# Patient Record
Sex: MALE | Race: WHITE | NOT HISPANIC OR LATINO | Employment: OTHER | URBAN - METROPOLITAN AREA
[De-identification: names, ages, dates, MRNs, and addresses within clinical notes are randomized per-mention and may not be internally consistent; named-entity substitution may affect disease eponyms.]

---

## 2017-04-04 DIAGNOSIS — E11.9 TYPE 2 DIABETES MELLITUS WITHOUT COMPLICATION, UNSPECIFIED LONG TERM INSULIN USE STATUS: ICD-10-CM

## 2017-04-04 DIAGNOSIS — E78.5 HYPERLIPIDEMIA, UNSPECIFIED HYPERLIPIDEMIA TYPE: Primary | ICD-10-CM

## 2017-04-05 DIAGNOSIS — Z12.5 SPECIAL SCREENING FOR MALIGNANT NEOPLASM OF PROSTATE: Primary | ICD-10-CM

## 2017-04-05 LAB
ALBUMIN SERPL-MCNC: 4.6 G/DL (ref 3.5–5.2)
ALBUMIN/GLOB SERPL: 1.8 G/DL
ALP SERPL-CCNC: 57 U/L (ref 39–117)
ALT SERPL-CCNC: 42 U/L (ref 1–41)
AST SERPL-CCNC: 43 U/L (ref 1–40)
BILIRUB SERPL-MCNC: 0.5 MG/DL (ref 0.1–1.2)
BUN SERPL-MCNC: 14 MG/DL (ref 8–23)
BUN/CREAT SERPL: 13.1 (ref 7–25)
CALCIUM SERPL-MCNC: 9.8 MG/DL (ref 8.6–10.5)
CHLORIDE SERPL-SCNC: 102 MMOL/L (ref 98–107)
CHOLEST SERPL-MCNC: 174 MG/DL (ref 0–200)
CO2 SERPL-SCNC: 25.6 MMOL/L (ref 22–29)
CREAT SERPL-MCNC: 1.07 MG/DL (ref 0.76–1.27)
GLOBULIN SER CALC-MCNC: 2.6 GM/DL
GLUCOSE SERPL-MCNC: 126 MG/DL (ref 65–99)
HBA1C MFR BLD: 7.87 % (ref 4.8–5.6)
HDLC SERPL-MCNC: 56 MG/DL (ref 40–60)
LDLC SERPL CALC-MCNC: 82 MG/DL (ref 0–100)
LDLC/HDLC SERPL: 1.47 {RATIO}
POTASSIUM SERPL-SCNC: 4.4 MMOL/L (ref 3.5–5.2)
PROT SERPL-MCNC: 7.2 G/DL (ref 6–8.5)
PSA SERPL-MCNC: 0.57 NG/ML (ref 0–4)
SODIUM SERPL-SCNC: 143 MMOL/L (ref 136–145)
TRIGL SERPL-MCNC: 179 MG/DL (ref 0–150)
VLDLC SERPL CALC-MCNC: 35.8 MG/DL (ref 5–40)

## 2017-04-07 ENCOUNTER — OFFICE VISIT (OUTPATIENT)
Dept: FAMILY MEDICINE CLINIC | Facility: CLINIC | Age: 67
End: 2017-04-07

## 2017-04-07 VITALS
OXYGEN SATURATION: 97 % | RESPIRATION RATE: 18 BRPM | DIASTOLIC BLOOD PRESSURE: 80 MMHG | WEIGHT: 302 LBS | SYSTOLIC BLOOD PRESSURE: 162 MMHG | HEIGHT: 74 IN | BODY MASS INDEX: 38.76 KG/M2 | HEART RATE: 90 BPM | TEMPERATURE: 97.7 F

## 2017-04-07 DIAGNOSIS — E11.9 TYPE 2 DIABETES MELLITUS WITHOUT COMPLICATION, WITHOUT LONG-TERM CURRENT USE OF INSULIN (HCC): Primary | ICD-10-CM

## 2017-04-07 DIAGNOSIS — G47.33 OBSTRUCTIVE SLEEP APNEA SYNDROME: ICD-10-CM

## 2017-04-07 DIAGNOSIS — R53.82 CHRONIC FATIGUE: ICD-10-CM

## 2017-04-07 DIAGNOSIS — E78.2 MIXED HYPERLIPIDEMIA: ICD-10-CM

## 2017-04-07 DIAGNOSIS — G47.10 HYPERSOMNIA: ICD-10-CM

## 2017-04-07 PROCEDURE — 99213 OFFICE O/P EST LOW 20 MIN: CPT

## 2017-04-07 RX ORDER — GLIMEPIRIDE 2 MG/1
2 TABLET ORAL
Qty: 30 TABLET | Refills: 2 | Status: SHIPPED | OUTPATIENT
Start: 2017-04-07 | End: 2017-07-15 | Stop reason: SDUPTHER

## 2017-04-07 RX ORDER — METFORMIN HYDROCHLORIDE 1000 MG/1
1000 TABLET, FILM COATED, EXTENDED RELEASE ORAL
COMMUNITY
End: 2017-10-10

## 2017-04-07 RX ORDER — GLIMEPIRIDE 2 MG/1
2 TABLET ORAL
COMMUNITY
End: 2017-04-07 | Stop reason: SDUPTHER

## 2017-04-07 RX ORDER — ALOGLIPTIN BENZOATE AND PIOGLITAZONE HYDROCHLORIDE 25; 30 MG/1; MG/1
TABLET, FILM COATED ORAL
COMMUNITY
End: 2017-05-18

## 2017-04-07 NOTE — PROGRESS NOTES
Subjective   Barry Cam is a 66 y.o. male. Patient is here today for   Chief Complaint   Patient presents with   • Diabetes   • Hyperlipidemia          Vitals:    04/07/17 0755   BP: 162/80   Pulse: 90   Resp: 18   Temp: 97.7 °F (36.5 °C)   SpO2: 97%     The following portions of the patient's history were reviewed and updated as appropriate: allergies, current medications, past family history, past medical history, past social history, past surgical history and problem list.    Past Medical History:   Diagnosis Date   • Allergic    • Depression    • Diabetes mellitus    • GERD (gastroesophageal reflux disease)    • History of hiatal hernia    • History of kidney stones    • Hyperglycemia    • Hyperlipidemia    • Sleep apnea       No Known Allergies   Social History     Social History   • Marital status:      Spouse name: N/A   • Number of children: N/A   • Years of education: N/A     Occupational History   • Not on file.     Social History Main Topics   • Smoking status: Never Smoker   • Smokeless tobacco: Not on file   • Alcohol use No   • Drug use: Not on file   • Sexual activity: Not on file     Other Topics Concern   • Not on file     Social History Narrative   • No narrative on file        Current Outpatient Prescriptions:   •  albuterol (PROVENTIL HFA;VENTOLIN HFA) 108 (90 BASE) MCG/ACT inhaler, Inhale 2 puffs Every 4 (Four) Hours As Needed for wheezing., Disp: 3 inhaler, Rfl: 3  •  ALLERGY/CONGESTION RELIEF 5-120 MG per 12 hr tablet, Take 1 tablet by mouth 2 (Two) Times a Day., Disp: 180 tablet, Rfl: 3  •  Alogliptin-Pioglitazone 25-30 MG tablet, Take  by mouth., Disp: , Rfl:   •  betamethasone valerate (VALISONE) 0.1 % cream, APPLY CREAM TOPICALLY TO AFFECTED AREA(S) TO SKIN TWICE DAILY, Disp: 15 g, Rfl: 1  •  glimepiride (AMARYL) 2 MG tablet, Take 1 tablet by mouth Every Morning Before Breakfast., Disp: 30 tablet, Rfl: 2  •  Glucosamine-Chondroit-Vit C-Mn (GLUCOSAMINE CHONDR 500 COMPLEX)  capsule, Take 2 capsules by mouth daily., Disp: , Rfl:   •  glucose blood (ONE TOUCH ULTRA TEST) test strip, USE TO TEST BLOOD SUGAR 3 TIMES DAILY AS DIRECTED, Disp: 100 each, Rfl: 3  •  glucose blood test strip, OneTouch Ultra Blue In Vitro Strip; Patient Sig: OneTouch Ultra Blue In Vitro Strip USE ONE STRIP TO CHECK GLUCOSE THREE TIMES DAILY; 100; 0; 09-Nov-2015; Active, Disp: , Rfl:   •  Lancets (ONETOUCH ULTRASOFT) lancets, USE TO TEST BLOOD SUGAR 3 TIMES DAILY AS DIRECTED, Disp: 100 each, Rfl: 3  •  metFORMIN (GLUMETZA) 1000 MG (MOD) 24 hr tablet, Take 1,000 mg by mouth Daily With Breakfast., Disp: , Rfl:   •  modafinil (PROVIGIL) 200 MG tablet, Take 1 tablet by mouth 2 (Two) Times a Day., Disp: 180 tablet, Rfl: 1  •  MULTIPLE VITAMINS ESSENTIAL PO, Take  by mouth daily., Disp: , Rfl:   •  naproxen sodium (ALEVE) 220 MG tablet, Take 2 tablets by mouth., Disp: , Rfl:   •  omeprazole (PriLOSEC) 20 MG capsule, Take 1 capsule by mouth Daily., Disp: 90 capsule, Rfl: 3  •  ONE TOUCH ULTRA TEST test strip, USE ONE STRIP TO CHECK GLUCOSE THREE TIMES DAILY, Disp: 100 each, Rfl: 5  •  pravastatin (PRAVACHOL) 40 MG tablet, Take 1 tablet by mouth Daily., Disp: 90 tablet, Rfl: 3     Objective     History of Present Illness   The patient is here today for follow-up on type 2 diabetes mellitus, hyperlipidemia, fatigue and hypersomnia.    Review of Systems   Constitutional:        The patient's main complaint is excessive daytime sleepiness which is not responding very well to his present medication.   HENT: Negative.    Respiratory: Negative for cough, shortness of breath and wheezing.    Cardiovascular: Negative for chest pain, palpitations and leg swelling.   Gastrointestinal: Negative for abdominal distention, abdominal pain and blood in stool.   Genitourinary: Negative.    Musculoskeletal:        Mild aches and pains only   Neurological: Negative for dizziness, light-headedness, numbness and headaches.    Psychiatric/Behavioral: Negative.        Physical Exam   Constitutional: He is oriented to person, place, and time. He appears well-developed and well-nourished.   Very overweight   Eyes: Pupils are equal, round, and reactive to light.   Neck:   Carotid pulses normal   Cardiovascular: Normal rate, regular rhythm and normal heart sounds.    Pulmonary/Chest: Effort normal and breath sounds normal. No respiratory distress. He has no wheezes. He has no rales.   Abdominal: Soft. Bowel sounds are normal.   Musculoskeletal: Normal range of motion.   Neurological: He is alert and oriented to person, place, and time.   Skin: Skin is warm and dry.   Psychiatric: He has a normal mood and affect.   The patient does appear fatigued   Nursing note and vitals reviewed.      ASSESSMENT  #1 type 2 diabetes mellitus         #2 hyperlipidemia         #3 excessive daytime sleepiness         #4 obstructive sleep apnea      #5 fatigue     DISCUSSION/SUMMARY   The patient's blood pressure was initially elevated at 162/80 but upon recheck it was down to 148/80.  Patient states that he had really not gotten much sleep last night and he has been up since about 130 AM.  The patient was told to monitor his blood pressure at home and if it exceeds 140 systolic on average she will let us know.  PSA remains normal at 0.573.  CMP is normal except for elevated fasting blood sugar of 126 and the patient's hemoglobin A1c is 7.87%.  The patient has gained approximately 20 pounds since his last visit and admits that his diet has been poor.  We discussed, again, a low sugar low starch and low saturated fat diet as well as him increasing his aerobic exercise.  Total cholesterol is 174, triglycerides 179, HDL cholesterol 56, and LDL cholesterol is 82.  The patient's biggest complaint today is his excessive daytime fatigue and sleepiness which is not really being controlled with his current dose of modafinil 200 mg twice a day.  The patient was diagnosed  as having obstructive sleep apnea in 2014 but was unsuccessful at using his CPAP machine because he just could not stand this being on his face.  He is currently not using any type of apparatus for his obstructive sleep apnea and I explained to him that treatment of his excessive daytime fatigue will most likely be intertwined with treatment of his obstructive sleep apnea .  The patient is willing to see another sleep specialist to discuss treatment for both problems.  I will get his old records concerning his obstructive sleep apnea and then make a referral to another physician for evaluation of this problem.    PLAN  Recheck in 6 months with fasting CMP, lipid panel and HbA1c.  I will also add a free T4 and TSH to his labs were drawn several days ago.  No Follow-up on file.

## 2017-04-27 RX ORDER — MODAFINIL 200 MG/1
TABLET ORAL
Qty: 180 TABLET | Refills: 0 | Status: SHIPPED | OUTPATIENT
Start: 2017-04-27 | End: 2018-01-18 | Stop reason: SDUPTHER

## 2017-05-15 RX ORDER — LANCETS
EACH MISCELLANEOUS
Qty: 100 EACH | Refills: 1 | Status: SHIPPED | OUTPATIENT
Start: 2017-05-15 | End: 2018-01-22 | Stop reason: SDUPTHER

## 2017-05-18 ENCOUNTER — TELEPHONE (OUTPATIENT)
Dept: FAMILY MEDICINE CLINIC | Facility: CLINIC | Age: 67
End: 2017-05-18

## 2017-05-18 RX ORDER — PIOGLITAZONEHYDROCHLORIDE 30 MG/1
30 TABLET ORAL DAILY
Qty: 30 TABLET | Refills: 5 | Status: SHIPPED | OUTPATIENT
Start: 2017-05-18 | End: 2017-12-15 | Stop reason: SDUPTHER

## 2017-07-17 RX ORDER — GLIMEPIRIDE 2 MG/1
TABLET ORAL
Qty: 30 TABLET | Refills: 5 | Status: SHIPPED | OUTPATIENT
Start: 2017-07-17 | End: 2018-03-17 | Stop reason: SDUPTHER

## 2017-07-17 RX ORDER — LORATADINE, PSEUDOEPHEDRINE SULFATE 5; 120 MG/1; MG/1
1 TABLET, FILM COATED, EXTENDED RELEASE ORAL 2 TIMES DAILY
Qty: 180 TABLET | Refills: 3 | Status: SHIPPED | OUTPATIENT
Start: 2017-07-17 | End: 2017-11-17 | Stop reason: SDUPTHER

## 2017-07-19 ENCOUNTER — HOSPITAL ENCOUNTER (OUTPATIENT)
Dept: SLEEP MEDICINE | Facility: HOSPITAL | Age: 67
Discharge: HOME | End: 2017-07-19

## 2017-07-19 DIAGNOSIS — M79.602 LEFT ARM PAIN: Primary | ICD-10-CM

## 2017-07-19 PROCEDURE — 99204 OFFICE O/P NEW MOD 45 MIN: CPT | Performed by: INTERNAL MEDICINE

## 2017-07-23 NOTE — H&P
[facility] New Patient/Consultation       Reason for Consultation: GRICEL    Patient Care Team:  Austin Shaikh MD as PCP - General  Barry Gillespie MD as Consulting Physician (Sleep Medicine)    Chief complaint:  GRICEL    History of present illness:  The patient is a 67 y.o. male who has a history of severe GRICEL diagnosed by overnight polysomnogram in August 20 14 at [facility].  The patient only used his CPAP for short time because he could not stand it.  Today he has complaints of excessive daytime sleepiness with related occasional exhaustion.  He goes to bed between 10 PM and 2 AM and awakens between 5 and 7 AM.  Takes him a long time to go to sleep and is variable on how he feels upon awakening.  She will take 1 or 2 naps a day.  His [place] Sleepiness Scale is abnormal at 15.  He has gained weight.  He has nocturnal pain.  He grinds his teeth at night.  He has complaints of COLE.  He has difficulty staying asleep.  He will go to the bathroom once or twice during the nighttime.  He is sleepy even if he increases his times.    Review of Systems:  Recorded on the Sleep Questionnaire.  Unremarkable except for nasal congestion and postnasal drip, dizziness, and anxiety and depression.    History:  Past Medical History:   Diagnosis Date   • Allergic    • Depression    • Diabetes mellitus    • GERD (gastroesophageal reflux disease)    • History of hiatal hernia    • History of kidney stones    • Hyperglycemia    • Hyperlipidemia    • Sleep apnea    ,   Past Surgical History:   Procedure Laterality Date   • HERNIA REPAIR     ,   Family History   Problem Relation Age of Onset   • Depression Mother    • Coronary artery disease Father    • Diabetes Father    • Stroke Father     and   Social History   Substance Use Topics   • Smoking status: Never Smoker   • Smokeless tobacco: Not on file   • Alcohol use No       Social History:  He is a retired .  He will have 1 or 2 quarts of the  caffeinated beverage a day.    Allergies:  No known medical allergies.     Medication Review: Reviewed.    Vital Signs:  Height 73 inches and his weight is 304 pounds.  In 2014, his weight was 286 pounds.  He is morbidly obese with a body mass index of 40.  /79 and heart rate 94 and neck size 18 inches.     Physical Exam:  Recorded on the [facility] Physical Exam Form and is unremarkable except for:  A large tongue and uvula and mild narrowing of the posterior pharyngeal opening and class II-III MP airway.     Results Review:  I reviewed the results of his overnight polysomnogram as well as as titration study performed in 2014.       Impression:   History of severe GRICEL with sleep-related hypoxemia with the emergence of complex sleep apnea with positive airway pressure therapy, borderline, and 2014.  The patient has gained 20 pounds since that time.  He has complaints of hypersomnolence.    Plan:  Good sleep hygiene measures should be maintained weight loss would be beneficial.  Pathophysiology of obstructive sleep apnea described to the patient.  Cardiovascular complications of severe untreated GRICEL also reviewed.    I obtained due to reports of his overnight polysomnogram after the visit.  I was asked the patient to return for follow-up and to bring all of his sleep equipment.  All will be reviewed and further recommendations to be made.  Previously the patient asked about an oral appliance.  Due to the severity, that will not cure his present condition.    Thank you for allowing me to assist in this patient's care.    Barry Gillespie MD  07/23/17  9:17 AM

## 2017-08-21 ENCOUNTER — OFFICE VISIT (OUTPATIENT)
Dept: ORTHOPEDIC SURGERY | Facility: CLINIC | Age: 67
End: 2017-08-21

## 2017-08-21 VITALS — TEMPERATURE: 98.8 F | WEIGHT: 305 LBS | BODY MASS INDEX: 40.42 KG/M2 | HEIGHT: 73 IN

## 2017-08-21 DIAGNOSIS — M75.102 LEFT ROTATOR CUFF TEAR ARTHROPATHY: ICD-10-CM

## 2017-08-21 DIAGNOSIS — S49.92XA SHOULDER INJURY, LEFT, INITIAL ENCOUNTER: Primary | ICD-10-CM

## 2017-08-21 DIAGNOSIS — S69.92XA LEFT WRIST INJURY, INITIAL ENCOUNTER: ICD-10-CM

## 2017-08-21 DIAGNOSIS — M12.812 LEFT ROTATOR CUFF TEAR ARTHROPATHY: ICD-10-CM

## 2017-08-21 PROCEDURE — 20610 DRAIN/INJ JOINT/BURSA W/O US: CPT | Performed by: ORTHOPAEDIC SURGERY

## 2017-08-21 PROCEDURE — 73110 X-RAY EXAM OF WRIST: CPT | Performed by: ORTHOPAEDIC SURGERY

## 2017-08-21 PROCEDURE — 73030 X-RAY EXAM OF SHOULDER: CPT | Performed by: ORTHOPAEDIC SURGERY

## 2017-08-21 PROCEDURE — 99204 OFFICE O/P NEW MOD 45 MIN: CPT | Performed by: ORTHOPAEDIC SURGERY

## 2017-08-21 RX ADMIN — BUPIVACAINE HYDROCHLORIDE 2 ML: 5 INJECTION, SOLUTION PERINEURAL at 09:10

## 2017-08-21 RX ADMIN — METHYLPREDNISOLONE ACETATE 160 MG: 80 INJECTION, SUSPENSION INTRA-ARTICULAR; INTRALESIONAL; INTRAMUSCULAR; SOFT TISSUE at 09:10

## 2017-08-21 RX ADMIN — LIDOCAINE HYDROCHLORIDE 2 ML: 10 INJECTION, SOLUTION INFILTRATION; PERINEURAL at 09:10

## 2017-08-21 NOTE — PROGRESS NOTES
"Patient: Barry Cma    YOB: 1950    Medical Record Number: [id number]    Chief Complaints:  Left shoulder pain and weakness; left wrist pain    History of Present Illness:     67 y.o. male patient who presents with a long history of progressively worsening pain and dysfunction affecting the left shoulder.  This shoulder has bothered him for years but he fell in his yard at home approximately 5 weeks ago and his pain is a little worse.  He describes his current pain as mild to moderate, intermittent, and both crushing and grinding in character.  His biggest complaint is weakness.  This is new since his fall.   He struggles to raise the left arm on his own.   He tells me that he has to use his right hand to raise the left.  He tells me that he is occasionally able to raise the left arm on it sound lyons has to \"manipulated into position\" before he can raise it.  He has not noticed any alleviating factors for his pain or weakness.    He also tells me that he injured his left wrist when he fell.  He was having some discomfort into the dorsum of the wrist.  The wrist pain is now completely resolved.  He tells me the wrist feels like it is \"back to normal\" as he states it.    Allergies: No Known Allergies    Home Medications:    Current Outpatient Prescriptions:   •  albuterol (PROVENTIL HFA;VENTOLIN HFA) 108 (90 BASE) MCG/ACT inhaler, Inhale 2 puffs Every 4 (Four) Hours As Needed for wheezing., Disp: 3 inhaler, Rfl: 3  •  ALLERGY/CONGESTION RELIEF 5-120 MG per 12 hr tablet, Take 1 tablet by mouth 2 (Two) Times a Day., Disp: 180 tablet, Rfl: 3  •  glimepiride (AMARYL) 2 MG tablet, TAKE ONE TABLET BY MOUTH IN THE MORNING BEFORE  BREAKFAST, Disp: 30 tablet, Rfl: 5  •  Glucosamine-Chondroit-Vit C-Mn (GLUCOSAMINE CHONDR 500 COMPLEX) capsule, Take 2 capsules by mouth daily., Disp: , Rfl:   •  Lancets (ONETOUCH ULTRASOFT) lancets, USE ONE LANCET TWO TO THREE TIMES DAILY TO TEST BLOOD GLUCOSE, Disp: 100 each, " Rfl: 1  •  metFORMIN (GLUCOPHAGE) 1000 MG tablet, , Disp: , Rfl:   •  metFORMIN (GLUMETZA) 1000 MG (MOD) 24 hr tablet, Take 1,000 mg by mouth Daily With Breakfast., Disp: , Rfl:   •  modafinil (PROVIGIL) 200 MG tablet, TAKE ONE TABLET BY MOUTH TWICE DAILY, Disp: 180 tablet, Rfl: 0  •  MULTIPLE VITAMINS ESSENTIAL PO, Take  by mouth daily., Disp: , Rfl:   •  naproxen sodium (ALEVE) 220 MG tablet, Take 2 tablets by mouth., Disp: , Rfl:   •  omeprazole (PriLOSEC) 20 MG capsule, Take 1 capsule by mouth Daily., Disp: 90 capsule, Rfl: 3  •  ONE TOUCH ULTRA TEST test strip, USE ONE STRIP TO CHECK GLUCOSE THREE TIMES DAILY, Disp: 100 each, Rfl: 1  •  pioglitazone (ACTOS) 30 MG tablet, Take 1 tablet by mouth Daily., Disp: 30 tablet, Rfl: 5  •  pravastatin (PRAVACHOL) 40 MG tablet, Take 1 tablet by mouth Daily., Disp: 90 tablet, Rfl: 3  •  SITagliptin (JANUVIA) 100 MG tablet, Take 1 tablet by mouth Daily., Disp: 30 tablet, Rfl: 5  •  betamethasone valerate (VALISONE) 0.1 % cream, APPLY CREAM TOPICALLY TO AFFECTED AREA(S) TO SKIN TWICE DAILY, Disp: 15 g, Rfl: 1    Past Medical History:   Diagnosis Date   • Allergic    • Depression    • Diabetes mellitus    • GERD (gastroesophageal reflux disease)    • History of hiatal hernia    • History of kidney stones    • Hyperglycemia    • Hyperlipidemia    • Sleep apnea        Past Surgical History:   Procedure Laterality Date   • HERNIA REPAIR         Social History     Occupational History   • Not on file.     Social History Main Topics   • Smoking status: Never Smoker   • Smokeless tobacco: Not on file   • Alcohol use No   • Drug use: Not on file   • Sexual activity: Not on file      Social History     Social History Narrative       Family History   Problem Relation Age of Onset   • Depression Mother    • Coronary artery disease Father    • Diabetes Father    • Stroke Father        Review of Systems:      Constitutional: Denies fever, shaking or chills   Eyes: Denies change in visual  "acuity   HEENT: Denies nasal congestion or sore throat   Respiratory: Denies cough or shortness of breath   Cardiovascular: Denies chest pain or edema  Endocrine: Denies tremors, palpitations, intolerance of heat or cold, polyuria, polydipsia.  GI: Denies abdominal pain, nausea, vomiting, bloody stools or diarrhea  : Denies frequency, urgency, incontinence, retention, or nocturia.  Musculoskeletal: Denies numbness tingling or loss of motor function except as above  Integument: Denies rash, lesion or ulceration   Neurologic: Denies headache or focal weakness, deficits  Heme: Denies epistaxis, spontaneous or excessive bleeding, epistaxis, hematuria, melena, fatigue, enlarged or tender lymph nodes.      All other pertinent positives and negatives as noted above in HPI.    Physical Exam: 67 y.o. male  Vitals:    08/21/17 0929   Temp: 98.8 °F (37.1 °C)   TempSrc: Temporal Artery    Weight: (!) 305 lb (138 kg)   Height: 73\" (185.4 cm)     General:  Patient is awake and alert.  Appears in no acute distress or discomfort.  Oriented to person, place, and time.    Psych:  Affect and demeanor are appropriate.    Eyes:  Conjunctiva and sclera appear grossly normal.  Eyes track well and EOM seem to be intact.    Ears:  No gross abnormalities.  Hearing adequate for the exam.    Cardiovascular:  Regular rate and rhythm.    Lungs:  Good chest expansion.  Breathing unlabored.    Spine:  Neck appears grossly normal.  No palpable masses or adenopathy.  Good motion.  Spurling's maneuver is negative for any shoulder or arm symptoms.    Extremities:  Skin is benign.  No obvious gross abnormalities about left shoulder.  No palpable masses or adenopathy.  Trace bursal effusion.  Moderate tenderness noted over anterior glenohumeral joint and rotator interval.  Motion is limited and uncomfortable.  He has to use his right arm to assist with raising the left.  No instability.  4 out of 5 strength with resistive testing of elevation in the " scapular plane and external rotation.  15° external rotation lag sign.  Negative Hornblower's.  The deltoid fires well.  Axillary nerve sensation is intact.  Good motor and sensory function in lower arm and hand.  No tenderness at the wrist.  He has full wrist motion and there is no evident instability.  Palpable radial pulse.  Brisk cap refill.  Good skin turgor.         Radiology:  AP, scapular Y, and axillary views of the left shoulder are ordered by myself and reviewed to evaluate the patient's complaint.  No comparison films are immediately available.  The x-rays show significant rotator cuff tear arthropathy with a diminished acromiohumeral interval, joint space narrowing, osteophyte formation, and subchondral sclerosis.  AP, oblique, and lateral views of the left wrist are also ordered and reviewed.  Again, no comparison films are available.  There is questionable widening of the scapholunate interval.  I do not see any other concerning findings on the x-rays.    Assessment/Plan:  1.  Left shoulder rotator cuff tear arthropathy with suspected acute on chronic rotator cuff tear  2.  Left wrist sprain, possible scapholunate ligament injury  3.  Diabetes mellitus    For the shoulder, we discussed treatment options in detail including the risks, benefits, and alternatives of conservative treatment versus surgical options.  Regarding conservative treatment, we discussed appropriate activity modifications, anti-inflammatories, injections, and physical therapy.  We also discussed the option of an arthroplasty and all that would entail.  I have recommended that we start with a conservative approach and he agrees.  The risks, benefits, and alternatives to an injection were carefully discussed, particularly with respect to his diabetes.  He acknowledged understanding of this information and consented to proceed.    His wrist pain is completely resolved at this point.  We will manage that expectantly.  If the shoulder  pain persists or recurs or if the wrist pain recurs, I told him to let me know and I will be happy to see him back.  Otherwise, he can follow-up as needed.    Large Joint Arthrocentesis  Date/Time: 8/21/2017 9:10 AM  Consent given by: patient  Site marked: site marked  Timeout: Immediately prior to procedure a time out was called to verify the correct patient, procedure, equipment, support staff and site/side marked as required   Supporting Documentation  Indications: pain   Procedure Details  Location: shoulder - L subacromial bursa  Preparation: Patient was prepped and draped in the usual sterile fashion  Needle size: 25 G  Approach: posterior  Medications administered: 2 mL lidocaine 1 %; 160 mg methylPREDNISolone acetate 80 MG/ML; 2 mL bupivacaine  Patient tolerance: patient tolerated the procedure well with no immediate complications            Noah Salcedo MD    08/21/2017    CC to Austin Shaikh MD

## 2017-08-22 RX ORDER — METHYLPREDNISOLONE ACETATE 80 MG/ML
160 INJECTION, SUSPENSION INTRA-ARTICULAR; INTRALESIONAL; INTRAMUSCULAR; SOFT TISSUE
Status: COMPLETED | OUTPATIENT
Start: 2017-08-21 | End: 2017-08-21

## 2017-08-22 RX ORDER — LIDOCAINE HYDROCHLORIDE 10 MG/ML
2 INJECTION, SOLUTION INFILTRATION; PERINEURAL
Status: COMPLETED | OUTPATIENT
Start: 2017-08-21 | End: 2017-08-21

## 2017-08-22 RX ORDER — BUPIVACAINE HYDROCHLORIDE 5 MG/ML
2 INJECTION, SOLUTION PERINEURAL
Status: COMPLETED | OUTPATIENT
Start: 2017-08-21 | End: 2017-08-21

## 2017-08-23 ENCOUNTER — HOSPITAL ENCOUNTER (OUTPATIENT)
Dept: SLEEP MEDICINE | Facility: HOSPITAL | Age: 67
Discharge: HOME | End: 2017-08-23
Admitting: INTERNAL MEDICINE

## 2017-08-23 ENCOUNTER — TELEPHONE (OUTPATIENT)
Dept: SLEEP MEDICINE | Facility: HOSPITAL | Age: 67
End: 2017-08-23

## 2017-08-23 PROCEDURE — 99213 OFFICE O/P EST LOW 20 MIN: CPT | Performed by: INTERNAL MEDICINE

## 2017-08-23 PROCEDURE — G0463 HOSPITAL OUTPT CLINIC VISIT: HCPCS

## 2017-08-26 NOTE — PROGRESS NOTES
Follow Up [facility] Note       Patient Care Team:  Austin Shaikh MD as PCP - General  Barry Gillespie MD as Consulting Physician (Sleep Medicine)    Chief Complaint:  GRICEL     Interval History:   The patient was last seen by me in July 2017.  He is here today with all of his sleep supplies.  He is unchanged.  His bedtime and wake time varies.  He has no new symptoms since I last saw him.  [place] Sleepiness Scale is abnormal at 10.    Review of Systems:  Recorded on the Sleep Questionnaire.  Unremarkable except for nasal congestion and COLE.    Social History:  He does not smoke cigarettes.  No alcohol.  2 bottles of tea a day.    Allergies:  No known medical allergies.     Medication Review:  Reviewed.      Vital Signs:  Height 73 inches and weight 300 pounds and he is obese with a body mass index of 39-40.    Physical Exam:    Constitutional:  Well developed white male and appears in no apparent distress.  Awake & oriented times 3.  Normal mood with normal recent and remote memory and normal judgement.  Eyes:  Conjunctivae normal.  Oropharynx:  moist mucous membranes without exudate and a large tongue and uvula and mild narrowing of the posterior pharyngeal opening and class II-III MP airway.      Results Review:  I reviewed downloads from 2014.  The patient has an auto CPAP set on 11 cm water pressure.  His AHI was normal at that time.  Over 4 days of using CPAP +11, average usage is 4 hours and 16 minutes with a maximum usage of 6 hours and 6 minutes.      Impression:    history of severe GRICEL with sleep-related hypoxemia with the emergence of complex sleep apnea with positive airway pressure therapy.      Plan:  Good sleep hygiene measurewhite female weight down to continueuld be maintained.  Weight loss would be beneficial in this patient who is obese by BMI.  The patient is benefiting from the treatment being provideI reviewed all with the patient.  His auto CPAP will be changed to 9-16.   A ResMed Air Fit small mask recommended with chinstrap.  The patient will work with his CPAP for 4-6 weeks to obtain compliance.      The patient will call for any problems and will follow up in 4-6 weeks.      Barry Gillespie MD  08/26/17  3:25 PM

## 2017-09-06 ENCOUNTER — TREATMENT (OUTPATIENT)
Dept: PHYSICAL THERAPY | Facility: CLINIC | Age: 67
End: 2017-09-06

## 2017-09-06 DIAGNOSIS — G89.29 CHRONIC LEFT SHOULDER PAIN: Primary | ICD-10-CM

## 2017-09-06 DIAGNOSIS — M75.102 TEAR OF LEFT ROTATOR CUFF, UNSPECIFIED TEAR EXTENT: ICD-10-CM

## 2017-09-06 DIAGNOSIS — M25.512 CHRONIC LEFT SHOULDER PAIN: Primary | ICD-10-CM

## 2017-09-06 PROCEDURE — G8984 CARRY CURRENT STATUS: HCPCS | Performed by: PHYSICAL THERAPIST

## 2017-09-06 PROCEDURE — 97110 THERAPEUTIC EXERCISES: CPT | Performed by: PHYSICAL THERAPIST

## 2017-09-06 PROCEDURE — 97161 PT EVAL LOW COMPLEX 20 MIN: CPT | Performed by: PHYSICAL THERAPIST

## 2017-09-06 PROCEDURE — G8985 CARRY GOAL STATUS: HCPCS | Performed by: PHYSICAL THERAPIST

## 2017-09-06 NOTE — PATIENT INSTRUCTIONS
Please view [facility] Barry Cam for a complete list of HEP instructions.  A&P of symptoms.   PT course of care, treatment outcomes.

## 2017-09-06 NOTE — PROGRESS NOTES
Physical Therapy Initial Evaluation    Patient Name: Barry Cam       Patient MRN: [id number]  : 1950  Physician:Noah Salcedo MD  Date: 2017    Encounter Diagnoses   Name Primary?   • Chronic left shoulder pain Yes   • Tear of left rotator cuff, unspecified tear extent        Subjective Evaluation    History of Present Illness  Date of surgery: 2017  Mechanism of injury: Pt reports he fell on the left side using the left arm to catch himself when he tripped in his garden.  Reports he waited 3 weeks, but it did not get better so he went to the MD because he was concerned something was broken.   Pt reports he hurt his wrist and shoulder when he fell, the wrist is feeling better, has some pain with pushing down or turning.   Reports MD stated he had hurt his shoulder previously and the fall probably tore the tendons further.   PMH: right leg fx, DM II      Patient Occupation: Retired  Quality of life: excellent    Pain  At worst pain rating: 3  Location: Front and top of the shoulder  Quality: sharp (Snapping. Audible popping, usually painfree)  Alleviating factors: Cortizone shot helped for 3 days. 2 Aleve 2x/day.  Aggravating factors: movement and lifting (Lifting out to the side)    Social Support  Lives in: multiple-level home  Lives with: spouse    Hand dominance: right    Diagnostic Tests  X-ray: abnormal (Arthritis)    Treatments  Previous treatment: injection treatment  Patient Goals  Patient goals for therapy: decreased pain           Objective     Postural Observations  Seated posture: fair (elevated and rounded shoulders)        Tenderness     Left Shoulder   Tenderness in the AC joint (Anterior GH joint), infraspinatus tendon and supraspinatus tendon.     Cervical/Thoracic Screen   Cervical range of motion within normal limits  Thoracic range of motion within normal limits    Neurological Testing   Sensation     Shoulder   Left Shoulder   Intact: light  touch    Right Shoulder   Intact: light touch    Reflexes   Left   Biceps (C5/C6): normal (2+)    Right   Biceps (C5/C6): normal (2+)    Active Range of Motion   Left Shoulder   Flexion: 160 degrees   Abduction: 155 degrees   External rotation 45°: 2 degrees with pain  Internal rotation BTB: T10     Right Shoulder   Flexion: 164 degrees   Abduction: 160 degrees   External rotation 45°: 84 degrees   Internal rotation BTB: T10     Passive Range of Motion   Left Shoulder   External rotation 45°: 85 degrees     Joint Play   Left Shoulder  Hypomobile in the posterior capsule and inferior capsule.    Strength/Myotome Testing     Left Shoulder     Planes of Motion   Flexion: 4   Abduction: 3   External rotation at 0°: 2-   Internal rotation at 0°: 4+     Isolated Muscles   Biceps: 4+   Supraspinatus: 3-   Triceps: 5     Right Shoulder     Planes of Motion   Flexion: 5   Abduction: 5   External rotation at 0°: 5   Internal rotation at 0°: 5     Isolated Muscles   Biceps: 5   Supraspinatus: 4   Triceps: 5     Tests     Left Shoulder   Negative Neer's.        Assessment & Plan     Assessment  Impairments: abnormal or restricted ROM, impaired physical strength, lacks appropriate home exercise program and pain with function  Assessment details: Barry Cam is a pleasant 67 y.o. male that presents with signs and symptoms consistent with the above diagnosis.   Pt will benefit from skilled PT services in order to address listed impairments and increase tolerance to normal daily activities including ADL's, work and recreational activities.    Prognosis: good  Functional Limitations: carrying objects, lifting, pulling, pushing, uncomfortable because of pain, reaching behind back and reaching overhead  Goals  Plan Goals: 2 weeks. Pt will:  1. Be independent with initial HEP  4 weeks. Pt will:  1. Report pain of </= 1/10 with all ADLs  2. Demonstrate improved left UE ER MMT of >/= 3+/5  3. Demonstrate improved left GH AROM ER of  >/= 20  4. Demonstrate independence with long term strengthening HEP  5. QuickDASH </= 3%, corresponding with        Plan  Therapy options: will be seen for skilled physical therapy services  Planned modality interventions: cryotherapy, electrical stimulation/German stimulation, ultrasound and thermotherapy (hydrocollator packs)  Planned therapy interventions: manual therapy, neuromuscular re-education, postural training, soft tissue mobilization, spinal/joint mobilization, stretching, strengthening, joint mobilization and home exercise program  Frequency: 1x week  Duration in weeks: 4  Treatment plan discussed with: patient        Therapy Exercise 65712 10 minutes    Timed Code Treatment: 10   Minutes     Total Treatment Time: 45      Minutes    Mary Ann Starkey, PT, DPT  Physical Therapist    Medicare Initial Certification  Certification Period: 12/5/2017  I certify that the therapy services are furnished while this patient is under my care.  The services outlined above are required by this patient, and will be reviewed every 90 days.     PHYSICIAN: Noah Salcedo MD      DATE:     Please sign and return via fax to [phone].. Thank you, [facility].

## 2017-09-18 ENCOUNTER — TREATMENT (OUTPATIENT)
Dept: PHYSICAL THERAPY | Facility: CLINIC | Age: 67
End: 2017-09-18

## 2017-09-18 DIAGNOSIS — M75.102 TEAR OF LEFT ROTATOR CUFF, UNSPECIFIED TEAR EXTENT: ICD-10-CM

## 2017-09-18 DIAGNOSIS — M25.512 CHRONIC LEFT SHOULDER PAIN: Primary | ICD-10-CM

## 2017-09-18 DIAGNOSIS — G89.29 CHRONIC LEFT SHOULDER PAIN: Primary | ICD-10-CM

## 2017-09-18 PROCEDURE — 97035 APP MDLTY 1+ULTRASOUND EA 15: CPT | Performed by: PHYSICAL THERAPIST

## 2017-09-18 PROCEDURE — 97110 THERAPEUTIC EXERCISES: CPT | Performed by: PHYSICAL THERAPIST

## 2017-09-18 NOTE — PROGRESS NOTES
"   Physical Therapy Daily Progress Note      Barry Cam reports: his shoulder \"popped\" about 5 days ago, cannot remember what he was doing at the time and it has been bothering him ever since.  Reports he has not been doing the exercises since then.     Objective   See Exercise, Manual, and Modality Logs for complete treatment.     Assessment/Plan  Instructed pt in pain free progressed strengthening HEP, importance of avoiding painful activities, and continued use if cryotherapy.  Pt would benefit from additional skilled PT to continue to progress left GH ROM and strength to tolerance.    Progress strengthening /stabilization /functional activity    Therapy Exercise 58277 15 minutes    Timed Code Treatment: 23   Minutes     Total Treatment Time: 50      Minutes    Mary Ann Starkey, PT, DPT  Physical Therapist #[id number]        "

## 2017-09-27 ENCOUNTER — HOSPITAL ENCOUNTER (OUTPATIENT)
Dept: SLEEP MEDICINE | Facility: HOSPITAL | Age: 67
Discharge: HOME | End: 2017-09-27
Admitting: INTERNAL MEDICINE

## 2017-09-27 PROCEDURE — G0463 HOSPITAL OUTPT CLINIC VISIT: HCPCS

## 2017-09-27 PROCEDURE — 99213 OFFICE O/P EST LOW 20 MIN: CPT | Performed by: INTERNAL MEDICINE

## 2017-09-29 ENCOUNTER — TREATMENT (OUTPATIENT)
Dept: PHYSICAL THERAPY | Facility: CLINIC | Age: 67
End: 2017-09-29

## 2017-09-29 DIAGNOSIS — M25.512 CHRONIC LEFT SHOULDER PAIN: Primary | ICD-10-CM

## 2017-09-29 DIAGNOSIS — G89.29 CHRONIC LEFT SHOULDER PAIN: Primary | ICD-10-CM

## 2017-09-29 DIAGNOSIS — M75.102 TEAR OF LEFT ROTATOR CUFF, UNSPECIFIED TEAR EXTENT: ICD-10-CM

## 2017-09-29 PROCEDURE — 97530 THERAPEUTIC ACTIVITIES: CPT | Performed by: PHYSICAL THERAPIST

## 2017-09-29 PROCEDURE — 97110 THERAPEUTIC EXERCISES: CPT | Performed by: PHYSICAL THERAPIST

## 2017-09-29 NOTE — PROGRESS NOTES
Physical Therapy Daily Progress Note      Barry Cam reports: the hardest thing he has to do is get up off the floor. Reports difficulty because he cannot use the left arm to push up from the ground.   Pain scale: 0     Objective   See Exercise, Manual, and Modality Logs for complete treatment.     Assessment/Plan  Pt performed all strengthening with no increased pain. Reviewed supine to standing transfers from ground, advised pt to roll to right instead of left due to weakness in the left shoulder to stabilize to push up from the ground.  Discussed with patient, long term outcomes, anatomy of why he continues to have the weakness and popping.  Instructed pt to continue HEP, follow up with MD as needed.   Other    Therapy Exercise 31526 15 minutes and Ther Act 81201 10 minutes    Timed Code Treatment: 25   Minutes     Total Treatment Time: 32      Minutes    Mary Ann Starkey, PT, DPT  Physical Therapist #[id number]

## 2017-10-02 DIAGNOSIS — E78.5 HYPERLIPIDEMIA, UNSPECIFIED HYPERLIPIDEMIA TYPE: ICD-10-CM

## 2017-10-02 DIAGNOSIS — E11.9 TYPE 2 DIABETES MELLITUS WITHOUT COMPLICATION, UNSPECIFIED LONG TERM INSULIN USE STATUS: Primary | ICD-10-CM

## 2017-10-05 LAB
ALBUMIN SERPL-MCNC: 4.4 G/DL (ref 3.5–5.2)
ALBUMIN/GLOB SERPL: 1.7 G/DL
ALP SERPL-CCNC: 48 U/L (ref 39–117)
ALT SERPL-CCNC: 32 U/L (ref 1–41)
AST SERPL-CCNC: 27 U/L (ref 1–40)
BILIRUB SERPL-MCNC: 0.5 MG/DL (ref 0.1–1.2)
BUN SERPL-MCNC: 14 MG/DL (ref 8–23)
BUN/CREAT SERPL: 14.3 (ref 7–25)
CALCIUM SERPL-MCNC: 9.9 MG/DL (ref 8.6–10.5)
CHLORIDE SERPL-SCNC: 103 MMOL/L (ref 98–107)
CHOLEST SERPL-MCNC: 154 MG/DL (ref 0–200)
CO2 SERPL-SCNC: 26.5 MMOL/L (ref 22–29)
CREAT SERPL-MCNC: 0.98 MG/DL (ref 0.76–1.27)
GLOBULIN SER CALC-MCNC: 2.6 GM/DL
GLUCOSE SERPL-MCNC: 125 MG/DL (ref 65–99)
HBA1C MFR BLD: 6.45 % (ref 4.8–5.6)
HDLC SERPL-MCNC: 55 MG/DL (ref 40–60)
LDLC SERPL CALC-MCNC: 62 MG/DL (ref 0–100)
LDLC/HDLC SERPL: 1.12 {RATIO}
POTASSIUM SERPL-SCNC: 4.2 MMOL/L (ref 3.5–5.2)
PROT SERPL-MCNC: 7 G/DL (ref 6–8.5)
SODIUM SERPL-SCNC: 145 MMOL/L (ref 136–145)
TRIGL SERPL-MCNC: 187 MG/DL (ref 0–150)
VLDLC SERPL CALC-MCNC: 37.4 MG/DL (ref 5–40)

## 2017-10-08 DIAGNOSIS — IMO0002 SLEEP-RELATED HYPOVENTILATION: ICD-10-CM

## 2017-10-08 DIAGNOSIS — G47.31 COMPLEX SLEEP APNEA SYNDROME: ICD-10-CM

## 2017-10-08 DIAGNOSIS — G47.33 OSA (OBSTRUCTIVE SLEEP APNEA): Primary | ICD-10-CM

## 2017-10-09 ENCOUNTER — DOCUMENTATION (OUTPATIENT)
Dept: SLEEP MEDICINE | Facility: HOSPITAL | Age: 67
End: 2017-10-09

## 2017-10-10 ENCOUNTER — OFFICE VISIT (OUTPATIENT)
Dept: FAMILY MEDICINE CLINIC | Facility: CLINIC | Age: 67
End: 2017-10-10

## 2017-10-10 VITALS
HEIGHT: 73 IN | OXYGEN SATURATION: 96 % | SYSTOLIC BLOOD PRESSURE: 140 MMHG | DIASTOLIC BLOOD PRESSURE: 78 MMHG | TEMPERATURE: 98 F | HEART RATE: 86 BPM | BODY MASS INDEX: 40.95 KG/M2 | WEIGHT: 309 LBS | RESPIRATION RATE: 18 BRPM

## 2017-10-10 DIAGNOSIS — G47.10 HYPERSOMNIA: ICD-10-CM

## 2017-10-10 DIAGNOSIS — E11.9 TYPE 2 DIABETES MELLITUS WITHOUT COMPLICATION, WITHOUT LONG-TERM CURRENT USE OF INSULIN (HCC): Primary | ICD-10-CM

## 2017-10-10 DIAGNOSIS — G47.33 OBSTRUCTIVE SLEEP APNEA SYNDROME: ICD-10-CM

## 2017-10-10 DIAGNOSIS — E78.2 MIXED HYPERLIPIDEMIA: ICD-10-CM

## 2017-10-10 PROCEDURE — 99213 OFFICE O/P EST LOW 20 MIN: CPT

## 2017-10-10 NOTE — PROGRESS NOTES
Subjective   Barry Cam is a 67 y.o. male. Patient is here today for   Chief Complaint   Patient presents with   • Diabetes   • Hyperlipidemia          Vitals:    10/10/17 0751   BP: 140/78   Pulse: 86   Resp: 18   Temp: 98 °F (36.7 °C)   SpO2: 96%     The following portions of the patient's history were reviewed and updated as appropriate: allergies, current medications, past family history, past medical history, past social history, past surgical history and problem list.    Past Medical History:   Diagnosis Date   • Allergic    • Depression    • Diabetes mellitus    • GERD (gastroesophageal reflux disease)    • History of hiatal hernia    • History of kidney stones    • Hyperglycemia    • Hyperlipidemia    • Sleep apnea       No Known Allergies   Social History     Social History   • Marital status:      Spouse name: N/A   • Number of children: N/A   • Years of education: N/A     Occupational History   • Not on file.     Social History Main Topics   • Smoking status: Never Smoker   • Smokeless tobacco: Not on file   • Alcohol use No   • Drug use: Not on file   • Sexual activity: Not on file     Other Topics Concern   • Not on file     Social History Narrative        Current Outpatient Prescriptions:   •  albuterol (PROVENTIL HFA;VENTOLIN HFA) 108 (90 BASE) MCG/ACT inhaler, Inhale 2 puffs Every 4 (Four) Hours As Needed for wheezing., Disp: 3 inhaler, Rfl: 3  •  ALLERGY/CONGESTION RELIEF 5-120 MG per 12 hr tablet, Take 1 tablet by mouth 2 (Two) Times a Day., Disp: 180 tablet, Rfl: 3  •  betamethasone valerate (VALISONE) 0.1 % cream, APPLY CREAM TOPICALLY TO AFFECTED AREA(S) TO SKIN TWICE DAILY, Disp: 15 g, Rfl: 1  •  glimepiride (AMARYL) 2 MG tablet, TAKE ONE TABLET BY MOUTH IN THE MORNING BEFORE  BREAKFAST, Disp: 30 tablet, Rfl: 5  •  Glucosamine-Chondroit-Vit C-Mn (GLUCOSAMINE CHONDR 500 COMPLEX) capsule, Take 2 capsules by mouth daily., Disp: , Rfl:   •  Lancets (ONETOUCH ULTRASOFT) lancets, USE ONE  LANCET TWO TO THREE TIMES DAILY TO TEST BLOOD GLUCOSE, Disp: 100 each, Rfl: 1  •  modafinil (PROVIGIL) 200 MG tablet, TAKE ONE TABLET BY MOUTH TWICE DAILY, Disp: 180 tablet, Rfl: 0  •  MULTIPLE VITAMINS ESSENTIAL PO, Take  by mouth daily., Disp: , Rfl:   •  naproxen sodium (ALEVE) 220 MG tablet, Take 2 tablets by mouth., Disp: , Rfl:   •  omeprazole (PriLOSEC) 20 MG capsule, Take 1 capsule by mouth Daily., Disp: 90 capsule, Rfl: 3  •  ONE TOUCH ULTRA TEST test strip, USE ONE STRIP TO CHECK GLUCOSE THREE TIMES DAILY, Disp: 100 each, Rfl: 1  •  pioglitazone (ACTOS) 30 MG tablet, Take 1 tablet by mouth Daily., Disp: 30 tablet, Rfl: 5  •  pravastatin (PRAVACHOL) 40 MG tablet, Take 1 tablet by mouth Daily., Disp: 90 tablet, Rfl: 3  •  SITagliptin (JANUVIA) 100 MG tablet, Take 1 tablet by mouth Daily., Disp: 30 tablet, Rfl: 5     Objective     History of Present Illness   The patient is here today for follow-up on type 2 diabetes mellitus and hyperlipidemia    Review of Systems   Constitutional:        Mild, chronic fatigue and daytime hyperinsomnia   HENT: Negative.    Respiratory: Negative for cough, shortness of breath and wheezing.    Cardiovascular: Negative for chest pain, palpitations and leg swelling.   Gastrointestinal: Negative for abdominal pain, blood in stool, constipation and diarrhea.   Genitourinary: Negative.    Musculoskeletal:        The patient fell and injured his left shoulder earlier this past summer and does have chronic pain.  He is being followed by orthopedic surgery for this and is going through physical therapy.  Patient may very well need surgery later.  He also has osteoarthritic aches and pains in various joints   Neurological: Negative.    Hematological: Negative.    Psychiatric/Behavioral: Negative.        Physical Exam   Constitutional: He is oriented to person, place, and time. He appears well-developed and well-nourished.   Overweight   Neck:   Carotid pulses normal   Cardiovascular:  Normal rate, regular rhythm and normal heart sounds.    Pulmonary/Chest: Effort normal and breath sounds normal. No respiratory distress. He has no wheezes. He has no rales.   Abdominal: Soft. Bowel sounds are normal.   Musculoskeletal:   Decreased range of motion in the left shoulder due to pain.  Mild osteoarthritic changes in various joints.   Neurological: He is alert and oriented to person, place, and time.   Skin: Skin is warm and dry.   Psychiatric: He has a normal mood and affect.   Nursing note and vitals reviewed.      ASSESSMENT  #1 type 2 diabetes mellitus             #2 hyperlipidemia, mixed    DISCUSSION/SUMMARY   The patient's blood pressure by me is 136/78.  CMP was normal except for elevated fasting blood sugar of 125 and the patient's hemoglobin A1c was in the proper range at 6.45%.  Total cholesterol is 154, triglycerides 187, HDL cholesterol 55, and LDL cholesterol 62.  Overall the patient is doing well although he does have some chronic left shoulder pain after a fall.  The patient is seeing an orthopedic surgeon for this.  The patient will try to work harder on his diet and keep up with some aerobic exercise.  He will stay on the same medications.    PLAN  Recheck in 6 months with fasting CMP, lipid panel, HbA1c, PSA  No Follow-up on file.

## 2017-11-17 RX ORDER — LORATADINE AND PSEUDOEPHEDRINE SULFATE 5; 120 MG/1; MG/1
TABLET, EXTENDED RELEASE ORAL
Qty: 60 TABLET | Refills: 11 | Status: SHIPPED | OUTPATIENT
Start: 2017-11-17 | End: 2018-08-22 | Stop reason: ALTCHOICE

## 2017-11-17 RX ORDER — PRAVASTATIN SODIUM 40 MG
TABLET ORAL
Qty: 30 TABLET | Refills: 11 | Status: SHIPPED | OUTPATIENT
Start: 2017-11-17 | End: 2018-09-27 | Stop reason: SDUPTHER

## 2017-11-17 RX ORDER — OMEPRAZOLE 20 MG/1
CAPSULE, DELAYED RELEASE ORAL
Qty: 30 CAPSULE | Refills: 11 | Status: SHIPPED | OUTPATIENT
Start: 2017-11-17 | End: 2018-11-17 | Stop reason: SDUPTHER

## 2017-12-15 RX ORDER — PIOGLITAZONEHYDROCHLORIDE 30 MG/1
30 TABLET ORAL DAILY
Qty: 30 TABLET | Refills: 5 | Status: SHIPPED | OUTPATIENT
Start: 2017-12-15 | End: 2018-01-18 | Stop reason: SDUPTHER

## 2018-01-18 RX ORDER — MODAFINIL 200 MG/1
200 TABLET ORAL DAILY
Qty: 180 TABLET | Refills: 0 | Status: SHIPPED | OUTPATIENT
Start: 2018-01-18 | End: 2018-01-18 | Stop reason: SDUPTHER

## 2018-01-18 RX ORDER — MODAFINIL 200 MG/1
200 TABLET ORAL 2 TIMES DAILY
Qty: 180 TABLET | Refills: 0 | Status: SHIPPED | OUTPATIENT
Start: 2018-01-18 | End: 2018-10-11 | Stop reason: SDUPTHER

## 2018-01-18 RX ORDER — PIOGLITAZONEHYDROCHLORIDE 30 MG/1
30 TABLET ORAL DAILY
Qty: 90 TABLET | Refills: 1 | Status: SHIPPED | OUTPATIENT
Start: 2018-01-18 | End: 2018-07-10 | Stop reason: SDUPTHER

## 2018-01-18 RX ORDER — ALBUTEROL SULFATE 90 UG/1
2 AEROSOL, METERED RESPIRATORY (INHALATION) EVERY 4 HOURS PRN
Qty: 3 INHALER | Refills: 3 | Status: SHIPPED | OUTPATIENT
Start: 2018-01-18 | End: 2019-04-25 | Stop reason: SDUPTHER

## 2018-01-22 RX ORDER — LANCETS
EACH MISCELLANEOUS
Qty: 100 EACH | Refills: 5 | Status: SHIPPED | OUTPATIENT
Start: 2018-01-22 | End: 2019-11-05 | Stop reason: SDUPTHER

## 2018-03-19 RX ORDER — GLIMEPIRIDE 2 MG/1
TABLET ORAL
Qty: 30 TABLET | Refills: 5 | Status: SHIPPED | OUTPATIENT
Start: 2018-03-19 | End: 2019-03-24 | Stop reason: SDUPTHER

## 2018-04-05 DIAGNOSIS — E78.5 HYPERLIPIDEMIA, UNSPECIFIED HYPERLIPIDEMIA TYPE: Primary | ICD-10-CM

## 2018-04-05 DIAGNOSIS — E11.9 TYPE 2 DIABETES MELLITUS WITHOUT COMPLICATION, UNSPECIFIED LONG TERM INSULIN USE STATUS: ICD-10-CM

## 2018-04-05 DIAGNOSIS — Z12.5 SPECIAL SCREENING FOR MALIGNANT NEOPLASM OF PROSTATE: ICD-10-CM

## 2018-04-10 LAB
ALBUMIN SERPL-MCNC: 4.3 G/DL (ref 3.5–5.2)
ALBUMIN/GLOB SERPL: 1.6 G/DL
ALP SERPL-CCNC: 52 U/L (ref 39–117)
ALT SERPL-CCNC: 49 U/L (ref 1–41)
AST SERPL-CCNC: 43 U/L (ref 1–40)
BILIRUB SERPL-MCNC: 0.4 MG/DL (ref 0.1–1.2)
BUN SERPL-MCNC: 12 MG/DL (ref 8–23)
BUN/CREAT SERPL: 12.2 (ref 7–25)
CALCIUM SERPL-MCNC: 9.5 MG/DL (ref 8.6–10.5)
CHLORIDE SERPL-SCNC: 104 MMOL/L (ref 98–107)
CHOLEST SERPL-MCNC: 159 MG/DL (ref 0–200)
CO2 SERPL-SCNC: 26.1 MMOL/L (ref 22–29)
CREAT SERPL-MCNC: 0.98 MG/DL (ref 0.76–1.27)
GFR SERPLBLD CREATININE-BSD FMLA CKD-EPI: 77 ML/MIN/1.73
GFR SERPLBLD CREATININE-BSD FMLA CKD-EPI: 93 ML/MIN/1.73
GLOBULIN SER CALC-MCNC: 2.7 GM/DL
GLUCOSE SERPL-MCNC: 126 MG/DL (ref 65–99)
HBA1C MFR BLD: 7.35 % (ref 4.8–5.6)
HDLC SERPL-MCNC: 50 MG/DL (ref 40–60)
LDLC SERPL CALC-MCNC: 75 MG/DL (ref 0–100)
LDLC/HDLC SERPL: 1.49 {RATIO}
POTASSIUM SERPL-SCNC: 4 MMOL/L (ref 3.5–5.2)
PROT SERPL-MCNC: 7 G/DL (ref 6–8.5)
PSA SERPL-MCNC: 0.57 NG/ML (ref 0–4)
SODIUM SERPL-SCNC: 144 MMOL/L (ref 136–145)
TRIGL SERPL-MCNC: 172 MG/DL (ref 0–150)
VLDLC SERPL CALC-MCNC: 34.4 MG/DL (ref 5–40)

## 2018-04-17 ENCOUNTER — OFFICE VISIT (OUTPATIENT)
Dept: FAMILY MEDICINE CLINIC | Facility: CLINIC | Age: 68
End: 2018-04-17

## 2018-04-17 VITALS
DIASTOLIC BLOOD PRESSURE: 70 MMHG | HEART RATE: 83 BPM | WEIGHT: 312 LBS | TEMPERATURE: 97.6 F | RESPIRATION RATE: 18 BRPM | SYSTOLIC BLOOD PRESSURE: 124 MMHG | BODY MASS INDEX: 41.35 KG/M2 | HEIGHT: 73 IN

## 2018-04-17 DIAGNOSIS — E11.40 DIABETIC NEUROPATHY, PAINFUL (HCC): ICD-10-CM

## 2018-04-17 DIAGNOSIS — E78.2 MIXED HYPERLIPIDEMIA: ICD-10-CM

## 2018-04-17 DIAGNOSIS — E11.9 TYPE 2 DIABETES MELLITUS WITHOUT COMPLICATION, WITHOUT LONG-TERM CURRENT USE OF INSULIN (HCC): Primary | ICD-10-CM

## 2018-04-17 DIAGNOSIS — G47.33 OBSTRUCTIVE SLEEP APNEA SYNDROME: ICD-10-CM

## 2018-04-17 DIAGNOSIS — G47.10 HYPERSOMNIA: ICD-10-CM

## 2018-04-17 PROCEDURE — 99213 OFFICE O/P EST LOW 20 MIN: CPT

## 2018-04-17 NOTE — PROGRESS NOTES
Subjective   Barry Cam is a 67 y.o. male. Patient is here today for   Chief Complaint   Patient presents with   • Diabetes   • Hyperlipidemia          Vitals:    04/17/18 0757   BP: 124/70   Pulse: 83   Resp: 18   Temp: 97.6 °F (36.4 °C)     The following portions of the patient's history were reviewed and updated as appropriate: allergies, current medications, past family history, past medical history, past social history, past surgical history and problem list.    Past Medical History:   Diagnosis Date   • Allergic    • Depression    • Diabetes mellitus    • GERD (gastroesophageal reflux disease)    • History of hiatal hernia    • History of kidney stones    • Hyperglycemia    • Hyperlipidemia    • Sleep apnea       No Known Allergies   Social History     Social History   • Marital status:      Spouse name: N/A   • Number of children: N/A   • Years of education: N/A     Occupational History   • Not on file.     Social History Main Topics   • Smoking status: Never Smoker   • Smokeless tobacco: Not on file   • Alcohol use No   • Drug use: Unknown   • Sexual activity: Not on file     Other Topics Concern   • Not on file     Social History Narrative   • No narrative on file        Current Outpatient Prescriptions:   •  albuterol (PROVENTIL HFA;VENTOLIN HFA) 108 (90 Base) MCG/ACT inhaler, Inhale 2 puffs Every 4 (Four) Hours As Needed for Wheezing., Disp: 3 inhaler, Rfl: 3  •  betamethasone valerate (VALISONE) 0.1 % cream, APPLY CREAM TOPICALLY TO AFFECTED AREA(S) TO SKIN TWICE DAILY, Disp: 15 g, Rfl: 1  •  CLARITIN-D 12 HOUR 5-120 MG per 12 hr tablet, TAKE ONE TABLET BY MOUTH TWICE DAILY, Disp: 60 tablet, Rfl: 11  •  glimepiride (AMARYL) 2 MG tablet, TAKE 1 TABLET BY MOUTH EVERY MORNING BEFORE BREAKFAST, Disp: 30 tablet, Rfl: 5  •  Glucosamine-Chondroit-Vit C-Mn (GLUCOSAMINE CHONDR 500 COMPLEX) capsule, Take 2 capsules by mouth daily., Disp: , Rfl:   •  glucose blood (ONE TOUCH ULTRA TEST) test strip, USE  TO TEST BLOOD SUGAR 3 TIMES DAILY, Disp: 100 each, Rfl: 5  •  Lancets (ONETOUCH ULTRASOFT) lancets, USE TO TEST BLOOD SUGAR 3 TIMES DAILY, Disp: 100 each, Rfl: 5  •  metFORMIN (GLUCOPHAGE) 1000 MG tablet, TAKE ONE TABLET BY MOUTH TWICE DAILY, Disp: 60 tablet, Rfl: 11  •  modafinil (PROVIGIL) 200 MG tablet, Take 1 tablet by mouth 2 (Two) Times a Day., Disp: 180 tablet, Rfl: 0  •  MULTIPLE VITAMINS ESSENTIAL PO, Take  by mouth daily., Disp: , Rfl:   •  naproxen sodium (ALEVE) 220 MG tablet, Take 2 tablets by mouth., Disp: , Rfl:   •  omeprazole (priLOSEC) 20 MG capsule, TAKE ONE CAPSULE BY MOUTH ONCE DAILY, Disp: 30 capsule, Rfl: 11  •  pioglitazone (ACTOS) 30 MG tablet, Take 1 tablet by mouth Daily., Disp: 90 tablet, Rfl: 1  •  pravastatin (PRAVACHOL) 40 MG tablet, TAKE ONE TABLET BY MOUTH ONCE DAILY, Disp: 30 tablet, Rfl: 11  •  SITagliptin (JANUVIA) 100 MG tablet, Take 1 tablet by mouth Daily., Disp: 90 tablet, Rfl: 1     Objective     History of Present Illness   The patient is here today for follow-up on type 2 diabetes mellitus, mixed hyperlipidemia, obstructive sleep apnea, and hypersomnia    Review of Systems   Constitutional: Negative for chills and fever.        The patient does have some fatigue during the day because of hypersomnia secondary to obstructive sleep apnea syndrome.  Patient takes Nuvigil for this   HENT: Negative.    Respiratory: Negative for cough, shortness of breath and wheezing.    Cardiovascular: Negative for chest pain, palpitations and leg swelling.   Gastrointestinal: Negative for blood in stool, constipation and diarrhea.   Musculoskeletal:        Mild to moderate osteoarthritic aches and pains   Neurological:        The patient states that he has been having increasing discomfort, a burning pain, and the bottoms of his feet bilaterally.  This has gradually come on over the last couple of years.   Psychiatric/Behavioral: Negative.        Physical Exam   Constitutional: He is oriented  to person, place, and time. He appears well-developed and well-nourished.   Overweight   Neck:   Carotid pulses normal   Cardiovascular: Normal rate, regular rhythm and normal heart sounds.    Pulmonary/Chest: Effort normal and breath sounds normal. No respiratory distress. He has no wheezes. He has no rales.   Abdominal: Soft. Bowel sounds are normal.   Musculoskeletal: Normal range of motion.   The patient seems to have normal sensation on the bottoms of feet but does complain of burning in this area.   Neurological: He is alert and oriented to person, place, and time.   Skin: Skin is warm and dry.   Psychiatric: He has a normal mood and affect.   Nursing note and vitals reviewed.      ASSESSMENT  #1 type 2 diabetes mellitus         #2 mixed hyperlipidemia              #3 diabetic peripheral painful neuropathy             #4 obstructive sleep apnea with daytime hypersomnolence      DISCUSSION/SUMMARY   The patient's vital signs are normal.  CMP showed an elevated fasting blood sugar of 126 and the patient's hemoglobin A1c is slightly higher on this visit at 7.35%.  The patient states that his diet has not been ideal over the winter and he has not been getting very much exercise.  He will try to improve his diet and exercise level while staying on the same doses of his medications.  PSA remains normal.  Total cholesterol is 159, triglycerides 172, HDL cholesterol 50 and LDL cholesterol is 75.  The patient does state that he has burning discomfort in the bottoms of his feet and this has been gradually worsening over the last couple of years.  Exam is normal.  I believe the patient does have an element of painful diabetic peripheral neuropathy.  He will start gabapentin at bedtime for this and he will call us in a couple of weeks to let us know if he is tolerating this medication.    PLAN   Recheck 6 months with fasting CMP, lipid panel, HbA1c  No Follow-up on file.

## 2018-06-28 ENCOUNTER — OFFICE VISIT (OUTPATIENT)
Dept: SLEEP MEDICINE | Facility: HOSPITAL | Age: 68
End: 2018-06-28
Attending: INTERNAL MEDICINE

## 2018-06-28 DIAGNOSIS — G47.33 OBSTRUCTIVE SLEEP APNEA SYNDROME: Primary | ICD-10-CM

## 2018-06-28 PROCEDURE — 99213 OFFICE O/P EST LOW 20 MIN: CPT | Performed by: INTERNAL MEDICINE

## 2018-06-28 PROCEDURE — G0463 HOSPITAL OUTPT CLINIC VISIT: HCPCS

## 2018-06-28 NOTE — PROGRESS NOTES
Follow Up [facility] Note     Chief Complaint:  GRICEL     Primary Care Physician: Austin Shaikh MD    Interval History:   The patient was last seen by me in September.  From the GRICEL standpoint he appears to be stable.  He goes to bed between 9:30 PM and 11:30 PM.  He awakens between 5:30 and 9 AM.  [place] Sleepiness Scale is borderline normal at 7.  He continues to take Provigil 200 mg every morning.    The patient reports shortness of breath due to blood clots.    Review of Systems:  Recorded on the Sleep Questionnaire.  Unremarkable except for nasal congestion and postnasal drip, DUGGAN with some wheezing, cough, COLE and abdominal bloating, and ear pain    Social History:  12-24 ounces of soda a day.  Social History     Social History   • Marital status:      Social History Main Topics   • Smoking status: Never Smoker   • Alcohol use No   • Drug use: Unknown     Other Topics Concern   • Not on file       Allergies:  Patient has no known allergies.     Medication Review:  Reviewed.      Vital Signs:  Height 73 inches, weight 305 pounds and BMI morbidly obese at 40-41.    Physical Exam:    Constitutional:  Well developed white male and appears in no apparent distress.  Awake & oriented times 3.  Normal mood with normal recent and remote memory and normal judgement.  Eyes:  Conjunctivae normal.  Oropharynx:  moist mucous membranes without exudate and a large tongue and uvula and mild narrowing of the posterior pharyngeal opening and class II-III MP airway      Results Review:  The patient uses a medium Quatro full face mask.  Downloads between December 30 and June 27, 2018 compliances 90%.  Average usage is 6 hours and 47 minutes.  Average AHI is mildly abnormal 7.1 without leak.  Average AutoCPAP pressure is 13.4 and his auto CPAP is 9-16.       Impression:   Obstructive sleep apnea nearly adequately treated with auto titrating CPAP with good compliance and usage and no significant complaints of  hypersomnolence      Plan:  Good sleep hygiene measures should be maintained.  Weight loss would be beneficial in this patient who is obese by BMI.  The patient is benefiting from the treatment being provided.     The patient will continue auto CPAP.  Pressures will be changed to 10-15.  A new prescription will be sent to his DME.    The patient will call for any problems and will follow up in one year.      Barry Gillespie MD  Sleep Medicine  06/28/18  11:46 AM

## 2018-07-03 ENCOUNTER — OFFICE VISIT (OUTPATIENT)
Dept: FAMILY MEDICINE CLINIC | Facility: CLINIC | Age: 68
End: 2018-07-03

## 2018-07-03 VITALS
DIASTOLIC BLOOD PRESSURE: 68 MMHG | RESPIRATION RATE: 16 BRPM | SYSTOLIC BLOOD PRESSURE: 128 MMHG | HEART RATE: 92 BPM | HEIGHT: 73 IN | WEIGHT: 315 LBS | OXYGEN SATURATION: 95 % | BODY MASS INDEX: 41.75 KG/M2

## 2018-07-03 DIAGNOSIS — Z23 NEED FOR VACCINATION: Primary | ICD-10-CM

## 2018-07-03 DIAGNOSIS — I26.99 MULTIPLE PULMONARY EMBOLI (HCC): ICD-10-CM

## 2018-07-03 DIAGNOSIS — I82.4Z1 ACUTE DEEP VEIN THROMBOSIS (DVT) OF DISTAL VEIN OF RIGHT LOWER EXTREMITY (HCC): ICD-10-CM

## 2018-07-03 PROCEDURE — 99213 OFFICE O/P EST LOW 20 MIN: CPT

## 2018-07-03 PROCEDURE — 90670 PCV13 VACCINE IM: CPT

## 2018-07-03 PROCEDURE — G0009 ADMIN PNEUMOCOCCAL VACCINE: HCPCS

## 2018-07-03 RX ORDER — GABAPENTIN 300 MG/1
300 CAPSULE ORAL 2 TIMES DAILY
COMMUNITY
End: 2019-12-04 | Stop reason: SDUPTHER

## 2018-07-10 RX ORDER — PIOGLITAZONEHYDROCHLORIDE 30 MG/1
30 TABLET ORAL DAILY
Qty: 90 TABLET | Refills: 1 | Status: SHIPPED | OUTPATIENT
Start: 2018-07-10 | End: 2019-01-23 | Stop reason: SDUPTHER

## 2018-09-27 RX ORDER — PRAVASTATIN SODIUM 40 MG
40 TABLET ORAL DAILY
Qty: 90 TABLET | Refills: 3 | Status: SHIPPED | OUTPATIENT
Start: 2018-09-27 | End: 2019-11-26 | Stop reason: SDUPTHER

## 2018-10-10 RX ORDER — MODAFINIL 200 MG/1
TABLET ORAL
Qty: 180 TABLET | Status: CANCELLED | OUTPATIENT
Start: 2018-10-10

## 2018-10-11 RX ORDER — MODAFINIL 200 MG/1
200 TABLET ORAL 2 TIMES DAILY
Qty: 180 TABLET | Refills: 0 | Status: SHIPPED | OUTPATIENT
Start: 2018-10-11 | End: 2019-08-30 | Stop reason: SDUPTHER

## 2018-10-12 RX ORDER — MODAFINIL 200 MG/1
TABLET ORAL
Qty: 180 TABLET | OUTPATIENT
Start: 2018-10-12

## 2018-10-16 DIAGNOSIS — E78.2 MIXED HYPERLIPIDEMIA: ICD-10-CM

## 2018-10-16 DIAGNOSIS — E11.9 TYPE 2 DIABETES MELLITUS WITHOUT COMPLICATION, WITHOUT LONG-TERM CURRENT USE OF INSULIN (HCC): Primary | ICD-10-CM

## 2018-10-17 LAB
ALBUMIN SERPL-MCNC: 4.3 G/DL (ref 3.5–5.2)
ALBUMIN/GLOB SERPL: 1.4 G/DL
ALP SERPL-CCNC: 56 U/L (ref 39–117)
ALT SERPL-CCNC: 23 U/L (ref 1–41)
AST SERPL-CCNC: 25 U/L (ref 1–40)
BILIRUB SERPL-MCNC: 0.3 MG/DL (ref 0.1–1.2)
BUN SERPL-MCNC: 13 MG/DL (ref 8–23)
BUN/CREAT SERPL: 12 (ref 7–25)
CALCIUM SERPL-MCNC: 9.7 MG/DL (ref 8.6–10.5)
CHLORIDE SERPL-SCNC: 102 MMOL/L (ref 98–107)
CHOLEST SERPL-MCNC: 179 MG/DL (ref 0–200)
CO2 SERPL-SCNC: 25.4 MMOL/L (ref 22–29)
CREAT SERPL-MCNC: 1.08 MG/DL (ref 0.76–1.27)
GLOBULIN SER CALC-MCNC: 3 GM/DL
GLUCOSE SERPL-MCNC: 150 MG/DL (ref 65–99)
HBA1C MFR BLD: 7.09 % (ref 4.8–5.6)
HDLC SERPL-MCNC: 55 MG/DL (ref 40–60)
LDLC SERPL CALC-MCNC: 65 MG/DL (ref 0–100)
LDLC/HDLC SERPL: 1.19 {RATIO}
POTASSIUM SERPL-SCNC: 4.4 MMOL/L (ref 3.5–5.2)
PROT SERPL-MCNC: 7.3 G/DL (ref 6–8.5)
SODIUM SERPL-SCNC: 143 MMOL/L (ref 136–145)
TRIGL SERPL-MCNC: 294 MG/DL (ref 0–150)
UNABLE TO VOID: NORMAL
VLDLC SERPL CALC-MCNC: 58.8 MG/DL (ref 5–40)

## 2018-10-24 ENCOUNTER — OFFICE VISIT (OUTPATIENT)
Dept: FAMILY MEDICINE CLINIC | Facility: CLINIC | Age: 68
End: 2018-10-24

## 2018-10-24 VITALS
TEMPERATURE: 98 F | DIASTOLIC BLOOD PRESSURE: 80 MMHG | HEIGHT: 73 IN | WEIGHT: 315 LBS | RESPIRATION RATE: 16 BRPM | SYSTOLIC BLOOD PRESSURE: 142 MMHG | HEART RATE: 90 BPM | BODY MASS INDEX: 41.75 KG/M2 | OXYGEN SATURATION: 97 %

## 2018-10-24 DIAGNOSIS — Z79.4 TYPE 2 DIABETES MELLITUS WITHOUT COMPLICATION, WITH LONG-TERM CURRENT USE OF INSULIN (HCC): Primary | ICD-10-CM

## 2018-10-24 DIAGNOSIS — E78.2 MIXED HYPERLIPIDEMIA: ICD-10-CM

## 2018-10-24 DIAGNOSIS — E11.9 TYPE 2 DIABETES MELLITUS WITHOUT COMPLICATION, WITH LONG-TERM CURRENT USE OF INSULIN (HCC): Primary | ICD-10-CM

## 2018-10-24 DIAGNOSIS — Z23 NEED FOR VACCINATION: ICD-10-CM

## 2018-10-24 DIAGNOSIS — I26.99 MULTIPLE PULMONARY EMBOLI (HCC): ICD-10-CM

## 2018-10-24 PROCEDURE — G0008 ADMIN INFLUENZA VIRUS VAC: HCPCS

## 2018-10-24 PROCEDURE — 99213 OFFICE O/P EST LOW 20 MIN: CPT

## 2018-10-24 PROCEDURE — 90662 IIV NO PRSV INCREASED AG IM: CPT

## 2018-10-24 NOTE — PROGRESS NOTES
Subjective   Barry Cam is a 68 y.o. male. Patient is here today for   Chief Complaint   Patient presents with   • Diabetes     lab f/u   • Hyperlipidemia          Vitals:    10/24/18 0814   BP: 142/80   Pulse: 90   Resp: 16   Temp: 98 °F (36.7 °C)   SpO2: 97%     The following portions of the patient's history were reviewed and updated as appropriate: allergies, current medications, past family history, past medical history, past social history, past surgical history and problem list.    Past Medical History:   Diagnosis Date   • Allergic    • Depression    • Diabetes mellitus (CMS/HCC)    • GERD (gastroesophageal reflux disease)    • History of hiatal hernia    • History of kidney stones    • Hyperglycemia    • Hyperlipidemia    • Sleep apnea       Allergies   Allergen Reactions   • Other Cough     Hay fever      Social History     Social History   • Marital status:      Spouse name: N/A   • Number of children: N/A   • Years of education: N/A     Occupational History   • Not on file.     Social History Main Topics   • Smoking status: Never Smoker   • Smokeless tobacco: Not on file   • Alcohol use No   • Drug use: Unknown   • Sexual activity: Not on file     Other Topics Concern   • Not on file     Social History Narrative   • No narrative on file        Current Outpatient Prescriptions:   •  albuterol (PROVENTIL HFA;VENTOLIN HFA) 108 (90 Base) MCG/ACT inhaler, Inhale 2 puffs Every 4 (Four) Hours As Needed for Wheezing., Disp: 3 inhaler, Rfl: 3  •  apixaban (ELIQUIS) 5 MG tablet tablet, Take 2 tablets by mouth Every 12 (Twelve) Hours., Disp: 60 tablet, Rfl: 4  •  betamethasone valerate (VALISONE) 0.1 % cream, APPLY CREAM TOPICALLY TO AFFECTED AREA(S) TO SKIN TWICE DAILY, Disp: 15 g, Rfl: 1  •  gabapentin (NEURONTIN) 300 MG capsule, Take 300 mg by mouth 2 (Two) Times a Day., Disp: , Rfl:   •  glimepiride (AMARYL) 2 MG tablet, TAKE 1 TABLET BY MOUTH EVERY MORNING BEFORE BREAKFAST, Disp: 30 tablet, Rfl:  5  •  Glucosamine-Chondroit-Vit C-Mn (GLUCOSAMINE CHONDR 500 COMPLEX) capsule, Take 2 capsules by mouth daily., Disp: , Rfl:   •  glucose blood (ONE TOUCH ULTRA TEST) test strip, USE TO TEST BLOOD SUGAR 3 TIMES DAILY, Disp: 100 each, Rfl: 5  •  Lancets (ONETOUCH ULTRASOFT) lancets, USE TO TEST BLOOD SUGAR 3 TIMES DAILY, Disp: 100 each, Rfl: 5  •  loratadine-pseudoephedrine (LORATADINE-D 12HR) 5-120 MG per 12 hr tablet, Take 1 tablet by mouth 2 (Two) Times a Day., Disp: 60 tablet, Rfl: 2  •  metFORMIN (GLUCOPHAGE) 1000 MG tablet, TAKE ONE TABLET BY MOUTH TWICE DAILY, Disp: 60 tablet, Rfl: 11  •  modafinil (PROVIGIL) 200 MG tablet, Take 1 tablet by mouth 2 (Two) Times a Day., Disp: 180 tablet, Rfl: 0  •  MULTIPLE VITAMINS ESSENTIAL PO, Take  by mouth daily., Disp: , Rfl:   •  naproxen sodium (ALEVE) 220 MG tablet, Take 2 tablets by mouth., Disp: , Rfl:   •  omeprazole (priLOSEC) 20 MG capsule, TAKE ONE CAPSULE BY MOUTH ONCE DAILY, Disp: 30 capsule, Rfl: 11  •  pioglitazone (ACTOS) 30 MG tablet, TAKE 1 TABLET BY MOUTH DAILY., Disp: 90 tablet, Rfl: 1  •  pravastatin (PRAVACHOL) 40 MG tablet, Take 1 tablet by mouth Daily., Disp: 90 tablet, Rfl: 3  •  SITagliptin (JANUVIA) 100 MG tablet, Take 1 tablet by mouth Daily., Disp: 90 tablet, Rfl: 1     Objective     History of Present Illness   The patient is here today for follow-up on type 2 diabetes mellitus, hyperlipidemia and multiple pulmonary emboli diagnosed in July 2018    Review of Systems   Constitutional: Negative for chills and fever.   HENT: Negative.    Respiratory: Negative for cough.         The patient does report some exertional dyspnea and slight wheezing with exertion.   Cardiovascular: Negative for chest pain, palpitations and leg swelling.   Gastrointestinal: Negative for abdominal pain, blood in stool, constipation and diarrhea.   Genitourinary: Negative.    Musculoskeletal:        Mild to moderate osteoarthritic aches and pains   Neurological: Negative  for light-headedness, numbness and headaches.   Hematological: Does not bruise/bleed easily.   Psychiatric/Behavioral: Negative.        Physical Exam   Constitutional: He is oriented to person, place, and time. He appears well-developed and well-nourished.   Overweight   Neck:   Carotid pulses normal   Cardiovascular: Normal rate, regular rhythm and normal heart sounds.    Pulmonary/Chest: Effort normal and breath sounds normal. No respiratory distress. He has no wheezes. He has no rales.   Abdominal: Soft. Bowel sounds are normal. He exhibits no distension. There is no tenderness. There is no guarding.   Musculoskeletal:   Mild osteoarthritic changes in multiple joints   Neurological: He is alert and oriented to person, place, and time.   Skin: Skin is warm and dry.   Psychiatric: He has a normal mood and affect.   Nursing note and vitals reviewed.      ASSESSMENT  #1 type 2 diabetes mellitus                       #2 hyperlipidemia               3 multiple pulmonary emboli in July 2018       DISCUSSION/SUMMARY    the patient's vital signs are normal.  CMP was normal except for elevated fasting blood sugar of 150 and hemoglobin A1c was 7.09%.  The patient's hemoglobin A1c has slightly improved from 6 months ago.  He will stay on his current diabetic medications.  The total cholesterol is 179, triglycerides 294, HDL cholesterol 55 and LDL cholesterol 65.  A urine was submitted today for microalbumin.  The patient had a DVT in his leg in June of this year.  He subsequently developed multiple bilateral pulmonary emboli and was anticoagulated.  The patient remains on Eliquis 5 mg twice a day.  The patient theoretically will be due to go off his Eliquis in January 2019 and I will see him at that time before we make that decision.  If he is still having any shortness of air I am going to have him evaluated by pulmonology before going off his Eliquis.      PLAN   recheck in January 2019 with no labs.  Recheck in 6 months  with fasting CMP, lipid panel, HbA1c and PSA   No Follow-up on file.

## 2018-10-25 LAB
ALBUMIN/CREAT UR: 2.8 MG/G CREAT (ref 0–30)
CREAT UR-MCNC: 147.4 MG/DL
MICROALBUMIN UR-MCNC: 4.2 UG/ML

## 2018-11-02 ENCOUNTER — TELEPHONE (OUTPATIENT)
Dept: FAMILY MEDICINE CLINIC | Facility: CLINIC | Age: 68
End: 2018-11-02

## 2018-11-02 NOTE — TELEPHONE ENCOUNTER
Left voicemail letting patient know.    ----- Message from Austin Shaikh MD sent at 10/25/2018  1:04 PM EDT -----  Please tell patient that his urine test did not show any evidence of significant amount of protein in his urine which is good.

## 2018-11-19 RX ORDER — OMEPRAZOLE 20 MG/1
CAPSULE, DELAYED RELEASE ORAL
Qty: 30 CAPSULE | Refills: 5 | Status: SHIPPED | OUTPATIENT
Start: 2018-11-19 | End: 2019-04-25 | Stop reason: SDUPTHER

## 2019-01-23 RX ORDER — PIOGLITAZONEHYDROCHLORIDE 30 MG/1
30 TABLET ORAL DAILY
Qty: 90 TABLET | Refills: 1 | Status: SHIPPED | OUTPATIENT
Start: 2019-01-23 | End: 2019-07-26 | Stop reason: SDUPTHER

## 2019-01-24 ENCOUNTER — OFFICE VISIT (OUTPATIENT)
Dept: FAMILY MEDICINE CLINIC | Facility: CLINIC | Age: 69
End: 2019-01-24

## 2019-01-24 VITALS
BODY MASS INDEX: 41.75 KG/M2 | WEIGHT: 315 LBS | OXYGEN SATURATION: 98 % | SYSTOLIC BLOOD PRESSURE: 154 MMHG | HEIGHT: 73 IN | HEART RATE: 86 BPM | RESPIRATION RATE: 20 BRPM | DIASTOLIC BLOOD PRESSURE: 78 MMHG | TEMPERATURE: 97.6 F

## 2019-01-24 DIAGNOSIS — G47.33 OBSTRUCTIVE SLEEP APNEA SYNDROME: ICD-10-CM

## 2019-01-24 DIAGNOSIS — E11.9 TYPE 2 DIABETES MELLITUS WITHOUT COMPLICATION, WITHOUT LONG-TERM CURRENT USE OF INSULIN (HCC): ICD-10-CM

## 2019-01-24 DIAGNOSIS — I82.4Y9 DEEP VEIN THROMBOSIS (DVT) OF PROXIMAL LOWER EXTREMITY, UNSPECIFIED CHRONICITY, UNSPECIFIED LATERALITY (HCC): Primary | ICD-10-CM

## 2019-01-24 DIAGNOSIS — E11.40 DIABETIC NEUROPATHY, PAINFUL (HCC): ICD-10-CM

## 2019-01-24 DIAGNOSIS — I82.4Z1 ACUTE DEEP VEIN THROMBOSIS (DVT) OF DISTAL VEIN OF RIGHT LOWER EXTREMITY (HCC): ICD-10-CM

## 2019-01-24 DIAGNOSIS — I26.99 MULTIPLE PULMONARY EMBOLI (HCC): ICD-10-CM

## 2019-01-24 DIAGNOSIS — R53.82 CHRONIC FATIGUE: ICD-10-CM

## 2019-01-24 DIAGNOSIS — G47.10 HYPERSOMNIA: ICD-10-CM

## 2019-01-24 DIAGNOSIS — E78.2 MIXED HYPERLIPIDEMIA: ICD-10-CM

## 2019-01-24 PROCEDURE — 99213 OFFICE O/P EST LOW 20 MIN: CPT

## 2019-01-24 NOTE — PROGRESS NOTES
Subjective   Barry Cam is a 68 y.o. male. Patient is here today for   Chief Complaint   Patient presents with   • Follow-up     Med check for Eliquis          Vitals:    01/24/19 0753   BP: 154/78   Pulse: 86   Resp: 20   Temp: 97.6 °F (36.4 °C)   SpO2: 98%     The following portions of the patient's history were reviewed and updated as appropriate: allergies, current medications, past family history, past medical history, past social history, past surgical history and problem list.    Past Medical History:   Diagnosis Date   • Allergic    • Depression    • Diabetes mellitus (CMS/[facility])    • GERD (gastroesophageal reflux disease)    • History of hiatal hernia    • History of kidney stones    • Hyperglycemia    • Hyperlipidemia    • Sleep apnea       Allergies   Allergen Reactions   • Other Cough     Hay fever      Social History     Socioeconomic History   • Marital status:      Spouse name: Not on file   • Number of children: Not on file   • Years of education: Not on file   • Highest education level: Not on file   Social Needs   • Financial resource strain: Not on file   • Food insecurity - worry: Not on file   • Food insecurity - inability: Not on file   • Transportation needs - medical: Not on file   • Transportation needs - non-medical: Not on file   Occupational History   • Not on file   Tobacco Use   • Smoking status: Never Smoker   Substance and Sexual Activity   • Alcohol use: No   • Drug use: Not on file   • Sexual activity: Not on file   Other Topics Concern   • Not on file   Social History Narrative   • Not on file        Current Outpatient Medications:   •  albuterol (PROVENTIL HFA;VENTOLIN HFA) 108 (90 Base) MCG/ACT inhaler, Inhale 2 puffs Every 4 (Four) Hours As Needed for Wheezing., Disp: 3 inhaler, Rfl: 3  •  apixaban (ELIQUIS) 5 MG tablet tablet, Take 2 tablets by mouth Every 12 (Twelve) Hours., Disp: 60 tablet, Rfl: 4  •  betamethasone valerate (VALISONE) 0.1 % cream, APPLY CREAM  TOPICALLY TO AFFECTED AREA(S) TO SKIN TWICE DAILY, Disp: 15 g, Rfl: 1  •  gabapentin (NEURONTIN) 300 MG capsule, Take 300 mg by mouth 2 (Two) Times a Day., Disp: , Rfl:   •  glimepiride (AMARYL) 2 MG tablet, TAKE 1 TABLET BY MOUTH EVERY MORNING BEFORE BREAKFAST, Disp: 30 tablet, Rfl: 5  •  Glucosamine-Chondroit-Vit C-Mn (GLUCOSAMINE CHONDR 500 COMPLEX) capsule, Take 2 capsules by mouth daily., Disp: , Rfl:   •  glucose blood (ONE TOUCH ULTRA TEST) test strip, USE TO TEST BLOOD SUGAR 3 TIMES DAILY, Disp: 100 each, Rfl: 5  •  Lancets (ONETOUCH ULTRASOFT) lancets, USE TO TEST BLOOD SUGAR 3 TIMES DAILY, Disp: 100 each, Rfl: 5  •  loratadine-pseudoephedrine (LORATADINE-D 12HR) 5-120 MG per 12 hr tablet, Take 1 tablet by mouth 2 (Two) Times a Day., Disp: 60 tablet, Rfl: 2  •  metFORMIN (GLUCOPHAGE) 1000 MG tablet, TAKE 1 TABLET BY MOUTH TWICE A DAY, Disp: 60 tablet, Rfl: 5  •  modafinil (PROVIGIL) 200 MG tablet, Take 1 tablet by mouth 2 (Two) Times a Day., Disp: 180 tablet, Rfl: 0  •  MULTIPLE VITAMINS ESSENTIAL PO, Take  by mouth daily., Disp: , Rfl:   •  naproxen sodium (ALEVE) 220 MG tablet, Take 2 tablets by mouth., Disp: , Rfl:   •  omeprazole (priLOSEC) 20 MG capsule, TAKE ONE CAPSULE BY MOUTH EVERY DAY, Disp: 30 capsule, Rfl: 5  •  pioglitazone (ACTOS) 30 MG tablet, TAKE 1 TABLET BY MOUTH DAILY., Disp: 90 tablet, Rfl: 1  •  pravastatin (PRAVACHOL) 40 MG tablet, Take 1 tablet by mouth Daily., Disp: 90 tablet, Rfl: 3  •  SITagliptin (JANUVIA) 100 MG tablet, Take 1 tablet by mouth Daily., Disp: 90 tablet, Rfl: 1     Objective     History of Present Illness   The patient is here today for follow-up on history of DVT and pulmonary embolus in June 2018.  The patient has been on long-term anticoagulation since that time.    Review of Systems   Constitutional:        The patient states that he is quite fatigued all the time.  This is despite taking generic Nuvigil.  The patient also is using his CPAP machine for  obstructive sleep apnea.   HENT: Negative.    Respiratory: Negative for cough and wheezing.         The patient does have shortness of air with moderate exertion   Cardiovascular: Negative for chest pain and palpitations.        The patient states that his right leg still has mild swelling and chronically.  This is the leg that he had a DVT in last summer.   Gastrointestinal: Negative for abdominal pain and blood in stool.   Genitourinary: Negative.    Musculoskeletal:        Mild to moderate osteoarthritic aches and pains.  The patient denies pain in his right leg.   Neurological: Negative for headaches.        The patient does have some numbness and discomfort in the lower legs from diabetic peripheral neuropathy but his symptoms have improved somewhat with the use of gabapentin   Hematological:        The patient bruises more easily since being on Eliquis but nothing of any significance.   Psychiatric/Behavioral: Negative.        Physical Exam   Constitutional: He is oriented to person, place, and time. He appears well-developed and well-nourished.   Overweight   Cardiovascular: Normal rate, regular rhythm and normal heart sounds.   Pulmonary/Chest: Effort normal and breath sounds normal. No respiratory distress. He has no wheezes. He has no rales.   Abdominal: Soft. Bowel sounds are normal.   Musculoskeletal:   Mild swelling of the right lower extremity below-the-knee.  No calf muscle tenderness.   Neurological: He is alert and oriented to person, place, and time.   Skin: Skin is warm and dry.   Psychiatric: He has a normal mood and affect.   Nursing note and vitals reviewed.      ASSESSMENT  #1 history of DVT and pulmonary embolus in June 2018, patient currently on Eliquis 5 mg twice a day               #2 moderate fatigue                  #3 type 2 diabetes mellitus                  #4 elevated blood pressure without diagnosis of hypertension                  #5 for hypersomnia                 #6 obstructive  sleep apnea              #7 hyperlipidemia    DISCUSSION/SUMMARY   Blood pressure was a bit elevated today at 150/78.  I have asked the patient to closely monitor his blood pressure over the next month until I see him again.  The patient was evaluated at [facility] last June for right leg swelling and discomfort.  The patient was found to have an extensive DVT of the right leg and also found to have pulmonary embolus.  He was initially started on heparin and then switched to Eliquis 5 mg twice a day which she has been on since that time.  A hypercoagulable workup was not done at the time he had his blood clot.  Before taking him off his medications I am going to order a repeat venous Doppler study of his right lower extremity and a hypercoagulable workup.  I am also going to move up his appointment that was scheduled several months down to just one month now with labs because of his extreme fatigue.    PLAN  #1 set up venous stop her study right lower extremity           #2 recheck in about one month with fasting CMP, lipid panel, HbA1c, CBC, free T4 and TSH  No Follow-up on file.

## 2019-02-01 LAB
F2 GENE MUT ANL BLD/T: NORMAL
F5 GENE MUT ANL BLD/T: NORMAL
MTHFR GENE MUT ANL BLD/T: NORMAL

## 2019-02-05 ENCOUNTER — HOSPITAL ENCOUNTER (OUTPATIENT)
Dept: CARDIOLOGY | Facility: HOSPITAL | Age: 69
Discharge: HOME | End: 2019-02-05

## 2019-02-05 LAB
BH CV LOW VAS RIGHT DISTAL FEMORAL SPONT: 1
BH CV LOW VAS RIGHT GASTRONEMIUS VESSEL: 1
BH CV LOW VAS RIGHT POPLITEAL SPONT: 1
BH CV LOW VAS RIGHT POSTERIOR TIBIAL VESSEL: 1
BH CV LOW VAS RIGHT SAPHENOFEMORAL JUNCTION SPONT: 1
BH CV LOWER VASCULAR LEFT COMMON FEMORAL AUGMENT: NORMAL
BH CV LOWER VASCULAR LEFT COMMON FEMORAL COMPETENT: NORMAL
BH CV LOWER VASCULAR LEFT COMMON FEMORAL COMPRESS: NORMAL
BH CV LOWER VASCULAR LEFT COMMON FEMORAL PHASIC: NORMAL
BH CV LOWER VASCULAR LEFT COMMON FEMORAL SPONT: NORMAL
BH CV LOWER VASCULAR RIGHT COMMON FEMORAL AUGMENT: NORMAL
BH CV LOWER VASCULAR RIGHT COMMON FEMORAL COMPETENT: NORMAL
BH CV LOWER VASCULAR RIGHT COMMON FEMORAL COMPRESS: NORMAL
BH CV LOWER VASCULAR RIGHT COMMON FEMORAL PHASIC: NORMAL
BH CV LOWER VASCULAR RIGHT COMMON FEMORAL SPONT: NORMAL
BH CV LOWER VASCULAR RIGHT DISTAL FEMORAL COMPRESS: NORMAL
BH CV LOWER VASCULAR RIGHT DISTAL FEMORAL THROMBUS: NORMAL
BH CV LOWER VASCULAR RIGHT GASTRONEMIUS COMPRESS: NORMAL
BH CV LOWER VASCULAR RIGHT GASTRONEMIUS THROMBUS: NORMAL
BH CV LOWER VASCULAR RIGHT GREATER SAPH AK COMPRESS: NORMAL
BH CV LOWER VASCULAR RIGHT GREATER SAPH BK COMPRESS: NORMAL
BH CV LOWER VASCULAR RIGHT LESSER SAPH COMPRESS: NORMAL
BH CV LOWER VASCULAR RIGHT MID FEMORAL AUGMENT: NORMAL
BH CV LOWER VASCULAR RIGHT MID FEMORAL COMPETENT: NORMAL
BH CV LOWER VASCULAR RIGHT MID FEMORAL COMPRESS: NORMAL
BH CV LOWER VASCULAR RIGHT MID FEMORAL PHASIC: NORMAL
BH CV LOWER VASCULAR RIGHT MID FEMORAL SPONT: NORMAL
BH CV LOWER VASCULAR RIGHT PERONEAL COMPRESS: NORMAL
BH CV LOWER VASCULAR RIGHT POPLITEAL AUGMENT: NORMAL
BH CV LOWER VASCULAR RIGHT POPLITEAL COMPETENT: NORMAL
BH CV LOWER VASCULAR RIGHT POPLITEAL COMPRESS: NORMAL
BH CV LOWER VASCULAR RIGHT POPLITEAL PHASIC: NORMAL
BH CV LOWER VASCULAR RIGHT POPLITEAL SPONT: NORMAL
BH CV LOWER VASCULAR RIGHT POPLITEAL THROMBUS: NORMAL
BH CV LOWER VASCULAR RIGHT POSTERIOR TIBIAL COMPRESS: NORMAL
BH CV LOWER VASCULAR RIGHT POSTERIOR TIBIAL THROMBUS: NORMAL
BH CV LOWER VASCULAR RIGHT PROXIMAL FEMORAL COMPRESS: NORMAL
BH CV LOWER VASCULAR RIGHT SAPHENOFEMORAL JUNCTION AUGMENT: NORMAL
BH CV LOWER VASCULAR RIGHT SAPHENOFEMORAL JUNCTION COMPETENT: NORMAL
BH CV LOWER VASCULAR RIGHT SAPHENOFEMORAL JUNCTION COMPRESS: NORMAL
BH CV LOWER VASCULAR RIGHT SAPHENOFEMORAL JUNCTION PHASIC: NORMAL
BH CV LOWER VASCULAR RIGHT SAPHENOFEMORAL JUNCTION SPONT: NORMAL

## 2019-02-05 PROCEDURE — 93971 EXTREMITY STUDY: CPT

## 2019-02-05 RX ORDER — LORATADINE/PSEUDOEPHEDRINE 5 MG-120MG
TABLET, EXTENDED RELEASE 12 HR ORAL
Qty: 60 TABLET | Refills: 2 | Status: SHIPPED | OUTPATIENT
Start: 2019-02-05 | End: 2019-03-28

## 2019-02-14 DIAGNOSIS — Z12.5 SPECIAL SCREENING FOR MALIGNANT NEOPLASM OF PROSTATE: ICD-10-CM

## 2019-02-14 DIAGNOSIS — E78.5 HYPERLIPIDEMIA, UNSPECIFIED HYPERLIPIDEMIA TYPE: Primary | ICD-10-CM

## 2019-02-14 DIAGNOSIS — E11.9 TYPE 2 DIABETES MELLITUS WITHOUT COMPLICATION, UNSPECIFIED WHETHER LONG TERM INSULIN USE (HCC): ICD-10-CM

## 2019-02-16 LAB
ALBUMIN SERPL-MCNC: 4.5 G/DL (ref 3.6–4.8)
ALBUMIN/GLOB SERPL: 1.7 {RATIO} (ref 1.2–2.2)
ALP SERPL-CCNC: 50 IU/L (ref 39–117)
ALT SERPL-CCNC: 28 IU/L (ref 0–44)
AST SERPL-CCNC: 32 IU/L (ref 0–40)
BILIRUB SERPL-MCNC: 0.4 MG/DL (ref 0–1.2)
BUN SERPL-MCNC: 12 MG/DL (ref 8–27)
BUN/CREAT SERPL: 13 (ref 10–24)
CALCIUM SERPL-MCNC: 9.8 MG/DL (ref 8.6–10.2)
CHLORIDE SERPL-SCNC: 103 MMOL/L (ref 96–106)
CHOLEST SERPL-MCNC: 185 MG/DL (ref 100–199)
CO2 SERPL-SCNC: 25 MMOL/L (ref 20–29)
CREAT SERPL-MCNC: 0.95 MG/DL (ref 0.76–1.27)
GLOBULIN SER CALC-MCNC: 2.7 G/DL (ref 1.5–4.5)
GLUCOSE SERPL-MCNC: 129 MG/DL (ref 65–99)
HBA1C MFR BLD: 7.1 % (ref 4.8–5.6)
HDLC SERPL-MCNC: 52 MG/DL
LDLC SERPL CALC-MCNC: 95 MG/DL (ref 0–99)
LDLC/HDLC SERPL: 1.8 RATIO (ref 0–3.6)
POTASSIUM SERPL-SCNC: 4.4 MMOL/L (ref 3.5–5.2)
PROT SERPL-MCNC: 7.2 G/DL (ref 6–8.5)
PSA SERPL-MCNC: 0.5 NG/ML (ref 0–4)
SODIUM SERPL-SCNC: 143 MMOL/L (ref 134–144)
TRIGL SERPL-MCNC: 189 MG/DL (ref 0–149)
VLDLC SERPL CALC-MCNC: 38 MG/DL (ref 5–40)

## 2019-02-22 ENCOUNTER — OFFICE VISIT (OUTPATIENT)
Dept: FAMILY MEDICINE CLINIC | Facility: CLINIC | Age: 69
End: 2019-02-22

## 2019-02-22 VITALS
OXYGEN SATURATION: 98 % | HEART RATE: 83 BPM | HEIGHT: 73 IN | RESPIRATION RATE: 20 BRPM | SYSTOLIC BLOOD PRESSURE: 158 MMHG | WEIGHT: 315 LBS | TEMPERATURE: 97.4 F | BODY MASS INDEX: 41.75 KG/M2 | DIASTOLIC BLOOD PRESSURE: 80 MMHG

## 2019-02-22 DIAGNOSIS — R06.09 EXERTIONAL DYSPNEA: ICD-10-CM

## 2019-02-22 DIAGNOSIS — E78.2 MIXED HYPERLIPIDEMIA: ICD-10-CM

## 2019-02-22 DIAGNOSIS — Z86.711 HISTORY OF PULMONARY EMBOLISM: ICD-10-CM

## 2019-02-22 DIAGNOSIS — R53.83 OTHER FATIGUE: Primary | ICD-10-CM

## 2019-02-22 DIAGNOSIS — E11.9 TYPE 2 DIABETES MELLITUS WITHOUT COMPLICATION, WITHOUT LONG-TERM CURRENT USE OF INSULIN (HCC): Primary | ICD-10-CM

## 2019-02-22 DIAGNOSIS — I82.5Z1 CHRONIC DEEP VEIN THROMBOSIS (DVT) OF DISTAL VEIN OF RIGHT LOWER EXTREMITY (HCC): ICD-10-CM

## 2019-02-22 DIAGNOSIS — R53.82 CHRONIC FATIGUE: ICD-10-CM

## 2019-02-22 PROBLEM — I26.99 MULTIPLE PULMONARY EMBOLI: Status: RESOLVED | Noted: 2018-07-03 | Resolved: 2019-02-22

## 2019-02-22 PROCEDURE — 99214 OFFICE O/P EST MOD 30 MIN: CPT

## 2019-02-22 RX ORDER — LOSARTAN POTASSIUM 100 MG/1
100 TABLET ORAL DAILY
Qty: 90 TABLET | Refills: 3 | Status: SHIPPED | OUTPATIENT
Start: 2019-02-22 | End: 2019-11-11 | Stop reason: SDUPTHER

## 2019-02-22 NOTE — PROGRESS NOTES
Subjective   Barry Cam is a 68 y.o. male. Patient is here today for   Chief Complaint   Patient presents with   • Diabetes   • Hyperlipidemia          Vitals:    02/22/19 0752   BP: 158/80   Pulse: 83   Resp: 20   Temp: 97.4 °F (36.3 °C)   SpO2: 98%     The following portions of the patient's history were reviewed and updated as appropriate: allergies, current medications, past family history, past medical history, past social history, past surgical history and problem list.    Past Medical History:   Diagnosis Date   • Allergic    • Depression    • Diabetes mellitus (CMS/[facility])    • GERD (gastroesophageal reflux disease)    • History of hiatal hernia    • History of kidney stones    • Hyperglycemia    • Hyperlipidemia    • Sleep apnea       Allergies   Allergen Reactions   • Other Cough     Hay fever      Social History     Socioeconomic History   • Marital status:      Spouse name: Not on file   • Number of children: Not on file   • Years of education: Not on file   • Highest education level: Not on file   Social Needs   • Financial resource strain: Not on file   • Food insecurity - worry: Not on file   • Food insecurity - inability: Not on file   • Transportation needs - medical: Not on file   • Transportation needs - non-medical: Not on file   Occupational History   • Not on file   Tobacco Use   • Smoking status: Never Smoker   Substance and Sexual Activity   • Alcohol use: No   • Drug use: Not on file   • Sexual activity: Not on file   Other Topics Concern   • Not on file   Social History Narrative   • Not on file        Current Outpatient Medications:   •  albuterol (PROVENTIL HFA;VENTOLIN HFA) 108 (90 Base) MCG/ACT inhaler, Inhale 2 puffs Every 4 (Four) Hours As Needed for Wheezing., Disp: 3 inhaler, Rfl: 3  •  apixaban (ELIQUIS) 5 MG tablet tablet, Take 2 tablets by mouth Every 12 (Twelve) Hours., Disp: 60 tablet, Rfl: 4  •  betamethasone valerate (VALISONE) 0.1 % cream, APPLY CREAM TOPICALLY TO  AFFECTED AREA(S) TO SKIN TWICE DAILY, Disp: 15 g, Rfl: 1  •  [employer] ALLERGY RELIEF-D12 5-120 MG per 12 hr tablet, TAKE 1 TABLET BY MOUTH TWICE A DAY, Disp: 60 tablet, Rfl: 2  •  gabapentin (NEURONTIN) 300 MG capsule, Take 300 mg by mouth 2 (Two) Times a Day., Disp: , Rfl:   •  glimepiride (AMARYL) 2 MG tablet, TAKE 1 TABLET BY MOUTH EVERY MORNING BEFORE BREAKFAST, Disp: 30 tablet, Rfl: 5  •  Glucosamine-Chondroit-Vit C-Mn (GLUCOSAMINE CHONDR 500 COMPLEX) capsule, Take 2 capsules by mouth daily., Disp: , Rfl:   •  glucose blood (ONE TOUCH ULTRA TEST) test strip, USE TO TEST BLOOD SUGAR 3 TIMES DAILY, Disp: 100 each, Rfl: 5  •  Lancets (ONETOUCH ULTRASOFT) lancets, USE TO TEST BLOOD SUGAR 3 TIMES DAILY, Disp: 100 each, Rfl: 5  •  metFORMIN (GLUCOPHAGE) 1000 MG tablet, TAKE 1 TABLET BY MOUTH TWICE A DAY, Disp: 60 tablet, Rfl: 5  •  modafinil (PROVIGIL) 200 MG tablet, Take 1 tablet by mouth 2 (Two) Times a Day., Disp: 180 tablet, Rfl: 0  •  MULTIPLE VITAMINS ESSENTIAL PO, Take  by mouth daily., Disp: , Rfl:   •  naproxen sodium (ALEVE) 220 MG tablet, Take 2 tablets by mouth., Disp: , Rfl:   •  omeprazole (priLOSEC) 20 MG capsule, TAKE ONE CAPSULE BY MOUTH EVERY DAY, Disp: 30 capsule, Rfl: 5  •  pioglitazone (ACTOS) 30 MG tablet, TAKE 1 TABLET BY MOUTH DAILY., Disp: 90 tablet, Rfl: 1  •  pravastatin (PRAVACHOL) 40 MG tablet, Take 1 tablet by mouth Daily., Disp: 90 tablet, Rfl: 3  •  SITagliptin (JANUVIA) 100 MG tablet, Take 1 tablet by mouth Daily., Disp: 90 tablet, Rfl: 1     Objective     History of Present Illness   The patient is here today for follow-up on type 2 diabetes mellitus, hyperlipidemia, history of pulmonary emboli and DVT, fatigue, hypersomnia and exertional dyspnea    Review of Systems   Constitutional: Positive for fatigue. Negative for appetite change, chills and fever.        The patient has not been exercising much at all because he has exertional dyspnea.   HENT: Negative.    Respiratory: Negative for  cough and wheezing.         The patient states that he is fine at rest but does have exertional dyspnea even with mild to moderate exercise   Cardiovascular: Negative for chest pain, palpitations and leg swelling.   Gastrointestinal: Negative.    Genitourinary: Negative.    Musculoskeletal:        Mild to moderate osteoarthritic aches and pains.  Patient denies any pain or swelling in his legs   Neurological: Negative for weakness, numbness and headaches.   Hematological: Does not bruise/bleed easily.   Psychiatric/Behavioral: Negative.        Physical Exam   Constitutional: He is oriented to person, place, and time. He appears well-developed and well-nourished.   Overweight.  The patient is in no acute distress at rest   Neck:   Carotid pulses normal   Cardiovascular: Normal rate, regular rhythm and normal heart sounds.   Pulmonary/Chest: Effort normal and breath sounds normal. No respiratory distress. He has no wheezes. He has no rales.   Abdominal: Soft. Bowel sounds are normal.   Musculoskeletal:   Mild osteoarthritic changes in multiple sites.  No significant leg edema   Neurological: He is alert and oriented to person, place, and time.   Skin: Skin is warm and dry.   Psychiatric: He has a normal mood and affect.   Nursing note and vitals reviewed.      ASSESSMENT  #1 type 2 diabetes mellitus                  #2 elevated blood pressure with new diagnosis of essential hypertension               #3 hyperlipidemia                #4 history of multiple pulmonary emboli and DVT of right lower extremity                  #5 fatigue                 #6 persistent exertional dyspnea    DISCUSSION/SUMMARY   Blood pressure is 158/80 and home readings have been similar.  Because of this I am going to start the patient on losartan 100 mg tablets 1 daily.  Previous tests to check for genetic predisposition for hypercoagulability state  were essentially negative .  CMP was normal except for elevated fasting blood sugar of 129  and the patient's hemoglobin A1c is still  elevated at 7.1%.  We discussed a low sugar, low starch and low/fat  diet again the patient will try to increase his walking.  Total cholesterol is 185, triglycerides 189, HDL cholesterol 52 and LDL cholesterol is 95.  The patient's PSA remains normal at 0.5.    The patient was found to have multiple pulmonary emboli and a DVT in his right lower extremity back in July 2018.  He has been on anticoagulant therapy with Eliquis since that time.  A repeat venous Doppler of his legs show subacute right lower extremity deep vein thrombosis noted in the popliteal area.  He also has chronic right lower extremity DVT in the distal femoral, posterior tibial and  gastrocnemius muscle area.  He is going to remain on Eliquis 5 mg twice a day and we will repeat his venous Doppler study again in 3 months.    The patient's main complaint is fatigue and exertional dyspnea.  He is not having any chest pain but I consider him high risk patient due to his type 2 diabetes mellitus, hyperlipidemia and family history.  I feel that he needs to see a cardiologist for evaluation and the patient wants to see Dr. Silvino Donahue who took care of his father and the patient has also seen him in the past.  I am also going to have the patient come back for a testosterone level, free T4 and TSH due to his fatigue.    PLAN  Lab only, nonfasting labs for total testosterone, free T4 and TSH levels.  I am going to refer the patient to cardiology for evaluation.  Recheck in 6 months with fasting CMP, lipid panel, hemoglobin A1c.  Repeat venous Doppler study in about 3 months.  No Follow-up on file.

## 2019-02-25 RX ORDER — APIXABAN 5 MG/1
TABLET, FILM COATED ORAL
Qty: 60 TABLET | Refills: 5 | Status: SHIPPED | OUTPATIENT
Start: 2019-02-25 | End: 2019-08-23 | Stop reason: SDUPTHER

## 2019-02-27 ENCOUNTER — TELEPHONE (OUTPATIENT)
Dept: FAMILY MEDICINE CLINIC | Facility: CLINIC | Age: 69
End: 2019-02-27

## 2019-02-27 LAB
T4 FREE SERPL-MCNC: 1.01 NG/DL (ref 0.93–1.7)
TESTOST FREE SERPL-MCNC: 8.9 PG/ML (ref 6.6–18.1)
TESTOST SERPL-MCNC: 414 NG/DL (ref 264–916)
TSH SERPL DL<=0.005 MIU/L-ACNC: 5.3 MIU/ML (ref 0.27–4.2)

## 2019-02-27 RX ORDER — LEVOTHYROXINE SODIUM 0.03 MG/1
25 TABLET ORAL DAILY
Qty: 90 TABLET | Refills: 0 | Status: SHIPPED | OUTPATIENT
Start: 2019-02-27 | End: 2019-05-20 | Stop reason: SDUPTHER

## 2019-03-25 RX ORDER — GLIMEPIRIDE 2 MG/1
TABLET ORAL
Qty: 30 TABLET | Refills: 5 | Status: SHIPPED | OUTPATIENT
Start: 2019-03-25 | End: 2019-07-16 | Stop reason: SDUPTHER

## 2019-03-28 ENCOUNTER — OFFICE VISIT (OUTPATIENT)
Dept: CARDIOLOGY | Facility: CLINIC | Age: 69
End: 2019-03-28

## 2019-03-28 VITALS
SYSTOLIC BLOOD PRESSURE: 138 MMHG | WEIGHT: 315 LBS | HEIGHT: 73 IN | BODY MASS INDEX: 41.75 KG/M2 | DIASTOLIC BLOOD PRESSURE: 76 MMHG | OXYGEN SATURATION: 97 % | HEART RATE: 83 BPM

## 2019-03-28 DIAGNOSIS — E78.2 MIXED HYPERLIPIDEMIA: ICD-10-CM

## 2019-03-28 DIAGNOSIS — E66.01 MORBID OBESITY WITH BMI OF 40.0-44.9, ADULT (HCC): ICD-10-CM

## 2019-03-28 DIAGNOSIS — I15.0 RENOVASCULAR HYPERTENSION: ICD-10-CM

## 2019-03-28 DIAGNOSIS — G47.33 OBSTRUCTIVE SLEEP APNEA SYNDROME: ICD-10-CM

## 2019-03-28 DIAGNOSIS — R06.09 DYSPNEA ON EXERTION: Primary | ICD-10-CM

## 2019-03-28 PROCEDURE — 93000 ELECTROCARDIOGRAM COMPLETE: CPT | Performed by: INTERNAL MEDICINE

## 2019-03-28 PROCEDURE — 99204 OFFICE O/P NEW MOD 45 MIN: CPT | Performed by: INTERNAL MEDICINE

## 2019-03-28 RX ORDER — CAFFEINE 200 MG
200 TABLET ORAL EVERY 4 HOURS PRN
COMMUNITY

## 2019-03-28 RX ORDER — ACETAMINOPHEN 500 MG
500 TABLET ORAL EVERY 6 HOURS PRN
COMMUNITY

## 2019-03-28 RX ORDER — TRIAMCINOLONE ACETONIDE 55 UG/1
2 SPRAY, METERED NASAL DAILY PRN
COMMUNITY

## 2019-03-28 RX ORDER — ZEAXANTHIN 90 %
POWDER (GRAM) MISCELLANEOUS DAILY
COMMUNITY

## 2019-03-28 RX ORDER — DIPHENHYDRAMINE HCL 50 MG
50 CAPSULE ORAL EVERY 6 HOURS PRN
COMMUNITY

## 2019-03-28 RX ORDER — UREA 10 %
LOTION (ML) TOPICAL
COMMUNITY
End: 2021-06-29

## 2019-03-28 RX ORDER — MULTIVIT WITH MINERALS/LUTEIN
250 TABLET ORAL DAILY
COMMUNITY

## 2019-03-28 NOTE — PROGRESS NOTES
Date of Office Visit: 19  Encounter Provider: Silvino Donahue MD  Place of Service: [facility] CARDIOLOGY  Patient Name: Barry Cam  :1950  Referral Provider:Austin Shaikh MD      No chief complaint on file.    History of Present Illness  Mr. Cam is a pleasant 68-year-old gentleman with history of hypertension, diabetes mellitus, hyperlipidemia, hypothyroidism, DVT, and sleep apnea.  We have seen him in the distant past and evaluated him with stress testing we then saw him again in 2014 for some atypical symptoms and then in 2018 he had a another echocardiogram performed at [facility] where he had normal LV systolic function grade 1 diastolic dysfunction and mild concentric hypertrophy.  But he now comes in because he is just getting more more short of breath and fatigued.  It has gotten progressively worse.  No real orthopnea or PND but he just does not feel like he has any energy and is very sleepy and tired during the day.  He had a lower extremity DVT in 2018 was started on Eliquis and follow-up venous study recently that showed some chronic disease not completely resolved and he supposed to have a follow-up again.  He denies palpitations near syncope or syncope.  Denies any recent fevers chills or sweats.  He has gained however a significant amount of weight he says.  He does not really do any structured exercising.          Past Medical History:   Diagnosis Date   • Allergic    • Allergic rhinitis    • Anxiety    • Depression    • Depression    • Diabetes mellitus (CMS/HCC)    • DUGGAN (dyspnea on exertion)    • Edema    • GERD (gastroesophageal reflux disease)    • History of hiatal hernia    • History of kidney stones    • Hx of blood clots    • Hyperglycemia    • Hyperlipidemia    • Hypertension    • Pulmonary embolism (CMS/HCC)    • Sciatica    • Sleep apnea    • SOB (shortness of breath)    • Thyroid disease          Past  Surgical History:   Procedure Laterality Date   • HERNIA REPAIR     • HERNIA REPAIR     • TONSILLECTOMY           Current Outpatient Medications on File Prior to Visit   Medication Sig Dispense Refill   • acetaminophen (TYLENOL) 500 MG tablet Take 500 mg by mouth Every 6 (Six) Hours As Needed for Mild Pain .     • albuterol (PROVENTIL HFA;VENTOLIN HFA) 108 (90 Base) MCG/ACT inhaler Inhale 2 puffs Every 4 (Four) Hours As Needed for Wheezing. 3 inhaler 3   • betamethasone valerate (VALISONE) 0.1 % cream APPLY CREAM TOPICALLY TO AFFECTED AREA(S) TO SKIN TWICE DAILY 15 g 1   • caffeine 200 MG tablet Take 200 mg by mouth Every 4 (Four) Hours As Needed for Headache.     • Copper Gluconate (COPPER CAPS PO) Take  by mouth.     • diphenhydrAMINE (BENADRYL) 50 MG capsule Take 50 mg by mouth Every 6 (Six) Hours As Needed for Itching.     • ELIQUIS 5 MG tablet tablet TAKE 1 TABLET BY MOUTH EVERY 12 HOURS 60 tablet 5   • gabapentin (NEURONTIN) 300 MG capsule Take 300 mg by mouth 2 (Two) Times a Day.     • glimepiride (AMARYL) 2 MG tablet TAKE 1 TABLET BY MOUTH EVERY MORNING BEFORE BREAKFAST 30 tablet 5   • Glucosamine-Chondroit-Vit C-Mn (GLUCOSAMINE CHONDR 500 COMPLEX) capsule Take 2 capsules by mouth daily.     • glucose blood (ONE TOUCH ULTRA TEST) test strip USE TO TEST BLOOD SUGAR 3 TIMES DAILY 100 each 5   • Lancets (ONETOUCH ULTRASOFT) lancets USE TO TEST BLOOD SUGAR 3 TIMES DAILY 100 each 5   • levothyroxine (SYNTHROID, LEVOTHROID) 25 MCG tablet Take 1 tablet by mouth Daily. 90 tablet 0   • losartan (COZAAR) 100 MG tablet Take 1 tablet by mouth Daily. 90 tablet 3   • LUTEIN PO Take 5 mg by mouth Daily.     • melatonin 1 MG tablet Take  by mouth.     • metFORMIN (GLUCOPHAGE) 1000 MG tablet TAKE 1 TABLET BY MOUTH TWICE A DAY 60 tablet 5   • modafinil (PROVIGIL) 200 MG tablet Take 1 tablet by mouth 2 (Two) Times a Day. 180 tablet 0   • MULTIPLE VITAMINS ESSENTIAL PO Take  by mouth daily.     • omeprazole (priLOSEC) 20 MG  capsule TAKE ONE CAPSULE BY MOUTH EVERY DAY 30 capsule 5   • pioglitazone (ACTOS) 30 MG tablet TAKE 1 TABLET BY MOUTH DAILY. 90 tablet 1   • pravastatin (PRAVACHOL) 40 MG tablet Take 1 tablet by mouth Daily. 90 tablet 3   • SITagliptin (JANUVIA) 100 MG tablet Take 1 tablet by mouth Daily. 90 tablet 1   • Triamcinolone Acetonide (NASACORT) 55 MCG/ACT nasal inhaler 2 sprays into the nostril(s) as directed by provider Daily.     • vitamin C (ASCORBIC ACID) 250 MG tablet Take 250 mg by mouth Daily.     • vitamin E 200 UNIT capsule Take 200 Units by mouth Daily.     • Zeaxanthin powder      • Zinc Sulfate (ZINC 15 PO) Take  by mouth.     • [DISCONTINUED] [employer] ALLERGY RELIEF-D12 5-120 MG per 12 hr tablet TAKE 1 TABLET BY MOUTH TWICE A DAY 60 tablet 2   • [DISCONTINUED] naproxen sodium (ALEVE) 220 MG tablet Take 2 tablets by mouth.       No current facility-administered medications on file prior to visit.          Social History     Socioeconomic History   • Marital status:      Spouse name: Not on file   • Number of children: Not on file   • Years of education: Not on file   • Highest education level: Not on file   Tobacco Use   • Smoking status: Never Smoker   • Smokeless tobacco: Never Used   • Tobacco comment: caffeine use   Substance and Sexual Activity   • Alcohol use: No       Family History   Problem Relation Age of Onset   • Depression Mother    • Coronary artery disease Father    • Diabetes Father    • Stroke Father    • Hypertension Father    • Diabetes Brother    • Diabetes Maternal Grandmother          Review of Systems   Constitution: Negative for decreased appetite, diaphoresis, fever, weakness, malaise/fatigue, weight gain and weight loss.   HENT: Negative for congestion, hearing loss, nosebleeds and tinnitus.    Eyes: Negative for blurred vision, double vision, vision loss in left eye, vision loss in right eye and visual disturbance.   Cardiovascular:        As noted in HPI   Respiratory:        As  "noted HPI   Endocrine: Negative for cold intolerance and heat intolerance.   Hematologic/Lymphatic: Negative for bleeding problem. Does not bruise/bleed easily.   Skin: Negative for color change, flushing, itching and rash.   Musculoskeletal: Negative for arthritis, back pain, joint pain, joint swelling, muscle weakness and myalgias.   Gastrointestinal: Negative for bloating, abdominal pain, constipation, diarrhea, dysphagia, heartburn, hematemesis, hematochezia, melena, nausea and vomiting.   Genitourinary: Negative for bladder incontinence, dysuria, frequency, nocturia and urgency.   Neurological: Negative for dizziness, focal weakness, headaches, light-headedness, loss of balance, numbness, paresthesias and vertigo.   Psychiatric/Behavioral: Negative for depression, memory loss and substance abuse.       Procedures      ECG 12 Lead  Date/Time: 3/28/2019 4:02 PM  Performed by: Silvino Donahue MD  Authorized by: Silvino Donahue MD   Comparison: compared with previous ECG   Similar to previous ECG  Rhythm: sinus rhythm  Rate: normal  QRS axis: normal                  Objective:    /76 (BP Location: Right arm, Patient Position: Sitting, Cuff Size: Large Adult)   Pulse 83   Ht 185.4 cm (73\")   Wt (!) 149 kg (327 lb 6.4 oz)   SpO2 97%   BMI 43.20 kg/m²        Physical Exam  Physical Exam   Constitutional: He is oriented to person, place, and time. He appears well-developed and well-nourished. No distress.   HENT:   Head: Normocephalic.   Eyes: Conjunctivae are normal. Pupils are equal, round, and reactive to light. No scleral icterus.   Neck: Normal carotid pulses, no hepatojugular reflux and no JVD present. Carotid bruit is not present. No tracheal deviation, no edema and no erythema present. No thyromegaly present.   Cardiovascular: Normal rate, regular rhythm, S1 normal, S2 normal, normal heart sounds and intact distal pulses.  No extrasystoles are present. PMI is not displaced. Exam reveals no " gallop, no distant heart sounds and no friction rub.   No murmur heard.  Pulses:       Carotid pulses are 2+ on the right side, and 2+ on the left side.       Radial pulses are 2+ on the right side, and 2+ on the left side.        Femoral pulses are 2+ on the right side, and 2+ on the left side.       Dorsalis pedis pulses are 2+ on the right side, and 2+ on the left side.        Posterior tibial pulses are 2+ on the right side, and 2+ on the left side.   Pulmonary/Chest: Effort normal and breath sounds normal. No respiratory distress. He has no decreased breath sounds. He has no wheezes. He has no rhonchi. He has no rales. He exhibits no tenderness.   Abdominal: Soft. Bowel sounds are normal. He exhibits no distension and no mass. There is no hepatosplenomegaly. There is no tenderness. There is no rebound and no guarding.   Musculoskeletal: He exhibits edema (1+ bilateral tibial). He exhibits no tenderness or deformity.   Neurological: He is alert and oriented to person, place, and time.   Skin: Skin is warm and dry. No rash noted. He is not diaphoretic. No cyanosis or erythema. No pallor. Nails show no clubbing.   Psychiatric: He has a normal mood and affect. His speech is normal and behavior is normal. Judgment and thought content normal.           Assessment:   1.  68-year-old gentleman was worrisome shortness of breath with activity.  This certainly could be an anginal equivalent is at least intermediate if not high risk for ischemic heart disease to been a little progressive however I do really believe this is probably due to his obesity his body mass index is now over 40 however again need to rule out underlying ischemic disease and structural heart disease therefore he is to return for a perfusion stress test will do this is a PET protocol since his body mass index is 43.  He is also to have an echocardiogram.  If these are normal I believe the symptoms are likely due to his obesity and he needs to diet  exercise and lose weight to have symptom improvement.  2.  Morbid obesity body mass index of 43 as outlined above.  3.  Diabetes mellitus followed by PCP.  4.  History of hypertension blood pressure goal 130/80 he seems to be close to that.  5.  Hyperlipidemia on target dose pravastatin.  6.  Obstructive sleep apnea my fear is that this may not be adequately treated and could be the cause of his symptoms he is going to follow back up with sleep medicine.  Obviously if he is gained significant amount of weight this may be making this harder to treat.  7.  Hypothyroidism on replacement therapy.         Plan:

## 2019-04-11 ENCOUNTER — HOSPITAL ENCOUNTER (OUTPATIENT)
Dept: CARDIOLOGY | Facility: HOSPITAL | Age: 69
Discharge: HOME | End: 2019-04-11

## 2019-04-11 ENCOUNTER — TELEPHONE (OUTPATIENT)
Dept: CARDIOLOGY | Facility: CLINIC | Age: 69
End: 2019-04-11

## 2019-04-11 ENCOUNTER — HOSPITAL ENCOUNTER (OUTPATIENT)
Dept: CARDIOLOGY | Facility: HOSPITAL | Age: 69
Discharge: HOME | End: 2019-04-11
Admitting: INTERNAL MEDICINE

## 2019-04-11 VITALS
OXYGEN SATURATION: 95 % | BODY MASS INDEX: 41.75 KG/M2 | DIASTOLIC BLOOD PRESSURE: 72 MMHG | HEART RATE: 76 BPM | HEIGHT: 73 IN | WEIGHT: 315 LBS | SYSTOLIC BLOOD PRESSURE: 130 MMHG

## 2019-04-11 DIAGNOSIS — R06.09 DYSPNEA ON EXERTION: ICD-10-CM

## 2019-04-11 LAB
ASCENDING AORTA: 3.7 CM
BH CV ECHO MEAS - ACS: 2.5 CM
BH CV ECHO MEAS - AO MAX PG (FULL): 1.8 MMHG
BH CV ECHO MEAS - AO MAX PG: 6.4 MMHG
BH CV ECHO MEAS - AO MEAN PG (FULL): 1.4 MMHG
BH CV ECHO MEAS - AO MEAN PG: 4 MMHG
BH CV ECHO MEAS - AO ROOT AREA (BSA CORRECTED): 1.4
BH CV ECHO MEAS - AO ROOT AREA: 10.5 CM^2
BH CV ECHO MEAS - AO ROOT DIAM: 3.7 CM
BH CV ECHO MEAS - AO V2 MAX: 126 CM/SEC
BH CV ECHO MEAS - AO V2 MEAN: 94.8 CM/SEC
BH CV ECHO MEAS - AO V2 VTI: 30.4 CM
BH CV ECHO MEAS - ASC AORTA: 3.7 CM
BH CV ECHO MEAS - AVA(I,A): 2.1 CM^2
BH CV ECHO MEAS - AVA(I,D): 2.1 CM^2
BH CV ECHO MEAS - AVA(V,A): 2.4 CM^2
BH CV ECHO MEAS - AVA(V,D): 2.4 CM^2
BH CV ECHO MEAS - BSA(HAYCOCK): 2.8 M^2
BH CV ECHO MEAS - BSA: 2.6 M^2
BH CV ECHO MEAS - BZI_BMI: 41.7 KILOGRAMS/M^2
BH CV ECHO MEAS - BZI_METRIC_HEIGHT: 185.4 CM
BH CV ECHO MEAS - BZI_METRIC_WEIGHT: 143.3 KG
BH CV ECHO MEAS - EDV(TEICH): 131.7 ML
BH CV ECHO MEAS - EF(CUBED): 67.2 %
BH CV ECHO MEAS - EF(MOD-BP): 66 %
BH CV ECHO MEAS - EF(TEICH): 58.4 %
BH CV ECHO MEAS - ESV(TEICH): 54.8 ML
BH CV ECHO MEAS - IVS/LVPW: 0.96
BH CV ECHO MEAS - IVSD: 1.1 CM
BH CV ECHO MEAS - LAT PEAK E' VEL: 9 CM/SEC
BH CV ECHO MEAS - LV MASS(C)D: 235.8 GRAMS
BH CV ECHO MEAS - LV MASS(C)DI: 90.2 GRAMS/M^2
BH CV ECHO MEAS - LV MAX PG: 4.6 MMHG
BH CV ECHO MEAS - LV MEAN PG: 2.6 MMHG
BH CV ECHO MEAS - LV V1 MAX: 107.2 CM/SEC
BH CV ECHO MEAS - LV V1 MEAN: 75.9 CM/SEC
BH CV ECHO MEAS - LV V1 VTI: 23.3 CM
BH CV ECHO MEAS - LVIDD: 5.2 CM
BH CV ECHO MEAS - LVIDS: 3.6 CM
BH CV ECHO MEAS - LVOT AREA (M): 2.8 CM^2
BH CV ECHO MEAS - LVOT AREA: 2.8 CM^2
BH CV ECHO MEAS - LVOT DIAM: 1.9 CM
BH CV ECHO MEAS - LVPWD: 1.2 CM
BH CV ECHO MEAS - MED PEAK E' VEL: 8 CM/SEC
BH CV ECHO MEAS - MV A DUR: 0.11 SEC
BH CV ECHO MEAS - MV A MAX VEL: 88.6 CM/SEC
BH CV ECHO MEAS - MV DEC SLOPE: 381.7 CM/SEC^2
BH CV ECHO MEAS - MV DEC TIME: 0.17 SEC
BH CV ECHO MEAS - MV E MAX VEL: 71.7 CM/SEC
BH CV ECHO MEAS - MV E/A: 0.81
BH CV ECHO MEAS - MV MAX PG: 3.7 MMHG
BH CV ECHO MEAS - MV MEAN PG: 1.5 MMHG
BH CV ECHO MEAS - MV P1/2T MAX VEL: 79.5 CM/SEC
BH CV ECHO MEAS - MV P1/2T: 61 MSEC
BH CV ECHO MEAS - MV V2 MAX: 96 CM/SEC
BH CV ECHO MEAS - MV V2 MEAN: 57.6 CM/SEC
BH CV ECHO MEAS - MV V2 VTI: 24.7 CM
BH CV ECHO MEAS - MVA P1/2T LCG: 2.8 CM^2
BH CV ECHO MEAS - MVA(P1/2T): 3.6 CM^2
BH CV ECHO MEAS - MVA(VTI): 2.6 CM^2
BH CV ECHO MEAS - PA ACC TIME: 0.07 SEC
BH CV ECHO MEAS - PA MAX PG (FULL): 1.3 MMHG
BH CV ECHO MEAS - PA MAX PG: 3.5 MMHG
BH CV ECHO MEAS - PA PR(ACCEL): 47.3 MMHG
BH CV ECHO MEAS - PA V2 MAX: 93.7 CM/SEC
BH CV ECHO MEAS - PULM A REVS DUR: 0.1 SEC
BH CV ECHO MEAS - PULM A REVS VEL: 31.4 CM/SEC
BH CV ECHO MEAS - PULM DIAS VEL: 33.6 CM/SEC
BH CV ECHO MEAS - PULM S/D: 1.6
BH CV ECHO MEAS - PULM SYS VEL: 54.9 CM/SEC
BH CV ECHO MEAS - PVA(V,A): 3.2 CM^2
BH CV ECHO MEAS - PVA(V,D): 3.2 CM^2
BH CV ECHO MEAS - QP/QS: 1.2
BH CV ECHO MEAS - RV MAX PG: 2.2 MMHG
BH CV ECHO MEAS - RV MEAN PG: 1.4 MMHG
BH CV ECHO MEAS - RV V1 MAX: 74.9 CM/SEC
BH CV ECHO MEAS - RV V1 MEAN: 56.3 CM/SEC
BH CV ECHO MEAS - RV V1 VTI: 19.2 CM
BH CV ECHO MEAS - RVOT AREA: 4 CM^2
BH CV ECHO MEAS - RVOT DIAM: 2.3 CM
BH CV ECHO MEAS - SI(AO): 122.4 ML/M^2
BH CV ECHO MEAS - SI(CUBED): 37 ML/M^2
BH CV ECHO MEAS - SI(LVOT): 24.9 ML/M^2
BH CV ECHO MEAS - SI(TEICH): 29.4 ML/M^2
BH CV ECHO MEAS - SUP REN AO DIAM: 2 CM
BH CV ECHO MEAS - SV(AO): 319.8 ML
BH CV ECHO MEAS - SV(CUBED): 96.6 ML
BH CV ECHO MEAS - SV(LVOT): 65.1 ML
BH CV ECHO MEAS - SV(RVOT): 76.8 ML
BH CV ECHO MEAS - SV(TEICH): 76.9 ML
BH CV ECHO MEAS - TAPSE (>1.6): 2.6 CM2
BH CV ECHO MEASUREMENTS AVERAGE E/E' RATIO: 8.44
BH CV NUCLEAR PRIOR STUDY: 3
BH CV STRESS BP STAGE 1: NORMAL
BH CV STRESS COMMENTS STAGE 1: NORMAL
BH CV STRESS DOSE REGADENOSON STAGE 1: 0.4
BH CV STRESS DURATION MIN STAGE 1: 0
BH CV STRESS DURATION SEC STAGE 1: 10
BH CV STRESS HR STAGE 1: 93
BH CV STRESS PROTOCOL 1: NORMAL
BH CV STRESS RECOVERY BP: NORMAL MMHG
BH CV STRESS RECOVERY HR: 81 BPM
BH CV STRESS STAGE 1: 1
BH CV XLRA - RV BASE: 3.2 CM
BH CV XLRA - TDI S': 16 CM/SEC
LEFT ATRIUM VOLUME INDEX: 26 ML/M2
LV EF NUC BP: 77 %
MAXIMAL PREDICTED HEART RATE: 154 BPM
MAXIMAL PREDICTED HEART RATE: 154 BPM
PERCENT MAX PREDICTED HR: 60.39 %
SINUS: 3.2 CM
STJ: 3.3 CM
STRESS BASELINE BP: NORMAL MMHG
STRESS BASELINE HR: 72 BPM
STRESS PERCENT HR: 71 %
STRESS POST EXERCISE DUR SEC: 10 SEC
STRESS POST PEAK BP: NORMAL MMHG
STRESS POST PEAK HR: 93 BPM
STRESS TARGET HR: 131 BPM
STRESS TARGET HR: 131 BPM

## 2019-04-11 PROCEDURE — 25010000002 REGADENOSON 0.4 MG/5ML SOLUTION: Performed by: INTERNAL MEDICINE

## 2019-04-11 PROCEDURE — 93016 CV STRESS TEST SUPVJ ONLY: CPT | Performed by: INTERNAL MEDICINE

## 2019-04-11 PROCEDURE — 25010000002 PERFLUTREN (DEFINITY) 8.476 MG IN SODIUM CHLORIDE 0.9 % 10 ML INJECTION: Performed by: INTERNAL MEDICINE

## 2019-04-11 PROCEDURE — A9555 RB82 RUBIDIUM: HCPCS | Performed by: INTERNAL MEDICINE

## 2019-04-11 PROCEDURE — 0 RUBIDIUM CHLORIDE: Performed by: INTERNAL MEDICINE

## 2019-04-11 PROCEDURE — 93017 CV STRESS TEST TRACING ONLY: CPT

## 2019-04-11 PROCEDURE — 78492 MYOCRD IMG PET MLT RST&STRS: CPT

## 2019-04-11 PROCEDURE — 78492 MYOCRD IMG PET MLT RST&STRS: CPT | Performed by: INTERNAL MEDICINE

## 2019-04-11 PROCEDURE — 93306 TTE W/DOPPLER COMPLETE: CPT | Performed by: INTERNAL MEDICINE

## 2019-04-11 PROCEDURE — 93306 TTE W/DOPPLER COMPLETE: CPT

## 2019-04-11 PROCEDURE — 93018 CV STRESS TEST I&R ONLY: CPT | Performed by: INTERNAL MEDICINE

## 2019-04-11 RX ADMIN — REGADENOSON 0.4 MG: 0.08 INJECTION, SOLUTION INTRAVENOUS at 11:45

## 2019-04-11 RX ADMIN — RUBIDIUM CHLORIDE RB-82 1 DOSE: 150 INJECTION, SOLUTION INTRAVENOUS at 11:45

## 2019-04-11 RX ADMIN — PERFLUTREN 2 ML: 6.52 INJECTION, SUSPENSION INTRAVENOUS at 10:55

## 2019-04-11 RX ADMIN — RUBIDIUM CHLORIDE RB-82 1 DOSE: 150 INJECTION, SOLUTION INTRAVENOUS at 11:35

## 2019-04-11 NOTE — TELEPHONE ENCOUNTER
Called echocardiac M normal, stress test normal.  Needs to just work on diet exercise and risk factor modification and will see us as needed.  And to call back if problems or questions.ADITI

## 2019-04-15 ENCOUNTER — TELEPHONE (OUTPATIENT)
Dept: CARDIOLOGY | Facility: CLINIC | Age: 69
End: 2019-04-15

## 2019-04-15 NOTE — TELEPHONE ENCOUNTER
Regarding: Compliment  Contact: [phone]  ----- Message from [employer] CIO Generic sent at 4/13/2019  8:58 PM EDT -----    Thank you and your staff for taking such good care of me.  :-)

## 2019-04-25 RX ORDER — ALBUTEROL SULFATE 90 UG/1
AEROSOL, METERED RESPIRATORY (INHALATION)
Qty: 54 INHALER | Refills: 2 | Status: SHIPPED | OUTPATIENT
Start: 2019-04-25 | End: 2019-10-10 | Stop reason: SDUPTHER

## 2019-04-25 RX ORDER — OMEPRAZOLE 20 MG/1
CAPSULE, DELAYED RELEASE ORAL
Qty: 30 CAPSULE | Refills: 4 | Status: SHIPPED | OUTPATIENT
Start: 2019-04-25 | End: 2019-09-11 | Stop reason: SDUPTHER

## 2019-05-20 RX ORDER — LEVOTHYROXINE SODIUM 0.03 MG/1
TABLET ORAL
Qty: 90 TABLET | Refills: 0 | Status: SHIPPED | OUTPATIENT
Start: 2019-05-20 | End: 2019-08-23 | Stop reason: SDUPTHER

## 2019-06-18 RX ORDER — SITAGLIPTIN 100 MG/1
TABLET, FILM COATED ORAL
Qty: 90 TABLET | Refills: 1 | Status: SHIPPED | OUTPATIENT
Start: 2019-06-18 | End: 2019-10-15 | Stop reason: SDUPTHER

## 2019-06-27 ENCOUNTER — OFFICE VISIT (OUTPATIENT)
Dept: SLEEP MEDICINE | Facility: HOSPITAL | Age: 69
End: 2019-06-27
Attending: INTERNAL MEDICINE

## 2019-06-27 VITALS — WEIGHT: 315 LBS | HEIGHT: 73 IN | BODY MASS INDEX: 41.75 KG/M2

## 2019-06-27 DIAGNOSIS — G47.10 HYPERSOMNIA: ICD-10-CM

## 2019-06-27 DIAGNOSIS — E66.01 CLASS 3 SEVERE OBESITY DUE TO EXCESS CALORIES WITH SERIOUS COMORBIDITY AND BODY MASS INDEX (BMI) OF 40.0 TO 44.9 IN ADULT (HCC): ICD-10-CM

## 2019-06-27 DIAGNOSIS — G47.33 OBSTRUCTIVE SLEEP APNEA SYNDROME: Primary | ICD-10-CM

## 2019-06-27 DIAGNOSIS — R06.09 DOE (DYSPNEA ON EXERTION): ICD-10-CM

## 2019-06-27 PROCEDURE — G0463 HOSPITAL OUTPT CLINIC VISIT: HCPCS

## 2019-06-27 PROCEDURE — 99214 OFFICE O/P EST MOD 30 MIN: CPT | Performed by: INTERNAL MEDICINE

## 2019-06-27 NOTE — PROGRESS NOTES
"Follow Up [facility] Note     Chief Complaint:  GRICEL     Primary Care Physician: Austin Shaikh MD    Interval History:   I last saw the patient one year ago.  The patient states he is unchanged from the GRICEL standpoint.  He goes to bed between 10 PM and 2 AM and awakens between 6 AM and 10 AM.  He will use the bathroom during the nighttime.  [place] Sleepiness Scale borderline normal at 8    The patient reports a DVT/PE 6/12/2018.  Because of that he had weight gain.  Because of both of the above, he describes more shortness of breath.  He did have a cardiology evaluation in April of this year and his stress test was okay.    Review of Systems:    A complete review of systems was done and all were negative with the exception of nasal congestion and postnasal drip, DUGGAN, COLE and ear pain    Medical history reviewed in the integrated medical records.  Patient reports no change in family history    Social History:    Social History     Socioeconomic History   • Marital status:      Spouse name: Not on file   • Number of children: Not on file   • Years of education: Not on file   • Highest education level: Not on file   Tobacco Use   • Smoking status: Never Smoker   • Smokeless tobacco: Never Used   • Tobacco comment: caffeine use   Substance and Sexual Activity   • Alcohol use: No       Allergies:  Other     Medication Review: His list was reviewed.      Vital Signs:    Vitals:    06/27/19 0858   Weight: (!) 152 kg (334 lb)   Height: 185.4 cm (73\")     Body mass index is 44.07 kg/m².    Physical Exam:    Constitutional:  Well developed 69 y.o. male that appears in no apparent distress.  Awake & oriented times 3.  Normal mood with normal recent and remote memory and normal judgement.  Eyes:  Conjunctivae normal.  Oropharynx:  moist mucous membranes without exudate and large tongue and uvula and mild narrowing of the posterior pharyngeal opening and class II-3 MP airway  Neck: Trachea " midline  Respiratory: Not labored at rest  Musculoskeletal: Gait appears grossly normal and there is no evidence of clubbing     Results Review:  DME is [facility] and he uses a fullface mask.  Downloads between 3/29 and 6/26/2019 compliance 100%.  Average usage is 9 hours.  Average AHI is normal without leak.  Average AutoCPAP pressure is 14.1 and his auto CPAP is 10-15.       Impression:   Obstructive sleep apnea adequately treated with CPAP with good compliance and usage and some persistent complaints of hypersomnolence.  Dyspnea on exertion, probably multifactorial; history DVT and PE    Plan:  Good sleep hygiene measures should be maintained.  Weight loss would be beneficial in this patient who is morbidly obese by BMI.  The patient is benefiting from the treatment being provided.     The patient will continue auto CPAP.  A new prescription will be sent to his DME.    Pulmonary evaluation will be requested for DUGGAN.  Worsening shortness of breath probably multifactorial related to previous DVT and PE, weight gain, and subsequent and activity.    The patient will call for any problems and will follow up in 1 year.      Barry Gillespie MD  Sleep Medicine  06/27/19  9:13 AM

## 2019-06-30 PROBLEM — E66.813 CLASS 3 SEVERE OBESITY DUE TO EXCESS CALORIES WITH SERIOUS COMORBIDITY AND BODY MASS INDEX (BMI) OF 40.0 TO 44.9 IN ADULT: Status: ACTIVE | Noted: 2019-06-30

## 2019-06-30 PROBLEM — E66.01 CLASS 3 SEVERE OBESITY DUE TO EXCESS CALORIES WITH SERIOUS COMORBIDITY AND BODY MASS INDEX (BMI) OF 40.0 TO 44.9 IN ADULT (HCC): Status: ACTIVE | Noted: 2019-06-30

## 2019-07-17 RX ORDER — GLIMEPIRIDE 2 MG/1
TABLET ORAL
Qty: 90 TABLET | Refills: 1 | Status: SHIPPED | OUTPATIENT
Start: 2019-07-17 | End: 2019-12-30

## 2019-07-26 RX ORDER — PIOGLITAZONEHYDROCHLORIDE 30 MG/1
30 TABLET ORAL DAILY
Qty: 90 TABLET | Refills: 1 | Status: SHIPPED | OUTPATIENT
Start: 2019-07-26 | End: 2019-11-26 | Stop reason: SDUPTHER

## 2019-08-01 ENCOUNTER — TRANSCRIBE ORDERS (OUTPATIENT)
Dept: ADMINISTRATIVE | Facility: HOSPITAL | Age: 69
End: 2019-08-01

## 2019-08-01 ENCOUNTER — HOSPITAL ENCOUNTER (OUTPATIENT)
Dept: CT IMAGING | Facility: HOSPITAL | Age: 69
Discharge: HOME | End: 2019-08-01
Admitting: INTERNAL MEDICINE

## 2019-08-01 DIAGNOSIS — I26.99 OTHER PULMONARY EMBOLISM WITHOUT ACUTE COR PULMONALE, UNSPECIFIED CHRONICITY (HCC): Primary | ICD-10-CM

## 2019-08-01 DIAGNOSIS — I26.99 OTHER PULMONARY EMBOLISM WITHOUT ACUTE COR PULMONALE, UNSPECIFIED CHRONICITY (HCC): ICD-10-CM

## 2019-08-01 LAB — CREAT BLDA-MCNC: 1 MG/DL (ref 0.6–1.3)

## 2019-08-01 PROCEDURE — 82565 ASSAY OF CREATININE: CPT

## 2019-08-01 PROCEDURE — 0 IOPAMIDOL PER 1 ML: Performed by: INTERNAL MEDICINE

## 2019-08-01 PROCEDURE — 71275 CT ANGIOGRAPHY CHEST: CPT

## 2019-08-01 RX ADMIN — IOPAMIDOL 95 ML: 755 INJECTION, SOLUTION INTRAVENOUS at 15:41

## 2019-08-01 NOTE — NURSING NOTE
Dr. Guerra read CT PE Chest as no PE. This was called to Dr. Reese Velasco as ordered and he stated patient may go home. Patient ambulated to car with spouse.

## 2019-08-19 DIAGNOSIS — E11.9 TYPE 2 DIABETES MELLITUS WITHOUT COMPLICATION, WITHOUT LONG-TERM CURRENT USE OF INSULIN (HCC): ICD-10-CM

## 2019-08-19 DIAGNOSIS — E78.2 MIXED HYPERLIPIDEMIA: ICD-10-CM

## 2019-08-23 RX ORDER — LEVOTHYROXINE SODIUM 0.03 MG/1
TABLET ORAL
Qty: 90 TABLET | Refills: 0 | Status: SHIPPED | OUTPATIENT
Start: 2019-08-23 | End: 2019-11-07 | Stop reason: SDUPTHER

## 2019-08-23 RX ORDER — APIXABAN 5 MG/1
TABLET, FILM COATED ORAL
Qty: 60 TABLET | Refills: 5 | Status: SHIPPED | OUTPATIENT
Start: 2019-08-23 | End: 2020-03-12

## 2019-08-24 LAB
ALBUMIN SERPL-MCNC: 4.4 G/DL (ref 3.5–5.2)
ALBUMIN/GLOB SERPL: 1.7 G/DL
ALP SERPL-CCNC: 52 U/L (ref 39–117)
ALT SERPL-CCNC: 21 U/L (ref 1–41)
AST SERPL-CCNC: 23 U/L (ref 1–40)
BILIRUB SERPL-MCNC: 0.3 MG/DL (ref 0.2–1.2)
BUN SERPL-MCNC: 13 MG/DL (ref 8–23)
BUN/CREAT SERPL: 13.8 (ref 7–25)
CALCIUM SERPL-MCNC: 9.2 MG/DL (ref 8.6–10.5)
CHLORIDE SERPL-SCNC: 103 MMOL/L (ref 98–107)
CHOLEST SERPL-MCNC: 204 MG/DL (ref 0–200)
CO2 SERPL-SCNC: 25.2 MMOL/L (ref 22–29)
CREAT SERPL-MCNC: 0.94 MG/DL (ref 0.76–1.27)
GLOBULIN SER CALC-MCNC: 2.6 GM/DL
GLUCOSE SERPL-MCNC: 122 MG/DL (ref 65–99)
HBA1C MFR BLD: 6.3 % (ref 4.8–5.6)
HDLC SERPL-MCNC: 44 MG/DL (ref 40–60)
LDLC SERPL CALC-MCNC: 87 MG/DL (ref 0–100)
LDLC/HDLC SERPL: 1.97 {RATIO}
POTASSIUM SERPL-SCNC: 4 MMOL/L (ref 3.5–5.2)
PROT SERPL-MCNC: 7 G/DL (ref 6–8.5)
SODIUM SERPL-SCNC: 144 MMOL/L (ref 136–145)
TRIGL SERPL-MCNC: 366 MG/DL (ref 0–150)
VLDLC SERPL CALC-MCNC: 73.2 MG/DL

## 2019-08-30 ENCOUNTER — OFFICE VISIT (OUTPATIENT)
Dept: FAMILY MEDICINE CLINIC | Facility: CLINIC | Age: 69
End: 2019-08-30

## 2019-08-30 VITALS
DIASTOLIC BLOOD PRESSURE: 78 MMHG | BODY MASS INDEX: 41.75 KG/M2 | HEIGHT: 73 IN | TEMPERATURE: 98.2 F | RESPIRATION RATE: 18 BRPM | OXYGEN SATURATION: 96 % | HEART RATE: 87 BPM | WEIGHT: 315 LBS | SYSTOLIC BLOOD PRESSURE: 132 MMHG

## 2019-08-30 DIAGNOSIS — G47.19 EXCESSIVE DAYTIME SLEEPINESS: ICD-10-CM

## 2019-08-30 DIAGNOSIS — Z86.711 HISTORY OF PULMONARY EMBOLISM: ICD-10-CM

## 2019-08-30 DIAGNOSIS — G47.10 HYPERSOMNIA: ICD-10-CM

## 2019-08-30 DIAGNOSIS — K21.9 GASTROESOPHAGEAL REFLUX DISEASE, ESOPHAGITIS PRESENCE NOT SPECIFIED: ICD-10-CM

## 2019-08-30 DIAGNOSIS — I82.5Z1 CHRONIC DEEP VEIN THROMBOSIS (DVT) OF DISTAL VEIN OF RIGHT LOWER EXTREMITY (HCC): ICD-10-CM

## 2019-08-30 DIAGNOSIS — E66.01 CLASS 3 SEVERE OBESITY DUE TO EXCESS CALORIES WITH SERIOUS COMORBIDITY AND BODY MASS INDEX (BMI) OF 40.0 TO 44.9 IN ADULT (HCC): ICD-10-CM

## 2019-08-30 DIAGNOSIS — E11.9 TYPE 2 DIABETES MELLITUS WITHOUT COMPLICATION, WITHOUT LONG-TERM CURRENT USE OF INSULIN (HCC): ICD-10-CM

## 2019-08-30 DIAGNOSIS — E78.2 MIXED HYPERLIPIDEMIA: Primary | ICD-10-CM

## 2019-08-30 PROCEDURE — 90732 PPSV23 VACC 2 YRS+ SUBQ/IM: CPT | Performed by: INTERNAL MEDICINE

## 2019-08-30 PROCEDURE — G0009 ADMIN PNEUMOCOCCAL VACCINE: HCPCS | Performed by: INTERNAL MEDICINE

## 2019-08-30 PROCEDURE — 99214 OFFICE O/P EST MOD 30 MIN: CPT | Performed by: INTERNAL MEDICINE

## 2019-08-30 RX ORDER — MODAFINIL 200 MG/1
200 TABLET ORAL 2 TIMES DAILY
Qty: 180 TABLET | Refills: 1 | Status: SHIPPED | OUTPATIENT
Start: 2019-08-30 | End: 2019-12-04 | Stop reason: SDUPTHER

## 2019-08-30 NOTE — PROGRESS NOTES
Subjective   Barry Cam is a 69 y.o. male. Patient is here today for establishing care with me as a new patient.  He was seeing Dr. Shaikh.  He has known problems of obesity, history of DVT and pulmonary emboli, hypertension, hyperlipidemia dyspnea on exertion.  He also has had some GERD and diabetes mellitus type 2 and excessive daytime sleepiness  Chief Complaint   Patient presents with   • Diabetes     HLD- FOLLOW UP LABS          Vitals:    08/30/19 1324   BP: 132/78   Pulse: 87   Resp: 18   Temp: 98.2 °F (36.8 °C)   SpO2: 96%     The following portions of the patient's history were reviewed and updated as appropriate: allergies, current medications, past family history, past medical history, past social history, past surgical history and problem list.    Past Medical History:   Diagnosis Date   • Allergic    • Allergic rhinitis    • Anxiety    • Depression    • Depression    • Diabetes mellitus (CMS/HCC)    • DUGGAN (dyspnea on exertion)    • Edema    • GERD (gastroesophageal reflux disease)    • History of hiatal hernia    • History of kidney stones    • Hx of blood clots    • Hyperglycemia    • Hyperlipidemia    • Hypertension    • Pulmonary embolism (CMS/HCC)    • Sciatica    • Sleep apnea    • SOB (shortness of breath)    • Thyroid disease       Allergies   Allergen Reactions   • Other Cough     Hay fever      Social History     Socioeconomic History   • Marital status:      Spouse name: Not on file   • Number of children: Not on file   • Years of education: Not on file   • Highest education level: Not on file   Tobacco Use   • Smoking status: Never Smoker   • Smokeless tobacco: Never Used   • Tobacco comment: caffeine use   Substance and Sexual Activity   • Alcohol use: No        Current Outpatient Medications:   •  acetaminophen (TYLENOL) 500 MG tablet, Take 500 mg by mouth Every 6 (Six) Hours As Needed for Mild Pain ., Disp: , Rfl:   •  betamethasone valerate (VALISONE) 0.1 % cream, APPLY  CREAM TOPICALLY TO AFFECTED AREA(S) TO SKIN TWICE DAILY, Disp: 15 g, Rfl: 1  •  caffeine 200 MG tablet, Take 200 mg by mouth Every 4 (Four) Hours As Needed for Headache., Disp: , Rfl:   •  Copper Gluconate (COPPER CAPS PO), Take  by mouth., Disp: , Rfl:   •  diphenhydrAMINE (BENADRYL) 50 MG capsule, Take 50 mg by mouth Every 6 (Six) Hours As Needed for Itching., Disp: , Rfl:   •  ELIQUIS 5 MG tablet tablet, TAKE 1 TABLET BY MOUTH EVERY 12 HOURS, Disp: 60 tablet, Rfl: 5  •  gabapentin (NEURONTIN) 300 MG capsule, Take 300 mg by mouth 2 (Two) Times a Day., Disp: , Rfl:   •  glimepiride (AMARYL) 2 MG tablet, TAKE 1 TABLET BY MOUTH EVERY MORNING BEFORE BREAKFAST, Disp: 90 tablet, Rfl: 1  •  Glucosamine-Chondroit-Vit C-Mn (GLUCOSAMINE CHONDR 500 COMPLEX) capsule, Take 2 capsules by mouth daily., Disp: , Rfl:   •  glucose blood (ONE TOUCH ULTRA TEST) test strip, USE TO TEST BLOOD SUGAR 3 TIMES DAILY, Disp: 100 each, Rfl: 5  •  JANUVIA 100 MG tablet, TAKE 1 TABLET BY MOUTH DAILY., Disp: 90 tablet, Rfl: 1  •  Lancets (ONETOUCH ULTRASOFT) lancets, USE TO TEST BLOOD SUGAR 3 TIMES DAILY, Disp: 100 each, Rfl: 5  •  levothyroxine (SYNTHROID, LEVOTHROID) 25 MCG tablet, TAKE 1 TABLET BY MOUTH EVERY DAY, Disp: 90 tablet, Rfl: 0  •  losartan (COZAAR) 100 MG tablet, Take 1 tablet by mouth Daily., Disp: 90 tablet, Rfl: 3  •  LUTEIN PO, Take 5 mg by mouth Daily., Disp: , Rfl:   •  melatonin 1 MG tablet, Take  by mouth., Disp: , Rfl:   •  metFORMIN (GLUCOPHAGE) 1000 MG tablet, TAKE 1 TABLET BY MOUTH TWICE A DAY, Disp: 180 tablet, Rfl: 1  •  modafinil (PROVIGIL) 200 MG tablet, Take 1 tablet by mouth 2 (Two) Times a Day., Disp: 180 tablet, Rfl: 1  •  MULTIPLE VITAMINS ESSENTIAL PO, Take  by mouth daily., Disp: , Rfl:   •  omeprazole (priLOSEC) 20 MG capsule, TAKE ONE CAPSULE BY MOUTH EVERY DAY, Disp: 30 capsule, Rfl: 4  •  pioglitazone (ACTOS) 30 MG tablet, TAKE 1 TABLET BY MOUTH DAILY., Disp: 90 tablet, Rfl: 1  •  pravastatin (PRAVACHOL)  40 MG tablet, Take 1 tablet by mouth Daily., Disp: 90 tablet, Rfl: 3  •  Triamcinolone Acetonide (NASACORT) 55 MCG/ACT nasal inhaler, 2 sprays into the nostril(s) as directed by provider Daily., Disp: , Rfl:   •  VENTOLIN  (90 Base) MCG/ACT inhaler, INHALE 2 PUFFS EVERY 4 HOURS AS NEEDED FOR WHEEZING, Disp: 54 inhaler, Rfl: 2  •  vitamin C (ASCORBIC ACID) 250 MG tablet, Take 250 mg by mouth Daily., Disp: , Rfl:   •  vitamin E 200 UNIT capsule, Take 200 Units by mouth Daily., Disp: , Rfl:   •  Zeaxanthin powder, , Disp: , Rfl:   •  Zinc Sulfate (ZINC 15 PO), Take  by mouth., Disp: , Rfl:      Objective     History of Present Illness     Review of Systems   Constitutional: Negative.    HENT: Negative.    Eyes: Negative.    Respiratory: Negative.    Cardiovascular: Negative.    Gastrointestinal: Negative.    Genitourinary: Negative.    Musculoskeletal: Negative.    Skin: Negative.    Neurological: Negative.    Psychiatric/Behavioral: Negative.        Physical Exam   Constitutional: He is oriented to person, place, and time. He appears well-developed and well-nourished.   Pleasant, cooperative no acute distress, blood pressure 130/80   HENT:   Head: Normocephalic and atraumatic.   Eyes: Conjunctivae are normal. Pupils are equal, round, and reactive to light. No scleral icterus.   Neck: Normal range of motion. Neck supple.   Cardiovascular: Normal rate, regular rhythm and normal heart sounds.   Pulmonary/Chest: Effort normal and breath sounds normal. No respiratory distress. He has no wheezes. He has no rales.   Musculoskeletal: Normal range of motion.   Neurological: He is alert and oriented to person, place, and time.   Skin: Skin is warm and dry.   Psychiatric: He has a normal mood and affect. His behavior is normal.   Nursing note and vitals reviewed.      ASSESSMENT lipid panel is total cholesterol of 204, HDL 44 and LDL 87.  CMP has a sugar of 122 and is otherwise normal and hemoglobin A1c is improved  dramatically to 6.3.  #1-hyperlipidemia, reasonable control  #2-history of DVT and pulmonary emboli, asymptomatic  #3-diabetes mellitus type 2, controlled on medication  #4-hypersomnia and excessive daytime sleepiness, on medication  #5-obesity, stable     Problem List Items Addressed This Visit        Cardiovascular and Mediastinum    Hyperlipidemia - Primary    Chronic deep vein thrombosis (DVT) of distal vein of right lower extremity (CMS/[facility])       Digestive    Gastroesophageal reflux disease    Class 3 severe obesity due to excess calories with serious comorbidity and body mass index (BMI) of 40.0 to 44.9 in adult (CMS/[facility])       Endocrine    Diabetes mellitus (CMS/[facility])       Other    Hypersomnia          PLAN the patient received a Pneumovax 23 immunization today.  I refilled his Provigil and he will continue on the low-dose thyroid medication.  He will continue his other medicines as now.  I recommended the hepatitis A, shingles immunizations and a flu shot in October.  I would like to recheck the patient in about 3 months with a CBC, CMP, lipid panel, hemoglobin A1c, TSH and free T4    There are no Patient Instructions on file for this visit.  Return in about 3 months (around 11/30/2019) for with labs.

## 2019-09-11 RX ORDER — OMEPRAZOLE 20 MG/1
CAPSULE, DELAYED RELEASE ORAL
Qty: 90 CAPSULE | Refills: 1 | Status: SHIPPED | OUTPATIENT
Start: 2019-09-11 | End: 2020-02-18 | Stop reason: SDUPTHER

## 2019-10-10 RX ORDER — ALBUTEROL SULFATE 90 UG/1
2 AEROSOL, METERED RESPIRATORY (INHALATION) EVERY 4 HOURS PRN
Qty: 54 INHALER | Refills: 1 | Status: SHIPPED | OUTPATIENT
Start: 2019-10-10 | End: 2022-01-11 | Stop reason: SDUPTHER

## 2019-11-05 RX ORDER — LANCETS
EACH MISCELLANEOUS
Qty: 100 EACH | Refills: 5 | Status: SHIPPED | OUTPATIENT
Start: 2019-11-05 | End: 2021-04-16

## 2019-11-07 RX ORDER — LEVOTHYROXINE SODIUM 0.03 MG/1
TABLET ORAL
Qty: 90 TABLET | Refills: 0 | Status: SHIPPED | OUTPATIENT
Start: 2019-11-07 | End: 2020-01-30

## 2019-11-11 RX ORDER — LOSARTAN POTASSIUM 100 MG/1
100 TABLET ORAL DAILY
Qty: 90 TABLET | Refills: 3 | Status: SHIPPED | OUTPATIENT
Start: 2019-11-11 | End: 2020-10-09

## 2019-11-26 DIAGNOSIS — E78.2 MIXED HYPERLIPIDEMIA: Primary | ICD-10-CM

## 2019-11-26 DIAGNOSIS — E11.9 TYPE 2 DIABETES MELLITUS WITHOUT COMPLICATION, WITHOUT LONG-TERM CURRENT USE OF INSULIN (HCC): ICD-10-CM

## 2019-11-26 RX ORDER — PRAVASTATIN SODIUM 40 MG
40 TABLET ORAL DAILY
Qty: 90 TABLET | Refills: 1 | Status: SHIPPED | OUTPATIENT
Start: 2019-11-26 | End: 2020-03-12

## 2019-11-26 RX ORDER — PIOGLITAZONEHYDROCHLORIDE 30 MG/1
30 TABLET ORAL DAILY
Qty: 90 TABLET | Refills: 1 | Status: SHIPPED | OUTPATIENT
Start: 2019-11-26 | End: 2020-07-15

## 2019-11-27 LAB
ALBUMIN SERPL-MCNC: 4.6 G/DL (ref 3.5–5.2)
ALBUMIN/GLOB SERPL: 1.7 G/DL
ALP SERPL-CCNC: 55 U/L (ref 39–117)
ALT SERPL-CCNC: 18 U/L (ref 1–41)
AST SERPL-CCNC: 21 U/L (ref 1–40)
BASOPHILS # BLD AUTO: 0.04 10*3/MM3 (ref 0–0.2)
BASOPHILS NFR BLD AUTO: 0.5 % (ref 0–1.5)
BILIRUB SERPL-MCNC: 0.4 MG/DL (ref 0.2–1.2)
BUN SERPL-MCNC: 15 MG/DL (ref 8–23)
BUN/CREAT SERPL: 13.5 (ref 7–25)
CALCIUM SERPL-MCNC: 9.9 MG/DL (ref 8.6–10.5)
CHLORIDE SERPL-SCNC: 102 MMOL/L (ref 98–107)
CHOLEST SERPL-MCNC: 212 MG/DL (ref 0–200)
CO2 SERPL-SCNC: 24.1 MMOL/L (ref 22–29)
CREAT SERPL-MCNC: 1.11 MG/DL (ref 0.76–1.27)
EOSINOPHIL # BLD AUTO: 0.23 10*3/MM3 (ref 0–0.4)
EOSINOPHIL NFR BLD AUTO: 3.1 % (ref 0.3–6.2)
ERYTHROCYTE [DISTWIDTH] IN BLOOD BY AUTOMATED COUNT: 13.4 % (ref 12.3–15.4)
GLOBULIN SER CALC-MCNC: 2.7 GM/DL
GLUCOSE SERPL-MCNC: 138 MG/DL (ref 65–99)
HBA1C MFR BLD: 6.4 % (ref 4.8–5.6)
HCT VFR BLD AUTO: 39.3 % (ref 37.5–51)
HDLC SERPL-MCNC: 48 MG/DL (ref 40–60)
HGB BLD-MCNC: 13.4 G/DL (ref 13–17.7)
IMM GRANULOCYTES # BLD AUTO: 0.03 10*3/MM3 (ref 0–0.05)
IMM GRANULOCYTES NFR BLD AUTO: 0.4 % (ref 0–0.5)
LDLC SERPL CALC-MCNC: 92 MG/DL (ref 0–100)
LDLC/HDLC SERPL: 1.93 {RATIO}
LYMPHOCYTES # BLD AUTO: 1.88 10*3/MM3 (ref 0.7–3.1)
LYMPHOCYTES NFR BLD AUTO: 25.3 % (ref 19.6–45.3)
MCH RBC QN AUTO: 30.5 PG (ref 26.6–33)
MCHC RBC AUTO-ENTMCNC: 34.1 G/DL (ref 31.5–35.7)
MCV RBC AUTO: 89.5 FL (ref 79–97)
MONOCYTES # BLD AUTO: 0.58 10*3/MM3 (ref 0.1–0.9)
MONOCYTES NFR BLD AUTO: 7.8 % (ref 5–12)
NEUTROPHILS # BLD AUTO: 4.68 10*3/MM3 (ref 1.7–7)
NEUTROPHILS NFR BLD AUTO: 62.9 % (ref 42.7–76)
NRBC BLD AUTO-RTO: 0 /100 WBC (ref 0–0.2)
PLATELET # BLD AUTO: 187 10*3/MM3 (ref 140–450)
POTASSIUM SERPL-SCNC: 4.1 MMOL/L (ref 3.5–5.2)
PROT SERPL-MCNC: 7.3 G/DL (ref 6–8.5)
RBC # BLD AUTO: 4.39 10*6/MM3 (ref 4.14–5.8)
SODIUM SERPL-SCNC: 141 MMOL/L (ref 136–145)
T4 FREE SERPL-MCNC: 1.09 NG/DL (ref 0.93–1.7)
TRIGL SERPL-MCNC: 358 MG/DL (ref 0–150)
TSH SERPL DL<=0.005 MIU/L-ACNC: 3.95 UIU/ML (ref 0.27–4.2)
VLDLC SERPL CALC-MCNC: 71.6 MG/DL
WBC # BLD AUTO: 7.44 10*3/MM3 (ref 3.4–10.8)

## 2019-12-04 ENCOUNTER — OFFICE VISIT (OUTPATIENT)
Dept: FAMILY MEDICINE CLINIC | Facility: CLINIC | Age: 69
End: 2019-12-04

## 2019-12-04 VITALS
HEIGHT: 73 IN | OXYGEN SATURATION: 96 % | WEIGHT: 315 LBS | BODY MASS INDEX: 41.75 KG/M2 | TEMPERATURE: 97.8 F | HEART RATE: 92 BPM

## 2019-12-04 DIAGNOSIS — E07.9 THYROID DISEASE: ICD-10-CM

## 2019-12-04 DIAGNOSIS — H61.21 IMPACTED CERUMEN OF RIGHT EAR: ICD-10-CM

## 2019-12-04 DIAGNOSIS — E78.2 MIXED HYPERLIPIDEMIA: ICD-10-CM

## 2019-12-04 DIAGNOSIS — E66.01 CLASS 3 SEVERE OBESITY DUE TO EXCESS CALORIES WITH SERIOUS COMORBIDITY AND BODY MASS INDEX (BMI) OF 40.0 TO 44.9 IN ADULT (HCC): ICD-10-CM

## 2019-12-04 DIAGNOSIS — I82.5Z1 CHRONIC DEEP VEIN THROMBOSIS (DVT) OF DISTAL VEIN OF RIGHT LOWER EXTREMITY (HCC): Primary | ICD-10-CM

## 2019-12-04 DIAGNOSIS — G47.10 HYPERSOMNIA: ICD-10-CM

## 2019-12-04 DIAGNOSIS — E11.40 DIABETIC NEUROPATHY, PAINFUL (HCC): ICD-10-CM

## 2019-12-04 DIAGNOSIS — E11.9 TYPE 2 DIABETES MELLITUS WITHOUT COMPLICATION, WITHOUT LONG-TERM CURRENT USE OF INSULIN (HCC): ICD-10-CM

## 2019-12-04 DIAGNOSIS — G47.19 EXCESSIVE DAYTIME SLEEPINESS: ICD-10-CM

## 2019-12-04 PROCEDURE — 99214 OFFICE O/P EST MOD 30 MIN: CPT | Performed by: INTERNAL MEDICINE

## 2019-12-04 PROCEDURE — 69209 REMOVE IMPACTED EAR WAX UNI: CPT | Performed by: INTERNAL MEDICINE

## 2019-12-04 RX ORDER — MODAFINIL 200 MG/1
200 TABLET ORAL 2 TIMES DAILY
Qty: 180 TABLET | Refills: 1 | Status: SHIPPED | OUTPATIENT
Start: 2019-12-04 | End: 2020-04-13 | Stop reason: SDUPTHER

## 2019-12-04 RX ORDER — GABAPENTIN 300 MG/1
600 CAPSULE ORAL 2 TIMES DAILY
Qty: 360 CAPSULE | Refills: 1 | Status: SHIPPED | OUTPATIENT
Start: 2019-12-04 | End: 2020-07-07

## 2019-12-04 NOTE — PROGRESS NOTES
Subjective   Barry Cam is a 69 y.o. male. Patient is here today for follow-up on his history of DVT, hypertension, hyperlipidemia, obstructive sleep apnea with excessive daytime sleepiness, obesity, diabetes mellitus type 2 with peripheral neuropathy and hypothyroidism.  Patient's generally been stable and has had no chest pain, shortness of breath, edema or myalgias.  He is been tolerating medicines without problems  Chief Complaint   Patient presents with   • Hyperlipidemia   • Diabetes          Vitals:    12/04/19 0848   Pulse: 92   Temp: 97.8 °F (36.6 °C)   SpO2: 96%     Body mass index is 45.36 kg/m².  The following portions of the patient's history were reviewed and updated as appropriate: allergies, current medications, past family history, past medical history, past social history, past surgical history and problem list.    Past Medical History:   Diagnosis Date   • Allergic    • Allergic rhinitis    • Anxiety    • Depression    • Depression    • Diabetes mellitus (CMS/HCC)    • DUGGAN (dyspnea on exertion)    • Edema    • GERD (gastroesophageal reflux disease)    • History of hiatal hernia    • History of kidney stones    • Hx of blood clots    • Hyperglycemia    • Hyperlipidemia    • Hypertension    • Pulmonary embolism (CMS/HCC)    • Sciatica    • Sleep apnea    • SOB (shortness of breath)    • Thyroid disease       Allergies   Allergen Reactions   • Other Cough     Hay fever      Social History     Socioeconomic History   • Marital status:      Spouse name: Not on file   • Number of children: Not on file   • Years of education: Not on file   • Highest education level: Not on file   Tobacco Use   • Smoking status: Never Smoker   • Smokeless tobacco: Never Used   • Tobacco comment: caffeine use   Substance and Sexual Activity   • Alcohol use: No        Current Outpatient Medications:   •  acetaminophen (TYLENOL) 500 MG tablet, Take 500 mg by mouth Every 6 (Six) Hours As Needed for Mild Pain .,  Disp: , Rfl:   •  albuterol sulfate HFA (VENTOLIN HFA) 108 (90 Base) MCG/ACT inhaler, Inhale 2 puffs Every 4 (Four) Hours As Needed for Wheezing or Shortness of Air. for wheezing, Disp: 54 inhaler, Rfl: 1  •  betamethasone valerate (VALISONE) 0.1 % cream, APPLY CREAM TOPICALLY TO AFFECTED AREA(S) TO SKIN TWICE DAILY, Disp: 15 g, Rfl: 1  •  caffeine 200 MG tablet, Take 200 mg by mouth Every 4 (Four) Hours As Needed for Headache., Disp: , Rfl:   •  Copper Gluconate (COPPER CAPS PO), Take  by mouth., Disp: , Rfl:   •  diphenhydrAMINE (BENADRYL) 50 MG capsule, Take 50 mg by mouth Every 6 (Six) Hours As Needed for Itching., Disp: , Rfl:   •  ELIQUIS 5 MG tablet tablet, TAKE 1 TABLET BY MOUTH EVERY 12 HOURS, Disp: 60 tablet, Rfl: 5  •  gabapentin (NEURONTIN) 300 MG capsule, Take 2 capsules by mouth 2 (Two) Times a Day., Disp: 360 capsule, Rfl: 1  •  glimepiride (AMARYL) 2 MG tablet, TAKE 1 TABLET BY MOUTH EVERY MORNING BEFORE BREAKFAST, Disp: 90 tablet, Rfl: 1  •  Glucosamine-Chondroit-Vit C-Mn (GLUCOSAMINE CHONDR 500 COMPLEX) capsule, Take 2 capsules by mouth daily., Disp: , Rfl:   •  glucose blood (ONE TOUCH ULTRA TEST) test strip, USE TO TEST BLOOD SUGAR 3 TIMES DAILY, Disp: 100 each, Rfl: 5  •  Lancets (ONETOUCH ULTRASOFT) lancets, USE TO TEST BLOOD SUGAR 3 TIMES DAILY, Disp: 100 each, Rfl: 5  •  levothyroxine (SYNTHROID, LEVOTHROID) 25 MCG tablet, TAKE 1 TABLET BY MOUTH EVERY DAY, Disp: 90 tablet, Rfl: 0  •  losartan (COZAAR) 100 MG tablet, Take 1 tablet by mouth Daily., Disp: 90 tablet, Rfl: 3  •  LUTEIN PO, Take 5 mg by mouth Daily., Disp: , Rfl:   •  melatonin 1 MG tablet, Take  by mouth., Disp: , Rfl:   •  metFORMIN (GLUCOPHAGE) 1000 MG tablet, Take 1 tablet by mouth 2 (Two) Times a Day., Disp: 180 tablet, Rfl: 1  •  modafinil (PROVIGIL) 200 MG tablet, Take 1 tablet by mouth 2 (Two) Times a Day., Disp: 180 tablet, Rfl: 1  •  MULTIPLE VITAMINS ESSENTIAL PO, Take  by mouth daily., Disp: , Rfl:   •  omeprazole  (priLOSEC) 20 MG capsule, TAKE ONE CAPSULE BY MOUTH EVERY DAY, Disp: 90 capsule, Rfl: 1  •  pioglitazone (ACTOS) 30 MG tablet, Take 1 tablet by mouth Daily., Disp: 90 tablet, Rfl: 1  •  pravastatin (PRAVACHOL) 40 MG tablet, Take 1 tablet by mouth Daily., Disp: 90 tablet, Rfl: 1  •  SITagliptin (JANUVIA) 100 MG tablet, Take 1 tablet by mouth Daily., Disp: 90 tablet, Rfl: 0  •  Triamcinolone Acetonide (NASACORT) 55 MCG/ACT nasal inhaler, 2 sprays into the nostril(s) as directed by provider Daily., Disp: , Rfl:   •  vitamin C (ASCORBIC ACID) 250 MG tablet, Take 250 mg by mouth Daily., Disp: , Rfl:   •  vitamin E 200 UNIT capsule, Take 200 Units by mouth Daily., Disp: , Rfl:   •  Zeaxanthin powder, , Disp: , Rfl:   •  Zinc Sulfate (ZINC 15 PO), Take  by mouth., Disp: , Rfl:      Objective     History of Present Illness     Review of Systems   Constitutional: Negative.    HENT: Negative.    Eyes: Negative.    Respiratory: Negative.    Cardiovascular: Negative.    Gastrointestinal: Negative.    Genitourinary: Negative.    Musculoskeletal: Negative.    Skin: Negative.    Neurological: Negative.    Psychiatric/Behavioral: Negative.        Physical Exam   Constitutional: He is oriented to person, place, and time. He appears well-developed and well-nourished.   Pleasant, cooperative no acute distress, blood pressure 130/80   HENT:   Head: Normocephalic and atraumatic.   Right Ear: Tympanic membrane, external ear and ear canal normal.   Initially the right auditory canal was plugged with wax.  This was successfully cleared.   Eyes: Conjunctivae are normal. Pupils are equal, round, and reactive to light. No scleral icterus.   Neck: Normal range of motion. Neck supple.   Cardiovascular: Normal rate, regular rhythm and normal heart sounds.   Pulmonary/Chest: Effort normal and breath sounds normal. No respiratory distress. He has no wheezes. He has no rales.   Musculoskeletal: Normal range of motion. He exhibits no edema.    Neurological: He is alert and oriented to person, place, and time.   Skin: Skin is warm and dry.   Psychiatric: He has a normal mood and affect. His behavior is normal.   Nursing note and vitals reviewed.  Ear Cerumen Removal  Date/Time: 12/4/2019 9:19 AM  Performed by: Cristi Gerber MD  Authorized by: Cristi Gerber MD   Consent: Verbal consent obtained.  Consent given by: patient    Anesthesia:  Local Anesthetic: none  Location details: right ear  Comments: The right ear was successfully cleared with irrigation and the large amount of wax was were removed.  The tympanic membrane and auditory canals appear normal and there was no signs of trauma.  Patient tolerated the procedure well and his hearing improved.  Procedure type: irrigation         ASSESSMENT CBC was normal.  CMP has an elevated but stable sugar of 138.  Hemoglobin A1c is stable and acceptable at 6.4.  TSH and free T4 were normal.  Lipid panel is stable with total cholesterol 212, HDL 48 LDL 92  #1-hyperlipidemia, reasonable control  #2-diabetes mellitus type 2, adequate control with peripheral neuropathy  #3-hypersomnia and obstructive sleep apnea  #4-history of DVT, on anticoagulation  #5-obesity  #6-right cerumen impaction, successfully cleared     Problem List Items Addressed This Visit        Cardiovascular and Mediastinum    Hyperlipidemia    Chronic deep vein thrombosis (DVT) of distal vein of right lower extremity (CMS/HCC) - Primary       Digestive    Class 3 severe obesity due to excess calories with serious comorbidity and body mass index (BMI) of 40.0 to 44.9 in adult (CMS/[facility])       Endocrine    Diabetes mellitus (CMS/[facility])    Diabetic neuropathy, painful (CMS/[facility])       Other    Hypersomnia    Excessive daytime sleepiness      Other Visit Diagnoses     Impacted cerumen of right ear              PLAN the patient will continue current medicines as now and I will recheck him in 6 months with a CMP, lipid panel, hemoglobin A1c,  urine microalbumin, TSH and free T4 and PSA    There are no Patient Instructions on file for this visit.  Return in about 6 months (around 6/4/2020) for with labs.

## 2019-12-30 RX ORDER — GLIMEPIRIDE 2 MG/1
TABLET ORAL
Qty: 90 TABLET | Refills: 1 | Status: SHIPPED | OUTPATIENT
Start: 2019-12-30 | End: 2020-06-02

## 2020-01-30 RX ORDER — LEVOTHYROXINE SODIUM 0.03 MG/1
TABLET ORAL
Qty: 90 TABLET | Refills: 1 | Status: SHIPPED | OUTPATIENT
Start: 2020-01-30 | End: 2020-07-15

## 2020-02-14 ENCOUNTER — TRANSCRIBE ORDERS (OUTPATIENT)
Dept: ADMINISTRATIVE | Facility: HOSPITAL | Age: 70
End: 2020-02-14

## 2020-02-14 DIAGNOSIS — J98.4 RESTRICTIVE LUNG DISEASE: Primary | ICD-10-CM

## 2020-02-18 RX ORDER — OMEPRAZOLE 20 MG/1
20 CAPSULE, DELAYED RELEASE ORAL DAILY
Qty: 90 CAPSULE | Refills: 0 | Status: SHIPPED | OUTPATIENT
Start: 2020-02-18 | End: 2020-03-12

## 2020-02-21 ENCOUNTER — HOSPITAL ENCOUNTER (OUTPATIENT)
Dept: CT IMAGING | Facility: HOSPITAL | Age: 70
Discharge: HOME | End: 2020-02-21
Admitting: INTERNAL MEDICINE

## 2020-02-21 DIAGNOSIS — J98.4 RESTRICTIVE LUNG DISEASE: ICD-10-CM

## 2020-02-21 PROCEDURE — 71250 CT THORAX DX C-: CPT

## 2020-03-12 RX ORDER — PRAVASTATIN SODIUM 40 MG
TABLET ORAL
Qty: 90 TABLET | Refills: 1 | Status: SHIPPED | OUTPATIENT
Start: 2020-03-12 | End: 2020-10-09

## 2020-03-12 RX ORDER — APIXABAN 5 MG/1
TABLET, FILM COATED ORAL
Qty: 60 TABLET | Refills: 5 | Status: SHIPPED | OUTPATIENT
Start: 2020-03-12 | End: 2020-09-08

## 2020-03-12 RX ORDER — OMEPRAZOLE 20 MG/1
CAPSULE, DELAYED RELEASE ORAL
Qty: 90 CAPSULE | Refills: 1 | Status: SHIPPED | OUTPATIENT
Start: 2020-03-12 | End: 2020-10-30

## 2020-04-14 RX ORDER — MODAFINIL 200 MG/1
200 TABLET ORAL 2 TIMES DAILY
Qty: 180 TABLET | Refills: 1 | Status: SHIPPED | OUTPATIENT
Start: 2020-04-14 | End: 2020-12-07

## 2020-06-02 ENCOUNTER — RESULTS ENCOUNTER (OUTPATIENT)
Dept: FAMILY MEDICINE CLINIC | Facility: CLINIC | Age: 70
End: 2020-06-02

## 2020-06-02 DIAGNOSIS — E78.2 MIXED HYPERLIPIDEMIA: Primary | ICD-10-CM

## 2020-06-02 DIAGNOSIS — E11.9 TYPE 2 DIABETES MELLITUS WITHOUT COMPLICATION, WITHOUT LONG-TERM CURRENT USE OF INSULIN (HCC): ICD-10-CM

## 2020-06-02 DIAGNOSIS — E07.9 THYROID DISEASE: ICD-10-CM

## 2020-06-02 DIAGNOSIS — Z12.5 SPECIAL SCREENING FOR MALIGNANT NEOPLASM OF PROSTATE: ICD-10-CM

## 2020-06-02 RX ORDER — GLIMEPIRIDE 2 MG/1
TABLET ORAL
Qty: 90 TABLET | Refills: 1 | Status: SHIPPED | OUTPATIENT
Start: 2020-06-02 | End: 2020-11-17

## 2020-06-04 ENCOUNTER — RESULTS ENCOUNTER (OUTPATIENT)
Dept: FAMILY MEDICINE CLINIC | Facility: CLINIC | Age: 70
End: 2020-06-04

## 2020-06-04 DIAGNOSIS — E11.9 TYPE 2 DIABETES MELLITUS WITHOUT COMPLICATION, WITHOUT LONG-TERM CURRENT USE OF INSULIN (HCC): ICD-10-CM

## 2020-06-04 DIAGNOSIS — E07.9 THYROID DISEASE: ICD-10-CM

## 2020-06-04 DIAGNOSIS — E78.2 MIXED HYPERLIPIDEMIA: ICD-10-CM

## 2020-06-04 DIAGNOSIS — Z12.5 SPECIAL SCREENING FOR MALIGNANT NEOPLASM OF PROSTATE: ICD-10-CM

## 2020-06-05 LAB
ALBUMIN SERPL-MCNC: 4.5 G/DL (ref 3.5–5.2)
ALBUMIN/CREAT UR: 3 MG/G CREAT (ref 0–29)
ALBUMIN/GLOB SERPL: 1.7 G/DL
ALP SERPL-CCNC: 47 U/L (ref 39–117)
ALT SERPL-CCNC: 17 U/L (ref 1–41)
AST SERPL-CCNC: 16 U/L (ref 1–40)
BILIRUB SERPL-MCNC: 0.3 MG/DL (ref 0.2–1.2)
BUN SERPL-MCNC: 10 MG/DL (ref 8–23)
BUN/CREAT SERPL: 9.9 (ref 7–25)
CALCIUM SERPL-MCNC: 9.8 MG/DL (ref 8.6–10.5)
CHLORIDE SERPL-SCNC: 104 MMOL/L (ref 98–107)
CHOLEST SERPL-MCNC: 183 MG/DL (ref 0–200)
CO2 SERPL-SCNC: 24.8 MMOL/L (ref 22–29)
CREAT SERPL-MCNC: 1.01 MG/DL (ref 0.76–1.27)
CREAT UR-MCNC: 160.1 MG/DL
GLOBULIN SER CALC-MCNC: 2.7 GM/DL
GLUCOSE SERPL-MCNC: 109 MG/DL (ref 65–99)
HBA1C MFR BLD: 6.3 % (ref 4.8–5.6)
HDLC SERPL-MCNC: 45 MG/DL (ref 40–60)
LDLC SERPL CALC-MCNC: 64 MG/DL (ref 0–100)
LDLC/HDLC SERPL: 1.41 {RATIO}
MICROALBUMIN UR-MCNC: 4.4 UG/ML
POTASSIUM SERPL-SCNC: 3.8 MMOL/L (ref 3.5–5.2)
PROT SERPL-MCNC: 7.2 G/DL (ref 6–8.5)
PSA SERPL-MCNC: 0.55 NG/ML (ref 0–4)
SODIUM SERPL-SCNC: 140 MMOL/L (ref 136–145)
T4 FREE SERPL-MCNC: 1.09 NG/DL (ref 0.93–1.7)
TRIGL SERPL-MCNC: 372 MG/DL (ref 0–150)
TSH SERPL DL<=0.005 MIU/L-ACNC: 3.13 UIU/ML (ref 0.27–4.2)
VLDLC SERPL CALC-MCNC: 74.4 MG/DL

## 2020-06-10 ENCOUNTER — OFFICE VISIT (OUTPATIENT)
Dept: FAMILY MEDICINE CLINIC | Facility: CLINIC | Age: 70
End: 2020-06-10

## 2020-06-10 VITALS
RESPIRATION RATE: 18 BRPM | HEIGHT: 73 IN | DIASTOLIC BLOOD PRESSURE: 70 MMHG | OXYGEN SATURATION: 98 % | TEMPERATURE: 98 F | BODY MASS INDEX: 41.75 KG/M2 | HEART RATE: 78 BPM | WEIGHT: 315 LBS | SYSTOLIC BLOOD PRESSURE: 142 MMHG

## 2020-06-10 DIAGNOSIS — I10 HYPERTENSION, UNSPECIFIED TYPE: ICD-10-CM

## 2020-06-10 DIAGNOSIS — E11.9 TYPE 2 DIABETES MELLITUS WITHOUT COMPLICATION, WITHOUT LONG-TERM CURRENT USE OF INSULIN (HCC): ICD-10-CM

## 2020-06-10 DIAGNOSIS — K21.9 GASTROESOPHAGEAL REFLUX DISEASE, ESOPHAGITIS PRESENCE NOT SPECIFIED: ICD-10-CM

## 2020-06-10 DIAGNOSIS — E66.01 CLASS 3 SEVERE OBESITY DUE TO EXCESS CALORIES WITH SERIOUS COMORBIDITY AND BODY MASS INDEX (BMI) OF 40.0 TO 44.9 IN ADULT (HCC): ICD-10-CM

## 2020-06-10 DIAGNOSIS — E78.2 MIXED HYPERLIPIDEMIA: Primary | ICD-10-CM

## 2020-06-10 DIAGNOSIS — E11.40 DIABETIC NEUROPATHY, PAINFUL (HCC): ICD-10-CM

## 2020-06-10 DIAGNOSIS — E07.9 THYROID DISEASE: ICD-10-CM

## 2020-06-10 PROCEDURE — 99214 OFFICE O/P EST MOD 30 MIN: CPT | Performed by: INTERNAL MEDICINE

## 2020-06-10 NOTE — PROGRESS NOTES
Subjective   Barry Cam is a 70 y.o. male. Patient is here today for follow-up on his hypertension, hyperlipidemia, obesity, diabetes mellitus type 2 and hypothyroidism.  He is generally been stable and has no acute complaints and has had no chest pain, shortness of breath, edema or myalgias  Chief Complaint   Patient presents with   • Hypertension   • Hypothyroidism   • Diabetes   • Hyperlipidemia          Vitals:    06/10/20 0811   BP: 142/70   Pulse: 78   Resp: 18   Temp: 98 °F (36.7 °C)   SpO2: 98%     Body mass index is 44.08 kg/m².  The following portions of the patient's history were reviewed and updated as appropriate: allergies, current medications, past family history, past medical history, past social history, past surgical history and problem list.    Past Medical History:   Diagnosis Date   • Allergic    • Allergic rhinitis    • Anxiety    • Depression    • Depression    • Diabetes mellitus (CMS/HCC)    • DUGGAN (dyspnea on exertion)    • Edema    • GERD (gastroesophageal reflux disease)    • History of hiatal hernia    • History of kidney stones    • Hx of blood clots    • Hyperglycemia    • Hyperlipidemia    • Hypertension    • Pulmonary embolism (CMS/HCC)    • Sciatica    • Sleep apnea    • SOB (shortness of breath)    • Thyroid disease       Allergies   Allergen Reactions   • Other Cough     Hay fever      Social History     Socioeconomic History   • Marital status:      Spouse name: Not on file   • Number of children: Not on file   • Years of education: Not on file   • Highest education level: Not on file   Tobacco Use   • Smoking status: Never Smoker   • Smokeless tobacco: Never Used   • Tobacco comment: caffeine use   Substance and Sexual Activity   • Alcohol use: No   • Drug use: Defer   • Sexual activity: Defer        Current Outpatient Medications:   •  acetaminophen (TYLENOL) 500 MG tablet, Take 500 mg by mouth Every 6 (Six) Hours As Needed for Mild Pain ., Disp: , Rfl:   •   albuterol sulfate HFA (VENTOLIN HFA) 108 (90 Base) MCG/ACT inhaler, Inhale 2 puffs Every 4 (Four) Hours As Needed for Wheezing or Shortness of Air. for wheezing, Disp: 54 inhaler, Rfl: 1  •  betamethasone valerate (VALISONE) 0.1 % cream, APPLY CREAM TOPICALLY TO AFFECTED AREA(S) TO SKIN TWICE DAILY, Disp: 15 g, Rfl: 1  •  caffeine 200 MG tablet, Take 200 mg by mouth Every 4 (Four) Hours As Needed for Headache., Disp: , Rfl:   •  Copper Gluconate (COPPER CAPS PO), Take  by mouth., Disp: , Rfl:   •  diphenhydrAMINE (BENADRYL) 50 MG capsule, Take 50 mg by mouth Every 6 (Six) Hours As Needed for Itching., Disp: , Rfl:   •  ELIQUIS 5 MG tablet tablet, TAKE 1 TABLET BY MOUTH EVERY 12 HOURS, Disp: 60 tablet, Rfl: 5  •  gabapentin (NEURONTIN) 300 MG capsule, Take 2 capsules by mouth 2 (Two) Times a Day., Disp: 360 capsule, Rfl: 1  •  glimepiride (AMARYL) 2 MG tablet, TAKE 1 TABLET BY MOUTH EVERY DAY BEFORE BREAKFAST, Disp: 90 tablet, Rfl: 1  •  Glucosamine-Chondroit-Vit C-Mn (GLUCOSAMINE CHONDR 500 COMPLEX) capsule, Take 2 capsules by mouth daily., Disp: , Rfl:   •  glucose blood (ONE TOUCH ULTRA TEST) test strip, USE TO TEST BLOOD SUGAR 3 TIMES DAILY, Disp: 100 each, Rfl: 5  •  JANUVIA 100 MG tablet, TAKE 1 TABLET BY MOUTH DAILY., Disp: 90 tablet, Rfl: 1  •  Lancets (ONETOUCH ULTRASOFT) lancets, USE TO TEST BLOOD SUGAR 3 TIMES DAILY, Disp: 100 each, Rfl: 5  •  levothyroxine (SYNTHROID, LEVOTHROID) 25 MCG tablet, TAKE 1 TABLET BY MOUTH EVERY DAY, Disp: 90 tablet, Rfl: 1  •  losartan (COZAAR) 100 MG tablet, Take 1 tablet by mouth Daily., Disp: 90 tablet, Rfl: 3  •  LUTEIN PO, Take 5 mg by mouth Daily., Disp: , Rfl:   •  melatonin 1 MG tablet, Take  by mouth., Disp: , Rfl:   •  metFORMIN (GLUCOPHAGE) 1000 MG tablet, Take 1 tablet by mouth 2 (Two) Times a Day., Disp: 180 tablet, Rfl: 1  •  modafinil (PROVIGIL) 200 MG tablet, Take 1 tablet by mouth 2 (Two) Times a Day., Disp: 180 tablet, Rfl: 1  •  MULTIPLE VITAMINS ESSENTIAL  PO, Take  by mouth daily., Disp: , Rfl:   •  omeprazole (priLOSEC) 20 MG capsule, TAKE 1 CAPSULE BY MOUTH EVERY DAY, Disp: 90 capsule, Rfl: 1  •  pioglitazone (ACTOS) 30 MG tablet, Take 1 tablet by mouth Daily., Disp: 90 tablet, Rfl: 1  •  pravastatin (PRAVACHOL) 40 MG tablet, TAKE 1 TABLET BY MOUTH EVERY DAY, Disp: 90 tablet, Rfl: 1  •  Triamcinolone Acetonide (NASACORT) 55 MCG/ACT nasal inhaler, 2 sprays into the nostril(s) as directed by provider Daily., Disp: , Rfl:   •  vitamin C (ASCORBIC ACID) 250 MG tablet, Take 250 mg by mouth Daily., Disp: , Rfl:   •  vitamin E 200 UNIT capsule, Take 200 Units by mouth Daily., Disp: , Rfl:   •  Zeaxanthin powder, , Disp: , Rfl:   •  Zinc Sulfate (ZINC 15 PO), Take  by mouth., Disp: , Rfl:      Objective     History of Present Illness     Review of Systems   Constitutional: Negative.    HENT: Negative.    Respiratory: Negative.    Cardiovascular: Negative.    Gastrointestinal: Negative.    Genitourinary: Negative.    Musculoskeletal: Negative.    Skin: Negative.    Neurological: Negative.    Psychiatric/Behavioral: Negative.        Physical Exam   Constitutional: He is oriented to person, place, and time. He appears well-developed and well-nourished.   Pleasant, cooperative no acute distress, blood pressure 130/80   HENT:   Head: Normocephalic and atraumatic.   Eyes: Conjunctivae are normal. No scleral icterus.   Neck: Normal range of motion. Neck supple.   Cardiovascular: Normal rate, regular rhythm and normal heart sounds.   Pulmonary/Chest: Effort normal and breath sounds normal. No respiratory distress. He has no wheezes. He has no rales.   Musculoskeletal: Normal range of motion. He exhibits no edema.   Neurological: He is alert and oriented to person, place, and time.   Skin: Skin is warm and dry.   Psychiatric: He has a normal mood and affect. His behavior is normal.   Nursing note and vitals reviewed.      ASSESSMENT CMP had a sugar of 109 and hemoglobin A1c is  stable and acceptable at 6.4 and urine microalbumin was low.  PSA was stable and remains quite normal and low.  TSH and free T4 were both normal on supplement.  Lipid panel was acceptable with total cholesterol 183, HDL 45 and LDL 64  #1-hypertension, controlled on medication  #2-hyperlipidemia, controlled on medication  #3-diabetes mellitus type 2, adequate control on medication  #4-obesity with some weight loss  #5-hypothyroidism, euthyroid on replacement     Problem List Items Addressed This Visit        Cardiovascular and Mediastinum    Hyperlipidemia - Primary    Hypertension       Digestive    Gastroesophageal reflux disease       Endocrine    Diabetes mellitus (CMS/HCC)    Diabetic neuropathy, painful (CMS/[facility])    Thyroid disease       Other    Class 3 severe obesity due to excess calories with serious comorbidity and body mass index (BMI) of 40.0 to 44.9 in adult (CMS/[facility])          PLAN the patient will continue current medicines as now and I will plan on rechecking him in 6 months with a CBC, CMP, lipid panel, hemoglobin A1c, TSH    There are no Patient Instructions on file for this visit.  Return in about 6 months (around 12/10/2020) for with labs.

## 2020-06-24 ENCOUNTER — OFFICE VISIT (OUTPATIENT)
Dept: SLEEP MEDICINE | Facility: HOSPITAL | Age: 70
End: 2020-06-24

## 2020-06-24 VITALS — BODY MASS INDEX: 41.75 KG/M2 | WEIGHT: 315 LBS | HEIGHT: 73 IN

## 2020-06-24 DIAGNOSIS — G47.33 OBSTRUCTIVE SLEEP APNEA SYNDROME: Primary | ICD-10-CM

## 2020-06-24 DIAGNOSIS — E66.01 CLASS 3 SEVERE OBESITY DUE TO EXCESS CALORIES WITH SERIOUS COMORBIDITY AND BODY MASS INDEX (BMI) OF 40.0 TO 44.9 IN ADULT (HCC): ICD-10-CM

## 2020-06-24 DIAGNOSIS — G47.14 HYPERSOMNIA DUE TO MEDICAL CONDITION: ICD-10-CM

## 2020-06-24 PROCEDURE — G0463 HOSPITAL OUTPT CLINIC VISIT: HCPCS

## 2020-06-24 PROCEDURE — 99213 OFFICE O/P EST LOW 20 MIN: CPT | Performed by: INTERNAL MEDICINE

## 2020-06-24 NOTE — PROGRESS NOTES
"[facility] Note     Chief Complaint:  GRICEL     Primary Care Physician: Cristi Gerber MD    Interval History:   I last saw the patient 1 year ago.  He states he is stable without new complaints.  The patient goes to bed between 10 PM and midnight and awakens between 5 and 9 AM.  [place] Sleepiness Scale is abnormal at 11    Review of Systems:    A complete review of systems was done and all were negative with the exception of DUGGAN and he was evaluated by Dr. Reese Villar    Social History:    Social History     Socioeconomic History   • Marital status:      Spouse name: Not on file   • Number of children: Not on file   • Years of education: Not on file   • Highest education level: Not on file   Tobacco Use   • Smoking status: Never Smoker   • Smokeless tobacco: Never Used   • Tobacco comment: caffeine use   Substance and Sexual Activity   • Alcohol use: No   • Drug use: Defer   • Sexual activity: Defer       Allergies:  Other     Medication Review:  Reviewed.      Vital Signs:    Vitals:    06/24/20 0855   Weight: (!) 152 kg (335 lb)   Height: 185.4 cm (73\")     Body mass index is 44.2 kg/m².    Physical Exam:    Constitutional:  Well developed 70 y.o. male that appears in no apparent distress.  Awake & oriented times 3.  Normal mood with normal recent and remote memory and normal judgement.  Eyes:  Conjunctivae normal.  Oropharynx: Previously, moist mucous membranes without exudate and a large tongue and uvula and mild narrowing of the posterior pharyngeal opening and class II-3 Mallampati airway, patient is wearing a facemask.     Results Review:  DME is [facility] and he uses a fullface mask.  Downloads between 3/26 and 6/23/2020 compliance 100%.  Average usage is 10 hours and 13 minutes.  Average AHI is normal without leak.  Average AutoCPAP pressure is 14.4 and his auto CPAP is 10-15.       Impression:   Obstructive sleep apnea adequately treated with auto CPAP with good compliance " and usage and some persistent complaints of hypersomnolence.    Plan:  Good sleep hygiene measures should be maintained.  Weight loss would be beneficial in this patient who is morbidly obese by BMI.  The patient is benefiting from the treatment being provided.     Patient will continue auto CPAP and a new prescription will be sent to his DME    The patient will call for any problems and will follow up in 1 year.      Barry Gillespie MD  Sleep Medicine  06/24/20  08:58

## 2020-07-07 RX ORDER — GABAPENTIN 300 MG/1
600 CAPSULE ORAL 2 TIMES DAILY
Qty: 360 CAPSULE | Refills: 0 | Status: SHIPPED | OUTPATIENT
Start: 2020-07-07 | End: 2020-09-30

## 2020-07-15 RX ORDER — LEVOTHYROXINE SODIUM 0.03 MG/1
TABLET ORAL
Qty: 90 TABLET | Refills: 1 | Status: SHIPPED | OUTPATIENT
Start: 2020-07-15 | End: 2020-12-15 | Stop reason: SDUPTHER

## 2020-07-15 RX ORDER — PIOGLITAZONEHYDROCHLORIDE 30 MG/1
TABLET ORAL
Qty: 90 TABLET | Refills: 1 | Status: SHIPPED | OUTPATIENT
Start: 2020-07-15 | End: 2020-12-15 | Stop reason: SDUPTHER

## 2020-09-08 RX ORDER — APIXABAN 5 MG/1
TABLET, FILM COATED ORAL
Qty: 60 TABLET | Refills: 5 | Status: SHIPPED | OUTPATIENT
Start: 2020-09-08 | End: 2020-12-15 | Stop reason: SDUPTHER

## 2020-09-30 RX ORDER — GABAPENTIN 300 MG/1
CAPSULE ORAL
Qty: 360 CAPSULE | Refills: 1 | Status: SHIPPED | OUTPATIENT
Start: 2020-09-30 | End: 2021-04-12

## 2020-10-09 RX ORDER — LOSARTAN POTASSIUM 100 MG/1
TABLET ORAL
Qty: 90 TABLET | Refills: 3 | Status: SHIPPED | OUTPATIENT
Start: 2020-10-09 | End: 2021-09-15

## 2020-10-09 RX ORDER — PRAVASTATIN SODIUM 40 MG
TABLET ORAL
Qty: 90 TABLET | Refills: 1 | Status: SHIPPED | OUTPATIENT
Start: 2020-10-09 | End: 2021-03-12

## 2020-10-30 RX ORDER — OMEPRAZOLE 20 MG/1
CAPSULE, DELAYED RELEASE ORAL
Qty: 90 CAPSULE | Refills: 1 | Status: SHIPPED | OUTPATIENT
Start: 2020-10-30 | End: 2021-04-20

## 2020-11-17 RX ORDER — GLIMEPIRIDE 2 MG/1
TABLET ORAL
Qty: 90 TABLET | Refills: 1 | Status: SHIPPED | OUTPATIENT
Start: 2020-11-17 | End: 2021-04-28

## 2020-11-19 RX ORDER — SITAGLIPTIN 100 MG/1
TABLET, FILM COATED ORAL
Qty: 90 TABLET | Refills: 1 | Status: SHIPPED | OUTPATIENT
Start: 2020-11-19 | End: 2021-04-28

## 2020-12-03 DIAGNOSIS — E07.9 THYROID DISEASE: ICD-10-CM

## 2020-12-03 DIAGNOSIS — E78.2 MIXED HYPERLIPIDEMIA: ICD-10-CM

## 2020-12-03 DIAGNOSIS — E11.9 TYPE 2 DIABETES MELLITUS WITHOUT COMPLICATION, WITHOUT LONG-TERM CURRENT USE OF INSULIN (HCC): ICD-10-CM

## 2020-12-08 ENCOUNTER — RESULTS ENCOUNTER (OUTPATIENT)
Dept: FAMILY MEDICINE CLINIC | Facility: CLINIC | Age: 70
End: 2020-12-08

## 2020-12-08 DIAGNOSIS — E11.9 TYPE 2 DIABETES MELLITUS WITHOUT COMPLICATION, WITHOUT LONG-TERM CURRENT USE OF INSULIN (HCC): ICD-10-CM

## 2020-12-08 DIAGNOSIS — E07.9 THYROID DISEASE: ICD-10-CM

## 2020-12-08 DIAGNOSIS — E78.2 MIXED HYPERLIPIDEMIA: ICD-10-CM

## 2020-12-08 LAB
ALBUMIN SERPL-MCNC: 4.3 G/DL (ref 3.5–5.2)
ALBUMIN/GLOB SERPL: 1.8 G/DL
ALP SERPL-CCNC: 53 U/L (ref 39–117)
ALT SERPL-CCNC: 17 U/L (ref 1–41)
AST SERPL-CCNC: 20 U/L (ref 1–40)
BASOPHILS # BLD AUTO: 0.04 10*3/MM3 (ref 0–0.2)
BASOPHILS NFR BLD AUTO: 0.6 % (ref 0–1.5)
BILIRUB SERPL-MCNC: 0.3 MG/DL (ref 0–1.2)
BUN SERPL-MCNC: 11 MG/DL (ref 8–23)
BUN/CREAT SERPL: 12.4 (ref 7–25)
CALCIUM SERPL-MCNC: 9.4 MG/DL (ref 8.6–10.5)
CHLORIDE SERPL-SCNC: 104 MMOL/L (ref 98–107)
CHOLEST SERPL-MCNC: 196 MG/DL (ref 0–200)
CO2 SERPL-SCNC: 26.3 MMOL/L (ref 22–29)
CREAT SERPL-MCNC: 0.89 MG/DL (ref 0.76–1.27)
EOSINOPHIL # BLD AUTO: 0.18 10*3/MM3 (ref 0–0.4)
EOSINOPHIL NFR BLD AUTO: 2.7 % (ref 0.3–6.2)
ERYTHROCYTE [DISTWIDTH] IN BLOOD BY AUTOMATED COUNT: 13 % (ref 12.3–15.4)
GLOBULIN SER CALC-MCNC: 2.4 GM/DL
GLUCOSE SERPL-MCNC: 109 MG/DL (ref 65–99)
HBA1C MFR BLD: 6 % (ref 4.8–5.6)
HCT VFR BLD AUTO: 37.9 % (ref 37.5–51)
HDLC SERPL-MCNC: 47 MG/DL (ref 40–60)
HGB BLD-MCNC: 12.8 G/DL (ref 13–17.7)
IMM GRANULOCYTES # BLD AUTO: 0.02 10*3/MM3 (ref 0–0.05)
IMM GRANULOCYTES NFR BLD AUTO: 0.3 % (ref 0–0.5)
LDLC SERPL CALC-MCNC: 90 MG/DL (ref 0–100)
LDLC/HDLC SERPL: 1.63 {RATIO}
LYMPHOCYTES # BLD AUTO: 1.48 10*3/MM3 (ref 0.7–3.1)
LYMPHOCYTES NFR BLD AUTO: 22.6 % (ref 19.6–45.3)
MCH RBC QN AUTO: 30.5 PG (ref 26.6–33)
MCHC RBC AUTO-ENTMCNC: 33.8 G/DL (ref 31.5–35.7)
MCV RBC AUTO: 90.5 FL (ref 79–97)
MONOCYTES # BLD AUTO: 0.49 10*3/MM3 (ref 0.1–0.9)
MONOCYTES NFR BLD AUTO: 7.5 % (ref 5–12)
NEUTROPHILS # BLD AUTO: 4.34 10*3/MM3 (ref 1.7–7)
NEUTROPHILS NFR BLD AUTO: 66.3 % (ref 42.7–76)
NRBC BLD AUTO-RTO: 0 /100 WBC (ref 0–0.2)
PLATELET # BLD AUTO: 176 10*3/MM3 (ref 140–450)
POTASSIUM SERPL-SCNC: 4.1 MMOL/L (ref 3.5–5.2)
PROT SERPL-MCNC: 6.7 G/DL (ref 6–8.5)
RBC # BLD AUTO: 4.19 10*6/MM3 (ref 4.14–5.8)
SODIUM SERPL-SCNC: 141 MMOL/L (ref 136–145)
T4 FREE SERPL-MCNC: 1.01 NG/DL (ref 0.93–1.7)
TRIGL SERPL-MCNC: 361 MG/DL (ref 0–150)
TSH SERPL DL<=0.005 MIU/L-ACNC: 3.01 UIU/ML (ref 0.27–4.2)
VLDLC SERPL CALC-MCNC: 59 MG/DL (ref 5–40)
WBC # BLD AUTO: 6.55 10*3/MM3 (ref 3.4–10.8)

## 2020-12-08 RX ORDER — MODAFINIL 200 MG/1
TABLET ORAL
Qty: 180 TABLET | Refills: 0 | Status: SHIPPED | OUTPATIENT
Start: 2020-12-08 | End: 2021-12-01 | Stop reason: SDUPTHER

## 2020-12-15 ENCOUNTER — OFFICE VISIT (OUTPATIENT)
Dept: FAMILY MEDICINE CLINIC | Facility: CLINIC | Age: 70
End: 2020-12-15

## 2020-12-15 VITALS
OXYGEN SATURATION: 99 % | WEIGHT: 315 LBS | HEIGHT: 73 IN | SYSTOLIC BLOOD PRESSURE: 144 MMHG | TEMPERATURE: 99.1 F | RESPIRATION RATE: 18 BRPM | DIASTOLIC BLOOD PRESSURE: 80 MMHG | HEART RATE: 73 BPM | BODY MASS INDEX: 41.75 KG/M2

## 2020-12-15 DIAGNOSIS — E11.9 TYPE 2 DIABETES MELLITUS WITHOUT COMPLICATION, WITHOUT LONG-TERM CURRENT USE OF INSULIN (HCC): ICD-10-CM

## 2020-12-15 DIAGNOSIS — E66.01 CLASS 3 SEVERE OBESITY DUE TO EXCESS CALORIES WITH SERIOUS COMORBIDITY AND BODY MASS INDEX (BMI) OF 40.0 TO 44.9 IN ADULT (HCC): ICD-10-CM

## 2020-12-15 DIAGNOSIS — I10 HYPERTENSION, UNSPECIFIED TYPE: Primary | ICD-10-CM

## 2020-12-15 DIAGNOSIS — E07.9 THYROID DISEASE: ICD-10-CM

## 2020-12-15 DIAGNOSIS — K21.9 GASTROESOPHAGEAL REFLUX DISEASE, UNSPECIFIED WHETHER ESOPHAGITIS PRESENT: ICD-10-CM

## 2020-12-15 DIAGNOSIS — E78.2 MIXED HYPERLIPIDEMIA: ICD-10-CM

## 2020-12-15 PROCEDURE — 99214 OFFICE O/P EST MOD 30 MIN: CPT | Performed by: INTERNAL MEDICINE

## 2020-12-15 RX ORDER — LEVOTHYROXINE SODIUM 0.03 MG/1
25 TABLET ORAL DAILY
Qty: 90 TABLET | Refills: 3 | Status: SHIPPED | OUTPATIENT
Start: 2020-12-15 | End: 2021-12-13

## 2020-12-15 RX ORDER — PIOGLITAZONEHYDROCHLORIDE 30 MG/1
30 TABLET ORAL DAILY
Qty: 90 TABLET | Refills: 3 | Status: SHIPPED | OUTPATIENT
Start: 2020-12-15 | End: 2021-12-13

## 2020-12-15 NOTE — PROGRESS NOTES
Subjective   Barry Cam is a 70 y.o. male. Patient is here today for follow-up on his hypertension, hyperlipidemia, GERD, obesity, hypothyroidism, diabetes mellitus type 2.  Patient is generally feeling well and has no acute complaints.  He has not lost any significant weight.  He has been more active than usual.  He has had no chest pain, shortness of breath, edema or myalgias  Chief Complaint   Patient presents with   • Diabetes     HLD, HTN, THYROID- FOLLOW UP LABS   • Dizziness          Vitals:    12/15/20 0800   BP: 144/80   Pulse: 73   Resp: 18   Temp: 99.1 °F (37.3 °C)   SpO2: 99%     Body mass index is 43.92 kg/m².  The following portions of the patient's history were reviewed and updated as appropriate: allergies, current medications, past family history, past medical history, past social history, past surgical history and problem list.    Past Medical History:   Diagnosis Date   • Allergic    • Allergic rhinitis    • Anxiety    • Depression    • Depression    • Diabetes mellitus (CMS/HCC)    • DUGGAN (dyspnea on exertion)    • Edema    • GERD (gastroesophageal reflux disease)    • History of hiatal hernia    • History of kidney stones    • Hx of blood clots    • Hyperglycemia    • Hyperlipidemia    • Hypertension    • Pulmonary embolism (CMS/HCC)    • Sciatica    • Sleep apnea    • SOB (shortness of breath)    • Thyroid disease       Allergies   Allergen Reactions   • Other Cough     Hay fever      Social History     Socioeconomic History   • Marital status:      Spouse name: Not on file   • Number of children: Not on file   • Years of education: Not on file   • Highest education level: Not on file   Tobacco Use   • Smoking status: Never Smoker   • Smokeless tobacco: Never Used   • Tobacco comment: caffeine use   Substance and Sexual Activity   • Alcohol use: No   • Drug use: Defer   • Sexual activity: Defer        Current Outpatient Medications:   •  acetaminophen (TYLENOL) 500 MG tablet, Take  500 mg by mouth Every 6 (Six) Hours As Needed for Mild Pain ., Disp: , Rfl:   •  albuterol sulfate HFA (VENTOLIN HFA) 108 (90 Base) MCG/ACT inhaler, Inhale 2 puffs Every 4 (Four) Hours As Needed for Wheezing or Shortness of Air. for wheezing, Disp: 54 inhaler, Rfl: 1  •  apixaban (Eliquis) 5 MG tablet tablet, Take 1 tablet by mouth Every 12 (Twelve) Hours., Disp: 60 tablet, Rfl: 5  •  betamethasone valerate (VALISONE) 0.1 % cream, APPLY CREAM TOPICALLY TO AFFECTED AREA(S) TO SKIN TWICE DAILY, Disp: 15 g, Rfl: 1  •  caffeine 200 MG tablet, Take 200 mg by mouth Every 4 (Four) Hours As Needed for Headache., Disp: , Rfl:   •  Copper Gluconate (COPPER CAPS PO), Take  by mouth., Disp: , Rfl:   •  diphenhydrAMINE (BENADRYL) 50 MG capsule, Take 50 mg by mouth Every 6 (Six) Hours As Needed for Itching., Disp: , Rfl:   •  gabapentin (NEURONTIN) 300 MG capsule, TAKE 2 CAPSULES BY MOUTH TWICE A DAY, Disp: 360 capsule, Rfl: 1  •  glimepiride (AMARYL) 2 MG tablet, TAKE 1 TABLET BY MOUTH EVERY DAY BEFORE BREAKFAST, Disp: 90 tablet, Rfl: 1  •  Glucosamine-Chondroit-Vit C-Mn (GLUCOSAMINE CHONDR 500 COMPLEX) capsule, Take 2 capsules by mouth daily., Disp: , Rfl:   •  glucose blood (ONE TOUCH ULTRA TEST) test strip, USE TO TEST BLOOD SUGAR 3 TIMES DAILY, Disp: 100 each, Rfl: 5  •  Januvia 100 MG tablet, TAKE 1 TABLET BY MOUTH DAILY., Disp: 90 tablet, Rfl: 1  •  Lancets (ONETOUCH ULTRASOFT) lancets, USE TO TEST BLOOD SUGAR 3 TIMES DAILY, Disp: 100 each, Rfl: 5  •  levothyroxine (SYNTHROID, LEVOTHROID) 25 MCG tablet, Take 1 tablet by mouth Daily., Disp: 90 tablet, Rfl: 3  •  losartan (COZAAR) 100 MG tablet, TAKE 1 TABLET BY MOUTH EVERY DAY, Disp: 90 tablet, Rfl: 3  •  LUTEIN PO, Take 5 mg by mouth Daily., Disp: , Rfl:   •  melatonin 1 MG tablet, Take  by mouth., Disp: , Rfl:   •  metFORMIN (GLUCOPHAGE) 1000 MG tablet, Take 1 tablet by mouth 2 (Two) Times a Day., Disp: 180 tablet, Rfl: 3  •  modafinil (PROVIGIL) 200 MG tablet, TAKE 1  TABLET BY MOUTH TWICE A DAY, Disp: 180 tablet, Rfl: 0  •  MULTIPLE VITAMINS ESSENTIAL PO, Take  by mouth daily., Disp: , Rfl:   •  omeprazole (priLOSEC) 20 MG capsule, TAKE 1 CAPSULE BY MOUTH EVERY DAY, Disp: 90 capsule, Rfl: 1  •  pioglitazone (ACTOS) 30 MG tablet, Take 1 tablet by mouth Daily., Disp: 90 tablet, Rfl: 3  •  pravastatin (PRAVACHOL) 40 MG tablet, TAKE 1 TABLET BY MOUTH EVERY DAY, Disp: 90 tablet, Rfl: 1  •  Triamcinolone Acetonide (NASACORT) 55 MCG/ACT nasal inhaler, 2 sprays into the nostril(s) as directed by provider Daily., Disp: , Rfl:   •  vitamin C (ASCORBIC ACID) 250 MG tablet, Take 250 mg by mouth Daily., Disp: , Rfl:   •  vitamin E 200 UNIT capsule, Take 200 Units by mouth Daily., Disp: , Rfl:   •  Zeaxanthin powder, , Disp: , Rfl:   •  Zinc Sulfate (ZINC 15 PO), Take  by mouth., Disp: , Rfl:      Objective     History of Present Illness     Review of Systems   Constitutional: Negative.    HENT: Negative.    Respiratory: Negative.    Cardiovascular: Negative.    Gastrointestinal: Negative.    Genitourinary: Negative.    Musculoskeletal: Negative.    Skin: Negative.    Neurological: Negative.    Psychiatric/Behavioral: Negative.        Physical Exam  Vitals signs and nursing note reviewed.   Constitutional:       General: He is not in acute distress.     Appearance: Normal appearance. He is obese. He is not ill-appearing.   HENT:      Head: Normocephalic and atraumatic.      Right Ear: Tympanic membrane, ear canal and external ear normal. There is no impacted cerumen.      Left Ear: Tympanic membrane, ear canal and external ear normal. There is no impacted cerumen.   Eyes:      General: No scleral icterus.     Conjunctiva/sclera: Conjunctivae normal.   Neck:      Musculoskeletal: Normal range of motion and neck supple.   Cardiovascular:      Rate and Rhythm: Normal rate and regular rhythm.      Heart sounds: Normal heart sounds.   Pulmonary:      Effort: Pulmonary effort is normal. No  respiratory distress.      Breath sounds: Normal breath sounds. No wheezing, rhonchi or rales.   Musculoskeletal: Normal range of motion.   Skin:     General: Skin is warm and dry.   Neurological:      General: No focal deficit present.      Mental Status: He is alert and oriented to person, place, and time.   Psychiatric:         Mood and Affect: Mood normal.         Behavior: Behavior normal.         ASSESSMENT CBC was essentially normal aside from a minimally low hemoglobin of 12.8.  CMP had a sugar of 109 and was otherwise normal and hemoglobin A1c was improved to 6.0.  Lipid panel is stable with total cholesterol 196, HDL 47 LDL 90.  TSH and free T4 both normal on supplementation  #1-hypertension, stable on medication  #2-hyperlipidemia, controlled on medication  #3-obesity with no significant weight loss  #4-GERD, stable on medication  #5-diabetes mellitus type 2, adequate control on medications  #6-hypothyroidism, euthyroid on replacement     Problems Addressed this Visit        Cardiovascular and Mediastinum    Hyperlipidemia    Hypertension - Primary       Digestive    Gastroesophageal reflux disease       Endocrine    Diabetes mellitus (CMS/[facility])    Relevant Medications    pioglitazone (ACTOS) 30 MG tablet    metFORMIN (GLUCOPHAGE) 1000 MG tablet    Thyroid disease    Relevant Medications    levothyroxine (SYNTHROID, LEVOTHROID) 25 MCG tablet      Diagnoses       Codes Comments    Hypertension, unspecified type    -  Primary ICD-10-CM: I10  ICD-9-CM: 401.9     Mixed hyperlipidemia     ICD-10-CM: E78.2  ICD-9-CM: 272.2     Gastroesophageal reflux disease, unspecified whether esophagitis present     ICD-10-CM: K21.9  ICD-9-CM: 530.81     Type 2 diabetes mellitus without complication, without long-term current use of insulin (CMS/[facility])     ICD-10-CM: E11.9  ICD-9-CM: 250.00     Thyroid disease     ICD-10-CM: E07.9  ICD-9-CM: 246.9           PLAN I recommended the shingles immunization for the patient and he  will continue current medicines as now.  I plan on rechecking him in 6 months with a CMP, lipid panel, hemoglobin A1c, TSH and free T4, PSA and urine microalbumin    There are no Patient Instructions on file for this visit.  Return in about 6 months (around 6/15/2021) for with labs.

## 2021-03-12 RX ORDER — PRAVASTATIN SODIUM 40 MG
TABLET ORAL
Qty: 90 TABLET | Refills: 1 | Status: SHIPPED | OUTPATIENT
Start: 2021-03-12 | End: 2021-08-31

## 2021-03-19 ENCOUNTER — BULK ORDERING (OUTPATIENT)
Dept: CASE MANAGEMENT | Facility: OTHER | Age: 71
End: 2021-03-19

## 2021-03-19 DIAGNOSIS — Z23 IMMUNIZATION DUE: ICD-10-CM

## 2021-04-13 RX ORDER — GABAPENTIN 300 MG/1
CAPSULE ORAL
Qty: 360 CAPSULE | Refills: 1 | Status: SHIPPED | OUTPATIENT
Start: 2021-04-13 | End: 2021-10-08

## 2021-04-16 DIAGNOSIS — E11.9 TYPE 2 DIABETES MELLITUS WITHOUT COMPLICATION, WITHOUT LONG-TERM CURRENT USE OF INSULIN (HCC): Primary | ICD-10-CM

## 2021-04-20 DIAGNOSIS — E11.9 TYPE 2 DIABETES MELLITUS WITHOUT COMPLICATION, WITHOUT LONG-TERM CURRENT USE OF INSULIN (HCC): ICD-10-CM

## 2021-04-20 RX ORDER — BLOOD SUGAR DIAGNOSTIC
STRIP MISCELLANEOUS
Qty: 100 EACH | Refills: 5 | Status: SHIPPED | OUTPATIENT
Start: 2021-04-20 | End: 2022-05-09

## 2021-04-20 RX ORDER — LANCETS 33 GAUGE
EACH MISCELLANEOUS
Qty: 100 EACH | Refills: 5 | Status: SHIPPED | OUTPATIENT
Start: 2021-04-20 | End: 2021-04-20 | Stop reason: SDUPTHER

## 2021-04-20 RX ORDER — OMEPRAZOLE 20 MG/1
CAPSULE, DELAYED RELEASE ORAL
Qty: 90 CAPSULE | Refills: 2 | Status: SHIPPED | OUTPATIENT
Start: 2021-04-20 | End: 2021-12-30

## 2021-04-20 RX ORDER — LANCETS 33 GAUGE
EACH MISCELLANEOUS
Qty: 100 EACH | Refills: 5 | Status: SHIPPED | OUTPATIENT
Start: 2021-04-20

## 2021-04-28 RX ORDER — GLIMEPIRIDE 2 MG/1
TABLET ORAL
Qty: 90 TABLET | Refills: 1 | Status: SHIPPED | OUTPATIENT
Start: 2021-04-28 | End: 2021-10-08

## 2021-04-28 RX ORDER — SITAGLIPTIN 100 MG/1
TABLET, FILM COATED ORAL
Qty: 90 TABLET | Refills: 1 | Status: SHIPPED | OUTPATIENT
Start: 2021-04-28 | End: 2021-10-08

## 2021-06-16 DIAGNOSIS — Z12.5 SPECIAL SCREENING FOR MALIGNANT NEOPLASM OF PROSTATE: ICD-10-CM

## 2021-06-16 DIAGNOSIS — E11.9 TYPE 2 DIABETES MELLITUS WITHOUT COMPLICATION, WITHOUT LONG-TERM CURRENT USE OF INSULIN (HCC): ICD-10-CM

## 2021-06-16 DIAGNOSIS — E07.9 THYROID DISEASE: ICD-10-CM

## 2021-06-16 DIAGNOSIS — E78.2 MIXED HYPERLIPIDEMIA: ICD-10-CM

## 2021-06-29 ENCOUNTER — OFFICE VISIT (OUTPATIENT)
Dept: FAMILY MEDICINE CLINIC | Facility: CLINIC | Age: 71
End: 2021-06-29

## 2021-06-29 VITALS
OXYGEN SATURATION: 95 % | WEIGHT: 315 LBS | DIASTOLIC BLOOD PRESSURE: 80 MMHG | HEIGHT: 73 IN | SYSTOLIC BLOOD PRESSURE: 160 MMHG | RESPIRATION RATE: 18 BRPM | TEMPERATURE: 98 F | HEART RATE: 73 BPM | BODY MASS INDEX: 41.75 KG/M2

## 2021-06-29 DIAGNOSIS — E11.40 DIABETIC NEUROPATHY, PAINFUL (HCC): ICD-10-CM

## 2021-06-29 DIAGNOSIS — E07.9 THYROID DISEASE: ICD-10-CM

## 2021-06-29 DIAGNOSIS — I10 HYPERTENSION, UNSPECIFIED TYPE: ICD-10-CM

## 2021-06-29 DIAGNOSIS — I82.5Z1 CHRONIC DEEP VEIN THROMBOSIS (DVT) OF DISTAL VEIN OF RIGHT LOWER EXTREMITY (HCC): ICD-10-CM

## 2021-06-29 DIAGNOSIS — G47.33 OBSTRUCTIVE SLEEP APNEA SYNDROME: ICD-10-CM

## 2021-06-29 DIAGNOSIS — Z86.711 HISTORY OF PULMONARY EMBOLISM: ICD-10-CM

## 2021-06-29 DIAGNOSIS — E66.01 CLASS 3 SEVERE OBESITY DUE TO EXCESS CALORIES WITH SERIOUS COMORBIDITY AND BODY MASS INDEX (BMI) OF 40.0 TO 44.9 IN ADULT (HCC): ICD-10-CM

## 2021-06-29 DIAGNOSIS — E78.2 MIXED HYPERLIPIDEMIA: ICD-10-CM

## 2021-06-29 DIAGNOSIS — J30.2 SEASONAL ALLERGIC RHINITIS, UNSPECIFIED TRIGGER: Primary | ICD-10-CM

## 2021-06-29 DIAGNOSIS — E11.9 TYPE 2 DIABETES MELLITUS WITHOUT COMPLICATION, WITHOUT LONG-TERM CURRENT USE OF INSULIN (HCC): ICD-10-CM

## 2021-06-29 PROCEDURE — 99214 OFFICE O/P EST MOD 30 MIN: CPT | Performed by: INTERNAL MEDICINE

## 2021-06-29 RX ORDER — AMLODIPINE BESYLATE 5 MG/1
5 TABLET ORAL DAILY
Qty: 90 TABLET | Refills: 2 | Status: SHIPPED | OUTPATIENT
Start: 2021-06-29 | End: 2021-07-29 | Stop reason: SDUPTHER

## 2021-06-29 RX ORDER — PHENOL 1.4 %
AEROSOL, SPRAY (ML) MUCOUS MEMBRANE DAILY PRN
COMMUNITY
Start: 2020-01-01

## 2021-06-29 NOTE — PROGRESS NOTES
Subjective   Barry Cam is a 71 y.o. male. Patient is here today for follow-up on his hypertension, hyperlipidemia, hypothyroidism, obesity, diabetes mellitus type 2 and history of DVTs and PE.  He remains on Eliquis.  He has had no chest pain, shortness of breath, edema or myalgias and has no acute complaints.  Chief Complaint   Patient presents with   • Hypertension     HYPERLIPIDEMIA, DIABETES- F/U LABS AND HAVING SOME VERTIGO           Vitals:    06/29/21 0800   BP: 160/80   Pulse: 73   Resp: 18   Temp: 98 °F (36.7 °C)   SpO2: 95%     Body mass index is 44.66 kg/m².  The following portions of the patient's history were reviewed and updated as appropriate: allergies, current medications, past family history, past medical history, past social history, past surgical history and problem list.    Past Medical History:   Diagnosis Date   • Allergic    • Allergic rhinitis    • Anxiety    • Depression    • Depression    • Diabetes mellitus (CMS/HCC)    • DUGGAN (dyspnea on exertion)    • Edema    • GERD (gastroesophageal reflux disease)    • History of hiatal hernia    • History of kidney stones    • Hx of blood clots    • Hyperglycemia    • Hyperlipidemia    • Hypertension    • Pulmonary embolism (CMS/HCC)    • Sciatica    • Sleep apnea    • SOB (shortness of breath)    • Thyroid disease       Allergies   Allergen Reactions   • Other Cough     Hay fever      Social History     Socioeconomic History   • Marital status:      Spouse name: Not on file   • Number of children: Not on file   • Years of education: Not on file   • Highest education level: Not on file   Tobacco Use   • Smoking status: Never Smoker   • Smokeless tobacco: Never Used   • Tobacco comment: caffeine use   Substance and Sexual Activity   • Alcohol use: No   • Drug use: Defer   • Sexual activity: Defer        Current Outpatient Medications:   •  acetaminophen (TYLENOL) 500 MG tablet, Take 500 mg by mouth Every 6 (Six) Hours As Needed for Mild  Pain ., Disp: , Rfl:   •  albuterol sulfate HFA (VENTOLIN HFA) 108 (90 Base) MCG/ACT inhaler, Inhale 2 puffs Every 4 (Four) Hours As Needed for Wheezing or Shortness of Air. for wheezing, Disp: 54 inhaler, Rfl: 1  •  apixaban (Eliquis) 5 MG tablet tablet, Take 1 tablet by mouth Every 12 (Twelve) Hours., Disp: 60 tablet, Rfl: 5  •  betamethasone valerate (VALISONE) 0.1 % cream, APPLY CREAM TOPICALLY TO AFFECTED AREA(S) TO SKIN TWICE DAILY, Disp: 15 g, Rfl: 1  •  caffeine 200 MG tablet, Take 200 mg by mouth Every 4 (Four) Hours As Needed for Headache., Disp: , Rfl:   •  Copper Gluconate (COPPER CAPS PO), Take  by mouth., Disp: , Rfl:   •  diphenhydrAMINE (BENADRYL) 50 MG capsule, Take 50 mg by mouth Every 6 (Six) Hours As Needed for Itching., Disp: , Rfl:   •  gabapentin (NEURONTIN) 300 MG capsule, TAKE 2 CAPSULES BY MOUTH TWICE A DAY, Disp: 360 capsule, Rfl: 1  •  glimepiride (AMARYL) 2 MG tablet, TAKE 1 TABLET BY MOUTH EVERY DAY BEFORE BREAKFAST, Disp: 90 tablet, Rfl: 1  •  Glucosamine-Chondroit-Vit C-Mn (GLUCOSAMINE CHONDR 500 COMPLEX) capsule, Take 2 capsules by mouth daily., Disp: , Rfl:   •  Januvia 100 MG tablet, TAKE 1 TABLET BY MOUTH DAILY., Disp: 90 tablet, Rfl: 1  •  levothyroxine (SYNTHROID, LEVOTHROID) 25 MCG tablet, Take 1 tablet by mouth Daily., Disp: 90 tablet, Rfl: 3  •  losartan (COZAAR) 100 MG tablet, TAKE 1 TABLET BY MOUTH EVERY DAY, Disp: 90 tablet, Rfl: 3  •  LUTEIN PO, Take 5 mg by mouth Daily., Disp: , Rfl:   •  Melatonin 10 MG tablet, , Disp: , Rfl:   •  metFORMIN (GLUCOPHAGE) 1000 MG tablet, Take 1 tablet by mouth 2 (Two) Times a Day., Disp: 180 tablet, Rfl: 3  •  modafinil (PROVIGIL) 200 MG tablet, TAKE 1 TABLET BY MOUTH TWICE A DAY, Disp: 180 tablet, Rfl: 0  •  MULTIPLE VITAMINS ESSENTIAL PO, Take  by mouth daily., Disp: , Rfl:   •  omeprazole (priLOSEC) 20 MG capsule, TAKE 1 CAPSULE BY MOUTH EVERY DAY, Disp: 90 capsule, Rfl: 2  •  OneTouch Delica Lancets 33G misc, USE TO TEST BLOOD SUGAR 3  TIMES DAILY DIABETES TYPE 2 E11.9, Disp: 100 each, Rfl: 5  •  OneTouch Ultra test strip, USE TO TEST BLOOD SUGAR 3 TIMES DAILY, Disp: 100 each, Rfl: 5  •  pioglitazone (ACTOS) 30 MG tablet, Take 1 tablet by mouth Daily., Disp: 90 tablet, Rfl: 3  •  pravastatin (PRAVACHOL) 40 MG tablet, TAKE 1 TABLET BY MOUTH EVERY DAY, Disp: 90 tablet, Rfl: 1  •  Triamcinolone Acetonide (NASACORT) 55 MCG/ACT nasal inhaler, 2 sprays into the nostril(s) as directed by provider Daily., Disp: , Rfl:   •  vitamin C (ASCORBIC ACID) 250 MG tablet, Take 250 mg by mouth Daily., Disp: , Rfl:   •  vitamin E 200 UNIT capsule, Take 200 Units by mouth Daily., Disp: , Rfl:   •  Zeaxanthin powder, , Disp: , Rfl:   •  Zinc Sulfate (ZINC 15 PO), Take  by mouth., Disp: , Rfl:   •  amLODIPine (NORVASC) 5 MG tablet, Take 1 tablet by mouth Daily., Disp: 90 tablet, Rfl: 2     Objective     History of Present Illness     Review of Systems   Constitutional: Negative.    HENT: Negative.    Respiratory: Negative.    Cardiovascular: Negative.    Gastrointestinal: Negative.    Genitourinary: Negative.    Musculoskeletal: Negative.    Skin: Negative.    Neurological: Negative.    Hematological: Negative.    Psychiatric/Behavioral: Negative.        Physical Exam  Vitals (150/80) and nursing note reviewed.   Constitutional:       General: He is not in acute distress.     Appearance: Normal appearance. He is obese. He is not ill-appearing.   HENT:      Head: Normocephalic and atraumatic.   Eyes:      General: No scleral icterus.     Conjunctiva/sclera: Conjunctivae normal.   Cardiovascular:      Rate and Rhythm: Normal rate and regular rhythm.      Heart sounds: Normal heart sounds.   Pulmonary:      Effort: Pulmonary effort is normal. No respiratory distress.      Breath sounds: Normal breath sounds. No wheezing or rales.   Musculoskeletal:         General: Normal range of motion.      Cervical back: Normal range of motion and neck supple.   Skin:     General:  Skin is warm and dry.   Neurological:      General: No focal deficit present.      Mental Status: He is alert and oriented to person, place, and time.   Psychiatric:         Mood and Affect: Mood normal.         Behavior: Behavior normal.         ASSESSMENT CMP has an elevated but stable sugar of 113 and is otherwise normal.  Hemoglobin A1c was acceptable at 6.4.  Lipid panel is stable with total cholesterol 197, HDL 47, LDL 90.  Urine microalbumin remains low.  TSH and free T4 are normal on supplement.  PSA is stable and low normal.  #1-hypertension, not optimally controlled  #2-hyperlipidemia, reasonable control on medication  #3-hypothyroidism, euthyroid on replacement  #4-diabetes mellitus type 2 was acceptable hemoglobin A1c  #5-obesity with no significant weight loss  #6-history of PE and DVT, asymptomatic on Eliquis     Problems Addressed this Visit        Allergies and Adverse Reactions    Seasonal allergic rhinitis - Primary       Cardiac and Vasculature    Hyperlipidemia    Hypertension    Relevant Medications    amLODIPine (NORVASC) 5 MG tablet       Coag and Thromboembolic    History of pulmonary embolism    Chronic deep vein thrombosis (DVT) of distal vein of right lower extremity (CMS/HCC)       Endocrine and Metabolic    Diabetes mellitus (CMS/[facility])    Class 3 severe obesity due to excess calories with serious comorbidity and body mass index (BMI) of 40.0 to 44.9 in adult (CMS/[facility])    Thyroid disease       Neuro    Diabetic neuropathy, painful (CMS/[facility])       Sleep    Obstructive sleep apnea syndrome treated with auto CPAP      Diagnoses       Codes Comments    Seasonal allergic rhinitis, unspecified trigger    -  Primary ICD-10-CM: J30.2  ICD-9-CM: 477.9     Hypertension, unspecified type     ICD-10-CM: I10  ICD-9-CM: 401.9     Mixed hyperlipidemia     ICD-10-CM: E78.2  ICD-9-CM: 272.2     Chronic deep vein thrombosis (DVT) of distal vein of right lower extremity (CMS/HCC)     ICD-10-CM:  I82.5Z1  ICD-9-CM: 453.52     History of pulmonary embolism     ICD-10-CM: Z86.711  ICD-9-CM: V12.55     Thyroid disease     ICD-10-CM: E07.9  ICD-9-CM: 246.9     Class 3 severe obesity due to excess calories with serious comorbidity and body mass index (BMI) of 40.0 to 44.9 in adult (CMS/[facility])     ICD-10-CM: E66.01, Z68.41  ICD-9-CM: 278.01, V85.41     Type 2 diabetes mellitus without complication, without long-term current use of insulin (CMS/[facility])     ICD-10-CM: E11.9  ICD-9-CM: 250.00     Diabetic neuropathy, painful (CMS/[facility])     ICD-10-CM: E11.40  ICD-9-CM: 250.60, 357.2     Obstructive sleep apnea syndrome treated with auto CPAP     ICD-10-CM: G47.33  ICD-9-CM: 327.23           PLAN I am going to add in amlodipine 5 mg daily for the patient's blood pressure and would like to recheck him in 1 month.  He will continue other medicines as now.    There are no Patient Instructions on file for this visit.  Return in about 1 month (around 7/29/2021) for Recheck.

## 2021-06-30 ENCOUNTER — OFFICE VISIT (OUTPATIENT)
Dept: SLEEP MEDICINE | Facility: HOSPITAL | Age: 71
End: 2021-06-30

## 2021-06-30 ENCOUNTER — APPOINTMENT (OUTPATIENT)
Dept: SLEEP MEDICINE | Facility: HOSPITAL | Age: 71
End: 2021-06-30

## 2021-06-30 ENCOUNTER — TELEPHONE (OUTPATIENT)
Dept: SLEEP MEDICINE | Facility: HOSPITAL | Age: 71
End: 2021-06-30

## 2021-06-30 VITALS — BODY MASS INDEX: 41.75 KG/M2 | WEIGHT: 315 LBS | HEIGHT: 73 IN

## 2021-06-30 DIAGNOSIS — G47.33 OBSTRUCTIVE SLEEP APNEA SYNDROME: Primary | ICD-10-CM

## 2021-06-30 DIAGNOSIS — E66.01 CLASS 3 SEVERE OBESITY DUE TO EXCESS CALORIES WITH SERIOUS COMORBIDITY AND BODY MASS INDEX (BMI) OF 40.0 TO 44.9 IN ADULT (HCC): ICD-10-CM

## 2021-06-30 DIAGNOSIS — G47.14 HYPERSOMNIA DUE TO MEDICAL CONDITION: ICD-10-CM

## 2021-06-30 PROCEDURE — 99213 OFFICE O/P EST LOW 20 MIN: CPT | Performed by: INTERNAL MEDICINE

## 2021-06-30 PROCEDURE — G0463 HOSPITAL OUTPT CLINIC VISIT: HCPCS

## 2021-06-30 NOTE — PROGRESS NOTES
"Follow Up [facility] Note     Chief Complaint:  GRICEL     Primary Care Physician: Cristi Gerber MD    Interval History:   The patient is a 71 y.o. male  who I last saw 6/24/2020 and that note was reviewed.  The patient states he is stable without new complaints.  He goes to bed 11 PM and awakens at 7 AM.  He will use the restroom occasionally during the nighttime.    Self-administered [place] Sleepiness Scale test results: 11  0-5 Lower normal daytime sleepiness  6-10 Higher normal daytime sleepiness  11-12 Mild, 13-15 Moderate, & 16-24 Severe excessive daytime sleepiness    Review of Systems:    A complete review of systems was done and all were negative with the exception of nasal congestion and postnasal drip, some shortness of breath especially with exertion, and some anxiety and depression that is variable    Social History:    Social History     Socioeconomic History   • Marital status:      Spouse name: Not on file   • Number of children: Not on file   • Years of education: Not on file   • Highest education level: Not on file   Tobacco Use   • Smoking status: Never Smoker   • Smokeless tobacco: Never Used   • Tobacco comment: caffeine use   Substance and Sexual Activity   • Alcohol use: No   • Drug use: Defer   • Sexual activity: Defer       Allergies:  Other     Medication Review:  Reviewed.      Vital Signs:    Vitals:    06/30/21 0815   Weight: (!) 153 kg (338 lb)   Height: 185.4 cm (73\")     Body mass index is 44.59 kg/m².    Physical Exam:    Constitutional:  Well developed 71 y.o. male that appears in no apparent distress.  Awake & oriented times 3.  Normal mood with normal recent and remote memory and normal judgement.  Eyes:  Conjunctivae normal.  Oropharynx: Previously, moist mucous membranes without exudate and a large tongue and uvula and mild narrowing of the posterior pharyngeal opening and class II-3 Mallampati airway, patient is wearing a facemask.     Downloaded PAP Data " Reviewed For Compliance:  DME is [facility] and he uses a fullface mask.  Downloads between 4/1 and 6/29/2021 compliance 100%.  Average usage is 10 hours and 15 minutes.  Average AHI is normal without leak.  Average auto CPAP pressure is 13.8 and his auto CPAP is 10-15.    I have reviewed the above results and compared them with the patient's last downloads and reviewed with the patient.    Impression:   Obstructive sleep apnea adequately treated with auto CPAP. The patient appears to be at goal with good compliance and usage. The patient has some complaints of hypersomnolence.    Plan:  Good sleep hygiene measures should be maintained.  Weight loss would be beneficial in this patient who is obese by BMI.      After evaluating the patient and assessing results available, the patient is benefiting from the treatment being provided.     The patient will continue auto CPAP.  After clinical evaluation and review of downloads, I recommend no changes to the patient's pressures.  A new prescription will be sent to the patient's DME.    I answered all of the patient's questions.  The patient will call for any problems and will follow up in 1 year.      Barry Gillespie MD  Sleep Medicine  06/30/21  08:20 EDT

## 2021-07-29 ENCOUNTER — OFFICE VISIT (OUTPATIENT)
Dept: FAMILY MEDICINE CLINIC | Facility: CLINIC | Age: 71
End: 2021-07-29

## 2021-07-29 VITALS
OXYGEN SATURATION: 95 % | SYSTOLIC BLOOD PRESSURE: 150 MMHG | HEIGHT: 73 IN | BODY MASS INDEX: 41.75 KG/M2 | RESPIRATION RATE: 18 BRPM | HEART RATE: 78 BPM | WEIGHT: 315 LBS | TEMPERATURE: 97.5 F | DIASTOLIC BLOOD PRESSURE: 72 MMHG

## 2021-07-29 DIAGNOSIS — R42 VERTIGO: ICD-10-CM

## 2021-07-29 DIAGNOSIS — E66.01 CLASS 3 SEVERE OBESITY DUE TO EXCESS CALORIES WITH SERIOUS COMORBIDITY AND BODY MASS INDEX (BMI) OF 40.0 TO 44.9 IN ADULT (HCC): ICD-10-CM

## 2021-07-29 DIAGNOSIS — I10 HYPERTENSION, UNSPECIFIED TYPE: Primary | ICD-10-CM

## 2021-07-29 PROCEDURE — 99213 OFFICE O/P EST LOW 20 MIN: CPT | Performed by: INTERNAL MEDICINE

## 2021-07-29 RX ORDER — AMLODIPINE BESYLATE 10 MG/1
10 TABLET ORAL DAILY
Qty: 90 TABLET | Refills: 3 | Status: SHIPPED | OUTPATIENT
Start: 2021-07-29 | End: 2022-08-17

## 2021-07-29 NOTE — PROGRESS NOTES
Subjective   Barry Cam is a 71 y.o. male. Patient is here today for follow-up on his hypertension.  At the last visit I increased his amlodipine.  Home blood pressure readings seem improved with systolics from 130 up to 150 generally.  Patient's only other new complaint is of balance problems, some dizziness.  Seems to be aggravated when he changes position or turns and it seems to be gradually worsening.  He has had no major episodes.  Chief Complaint   Patient presents with   • Hypertension     FOLLOW UP BP          Vitals:    07/29/21 0802   BP: 150/72   Pulse: 78   Resp: 18   Temp: 97.5 °F (36.4 °C)   SpO2: 95%     Body mass index is 44.68 kg/m².  The following portions of the patient's history were reviewed and updated as appropriate: allergies, current medications, past family history, past medical history, past social history, past surgical history and problem list.    Past Medical History:   Diagnosis Date   • Allergic    • Allergic rhinitis    • Anxiety    • Depression    • Depression    • Diabetes mellitus (CMS/HCC)    • DUGGAN (dyspnea on exertion)    • Edema    • GERD (gastroesophageal reflux disease)    • History of hiatal hernia    • History of kidney stones    • Hx of blood clots    • Hyperglycemia    • Hyperlipidemia    • Hypertension    • Pulmonary embolism (CMS/HCC)    • Sciatica    • Sleep apnea    • SOB (shortness of breath)    • Thyroid disease       Allergies   Allergen Reactions   • Other Cough     Hay fever      Social History     Socioeconomic History   • Marital status:      Spouse name: Not on file   • Number of children: Not on file   • Years of education: Not on file   • Highest education level: Not on file   Tobacco Use   • Smoking status: Never Smoker   • Smokeless tobacco: Never Used   • Tobacco comment: caffeine use   Substance and Sexual Activity   • Alcohol use: No   • Drug use: Defer   • Sexual activity: Defer        Current Outpatient Medications:   •  acetaminophen  (TYLENOL) 500 MG tablet, Take 500 mg by mouth Every 6 (Six) Hours As Needed for Mild Pain ., Disp: , Rfl:   •  albuterol sulfate HFA (VENTOLIN HFA) 108 (90 Base) MCG/ACT inhaler, Inhale 2 puffs Every 4 (Four) Hours As Needed for Wheezing or Shortness of Air. for wheezing, Disp: 54 inhaler, Rfl: 1  •  amLODIPine (NORVASC) 10 MG tablet, Take 1 tablet by mouth Daily., Disp: 90 tablet, Rfl: 3  •  apixaban (Eliquis) 5 MG tablet tablet, Take 1 tablet by mouth Every 12 (Twelve) Hours., Disp: 60 tablet, Rfl: 5  •  betamethasone valerate (VALISONE) 0.1 % cream, APPLY CREAM TOPICALLY TO AFFECTED AREA(S) TO SKIN TWICE DAILY, Disp: 15 g, Rfl: 1  •  caffeine 200 MG tablet, Take 200 mg by mouth Every 4 (Four) Hours As Needed for Headache., Disp: , Rfl:   •  Copper Gluconate (COPPER CAPS PO), Take  by mouth., Disp: , Rfl:   •  diphenhydrAMINE (BENADRYL) 50 MG capsule, Take 50 mg by mouth Every 6 (Six) Hours As Needed for Itching., Disp: , Rfl:   •  gabapentin (NEURONTIN) 300 MG capsule, TAKE 2 CAPSULES BY MOUTH TWICE A DAY, Disp: 360 capsule, Rfl: 1  •  glimepiride (AMARYL) 2 MG tablet, TAKE 1 TABLET BY MOUTH EVERY DAY BEFORE BREAKFAST, Disp: 90 tablet, Rfl: 1  •  Glucosamine-Chondroit-Vit C-Mn (GLUCOSAMINE CHONDR 500 COMPLEX) capsule, Take 2 capsules by mouth daily., Disp: , Rfl:   •  Januvia 100 MG tablet, TAKE 1 TABLET BY MOUTH DAILY., Disp: 90 tablet, Rfl: 1  •  levothyroxine (SYNTHROID, LEVOTHROID) 25 MCG tablet, Take 1 tablet by mouth Daily., Disp: 90 tablet, Rfl: 3  •  losartan (COZAAR) 100 MG tablet, TAKE 1 TABLET BY MOUTH EVERY DAY, Disp: 90 tablet, Rfl: 3  •  LUTEIN PO, Take 5 mg by mouth Daily., Disp: , Rfl:   •  Melatonin 10 MG tablet, , Disp: , Rfl:   •  metFORMIN (GLUCOPHAGE) 1000 MG tablet, Take 1 tablet by mouth 2 (Two) Times a Day., Disp: 180 tablet, Rfl: 3  •  modafinil (PROVIGIL) 200 MG tablet, TAKE 1 TABLET BY MOUTH TWICE A DAY, Disp: 180 tablet, Rfl: 0  •  MULTIPLE VITAMINS ESSENTIAL PO, Take  by mouth daily.,  Disp: , Rfl:   •  omeprazole (priLOSEC) 20 MG capsule, TAKE 1 CAPSULE BY MOUTH EVERY DAY, Disp: 90 capsule, Rfl: 2  •  OneTouch Delica Lancets 33G misc, USE TO TEST BLOOD SUGAR 3 TIMES DAILY DIABETES TYPE 2 E11.9, Disp: 100 each, Rfl: 5  •  OneTouch Ultra test strip, USE TO TEST BLOOD SUGAR 3 TIMES DAILY, Disp: 100 each, Rfl: 5  •  pioglitazone (ACTOS) 30 MG tablet, Take 1 tablet by mouth Daily., Disp: 90 tablet, Rfl: 3  •  pravastatin (PRAVACHOL) 40 MG tablet, TAKE 1 TABLET BY MOUTH EVERY DAY, Disp: 90 tablet, Rfl: 1  •  Triamcinolone Acetonide (NASACORT) 55 MCG/ACT nasal inhaler, 2 sprays into the nostril(s) as directed by provider Daily., Disp: , Rfl:   •  vitamin C (ASCORBIC ACID) 250 MG tablet, Take 250 mg by mouth Daily., Disp: , Rfl:   •  vitamin E 200 UNIT capsule, Take 200 Units by mouth Daily., Disp: , Rfl:   •  Zeaxanthin powder, , Disp: , Rfl:   •  Zinc Sulfate (ZINC 15 PO), Take  by mouth., Disp: , Rfl:      Objective     History of Present Illness     Review of Systems   Constitutional: Negative.    HENT: Negative.    Respiratory: Negative.    Cardiovascular: Negative.    Gastrointestinal: Negative.    Genitourinary: Negative.    Musculoskeletal: Negative.    Skin: Negative.    Neurological: Negative.    Hematological: Negative.    Psychiatric/Behavioral: Negative.        Physical Exam  Vitals (140/70, 145/70) and nursing note reviewed.   Constitutional:       Appearance: Normal appearance. He is obese.   HENT:      Head: Normocephalic and atraumatic.   Eyes:      General: No scleral icterus.     Conjunctiva/sclera: Conjunctivae normal.      Comments: No nystagmus   Cardiovascular:      Rate and Rhythm: Normal rate and regular rhythm.      Heart sounds: Normal heart sounds.   Pulmonary:      Effort: Pulmonary effort is normal. No respiratory distress.      Breath sounds: Normal breath sounds. No wheezing or rales.   Musculoskeletal:         General: Normal range of motion.      Cervical back: Normal  range of motion and neck supple.      Right lower leg: No edema.      Left lower leg: No edema.   Skin:     General: Skin is warm and dry.   Neurological:      General: No focal deficit present.      Mental Status: He is alert and oriented to person, place, and time.   Psychiatric:         Mood and Affect: Mood normal.         Behavior: Behavior normal.         ASSESSMENT #1-hypertension overall I believe improved and tolerating amlodipine 5 mg  #2-symptoms consistent with vertigo     Problems Addressed this Visit        Cardiac and Vasculature    Hypertension - Primary    Relevant Medications    amLODIPine (NORVASC) 10 MG tablet       ENT    Vertigo    Relevant Orders    Ambulatory Referral to Physical Therapy Vestibular       Endocrine and Metabolic    Class 3 severe obesity due to excess calories with serious comorbidity and body mass index (BMI) of 40.0 to 44.9 in adult (CMS/[facility])      Diagnoses       Codes Comments    Hypertension, unspecified type    -  Primary ICD-10-CM: I10  ICD-9-CM: 401.9     Class 3 severe obesity due to excess calories with serious comorbidity and body mass index (BMI) of 40.0 to 44.9 in adult (CMS/[facility])     ICD-10-CM: E66.01, Z68.41  ICD-9-CM: 278.01, V85.41     Vertigo     ICD-10-CM: R42  ICD-9-CM: 780.4           PLAN I am going to increase the patient's amlodipine to 10 mg daily.  I also am referring him to physical therapy for treatments for vertigo see if they would help.  I plan on rechecking the patient in October to see how his blood pressure is doing on the amlodipine 10 mg    There are no Patient Instructions on file for this visit.  Return in about 2 months (around 9/29/2021).

## 2021-08-16 RX ORDER — APIXABAN 5 MG/1
TABLET, FILM COATED ORAL
Qty: 60 TABLET | Refills: 5 | Status: SHIPPED | OUTPATIENT
Start: 2021-08-16 | End: 2022-01-11 | Stop reason: SDUPTHER

## 2021-08-31 RX ORDER — PRAVASTATIN SODIUM 40 MG
TABLET ORAL
Qty: 90 TABLET | Refills: 1 | Status: SHIPPED | OUTPATIENT
Start: 2021-08-31 | End: 2022-01-11 | Stop reason: SDUPTHER

## 2021-09-15 RX ORDER — LOSARTAN POTASSIUM 100 MG/1
TABLET ORAL
Qty: 90 TABLET | Refills: 3 | Status: SHIPPED | OUTPATIENT
Start: 2021-09-15 | End: 2022-09-07

## 2021-10-08 RX ORDER — GLIMEPIRIDE 2 MG/1
TABLET ORAL
Qty: 90 TABLET | Refills: 1 | Status: SHIPPED | OUTPATIENT
Start: 2021-10-08 | End: 2022-01-11 | Stop reason: SDUPTHER

## 2021-10-08 RX ORDER — GABAPENTIN 300 MG/1
CAPSULE ORAL
Qty: 360 CAPSULE | Refills: 1 | Status: SHIPPED | OUTPATIENT
Start: 2021-10-08 | End: 2022-01-11 | Stop reason: SDUPTHER

## 2021-10-08 RX ORDER — SITAGLIPTIN 100 MG/1
TABLET, FILM COATED ORAL
Qty: 90 TABLET | Refills: 1 | Status: SHIPPED | OUTPATIENT
Start: 2021-10-08 | End: 2022-01-11 | Stop reason: SDUPTHER

## 2021-10-08 NOTE — TELEPHONE ENCOUNTER
Rx Refill Note  Requested Prescriptions     Pending Prescriptions Disp Refills   • glimepiride (AMARYL) 2 MG tablet [Pharmacy Med Name: GLIMEPIRIDE 2 MG TABLET] 90 tablet 1     Sig: TAKE 1 TABLET BY MOUTH EVERY DAY BEFORE BREAKFAST   • gabapentin (NEURONTIN) 300 MG capsule [Pharmacy Med Name: GABAPENTIN 300 MG CAPSULE] 360 capsule 1     Sig: TAKE 2 CAPSULES BY MOUTH TWICE A DAY   • Januvia 100 MG tablet [Pharmacy Med Name: JANUVIA 100 MG TABLET] 90 tablet 1     Sig: TAKE 1 TABLET BY MOUTH DAILY.      Last office visit with prescribing clinician: 7/29/2021      Next office visit with prescribing clinician: 10/12/2021            Beth Anderson MA  10/08/21, 12:47 EDT

## 2021-10-12 ENCOUNTER — OFFICE VISIT (OUTPATIENT)
Dept: FAMILY MEDICINE CLINIC | Facility: CLINIC | Age: 71
End: 2021-10-12

## 2021-10-12 VITALS
TEMPERATURE: 97.5 F | HEART RATE: 72 BPM | WEIGHT: 315 LBS | HEIGHT: 73 IN | OXYGEN SATURATION: 97 % | SYSTOLIC BLOOD PRESSURE: 140 MMHG | BODY MASS INDEX: 41.75 KG/M2 | RESPIRATION RATE: 20 BRPM | DIASTOLIC BLOOD PRESSURE: 72 MMHG

## 2021-10-12 DIAGNOSIS — G89.29 CHRONIC MIDLINE LOW BACK PAIN WITH LEFT-SIDED SCIATICA: ICD-10-CM

## 2021-10-12 DIAGNOSIS — E11.9 TYPE 2 DIABETES MELLITUS WITHOUT COMPLICATION, WITHOUT LONG-TERM CURRENT USE OF INSULIN (HCC): ICD-10-CM

## 2021-10-12 DIAGNOSIS — R73.9 HYPERGLYCEMIA: ICD-10-CM

## 2021-10-12 DIAGNOSIS — R42 VERTIGO: ICD-10-CM

## 2021-10-12 DIAGNOSIS — E11.40 DIABETIC NEUROPATHY, PAINFUL (HCC): ICD-10-CM

## 2021-10-12 DIAGNOSIS — E78.2 MIXED HYPERLIPIDEMIA: ICD-10-CM

## 2021-10-12 DIAGNOSIS — E66.01 CLASS 3 SEVERE OBESITY DUE TO EXCESS CALORIES WITH SERIOUS COMORBIDITY AND BODY MASS INDEX (BMI) OF 40.0 TO 44.9 IN ADULT (HCC): ICD-10-CM

## 2021-10-12 DIAGNOSIS — Z86.711 HISTORY OF PULMONARY EMBOLISM: ICD-10-CM

## 2021-10-12 DIAGNOSIS — Z23 NEED FOR IMMUNIZATION AGAINST INFLUENZA: Primary | ICD-10-CM

## 2021-10-12 DIAGNOSIS — E07.9 THYROID DISEASE: ICD-10-CM

## 2021-10-12 DIAGNOSIS — I10 HYPERTENSION, UNSPECIFIED TYPE: ICD-10-CM

## 2021-10-12 DIAGNOSIS — M54.42 CHRONIC MIDLINE LOW BACK PAIN WITH LEFT-SIDED SCIATICA: ICD-10-CM

## 2021-10-12 PROCEDURE — 90662 IIV NO PRSV INCREASED AG IM: CPT | Performed by: INTERNAL MEDICINE

## 2021-10-12 PROCEDURE — 99214 OFFICE O/P EST MOD 30 MIN: CPT | Performed by: INTERNAL MEDICINE

## 2021-10-12 PROCEDURE — G0008 ADMIN INFLUENZA VIRUS VAC: HCPCS | Performed by: INTERNAL MEDICINE

## 2021-10-12 RX ORDER — METHYLPREDNISOLONE 4 MG/1
TABLET ORAL
Qty: 21 TABLET | Refills: 0 | Status: SHIPPED | OUTPATIENT
Start: 2021-10-12 | End: 2022-01-11

## 2021-10-12 RX ORDER — HYDROCHLOROTHIAZIDE 12.5 MG/1
12.5 TABLET ORAL DAILY
Qty: 30 TABLET | Refills: 2 | Status: SHIPPED | OUTPATIENT
Start: 2021-10-12 | End: 2021-12-30

## 2021-10-12 NOTE — PROGRESS NOTES
Subjective   Barry Cam is a 71 y.o. male. Patient is here today for follow-up on hypertension.  He also has hyperlipidemia, hyperglycemia, chronic anticoagulation, hypothyroidism and obesity.  Patient has a couple of new problems.  He had a fall at home about 10 days ago injuring his left side of his chest and under the axilla.  He has an abrasion on the left shoulder that is healing and his pain is improved.  He has had no real breathing problems.  Secondly he continues with vertigo and never did coordinate with physical therapy and third he has had off and on back pain has been having a lot of problems with left-sided sciatica.  Chief Complaint   Patient presents with   • Hypertension     FOLLOW UP ON BP          Vitals:    10/12/21 0812   BP: 140/72   Pulse: 72   Resp: 20   Temp: 97.5 °F (36.4 °C)   SpO2: 97%     Body mass index is 45.61 kg/m².  The following portions of the patient's history were reviewed and updated as appropriate: allergies, current medications, past family history, past medical history, past social history, past surgical history and problem list.    Past Medical History:   Diagnosis Date   • Allergic    • Allergic rhinitis    • Anxiety    • Depression    • Depression    • Diabetes mellitus (HCC)    • DUGGAN (dyspnea on exertion)    • Edema    • GERD (gastroesophageal reflux disease)    • History of hiatal hernia    • History of kidney stones    • Hx of blood clots    • Hyperglycemia    • Hyperlipidemia    • Hypertension    • Pulmonary embolism (HCC)    • Sciatica    • Sleep apnea    • SOB (shortness of breath)    • Thyroid disease       Allergies   Allergen Reactions   • Other Cough     Hay fever      Social History     Socioeconomic History   • Marital status:    Tobacco Use   • Smoking status: Never Smoker   • Smokeless tobacco: Never Used   • Tobacco comment: caffeine use   Substance and Sexual Activity   • Alcohol use: No   • Drug use: Defer   • Sexual activity: Defer         Current Outpatient Medications:   •  acetaminophen (TYLENOL) 500 MG tablet, Take 500 mg by mouth Every 6 (Six) Hours As Needed for Mild Pain ., Disp: , Rfl:   •  albuterol sulfate HFA (VENTOLIN HFA) 108 (90 Base) MCG/ACT inhaler, Inhale 2 puffs Every 4 (Four) Hours As Needed for Wheezing or Shortness of Air. for wheezing, Disp: 54 inhaler, Rfl: 1  •  amLODIPine (NORVASC) 10 MG tablet, Take 1 tablet by mouth Daily., Disp: 90 tablet, Rfl: 3  •  betamethasone valerate (VALISONE) 0.1 % cream, APPLY CREAM TOPICALLY TO AFFECTED AREA(S) TO SKIN TWICE DAILY, Disp: 15 g, Rfl: 1  •  caffeine 200 MG tablet, Take 200 mg by mouth Every 4 (Four) Hours As Needed for Headache., Disp: , Rfl:   •  Copper Gluconate (COPPER CAPS PO), Take  by mouth., Disp: , Rfl:   •  diphenhydrAMINE (BENADRYL) 50 MG capsule, Take 50 mg by mouth Every 6 (Six) Hours As Needed for Itching., Disp: , Rfl:   •  Eliquis 5 MG tablet tablet, TAKE 1 TABLET BY MOUTH EVERY 12 HOURS, Disp: 60 tablet, Rfl: 5  •  gabapentin (NEURONTIN) 300 MG capsule, TAKE 2 CAPSULES BY MOUTH TWICE A DAY, Disp: 360 capsule, Rfl: 1  •  glimepiride (AMARYL) 2 MG tablet, TAKE 1 TABLET BY MOUTH EVERY DAY BEFORE BREAKFAST, Disp: 90 tablet, Rfl: 1  •  Glucosamine-Chondroit-Vit C-Mn (GLUCOSAMINE CHONDR 500 COMPLEX) capsule, Take 2 capsules by mouth daily., Disp: , Rfl:   •  Januvia 100 MG tablet, TAKE 1 TABLET BY MOUTH DAILY., Disp: 90 tablet, Rfl: 1  •  levothyroxine (SYNTHROID, LEVOTHROID) 25 MCG tablet, Take 1 tablet by mouth Daily., Disp: 90 tablet, Rfl: 3  •  losartan (COZAAR) 100 MG tablet, TAKE 1 TABLET BY MOUTH EVERY DAY, Disp: 90 tablet, Rfl: 3  •  LUTEIN PO, Take 5 mg by mouth Daily., Disp: , Rfl:   •  Melatonin 10 MG tablet, , Disp: , Rfl:   •  metFORMIN (GLUCOPHAGE) 1000 MG tablet, Take 1 tablet by mouth 2 (Two) Times a Day., Disp: 180 tablet, Rfl: 3  •  modafinil (PROVIGIL) 200 MG tablet, TAKE 1 TABLET BY MOUTH TWICE A DAY, Disp: 180 tablet, Rfl: 0  •  MULTIPLE VITAMINS  ESSENTIAL PO, Take  by mouth daily., Disp: , Rfl:   •  omeprazole (priLOSEC) 20 MG capsule, TAKE 1 CAPSULE BY MOUTH EVERY DAY, Disp: 90 capsule, Rfl: 2  •  OneTouch Delica Lancets 33G misc, USE TO TEST BLOOD SUGAR 3 TIMES DAILY DIABETES TYPE 2 E11.9, Disp: 100 each, Rfl: 5  •  OneTouch Ultra test strip, USE TO TEST BLOOD SUGAR 3 TIMES DAILY, Disp: 100 each, Rfl: 5  •  pioglitazone (ACTOS) 30 MG tablet, Take 1 tablet by mouth Daily., Disp: 90 tablet, Rfl: 3  •  pravastatin (PRAVACHOL) 40 MG tablet, TAKE 1 TABLET BY MOUTH EVERY DAY, Disp: 90 tablet, Rfl: 1  •  Triamcinolone Acetonide (NASACORT) 55 MCG/ACT nasal inhaler, 2 sprays into the nostril(s) as directed by provider Daily., Disp: , Rfl:   •  vitamin C (ASCORBIC ACID) 250 MG tablet, Take 250 mg by mouth Daily., Disp: , Rfl:   •  vitamin E 200 UNIT capsule, Take 200 Units by mouth Daily., Disp: , Rfl:   •  Zeaxanthin powder, , Disp: , Rfl:   •  Zinc Sulfate (ZINC 15 PO), Take  by mouth., Disp: , Rfl:   •  hydroCHLOROthiazide (HYDRODIURIL) 12.5 MG tablet, Take 1 tablet by mouth Daily., Disp: 30 tablet, Rfl: 2  •  methylPREDNISolone (MEDROL) 4 MG dose pack, Take as directed on package instructions., Disp: 21 tablet, Rfl: 0     Objective     History of Present Illness     Review of Systems   Constitutional: Negative.    HENT: Negative.    Respiratory: Negative.    Cardiovascular: Negative.    Gastrointestinal: Negative.    Genitourinary: Negative.    Musculoskeletal: Negative.    Skin: Negative.    Neurological: Negative.    Hematological: Negative.    Psychiatric/Behavioral: Negative.        Physical Exam  Vitals (140/80) and nursing note reviewed.   Constitutional:       General: He is not in acute distress.     Appearance: Normal appearance. He is obese. He is not ill-appearing.   HENT:      Head: Normocephalic and atraumatic.   Eyes:      General: No scleral icterus.     Conjunctiva/sclera: Conjunctivae normal.   Cardiovascular:      Rate and Rhythm: Normal rate  and regular rhythm.      Heart sounds: Normal heart sounds.   Pulmonary:      Effort: Pulmonary effort is normal.      Breath sounds: Normal breath sounds.   Chest:      Chest wall: No tenderness.   Musculoskeletal:         General: No tenderness. Normal range of motion.      Cervical back: Normal range of motion and neck supple.      Comments: No significant chest wall tenderness   Skin:     General: Skin is warm and dry.      Comments: Healing abrasion to the left shoulder   Neurological:      General: No focal deficit present.      Mental Status: He is alert and oriented to person, place, and time.   Psychiatric:         Mood and Affect: Mood normal.         Behavior: Behavior normal.         ASSESSMENT #1-episodes of vertigo  #2-fall at home with left chest soft tissue injury but no sign of rib fracture  #3-hypertension, not optimally controlled  #4-low back pain with left-sided sciatica, intermittent  #5-hypothyroidism, asymptomatic  #6-chronic anticoagulation     Problems Addressed this Visit        Cardiac and Vasculature    Hyperlipidemia    Hypertension    Relevant Medications    hydroCHLOROthiazide (HYDRODIURIL) 12.5 MG tablet       Coag and Thromboembolic    History of pulmonary embolism       ENT    Vertigo    Relevant Orders    Ambulatory Referral to Physical Therapy Evaluate and treat       Endocrine and Metabolic    Diabetes mellitus (HCC)    Class 3 severe obesity due to excess calories with serious comorbidity and body mass index (BMI) of 40.0 to 44.9 in adult (HCC)    Thyroid disease    Relevant Medications    methylPREDNISolone (MEDROL) 4 MG dose pack    Hyperglycemia       Neuro    Diabetic neuropathy, painful (HCC)      Other Visit Diagnoses     Need for immunization against influenza    -  Primary    Relevant Orders    Fluzone High-Dose 65+yrs    Chronic midline low back pain with left-sided sciatica        Relevant Orders    Ambulatory Referral to Physical Therapy Evaluate and treat       Diagnoses       Codes Comments    Need for immunization against influenza    -  Primary ICD-10-CM: Z23  ICD-9-CM: V04.81     Hypertension, unspecified type     ICD-10-CM: I10  ICD-9-CM: 401.9     Mixed hyperlipidemia     ICD-10-CM: E78.2  ICD-9-CM: 272.2     History of pulmonary embolism     ICD-10-CM: Z86.711  ICD-9-CM: V12.55     Class 3 severe obesity due to excess calories with serious comorbidity and body mass index (BMI) of 40.0 to 44.9 in adult ([facility])     ICD-10-CM: E66.01, Z68.41  ICD-9-CM: 278.01, V85.41     Type 2 diabetes mellitus without complication, without long-term current use of insulin ([facility])     ICD-10-CM: E11.9  ICD-9-CM: 250.00     Diabetic neuropathy, painful ([facility])     ICD-10-CM: E11.40  ICD-9-CM: 250.60, 357.2     Chronic midline low back pain with left-sided sciatica     ICD-10-CM: M54.42, G89.29  ICD-9-CM: 724.2, 724.3, 338.29     Vertigo     ICD-10-CM: R42  ICD-9-CM: 780.4     Thyroid disease     ICD-10-CM: E07.9  ICD-9-CM: 246.9     Hyperglycemia     ICD-10-CM: R73.9  ICD-9-CM: 790.29           PLAN the patient received a flu shot today.  I am starting him on a Medrol Dosepak for his back and I am referring him to physical therapy for his back and vertigo.  I am starting him on hydrochlorothiazide 12.5 mg daily for his blood pressure and would like to recheck him in 1 month to see how the blood pressure is doing    There are no Patient Instructions on file for this visit.  Return in about 1 month (around 11/12/2021).

## 2021-10-28 ENCOUNTER — TREATMENT (OUTPATIENT)
Dept: PHYSICAL THERAPY | Facility: CLINIC | Age: 71
End: 2021-10-28

## 2021-10-28 DIAGNOSIS — H81.12 BPPV (BENIGN PAROXYSMAL POSITIONAL VERTIGO), LEFT: Primary | ICD-10-CM

## 2021-10-28 DIAGNOSIS — R26.89 IMBALANCE: ICD-10-CM

## 2021-10-28 PROCEDURE — 97161 PT EVAL LOW COMPLEX 20 MIN: CPT | Performed by: PHYSICAL THERAPIST

## 2021-10-28 PROCEDURE — 95992 CANALITH REPOSITIONING PROC: CPT | Performed by: PHYSICAL THERAPIST

## 2021-10-28 NOTE — PROGRESS NOTES
Physical Therapy Initial Evaluation-  Vestibular Therapy    Patient Name: Barry Cam       Patient MRN: [id number]  : 1950  Physician:Cristi Gerber MD  Date: 10/28/2021  Encounter Diagnoses   Name Primary?   • BPPV (benign paroxysmal positional vertigo), left Yes   • Imbalance        Objective Testing:  Positional Testing  Positional Testing: With infrared goggles  Vertebrobasilar Artery Screen - Right: Negative  Vertebrobasilar Artery Screen - Left: Negative  [place]-Hallpike Right: No nystagmus  [place]-Hallpike Left: No nystagmus  Horizontal Roll Test Right: Left-beating (stronger)  Horizontal Roll Test Right Onset Time : immediate  Horizontal Roll Test Left: Right-beating (weaker)  Horizontal Roll Test Left Onset Time : immediate     Occulomotor Exam Fixation Present  Spontaneous Nystagmus: Absent  VOR with Occulomotor Exam Fixation Absent   Spontaneous Nystagmus: Absent   DHI: 12/100, low perception of handicap    THERAPY ASSESSMENT: Patient is a 71 y.o. male. Patient presents to physical therapy due to complaints of episodes of imbalance that started several years ago. He reports several falls but denies dizziness as the cause of the falls. He feels unsteady with quick head turns, bending forward and standing with EC. He reports also having LBP with radiculopathy. This is his primary concern. He also endorses bilateral peripheral neuropathy which may contributing to imbalance. Signs and symptoms are consistent with L LC cupulolithiasis BPPV. Balance was not tested due to L leg pain and LBP. Will set up eval for LBP. Patient is a good candidate for physical therapy to address the following:    Functional Limitations: Walking, Complaints of Pain, Difficulty moving   Length of Therapy:  (6 visits)       PT Interventions: Home Exercise Program, Balance Training, CRP   Patient Agrees with Plan of Care: Yes    REHAB POTENTIAL: fair            SHORT TERM GOALS: To be met in 2 weeks:  1. Patient is  independent with HEP.  2. Patient will report at least 30% improvement in overall condition.  LONG TERM GOALS:To be met in 4 weeks:  1. Patient will report decreased disequilibrium/dizziness by at least 90%.  2. Patient will report no loss of balance with ADLs to demonstrate improved functional balance.  3. Patient denies dizziness with daily activity.  4. DHI score is less than 10.     Other Procedure CPT 81972 minutes 0    Timed Code Treatment: 0   Minutes     Total Treatment Time: 45      Minutes      PT SIGNATURE: Elda Cain PT, DPT, CHT, AMANDA   [place] license #[id number]  DATE TREATMENT INITIATED: 10/28/2021     Medicare Initial Certification  Certification Period: 1/26/2022  I certify that the therapy services are furnished while this patient is under my care.  The services outlined above are required by this patient, and will be reviewed every 90 days.     PHYSICIAN: Cristi Gerber MD      DATE:     Please sign and return via fax to [phone].. Thank you, [facility].

## 2021-11-04 ENCOUNTER — TREATMENT (OUTPATIENT)
Dept: PHYSICAL THERAPY | Facility: CLINIC | Age: 71
End: 2021-11-04

## 2021-11-04 DIAGNOSIS — R26.89 IMBALANCE: Primary | ICD-10-CM

## 2021-11-04 DIAGNOSIS — H81.12 BPPV (BENIGN PAROXYSMAL POSITIONAL VERTIGO), LEFT: ICD-10-CM

## 2021-11-04 PROCEDURE — 95992 CANALITH REPOSITIONING PROC: CPT | Performed by: PHYSICAL THERAPIST

## 2021-11-04 NOTE — PROGRESS NOTES
Vestibular Daily Progress Note    Visit#:2  Subjective   No change in symptoms. I am not able to stay up after doing the home exercise because of the pain in my back and leg. I did the exercise a few times. I also have narcolepsy and have no control when I need to sleep.  Objective            Positional Testing  Positional Testing: With infrared goggles  Horizontal Roll Test Right: Left-beating  Horizontal Roll Test Right Onset Time : 10 sec  Horizontal Roll Test Right Onset Time : < 1 min  Horizontal Roll Test Left: Right-beating  Horizontal Roll Test Left Onset Time : 13 sec > 1 min  Treatment:  (PRIMO Rodrigues)             PROCEDURES AND MODALITIES:  Paraffin:    Moist Heat:    Ice:    E-Stim:    Ultrasound:    Ionto:   Traction:    See Exercise, Manual, and Modality Logs for complete treatment.   Other Procedure CPT 43787 minutes 0       Timed Code Treatment: 0 Minutes  Total Treatment Time: 30 Minutes    Assessment/Plan   Patient continues to present with L LC cupulolithiasis BPPV. He reports he was not compliant with HEP. CRP was performed again today and encouraged to do what he could in terms of after care for CRP. Stay upright for as long as he could and avoid lying on L side. His physical pain and narcolepsy is affecting the effectiveness of the CRP treatment and may prolong improvement.     Progress per Plan of Care    Elda Cain, PT, DPT, CHT, CIDN  Physical Therapist

## 2021-11-09 ENCOUNTER — TREATMENT (OUTPATIENT)
Dept: PHYSICAL THERAPY | Facility: CLINIC | Age: 71
End: 2021-11-09

## 2021-11-09 DIAGNOSIS — H81.12 BPPV (BENIGN PAROXYSMAL POSITIONAL VERTIGO), LEFT: Primary | ICD-10-CM

## 2021-11-09 DIAGNOSIS — R26.89 IMBALANCE: ICD-10-CM

## 2021-11-09 PROCEDURE — 95992 CANALITH REPOSITIONING PROC: CPT | Performed by: PHYSICAL THERAPIST

## 2021-11-09 NOTE — PROGRESS NOTES
Vestibular Daily Progress Note    Visit#:3  Subjective   No change in balance. Patient reports having increased dizziness/imbalance waking up Saturday morning. Was able to still do the exercises daily. Feels back to baseline today.  Objective            Positional Testing  [place]-Hallpike Left: Left-beating Nystagmus  [place]-Hallpike Left Onset Time : immediate  [place]ora-Hallpike Left Duration Time : 12 sec  Horizontal Roll Test Right: Right-beating  Horizontal Roll Test Right Onset Time : 5 sec  Horizontal Roll Test Right Onset Time : 5 sec  Horizontal Roll Test Left: Left-beating  Horizontal Roll Test Left Onset Time : 5 sec  Horizontal Roll Test Left Onset Time : 12 sec  Treatment: L BBQ Roll             PROCEDURES AND MODALITIES:  Paraffin:    Moist Heat:    Ice:    E-Stim:    Ultrasound:    Ionto:   Traction:    See Exercise, Manual, and Modality Logs for complete treatment.   Other Procedure CPT 72031 minutes 0       Timed Code Treatment: 0 Minutes  Total Treatment Time: 30 Minutes    Assessment/Plan   L LC cupulolithiasis BPPV has transitioned to canalithiasis and that likely explains why he felt increased symptoms on Saturday. He reports he was more compliant with HEP. CRP was performed today and HEP was changed. This is good progress in resolving BPPV. Hopeful he will notice an improvement in balance once resolved.      Progress per Plan of Care    Elda Cain, PT, DPT, CHT, CIDN  Physical Therapist

## 2021-11-17 ENCOUNTER — TREATMENT (OUTPATIENT)
Dept: PHYSICAL THERAPY | Facility: CLINIC | Age: 71
End: 2021-11-17

## 2021-11-17 DIAGNOSIS — G89.29 CHRONIC BILATERAL LOW BACK PAIN WITH LEFT-SIDED SCIATICA: Primary | ICD-10-CM

## 2021-11-17 DIAGNOSIS — Z74.09 IMPAIRED FUNCTIONAL MOBILITY AND ACTIVITY TOLERANCE: ICD-10-CM

## 2021-11-17 DIAGNOSIS — M54.42 CHRONIC BILATERAL LOW BACK PAIN WITH LEFT-SIDED SCIATICA: Primary | ICD-10-CM

## 2021-11-17 PROCEDURE — 97110 THERAPEUTIC EXERCISES: CPT | Performed by: PHYSICAL THERAPIST

## 2021-11-17 PROCEDURE — 97530 THERAPEUTIC ACTIVITIES: CPT | Performed by: PHYSICAL THERAPIST

## 2021-11-17 PROCEDURE — 97161 PT EVAL LOW COMPLEX 20 MIN: CPT | Performed by: PHYSICAL THERAPIST

## 2021-11-17 NOTE — PATIENT INSTRUCTIONS
Access Code: [email or web address]  URL: [email or web address]  Date: 11/17/2021  Prepared by: Patria Wynn    Exercises  Hooklying Single Knee to Chest Stretch - 1 x daily - 7 x weekly - 1 sets - 6 reps - 10s hold  Seated Hamstring Stretch - 1 x daily - 7 x weekly - 1 sets - 3 reps - 20s hold  Supine Posterior Pelvic Tilt - 1 x daily - 7 x weekly - 1 sets - 10 reps - 5s hold     80

## 2021-11-17 NOTE — PROGRESS NOTES
Physical Therapy Initial Evaluation and Plan of Care      Patient: Barry Cam   : 1950  Diagnosis/ICD-10 Code:  Chronic bilateral low back pain with left-sided sciatica [M54.42, G89.29]  Referring practitioner: Cristi Gerber MD  Date of Initial Visit: 2021  Today's Date: 2021  Patient seen for 1 sessions           Subjective Questionnaire: Oswestry: 54/100      Subjective Evaluation    History of Present Illness  Mechanism of injury: Low back pain for a few years. A few weeks ago I began having stabbing pain in left hip that shoots to L foot. Better in the last 2-3 weeks. Pain triggered by movement. Low back tightens with standing. There are days I can't walk 75ft to mailbox. There are days I can make it around grocery store as long as I can lean on cart.    Taking tylenol and gabapentin daily    CT 2019 notes DDD in spine    Hx Type 2 diabetes, diabetic neuropathy affecting B feet/lower legs       Patient Occupation: Retired  Pain  Current pain ratin  At worst pain rating: 10  Location: Low back, L hip  Quality: sharp, dull ache and tight  Relieving factors: rest and medications  Aggravating factors: movement and standing  Progression: improved    Social Support  Lives in: multiple-level home  Lives with: spouse    Diagnostic Tests  Abnormal x-ray: None.    Treatments  Current treatment: physical therapy  Patient Goals  Patient goals for therapy: decreased pain, improved balance, increased motion, increased strength and independence with ADLs/IADLs             Objective        Special Questions      Additional Special Questions  Denies change in bladder/bowel      Palpation   Left   Tenderness of the erector spinae.     Tenderness     Lumbar Spine  Tenderness in the spinous process.     Additional Tenderness Details  TTP L2/3    Active Range of Motion     Lumbar   Normal active range of motion    Strength/Myotome Testing     Left Hip   Planes of Motion   Flexion: 4 (Pain L low  back)    Right Hip   Planes of Motion   Flexion: 5    Left Knee   Flexion: 5  Extension: 5    Right Knee   Flexion: 5  Extension: 5    Left Ankle/Foot   Dorsiflexion: 5    Right Ankle/Foot   Dorsiflexion: 5    Tests       Thoracic   Negative slump.     Lumbar     Left   Negative passive SLR.     Right   Negative passive SLR.     Additional Tests Details  TRACY: Mild L hip pain, circumferential    Lumbar Flexibility Comments:   Mod herring hamstrings B          Assessment & Plan     Assessment  Impairments: abnormal gait, activity intolerance, impaired physical strength, lacks appropriate home exercise program and pain with function  Assessment details: Pt c/o low back (chronic) and L LE pain x4 weeks. Objective findings include lumbar tenderness, low back pain w/ resisted L hip flexion, positive TRACY L, and moderate limitation in hamstring flexibility B. Pt will benefit from skilled PT services in order to address listed impairments and increase tolerance to normal daily activities including ADL's, and recreational activities.    Prognosis: good  Functional Limitations: lifting, walking, uncomfortable because of pain, moving in bed and standing  Goals  Plan Goals: Short Term Goals: 4-6 weeks. Patient will:  1. Be independent with initial HEP  2. Be instructed in posture and body mechanics  3. Report pain of </= 5/10 with daily activities    Long Term Goals: 6-12 weeks. Patient will:  1. Demonstrate improved Bilateral lower extremity/lower abdominal MMT of >/= 4+/5 to allow for performance of daily activities without pain.  2. Demonstrate lower extremity flexibility and lumbar ROM WFL to allow for return to household & recreational activities w/o increased symptoms  3. Report pain of </= 1/10 with all daily activities.  4. Perceived disability </= 10% as measured by Oswestry Questionnaire.  5. Be independent with long term HEP    Plan  Therapy options: will be seen for skilled physical therapy services  Planned modality  interventions: cryotherapy, electrical stimulation/Russian stimulation, thermotherapy (hydrocollator packs), traction, ultrasound and dry needling  Planned therapy interventions: abdominal trunk stabilization, ADL retraining, body mechanics training, balance/weight-bearing training, flexibility, functional ROM exercises, gait training, home exercise program, joint mobilization, manual therapy, neuromuscular re-education, soft tissue mobilization, spinal/joint mobilization, strengthening, stretching and therapeutic activities  Frequency: 2x week  Duration in weeks: 12  Treatment plan discussed with: patient        Manual Therapy:    0     mins  11001;  Therapeutic Exercise:    15     mins  68055;     Neuromuscular Elizabeth:    0    mins  72298;    Therapeutic Activity:     10     mins  28607;     Gait Trainin     mins  15949;     Ultrasound:     0     mins  43935;    Electrical Stimulation:    0     mins  51190 ( );  Dry Needling     0     mins self-pay    Timed Treatment:   25   mins   Total Treatment:     45   mins    PT SIGNATURE: Patria Wynn PT   DATE TREATMENT INITIATED: 2021    Initial Certification  Certification Period: 2/15/2022  I certify that the therapy services are furnished while this patient is under my care.  The services outlined above are required by this patient, and will be reviewed every 90 days.     PHYSICIAN: Cristi Gerber MD      DATE:     Please sign and return via fax to [phone].. Thank you, [facility].

## 2021-11-18 ENCOUNTER — TREATMENT (OUTPATIENT)
Dept: PHYSICAL THERAPY | Facility: CLINIC | Age: 71
End: 2021-11-18

## 2021-11-18 DIAGNOSIS — R26.89 IMBALANCE: ICD-10-CM

## 2021-11-18 DIAGNOSIS — H81.12 BPPV (BENIGN PAROXYSMAL POSITIONAL VERTIGO), LEFT: Primary | ICD-10-CM

## 2021-11-18 PROCEDURE — 95992 CANALITH REPOSITIONING PROC: CPT | Performed by: PHYSICAL THERAPIST

## 2021-11-18 NOTE — PROGRESS NOTES
Vestibular Daily Progress Note    Visit#:4  Subjective   No change in balance. I have only had one instance of loss of balance last week when I was turning a corner to the L. I felt like I was tilting to the L. My back is very sore today from PT yesterday.   Objective            Positional Testing  Positional Testing: With infrared goggles  Benwood-Hallpike Right: No nystagmus  Benwood-Hallpike Left: No nystagmus  Horizontal Roll Test Right: No nystagmus  Horizontal Roll Test Left: No nystagmus  Treatment:  (None)             PROCEDURES AND MODALITIES:  Paraffin:    Moist Heat:    Ice:    E-Stim:    Ultrasound:    Ionto:   Traction:    See Exercise, Manual, and Modality Logs for complete treatment.   Other Procedure CPT 05868 minutes 0       Timed Code Treatment: 0 Minutes  Total Treatment Time: 25 Minutes    Assessment/Plan   Patient tested negative for BPPV in all canals today. He denies improvement in balance at this time. He is also in PT for LBP. I suggested we focus on his PT for the back pain and revisit balance training once better. He agreed with this plan. Will hold PT until Jan 2022.    Progress per Plan of Care    Elda Cain, PT, DPT, CHT, CIDN  Physical Therapist

## 2021-11-24 ENCOUNTER — TREATMENT (OUTPATIENT)
Dept: PHYSICAL THERAPY | Facility: CLINIC | Age: 71
End: 2021-11-24

## 2021-11-24 DIAGNOSIS — M54.42 CHRONIC BILATERAL LOW BACK PAIN WITH LEFT-SIDED SCIATICA: Primary | ICD-10-CM

## 2021-11-24 DIAGNOSIS — Z74.09 IMPAIRED FUNCTIONAL MOBILITY AND ACTIVITY TOLERANCE: ICD-10-CM

## 2021-11-24 DIAGNOSIS — G89.29 CHRONIC BILATERAL LOW BACK PAIN WITH LEFT-SIDED SCIATICA: Primary | ICD-10-CM

## 2021-11-24 PROCEDURE — 97110 THERAPEUTIC EXERCISES: CPT | Performed by: PHYSICAL THERAPIST

## 2021-11-24 NOTE — PROGRESS NOTES
Physical Therapy Daily Treatment Note    Patient: Barry Cam   : 1950  Diagnosis/ICD-10 Code:  Chronic bilateral low back pain with left-sided sciatica [M54.42, G89.29]  Referring practitioner: Cristi Gerber MD  Date of Initial Evaluation:  Type: THERAPY  Noted: 2021  Visit # 2      Subjective   Low back pain is a little less intense, Reports shooting pain in L leg when rolling in bed. I was sore for 2 days after you pressed on my back at evaluation.    Objective   See Exercise, Manual, and Modality Logs for complete treatment.       Assessment/Plan  Pt reported moderate tenderness with massage of lumbar paraspinals. Tolerates seated exercises better than supine due to breathing difficulties. Updated HEP. Progress per POC.         Manual Therapy:    5     mins  14333;  Therapeutic Exercise:    30     mins  21881;     Neuromuscular Elizabeth:    0    mins  86614;    Therapeutic Activity:     0     mins  68428;     Gait Trainin     mins  29729;     Ultrasound:     0     mins  55254;    Work Hardening           0      mins 05566  Iontophoresis               0   mins 58207    Timed Treatment:   35   mins   Total Treatment:     40   mins    Patria Wynn, PT  Physical Therapist

## 2021-12-01 ENCOUNTER — OFFICE VISIT (OUTPATIENT)
Dept: FAMILY MEDICINE CLINIC | Facility: CLINIC | Age: 71
End: 2021-12-01

## 2021-12-01 VITALS
OXYGEN SATURATION: 95 % | HEIGHT: 73 IN | TEMPERATURE: 97.1 F | SYSTOLIC BLOOD PRESSURE: 130 MMHG | WEIGHT: 315 LBS | HEART RATE: 83 BPM | BODY MASS INDEX: 41.75 KG/M2 | RESPIRATION RATE: 20 BRPM | DIASTOLIC BLOOD PRESSURE: 68 MMHG

## 2021-12-01 DIAGNOSIS — J30.2 SEASONAL ALLERGIC RHINITIS, UNSPECIFIED TRIGGER: Primary | ICD-10-CM

## 2021-12-01 DIAGNOSIS — I10 HYPERTENSION, UNSPECIFIED TYPE: ICD-10-CM

## 2021-12-01 DIAGNOSIS — R42 VERTIGO: ICD-10-CM

## 2021-12-01 DIAGNOSIS — E66.01 CLASS 3 SEVERE OBESITY DUE TO EXCESS CALORIES WITH SERIOUS COMORBIDITY AND BODY MASS INDEX (BMI) OF 40.0 TO 44.9 IN ADULT (HCC): ICD-10-CM

## 2021-12-01 PROCEDURE — 99213 OFFICE O/P EST LOW 20 MIN: CPT | Performed by: INTERNAL MEDICINE

## 2021-12-01 RX ORDER — MODAFINIL 200 MG/1
200 TABLET ORAL 2 TIMES DAILY
Qty: 180 TABLET | Refills: 0 | Status: SHIPPED | OUTPATIENT
Start: 2021-12-01 | End: 2023-01-25

## 2021-12-01 NOTE — PROGRESS NOTES
Subjective   Barry Cam is a 71 y.o. male. Patient is here today for follow-up on his hypertension, low back pain, vertigo and obesity.  I modified his blood pressure medicine at the last visit he is here for follow-up and is certainly feeling reasonable.  He also has been seeing physical therapy and they have primarily started work on his back discomfort.  Home blood pressure readings seem to be improving.  Chief Complaint   Patient presents with   • Hypertension     PT HERE FOR FOLLOW UP ON BP           Vitals:    12/01/21 0801   BP: 130/68   Pulse: 83   Resp: 20   Temp: 97.1 °F (36.2 °C)   SpO2: 95%     Body mass index is 44.95 kg/m².  The following portions of the patient's history were reviewed and updated as appropriate: allergies, current medications, past family history, past medical history, past social history, past surgical history and problem list.    Past Medical History:   Diagnosis Date   • Allergic    • Allergic rhinitis    • Anxiety    • Depression    • Depression    • Diabetes mellitus (HCC)    • DUGGAN (dyspnea on exertion)    • Edema    • GERD (gastroesophageal reflux disease)    • History of hiatal hernia    • History of kidney stones    • Hx of blood clots    • Hyperglycemia    • Hyperlipidemia    • Hypertension    • Pulmonary embolism (HCC)    • Sciatica    • Sleep apnea    • SOB (shortness of breath)    • Thyroid disease       Allergies   Allergen Reactions   • Other Cough     Hay fever      Social History     Socioeconomic History   • Marital status:    Tobacco Use   • Smoking status: Never Smoker   • Smokeless tobacco: Never Used   • Tobacco comment: caffeine use   Substance and Sexual Activity   • Alcohol use: No   • Drug use: Defer   • Sexual activity: Defer        Current Outpatient Medications:   •  acetaminophen (TYLENOL) 500 MG tablet, Take 500 mg by mouth Every 6 (Six) Hours As Needed for Mild Pain ., Disp: , Rfl:   •  albuterol sulfate HFA (VENTOLIN HFA) 108 (90 Base)  MCG/ACT inhaler, Inhale 2 puffs Every 4 (Four) Hours As Needed for Wheezing or Shortness of Air. for wheezing, Disp: 54 inhaler, Rfl: 1  •  amLODIPine (NORVASC) 10 MG tablet, Take 1 tablet by mouth Daily., Disp: 90 tablet, Rfl: 3  •  betamethasone valerate (VALISONE) 0.1 % cream, APPLY CREAM TOPICALLY TO AFFECTED AREA(S) TO SKIN TWICE DAILY, Disp: 15 g, Rfl: 1  •  caffeine 200 MG tablet, Take 200 mg by mouth Every 4 (Four) Hours As Needed for Headache., Disp: , Rfl:   •  Copper Gluconate (COPPER CAPS PO), Take  by mouth., Disp: , Rfl:   •  diphenhydrAMINE (BENADRYL) 50 MG capsule, Take 50 mg by mouth Every 6 (Six) Hours As Needed for Itching., Disp: , Rfl:   •  Eliquis 5 MG tablet tablet, TAKE 1 TABLET BY MOUTH EVERY 12 HOURS, Disp: 60 tablet, Rfl: 5  •  gabapentin (NEURONTIN) 300 MG capsule, TAKE 2 CAPSULES BY MOUTH TWICE A DAY, Disp: 360 capsule, Rfl: 1  •  glimepiride (AMARYL) 2 MG tablet, TAKE 1 TABLET BY MOUTH EVERY DAY BEFORE BREAKFAST, Disp: 90 tablet, Rfl: 1  •  Glucosamine-Chondroit-Vit C-Mn (GLUCOSAMINE CHONDR 500 COMPLEX) capsule, Take 2 capsules by mouth daily., Disp: , Rfl:   •  hydroCHLOROthiazide (HYDRODIURIL) 12.5 MG tablet, Take 1 tablet by mouth Daily., Disp: 30 tablet, Rfl: 2  •  Januvia 100 MG tablet, TAKE 1 TABLET BY MOUTH DAILY., Disp: 90 tablet, Rfl: 1  •  levothyroxine (SYNTHROID, LEVOTHROID) 25 MCG tablet, Take 1 tablet by mouth Daily., Disp: 90 tablet, Rfl: 3  •  losartan (COZAAR) 100 MG tablet, TAKE 1 TABLET BY MOUTH EVERY DAY, Disp: 90 tablet, Rfl: 3  •  LUTEIN PO, Take 5 mg by mouth Daily., Disp: , Rfl:   •  Melatonin 10 MG tablet, , Disp: , Rfl:   •  metFORMIN (GLUCOPHAGE) 1000 MG tablet, Take 1 tablet by mouth 2 (Two) Times a Day., Disp: 180 tablet, Rfl: 3  •  methylPREDNISolone (MEDROL) 4 MG dose pack, Take as directed on package instructions., Disp: 21 tablet, Rfl: 0  •  modafinil (PROVIGIL) 200 MG tablet, Take 1 tablet by mouth 2 (Two) Times a Day., Disp: 180 tablet, Rfl: 0  •   MULTIPLE VITAMINS ESSENTIAL PO, Take  by mouth daily., Disp: , Rfl:   •  omeprazole (priLOSEC) 20 MG capsule, TAKE 1 CAPSULE BY MOUTH EVERY DAY, Disp: 90 capsule, Rfl: 2  •  OneTouch Delica Lancets 33G misc, USE TO TEST BLOOD SUGAR 3 TIMES DAILY DIABETES TYPE 2 E11.9, Disp: 100 each, Rfl: 5  •  OneTouch Ultra test strip, USE TO TEST BLOOD SUGAR 3 TIMES DAILY, Disp: 100 each, Rfl: 5  •  pioglitazone (ACTOS) 30 MG tablet, Take 1 tablet by mouth Daily., Disp: 90 tablet, Rfl: 3  •  pravastatin (PRAVACHOL) 40 MG tablet, TAKE 1 TABLET BY MOUTH EVERY DAY, Disp: 90 tablet, Rfl: 1  •  Triamcinolone Acetonide (NASACORT) 55 MCG/ACT nasal inhaler, 2 sprays into the nostril(s) as directed by provider Daily., Disp: , Rfl:   •  vitamin C (ASCORBIC ACID) 250 MG tablet, Take 250 mg by mouth Daily., Disp: , Rfl:   •  vitamin E 200 UNIT capsule, Take 200 Units by mouth Daily., Disp: , Rfl:   •  Zeaxanthin powder, , Disp: , Rfl:   •  Zinc Sulfate (ZINC 15 PO), Take  by mouth., Disp: , Rfl:      Objective     History of Present Illness     Review of Systems   Constitutional: Negative.    HENT: Negative.    Respiratory: Negative.    Cardiovascular: Negative.    Gastrointestinal: Negative.    Genitourinary: Negative.    Musculoskeletal: Negative.    Skin: Negative.    Neurological: Negative.    Hematological: Negative.    Psychiatric/Behavioral: Negative.        Physical Exam  Vitals (130/60) and nursing note reviewed.   Constitutional:       General: He is not in acute distress.     Appearance: Normal appearance. He is obese. He is not ill-appearing.   HENT:      Head: Normocephalic and atraumatic.   Cardiovascular:      Rate and Rhythm: Normal rate and regular rhythm.      Heart sounds: Normal heart sounds.   Pulmonary:      Effort: Pulmonary effort is normal. No respiratory distress.      Breath sounds: Normal breath sounds. No wheezing or rales.   Skin:     General: Skin is warm and dry.   Neurological:      General: No focal deficit  present.      Mental Status: He is alert and oriented to person, place, and time.   Psychiatric:         Mood and Affect: Mood normal.         Behavior: Behavior normal.         ASSESSMENT #1-hypertension, much better control  #2-obesity with about a 5 pound weight loss  #3-low back pain with left sciatica, seeing physical therapy     Problems Addressed this Visit        Allergies and Adverse Reactions    Seasonal allergic rhinitis - Primary       Cardiac and Vasculature    Hypertension       ENT    Vertigo       Endocrine and Metabolic    Class 3 severe obesity due to excess calories with serious comorbidity and body mass index (BMI) of 40.0 to 44.9 in adult ([facility])      Diagnoses       Codes Comments    Seasonal allergic rhinitis, unspecified trigger    -  Primary ICD-10-CM: J30.2  ICD-9-CM: 477.9     Hypertension, unspecified type     ICD-10-CM: I10  ICD-9-CM: 401.9     Class 3 severe obesity due to excess calories with serious comorbidity and body mass index (BMI) of 40.0 to 44.9 in adult ([facility])     ICD-10-CM: E66.01, Z68.41  ICD-9-CM: 278.01, V85.41     Vertigo     ICD-10-CM: R42  ICD-9-CM: 780.4           PLAN patient will continue current medicines and continue his physical therapy.  Like to recheck him in about 6 to 8 weeks with a wellness visit and a CBC, CMP, lipid panel, hemoglobin A1c, TSH and free T4    There are no Patient Instructions on file for this visit.  No follow-ups on file.

## 2021-12-02 ENCOUNTER — TREATMENT (OUTPATIENT)
Dept: PHYSICAL THERAPY | Facility: CLINIC | Age: 71
End: 2021-12-02

## 2021-12-02 DIAGNOSIS — G89.29 CHRONIC BILATERAL LOW BACK PAIN WITH LEFT-SIDED SCIATICA: Primary | ICD-10-CM

## 2021-12-02 DIAGNOSIS — Z74.09 IMPAIRED FUNCTIONAL MOBILITY AND ACTIVITY TOLERANCE: ICD-10-CM

## 2021-12-02 DIAGNOSIS — M54.42 CHRONIC BILATERAL LOW BACK PAIN WITH LEFT-SIDED SCIATICA: Primary | ICD-10-CM

## 2021-12-02 PROCEDURE — 97110 THERAPEUTIC EXERCISES: CPT | Performed by: PHYSICAL THERAPIST

## 2021-12-02 PROCEDURE — 97530 THERAPEUTIC ACTIVITIES: CPT | Performed by: PHYSICAL THERAPIST

## 2021-12-02 NOTE — PROGRESS NOTES
Physical Therapy Daily Progress Note    Patient: Barry Cam   : 1950  Diagnosis/ICD-10 Code:  Chronic bilateral low back pain with left-sided sciatica [M54.42, G89.29]  Referring practitioner: Cristi Gerber MD  Date of Initial Visit: Type: THERAPY  Noted: 2021  Today's Date: 2021  Patient seen for 3 sessions         Barry Cam reports: no new complaints.    Subjective     Objective   See Exercise, Manual, and Modality Logs for complete treatment.       Assessment/Plan  Subjectively, pt reports no increase of pain or discomfort with interventions performed today. Performed well with added exercises. Continues to demonstrate decreased functional activity tolerance in standing. Encouraged pt to activate core muscles with daily activity. Continues to benefit from verbal/tactile cues to ensure proper form and technique for exercise performance.     Progress per Plan of Care           Manual Therapy:    5     mins  79856;  Therapeutic Exercise:    23     mins  64591;     Neuromuscular Elizabeth:        mins  39619;    Therapeutic Activity:     10     mins  12766;     Gait Training:           mins  77151;     Ultrasound:          mins  78062;    Electrical Stimulation:         mins  24367 ( );  Dry Needling          mins self-pay    Timed Treatment:   38   mins   Total Treatment:     38   mins    Priti Benavides PTA  Physical Therapist Assistant [phone]

## 2021-12-06 ENCOUNTER — TREATMENT (OUTPATIENT)
Dept: PHYSICAL THERAPY | Facility: CLINIC | Age: 71
End: 2021-12-06

## 2021-12-06 DIAGNOSIS — Z74.09 IMPAIRED FUNCTIONAL MOBILITY AND ACTIVITY TOLERANCE: ICD-10-CM

## 2021-12-06 DIAGNOSIS — G89.29 CHRONIC BILATERAL LOW BACK PAIN WITH LEFT-SIDED SCIATICA: Primary | ICD-10-CM

## 2021-12-06 DIAGNOSIS — M54.42 CHRONIC BILATERAL LOW BACK PAIN WITH LEFT-SIDED SCIATICA: Primary | ICD-10-CM

## 2021-12-06 PROCEDURE — 97112 NEUROMUSCULAR REEDUCATION: CPT | Performed by: PHYSICAL THERAPIST

## 2021-12-06 PROCEDURE — 97110 THERAPEUTIC EXERCISES: CPT | Performed by: PHYSICAL THERAPIST

## 2021-12-06 PROCEDURE — 97530 THERAPEUTIC ACTIVITIES: CPT | Performed by: PHYSICAL THERAPIST

## 2021-12-06 NOTE — PROGRESS NOTES
Physical Therapy Daily Treatment Note      Patient: Barry Cam   : 1950  Referring practitioner: Cristi Gerber MD  Date of Initial Visit: Type: THERAPY  Noted: 2021  Today's Date: 2021  Patient seen for 4 sessions       Visit Diagnoses:    ICD-10-CM ICD-9-CM   1. Chronic bilateral low back pain with left-sided sciatica  M54.42 724.2    G89.29 724.3     338.29   2. Impaired functional mobility and activity tolerance  Z74.09 V49.89       Subjective   Woke up with new pain in L upper back. I've been moving more than I have in a long time, so that might be why.    Objective   See Exercise, Manual, and Modality Logs for complete treatment.     Assessment/Plan  Tolerated new core stability and balance exercises well w/o c/o back pain. Updated HEP. Progress per POC.    Timed:         Manual Therapy:    0     mins  70998;     Therapeutic Exercise:    20     mins  87898;     Neuromuscular Elizabeth:    10    mins  42810;    Therapeutic Activity:     10     mins  62987;     Gait Trainin     mins  33826;     Ultrasound:     0     mins  69650;    Ionto                               0    mins   43429        Timed Treatment:   40   mins   Total Treatment:     45   mins    Patria Wynn PT  [place] License: [id number]

## 2021-12-08 ENCOUNTER — TREATMENT (OUTPATIENT)
Dept: PHYSICAL THERAPY | Facility: CLINIC | Age: 71
End: 2021-12-08

## 2021-12-08 DIAGNOSIS — G89.29 CHRONIC BILATERAL LOW BACK PAIN WITH LEFT-SIDED SCIATICA: Primary | ICD-10-CM

## 2021-12-08 DIAGNOSIS — Z74.09 IMPAIRED FUNCTIONAL MOBILITY AND ACTIVITY TOLERANCE: ICD-10-CM

## 2021-12-08 DIAGNOSIS — M54.42 CHRONIC BILATERAL LOW BACK PAIN WITH LEFT-SIDED SCIATICA: Primary | ICD-10-CM

## 2021-12-08 PROCEDURE — 97140 MANUAL THERAPY 1/> REGIONS: CPT | Performed by: PHYSICAL THERAPIST

## 2021-12-08 PROCEDURE — 97530 THERAPEUTIC ACTIVITIES: CPT | Performed by: PHYSICAL THERAPIST

## 2021-12-08 PROCEDURE — 97110 THERAPEUTIC EXERCISES: CPT | Performed by: PHYSICAL THERAPIST

## 2021-12-08 NOTE — PROGRESS NOTES
Physical Therapy Daily Treatment Note      Patient: Barry Cam   : 1950  Referring practitioner: Cristi Gerber MD  Date of Initial Visit: Type: THERAPY  Noted: 2021  Today's Date: 2021  Patient seen for 5 sessions       Visit Diagnoses:    ICD-10-CM ICD-9-CM   1. Chronic bilateral low back pain with left-sided sciatica  M54.42 724.2    G89.29 724.3     338.29   2. Impaired functional mobility and activity tolerance  Z74.09 V49.89       Subjective   No c/o low back pain. C/o L mid back pain.    Objective   See Exercise, Manual, and Modality Logs for complete treatment.       Assessment/Plan  Improved balance/stabiltiy with exercise today requiring HHA less.    Timed:         Manual Therapy:    5     mins  80522;     Therapeutic Exercise:    20     mins  31621;     Neuromuscular Elizabeth:    0    mins  15324;    Therapeutic Activity:     15     mins  26939;     Gait Trainin     mins  64946;     Ultrasound:     0     mins  88388;    Ionto                               0    mins   97367        Timed Treatment:   40   mins   Total Treatment:     45   mins    Patria Wynn PT  [place] License: [id number]

## 2021-12-13 ENCOUNTER — TREATMENT (OUTPATIENT)
Dept: PHYSICAL THERAPY | Facility: CLINIC | Age: 71
End: 2021-12-13

## 2021-12-13 DIAGNOSIS — Z74.09 IMPAIRED FUNCTIONAL MOBILITY AND ACTIVITY TOLERANCE: ICD-10-CM

## 2021-12-13 DIAGNOSIS — M54.42 CHRONIC BILATERAL LOW BACK PAIN WITH LEFT-SIDED SCIATICA: Primary | ICD-10-CM

## 2021-12-13 DIAGNOSIS — G89.29 CHRONIC BILATERAL LOW BACK PAIN WITH LEFT-SIDED SCIATICA: Primary | ICD-10-CM

## 2021-12-13 PROCEDURE — 97116 GAIT TRAINING THERAPY: CPT | Performed by: PHYSICAL THERAPIST

## 2021-12-13 PROCEDURE — 97110 THERAPEUTIC EXERCISES: CPT | Performed by: PHYSICAL THERAPIST

## 2021-12-13 PROCEDURE — 97530 THERAPEUTIC ACTIVITIES: CPT | Performed by: PHYSICAL THERAPIST

## 2021-12-13 RX ORDER — LEVOTHYROXINE SODIUM 0.03 MG/1
TABLET ORAL
Qty: 90 TABLET | Refills: 3 | Status: SHIPPED | OUTPATIENT
Start: 2021-12-13 | End: 2023-01-16

## 2021-12-13 RX ORDER — PIOGLITAZONEHYDROCHLORIDE 30 MG/1
TABLET ORAL
Qty: 90 TABLET | Refills: 3 | Status: SHIPPED | OUTPATIENT
Start: 2021-12-13 | End: 2022-12-05

## 2021-12-13 NOTE — PROGRESS NOTES
Physical Therapy Daily Treatment Note      Patient: Barry Cam   : 1950  Referring practitioner: Cristi Gerber MD  Date of Initial Visit: Type: THERAPY  Noted: 2021  Today's Date: 2021  Patient seen for 6 sessions       Visit Diagnoses:    ICD-10-CM ICD-9-CM   1. Chronic bilateral low back pain with left-sided sciatica  M54.42 724.2    G89.29 724.3     338.29   2. Impaired functional mobility and activity tolerance  Z74.09 V49.89       Subjective   No pain currently.    Objective   See Exercise, Manual, and Modality Logs for complete treatment.     Assessment/Plan  Excellent tolerance to all strengthening w/o c/o pain. Progress per POC.    Timed:         Manual Therapy:    0     mins  47392;     Therapeutic Exercise:    20     mins  90122;     Neuromuscular Elizabeth:    0    mins  49494;    Therapeutic Activity:     10     mins  56248;     Gait Training:      10     mins  78056;     Ultrasound:     0     mins  97425;    Ionto                               0    mins   63313        Timed Treatment:   40   mins   Total Treatment:     50   mins    Patria Wynn, PT  [place] License: [id number]

## 2021-12-15 ENCOUNTER — TREATMENT (OUTPATIENT)
Dept: PHYSICAL THERAPY | Facility: CLINIC | Age: 71
End: 2021-12-15

## 2021-12-15 DIAGNOSIS — Z74.09 IMPAIRED FUNCTIONAL MOBILITY AND ACTIVITY TOLERANCE: ICD-10-CM

## 2021-12-15 DIAGNOSIS — G89.29 CHRONIC BILATERAL LOW BACK PAIN WITH LEFT-SIDED SCIATICA: Primary | ICD-10-CM

## 2021-12-15 DIAGNOSIS — M54.42 CHRONIC BILATERAL LOW BACK PAIN WITH LEFT-SIDED SCIATICA: Primary | ICD-10-CM

## 2021-12-15 PROCEDURE — 97112 NEUROMUSCULAR REEDUCATION: CPT | Performed by: PHYSICAL THERAPIST

## 2021-12-15 PROCEDURE — 97530 THERAPEUTIC ACTIVITIES: CPT | Performed by: PHYSICAL THERAPIST

## 2021-12-15 PROCEDURE — 97110 THERAPEUTIC EXERCISES: CPT | Performed by: PHYSICAL THERAPIST

## 2021-12-15 NOTE — PROGRESS NOTES
"   Physical Therapy Daily Progress Note    Patient: Barry Cam   : 1950  Diagnosis/ICD-10 Code:  Chronic bilateral low back pain with left-sided sciatica [M54.42, G89.29]  Referring practitioner: Cristi Gerber MD  Date of Initial Visit: Type: THERAPY  Noted: 2021  Today's Date: 12/15/2021  Patient seen for 7 sessions         Barry Cam reports: everything hurts today. Woke up at 3 am with L leg pain that would not go away. I jerked when the alarm went off this morning and \"threw everything out of whack.\" Doing the band exercises really hurt my shoulders, especially my right one.    Subjective     Objective   See Exercise, Manual, and Modality Logs for complete treatment.       Assessment/Plan  Subjectively, pt reports no increase of pain or discomfort with interventions performed today. Performed well with increased table and seated interventions and decreased standing exercise due to increased leg pain today. Pt reports feeling better but still feeling stiff post treatment. Continues to demonstrate difficulty with balance activities. Continues to benefit from verbal/tactile cues to ensure proper form and technique for exercise performance.     Progress per Plan of Care           Manual Therapy:         mins  93745;  Therapeutic Exercise:    20     mins  56047;     Neuromuscular Elizabeth:    10    mins  06511;    Therapeutic Activity:     10     mins  36194;     Gait Training:           mins  57358;     Ultrasound:          mins  60228;    Electrical Stimulation:         mins  95061 ( );  Dry Needling          mins self-pay    Timed Treatment:   40   mins   Total Treatment:     40   mins    Priti Benavides PTA  Physical Therapist Assistant [phone]  "

## 2021-12-20 ENCOUNTER — TREATMENT (OUTPATIENT)
Dept: PHYSICAL THERAPY | Facility: CLINIC | Age: 71
End: 2021-12-20

## 2021-12-20 DIAGNOSIS — Z74.09 IMPAIRED FUNCTIONAL MOBILITY AND ACTIVITY TOLERANCE: ICD-10-CM

## 2021-12-20 DIAGNOSIS — G89.29 CHRONIC BILATERAL LOW BACK PAIN WITH LEFT-SIDED SCIATICA: Primary | ICD-10-CM

## 2021-12-20 DIAGNOSIS — M54.42 CHRONIC BILATERAL LOW BACK PAIN WITH LEFT-SIDED SCIATICA: Primary | ICD-10-CM

## 2021-12-20 PROCEDURE — 97110 THERAPEUTIC EXERCISES: CPT | Performed by: PHYSICAL THERAPIST

## 2021-12-20 PROCEDURE — 97116 GAIT TRAINING THERAPY: CPT | Performed by: PHYSICAL THERAPIST

## 2021-12-20 PROCEDURE — 97530 THERAPEUTIC ACTIVITIES: CPT | Performed by: PHYSICAL THERAPIST

## 2021-12-20 NOTE — PROGRESS NOTES
Re-Assessment / Re-Certification        Patient: Barry Cam   : 1950  Diagnosis/ICD-10 Code:  Chronic bilateral low back pain with left-sided sciatica [M54.42, G89.29]  Referring practitioner: Cristi Gerber MD  Date of Initial Evaluation:  Type: THERAPY  Noted: 2021  Patient seen for 8 sessions      Subjective:   Barry Cam reports: Today I am doing better than I have been recently. It has been a rough week. On Saturday my back tightened up and had me down all day. No known trigger. L leg pain started after PT 21. Leg pain most bothersome when I sleep. Side sleeper. Pain worse sleeping on L. Pain in both hips with side sleeping. Intermittent shooting pain going up steps with L foot. No pain in legs or back today. C/o bilateral shoulder pain. I do feel that I am progressing.    Subjective Questionnaire: Oswestry: 52/100 vs 54/100 21  Clinical Progress: improved  Home Program Compliance: No   Treatment has included: therapeutic exercise, manual therapy, therapeutic activity and gait training    Subjective   Objective   Palpation   Left   Tenderness of the erector spinae.      Tenderness   Nontender     Active Range of Motion   Lumbar   L SB, L rotation: Pain L hip, AROM WNL     Strength/Myotome Testing      Left Hip   Planes of Motion   Flexion: 5, pain free    Additional Tests Details  TRACY: Mild L hip pain, circumferential     Lumbar Flexibility Comments:   Mod herring hamstrings B      Goals  Plan Goals: Short Term Goals: 4-6 weeks. Patient will:  1. Be independent with initial HEP MET  2. Be instructed in posture and body mechanics MET  3. Report pain of </= 5/10 with daily activities NOT MET    Long Term Goals: 6-12 weeks. Patient will:  1. Demonstrate improved Bilateral lower extremity/lower abdominal MMT of >/= 4+/5 to allow for performance of daily activities without pain.  2. Demonstrate lower extremity flexibility and lumbar ROM WFL to allow for return to household &  recreational activities w/o increased symptoms  3. Report pain of </= 1/10 with all daily activities.  4. Perceived disability </= 10% as measured by Oswestry Questionnaire.  5. Be independent with long term HEP  NOT MET    Assessment/Plan  Progress toward previous goals: Partially Met    No longer reports pain w/ L hip flexion MMT and back is nontender. Reported L sided pain w/ lumbar AROM, and reports intermittent L medial thigh pain to foot w/ ADLs (unable to reproduce in clinic).  Encouraged compliance with HEP to maximize improvement.     Recommendations: Continue as planned  Timeframe: 2 months  Prognosis to achieve goals: fair    PT Signature: Patria Wynn PT      Based upon review of the patient's progress and continued therapy plan, it is my medical opinion that Barry Cam should continue physical therapy treatment at [facility] PHYSICAL THERAP[facility]  [address] [place] [id number] [phone].    Signature: __________________________________  Cristi Gerber MD    Manual Therapy:    0     mins  55833;  Therapeutic Exercise:    15     mins  98332;     Neuromuscular Elizabeth:    0    mins  43770;    Therapeutic Activity:     15     mins  91295;     Gait Training:      15     mins  05638;     Ultrasound:     0     mins  04288;    Work Hardening           0      mins 62391  Iontophoresis               0   mins 35419    Timed Treatment:   45   mins   Total Treatment:     55   mins

## 2021-12-22 ENCOUNTER — TREATMENT (OUTPATIENT)
Dept: PHYSICAL THERAPY | Facility: CLINIC | Age: 71
End: 2021-12-22

## 2021-12-22 DIAGNOSIS — M54.42 CHRONIC BILATERAL LOW BACK PAIN WITH LEFT-SIDED SCIATICA: Primary | ICD-10-CM

## 2021-12-22 DIAGNOSIS — Z74.09 IMPAIRED FUNCTIONAL MOBILITY AND ACTIVITY TOLERANCE: ICD-10-CM

## 2021-12-22 DIAGNOSIS — G89.29 CHRONIC BILATERAL LOW BACK PAIN WITH LEFT-SIDED SCIATICA: Primary | ICD-10-CM

## 2021-12-22 PROCEDURE — 97110 THERAPEUTIC EXERCISES: CPT | Performed by: PHYSICAL THERAPIST

## 2021-12-22 PROCEDURE — 97112 NEUROMUSCULAR REEDUCATION: CPT | Performed by: PHYSICAL THERAPIST

## 2021-12-22 PROCEDURE — 97530 THERAPEUTIC ACTIVITIES: CPT | Performed by: PHYSICAL THERAPIST

## 2021-12-22 NOTE — PROGRESS NOTES
Physical Therapy Daily Progress Note    Patient: Barry Cam   : 1950  Diagnosis/ICD-10 Code:  Chronic bilateral low back pain with left-sided sciatica [M54.42, G89.29]  Referring practitioner: Cristi Gerber MD  Date of Initial Visit: Type: THERAPY  Noted: 2021  Today's Date: 2021  Patient seen for 9 sessions         Barry Cam reports: stiff this morning, felt better after stretches.    Subjective     Objective   See Exercise, Manual, and Modality Logs for complete treatment.       Assessment/Plan  Subjectively, pt reports no increase of pain or discomfort with interventions performed today. Performed well with established exercises with improved balance. Continues to demonstrate hamstring cramping with hip adduction and bridge exercise. Continues to benefit from verbal/tactile cues to ensure proper form and technique for exercise performance.     Progress per Plan of Care           Manual Therapy:         mins  68077;  Therapeutic Exercise:    25     mins  92689;     Neuromuscular Elizabeth:    10    mins  32425;    Therapeutic Activity:     10     mins  96530;     Gait Training:           mins  67464;     Ultrasound:          mins  50274;    Electrical Stimulation:         mins  53089 ( );  Dry Needling          mins self-pay    Timed Treatment:   45   mins   Total Treatment:     45   mins    Priti Benavides PTA  Physical Therapist Assistant [id number]

## 2021-12-29 DIAGNOSIS — E07.9 THYROID DISEASE: ICD-10-CM

## 2021-12-29 DIAGNOSIS — E78.2 MIXED HYPERLIPIDEMIA: ICD-10-CM

## 2021-12-29 DIAGNOSIS — R73.9 HYPERGLYCEMIA: ICD-10-CM

## 2021-12-30 RX ORDER — HYDROCHLOROTHIAZIDE 12.5 MG/1
TABLET ORAL
Qty: 90 TABLET | Refills: 0 | Status: SHIPPED | OUTPATIENT
Start: 2021-12-30 | End: 2022-01-11 | Stop reason: SDUPTHER

## 2021-12-30 RX ORDER — OMEPRAZOLE 20 MG/1
CAPSULE, DELAYED RELEASE ORAL
Qty: 90 CAPSULE | Refills: 2 | Status: SHIPPED | OUTPATIENT
Start: 2021-12-30 | End: 2022-09-07

## 2022-01-03 ENCOUNTER — TREATMENT (OUTPATIENT)
Dept: PHYSICAL THERAPY | Facility: CLINIC | Age: 72
End: 2022-01-03

## 2022-01-03 DIAGNOSIS — Z74.09 IMPAIRED FUNCTIONAL MOBILITY AND ACTIVITY TOLERANCE: ICD-10-CM

## 2022-01-03 DIAGNOSIS — M54.42 CHRONIC BILATERAL LOW BACK PAIN WITH LEFT-SIDED SCIATICA: Primary | ICD-10-CM

## 2022-01-03 DIAGNOSIS — G89.29 CHRONIC BILATERAL LOW BACK PAIN WITH LEFT-SIDED SCIATICA: Primary | ICD-10-CM

## 2022-01-03 PROCEDURE — 97110 THERAPEUTIC EXERCISES: CPT | Performed by: PHYSICAL THERAPIST

## 2022-01-03 PROCEDURE — 97530 THERAPEUTIC ACTIVITIES: CPT | Performed by: PHYSICAL THERAPIST

## 2022-01-03 NOTE — PROGRESS NOTES
Physical Therapy Daily Treatment Note      Patient: Barry Cam   : 1950  Referring practitioner: Cristi Gerber MD  Date of Initial Visit: Type: THERAPY  Noted: 2021  Today's Date: 1/3/2022  Patient seen for 10 sessions       Visit Diagnoses:    ICD-10-CM ICD-9-CM   1. Chronic bilateral low back pain with left-sided sciatica  M54.42 724.2    G89.29 724.3     338.29   2. Impaired functional mobility and activity tolerance  Z74.09 V49.89       Subjective   On the , my body fell apart. I was in bed for days. No known trigger. My left leg is out of commission. Describes pain from L hip to ankle/foot.    Objective   See Exercise, Manual, and Modality Logs for complete treatment.     Assessment/Plan  Did well with WB and NWB exercise w/o increased pain. Reported L ankle tightness with long axis lumbar traction. Encouraged pt to utilize HEP to combat pain. Progress per POC.    Timed:         Manual Therapy:    5     mins  82612;     Therapeutic Exercise:    25     mins  58952;     Neuromuscular Elizabeth:    0    mins  80072;    Therapeutic Activity:     10     mins  66184;     Gait Trainin     mins  52351;     Ultrasound:     0     mins  54309;    Ionto                               0    mins   82632        Timed Treatment:   40   mins   Total Treatment:     60   mins    ANGELIQUE Valles License: [id number]

## 2022-01-05 ENCOUNTER — TREATMENT (OUTPATIENT)
Dept: PHYSICAL THERAPY | Facility: CLINIC | Age: 72
End: 2022-01-05

## 2022-01-05 DIAGNOSIS — G89.29 CHRONIC BILATERAL LOW BACK PAIN WITH LEFT-SIDED SCIATICA: Primary | ICD-10-CM

## 2022-01-05 DIAGNOSIS — M54.42 CHRONIC BILATERAL LOW BACK PAIN WITH LEFT-SIDED SCIATICA: Primary | ICD-10-CM

## 2022-01-05 DIAGNOSIS — Z74.09 IMPAIRED FUNCTIONAL MOBILITY AND ACTIVITY TOLERANCE: ICD-10-CM

## 2022-01-05 PROCEDURE — 97110 THERAPEUTIC EXERCISES: CPT | Performed by: PHYSICAL THERAPIST

## 2022-01-05 PROCEDURE — 97530 THERAPEUTIC ACTIVITIES: CPT | Performed by: PHYSICAL THERAPIST

## 2022-01-05 NOTE — PROGRESS NOTES
Physical Therapy Daily Treatment Note      Patient: Barry Cam   : 1950  Referring practitioner: Cristi Gerber MD  Date of Initial Visit: Type: THERAPY  Noted: 2021  Today's Date: 2022  Patient seen for 11 sessions       Visit Diagnoses:    ICD-10-CM ICD-9-CM   1. Chronic bilateral low back pain with left-sided sciatica  M54.42 724.2    G89.29 724.3     338.29   2. Impaired functional mobility and activity tolerance  Z74.09 V49.89       Subjective   No leg pain today.    Objective   See Exercise, Manual, and Modality Logs for complete treatment.     Assessment/Plan  Tolerated increase in core strengthening well w/o leg pain. Progress per POC.    Timed:         Manual Therapy:    0     mins  88055;     Therapeutic Exercise:    30     mins  93109;     Neuromuscular Elizabeth:    0    mins  66826;    Therapeutic Activity:     15     mins  15572;     Gait Trainin     mins  51479;     Ultrasound:     0     mins  33918;    Ionto                               0    mins   55669        Timed Treatment:   45   mins   Total Treatment:     45   mins    Patria Wynn, PT  [place] License: [id number]

## 2022-01-07 ENCOUNTER — TREATMENT (OUTPATIENT)
Dept: PHYSICAL THERAPY | Facility: CLINIC | Age: 72
End: 2022-01-07

## 2022-01-07 DIAGNOSIS — H81.12 BPPV (BENIGN PAROXYSMAL POSITIONAL VERTIGO), LEFT: Primary | ICD-10-CM

## 2022-01-07 DIAGNOSIS — R26.89 IMBALANCE: ICD-10-CM

## 2022-01-07 PROCEDURE — 95992 CANALITH REPOSITIONING PROC: CPT | Performed by: PHYSICAL THERAPIST

## 2022-01-07 NOTE — PROGRESS NOTES
Recertification    Name: Barry Cam  Date: 01/07/2022  Diagnosis/ICD-10 Code:  BPPV (benign paroxysmal positional vertigo), left [H81.12]  Referring practitioner: Cristi Gerber MD  Date of Initial Visit:   Visit #: 5  Subjective:   Barry Cam reports: ***  Subjective Questionnaire: {PT subjective questionnaires:04160}  Clinical Progress: {UNCHANGED, IMPROVED, WORSE:24733}  Home Program Compliance: {YES/NA/NO:66737}  Treatment has included: {PT interventions:20772}  Objective:   Objective                            Assessment:        Plan:      Progress toward previous goals: {all/partially/not met:67053}  Recommendations: {Reassess plan:01038}  Timeframe: { timeframe:2154364599}  Prognosis to achieve goals: {GOOD/FAIR/POOR:00693}    01/07/2022 Treatments:     {PT Rx Minutes:61370}    Timed Code Treatment: ***   Minutes     Total Treatment Time: ***      Minutes      PT Signature: Elda Cain PT, DPT, CHT, CIDN   [place] License #: [id number]    Based upon review of the patient's progress and continued therapy plan, it is my medical opinion that Barry Cam should continue physical therapy treatment at [facility] PH[facility] THERAP[facility]  [address] [place] [id number] [phone].    Signature:  Cristi Gerber MD

## 2022-01-07 NOTE — PROGRESS NOTES
Vestibular Daily Progress Note    Visit#:5  Subjective   I have been working with PT on my back and left leg weakness/pain. It is improving. I have not had any loss of balance or dizziness. I am doing much better. I still have some weakness to work through and I am doing some balance exercise with the other PT.  Objective            Positional Testing  Positional Testing: With infrared goggles  Debora-Hallpike Right: No nystagmus  Debora-Hallpike Left: No nystagmus  Horizontal Roll Test Right: No nystagmus  Horizontal Roll Test Left: No nystagmus                PROCEDURES AND MODALITIES:  Paraffin:    Moist Heat:    Ice:    E-Stim:    Ultrasound:    Ionto:   Traction:    See Exercise, Manual, and Modality Logs for complete treatment.   Other Procedure CPT 78644 minutes 0       Timed Code Treatment: 0 Minutes  Total Treatment Time: 25 Minutes    Assessment/Plan   Patient tested negative for BPPV in all canals today. He is reporting improvement in balance and denies loss of balance or falling with daily activity. At this time patient is appropriate for DC from VRT with all goals met. Encouraged him to continue to work with other therapist on strength and balance to maximize function and reduce risk for falls.     Progress per Plan of Care    Elda Cain, PT, DPT, CHT, CIDN  Physical Therapist

## 2022-01-11 ENCOUNTER — TREATMENT (OUTPATIENT)
Dept: PHYSICAL THERAPY | Facility: CLINIC | Age: 72
End: 2022-01-11

## 2022-01-11 ENCOUNTER — OFFICE VISIT (OUTPATIENT)
Dept: FAMILY MEDICINE CLINIC | Facility: CLINIC | Age: 72
End: 2022-01-11

## 2022-01-11 VITALS
SYSTOLIC BLOOD PRESSURE: 136 MMHG | WEIGHT: 315 LBS | DIASTOLIC BLOOD PRESSURE: 80 MMHG | RESPIRATION RATE: 20 BRPM | HEIGHT: 73 IN | OXYGEN SATURATION: 96 % | TEMPERATURE: 97.1 F | HEART RATE: 81 BPM | BODY MASS INDEX: 41.75 KG/M2

## 2022-01-11 DIAGNOSIS — R73.9 HYPERGLYCEMIA: ICD-10-CM

## 2022-01-11 DIAGNOSIS — E78.2 MIXED HYPERLIPIDEMIA: ICD-10-CM

## 2022-01-11 DIAGNOSIS — E11.9 TYPE 2 DIABETES MELLITUS WITHOUT COMPLICATION, WITHOUT LONG-TERM CURRENT USE OF INSULIN: ICD-10-CM

## 2022-01-11 DIAGNOSIS — Z74.09 IMPAIRED FUNCTIONAL MOBILITY AND ACTIVITY TOLERANCE: ICD-10-CM

## 2022-01-11 DIAGNOSIS — G89.29 CHRONIC BILATERAL LOW BACK PAIN WITH LEFT-SIDED SCIATICA: Primary | ICD-10-CM

## 2022-01-11 DIAGNOSIS — K21.9 GASTROESOPHAGEAL REFLUX DISEASE, UNSPECIFIED WHETHER ESOPHAGITIS PRESENT: ICD-10-CM

## 2022-01-11 DIAGNOSIS — Z86.711 HISTORY OF PULMONARY EMBOLISM: ICD-10-CM

## 2022-01-11 DIAGNOSIS — M54.42 CHRONIC BILATERAL LOW BACK PAIN WITH LEFT-SIDED SCIATICA: Primary | ICD-10-CM

## 2022-01-11 DIAGNOSIS — E11.40 DIABETIC NEUROPATHY, PAINFUL: ICD-10-CM

## 2022-01-11 DIAGNOSIS — I10 HYPERTENSION, UNSPECIFIED TYPE: Primary | ICD-10-CM

## 2022-01-11 DIAGNOSIS — E07.9 THYROID DISEASE: ICD-10-CM

## 2022-01-11 DIAGNOSIS — E66.01 CLASS 3 SEVERE OBESITY DUE TO EXCESS CALORIES WITH SERIOUS COMORBIDITY AND BODY MASS INDEX (BMI) OF 40.0 TO 44.9 IN ADULT: ICD-10-CM

## 2022-01-11 PROCEDURE — 97530 THERAPEUTIC ACTIVITIES: CPT | Performed by: PHYSICAL THERAPIST

## 2022-01-11 PROCEDURE — 99214 OFFICE O/P EST MOD 30 MIN: CPT | Performed by: INTERNAL MEDICINE

## 2022-01-11 PROCEDURE — 1159F MED LIST DOCD IN RCRD: CPT | Performed by: INTERNAL MEDICINE

## 2022-01-11 PROCEDURE — G0439 PPPS, SUBSEQ VISIT: HCPCS | Performed by: INTERNAL MEDICINE

## 2022-01-11 PROCEDURE — 97116 GAIT TRAINING THERAPY: CPT | Performed by: PHYSICAL THERAPIST

## 2022-01-11 PROCEDURE — 97110 THERAPEUTIC EXERCISES: CPT | Performed by: PHYSICAL THERAPIST

## 2022-01-11 PROCEDURE — 1170F FXNL STATUS ASSESSED: CPT | Performed by: INTERNAL MEDICINE

## 2022-01-11 RX ORDER — ALBUTEROL SULFATE 90 UG/1
2 AEROSOL, METERED RESPIRATORY (INHALATION) EVERY 4 HOURS PRN
Qty: 18 G | Refills: 5 | Status: SHIPPED | OUTPATIENT
Start: 2022-01-11 | End: 2023-01-16

## 2022-01-11 RX ORDER — GABAPENTIN 300 MG/1
600 CAPSULE ORAL 2 TIMES DAILY
Qty: 360 CAPSULE | Refills: 3 | Status: SHIPPED | OUTPATIENT
Start: 2022-01-11 | End: 2022-07-13

## 2022-01-11 RX ORDER — HYDROCHLOROTHIAZIDE 12.5 MG/1
12.5 TABLET ORAL DAILY
Qty: 90 TABLET | Refills: 3 | Status: SHIPPED | OUTPATIENT
Start: 2022-01-11 | End: 2023-02-23

## 2022-01-11 RX ORDER — GLIMEPIRIDE 2 MG/1
2 TABLET ORAL
Qty: 90 TABLET | Refills: 3 | Status: SHIPPED | OUTPATIENT
Start: 2022-01-11 | End: 2023-01-25 | Stop reason: SDUPTHER

## 2022-01-11 RX ORDER — PRAVASTATIN SODIUM 40 MG
40 TABLET ORAL DAILY
Qty: 90 TABLET | Refills: 3 | Status: SHIPPED | OUTPATIENT
Start: 2022-01-11 | End: 2023-02-23

## 2022-01-11 NOTE — PROGRESS NOTES
Physical Therapy Daily Treatment Note      Patient: Barry Cam   : 1950  Referring practitioner: Cristi Gerber MD  Date of Initial Visit: Type: THERAPY  Noted: 2021  Today's Date: 2022  Patient seen for 12 sessions       Visit Diagnoses:    ICD-10-CM ICD-9-CM   1. Chronic bilateral low back pain with left-sided sciatica  M54.42 724.2    G89.29 724.3     338.29   2. Impaired functional mobility and activity tolerance  Z74.09 V49.89       Subjective   Kicked an object injuring toes on right foot. No leg or back pain.    Objective   See Exercise, Manual, and Modality Logs for complete treatment.     Assessment/Plan  No c/o pain w/ exercise. Improved balance with therapeutic activities, using hand hold assist less often. Progress per POC.    Timed:         Manual Therapy:    0     mins  59912;     Therapeutic Exercise:    15     mins  31376;     Neuromuscular Elizabeth:    0    mins  04925;    Therapeutic Activity:     15     mins  55775;     Gait Training:      15     mins  20143;     Ultrasound:     0     mins  61243;    Ionto                               0    mins   75369        Timed Treatment:   45   mins   Total Treatment:     55   mins    Patria Wynn, PT  [place] License: [id number]

## 2022-01-11 NOTE — PROGRESS NOTES
The ABCs of the Annual Wellness Visit  Subsequent Medicare Wellness Visit    Chief Complaint   Patient presents with   • Medicare Wellness-subsequent     PT HERE FOR AWV AND F/U ON LABS      Subjective    History of Present Illness:  Barry Cam is a 71 y.o. male who presents for a Subsequent Medicare Wellness Visit.  He is also here for follow-up on his hypertension, hyperlipidemia, history of DVT and PE, diabetes mellitus type 2, hypothyroidism, obesity, GERD and diabetic peripheral neuropathy.  He is generally stable and has not lost any significant weight.  He has no acute complaints    The following portions of the patient's history were reviewed and   updated as appropriate: allergies, current medications, past family history, past medical history, past social history, past surgical history and problem list.    Compared to one year ago, the patient feels his physical   health is the same.    Compared to one year ago, the patient feels his mental   health is the same.    Recent Hospitalizations:  He was not admitted to the hospital during the last year.       Current Medical Providers:  Patient Care Team:  Cristi Gerber MD as PCP - General (Internal Medicine)  Barry Gillespie MD as Consulting Physician (Sleep Medicine)    Outpatient Medications Prior to Visit   Medication Sig Dispense Refill   • acetaminophen (TYLENOL) 500 MG tablet Take 500 mg by mouth Every 6 (Six) Hours As Needed for Mild Pain .     • albuterol sulfate HFA (VENTOLIN HFA) 108 (90 Base) MCG/ACT inhaler Inhale 2 puffs Every 4 (Four) Hours As Needed for Wheezing or Shortness of Air. for wheezing 54 inhaler 1   • amLODIPine (NORVASC) 10 MG tablet Take 1 tablet by mouth Daily. 90 tablet 3   • betamethasone valerate (VALISONE) 0.1 % cream APPLY CREAM TOPICALLY TO AFFECTED AREA(S) TO SKIN TWICE DAILY 15 g 1   • caffeine 200 MG tablet Take 200 mg by mouth Every 4 (Four) Hours As Needed for Headache.     • Copper Gluconate (COPPER CAPS  PO) Take  by mouth.     • diphenhydrAMINE (BENADRYL) 50 MG capsule Take 50 mg by mouth Every 6 (Six) Hours As Needed for Itching.     • Eliquis 5 MG tablet tablet TAKE 1 TABLET BY MOUTH EVERY 12 HOURS 60 tablet 5   • gabapentin (NEURONTIN) 300 MG capsule TAKE 2 CAPSULES BY MOUTH TWICE A  capsule 1   • glimepiride (AMARYL) 2 MG tablet TAKE 1 TABLET BY MOUTH EVERY DAY BEFORE BREAKFAST 90 tablet 1   • Glucosamine-Chondroit-Vit C-Mn (GLUCOSAMINE CHONDR 500 COMPLEX) capsule Take 2 capsules by mouth daily.     • hydroCHLOROthiazide (HYDRODIURIL) 12.5 MG tablet TAKE 1 TABLET BY MOUTH EVERY DAY 90 tablet 0   • Januvia 100 MG tablet TAKE 1 TABLET BY MOUTH DAILY. 90 tablet 1   • levothyroxine (SYNTHROID, LEVOTHROID) 25 MCG tablet TAKE 1 TABLET BY MOUTH EVERY DAY 90 tablet 3   • losartan (COZAAR) 100 MG tablet TAKE 1 TABLET BY MOUTH EVERY DAY 90 tablet 3   • LUTEIN PO Take 5 mg by mouth Daily.     • Melatonin 10 MG tablet      • metFORMIN (GLUCOPHAGE) 1000 MG tablet TAKE 1 TABLET BY MOUTH TWICE A  tablet 3   • modafinil (PROVIGIL) 200 MG tablet Take 1 tablet by mouth 2 (Two) Times a Day. 180 tablet 0   • MULTIPLE VITAMINS ESSENTIAL PO Take  by mouth daily.     • omeprazole (priLOSEC) 20 MG capsule TAKE 1 CAPSULE BY MOUTH EVERY DAY 90 capsule 2   • OneTouch Delica Lancets 33G misc USE TO TEST BLOOD SUGAR 3 TIMES DAILY DIABETES TYPE 2 E11.9 100 each 5   • OneTouch Ultra test strip USE TO TEST BLOOD SUGAR 3 TIMES DAILY 100 each 5   • pioglitazone (ACTOS) 30 MG tablet TAKE 1 TABLET BY MOUTH EVERY DAY 90 tablet 3   • pravastatin (PRAVACHOL) 40 MG tablet TAKE 1 TABLET BY MOUTH EVERY DAY 90 tablet 1   • Triamcinolone Acetonide (NASACORT) 55 MCG/ACT nasal inhaler 2 sprays into the nostril(s) as directed by provider Daily.     • vitamin C (ASCORBIC ACID) 250 MG tablet Take 250 mg by mouth Daily.     • vitamin E 200 UNIT capsule Take 200 Units by mouth Daily.     • Zeaxanthin powder      • Zinc Sulfate (ZINC 15 PO) Take   "by mouth.     • methylPREDNISolone (MEDROL) 4 MG dose pack Take as directed on package instructions. 21 tablet 0     No facility-administered medications prior to visit.       No opioid medication identified on active medication list. I have reviewed chart for other potential  high risk medication/s and harmful drug interactions in the elderly.          Aspirin is not on active medication list.  Aspirin use is not indicated based on review of current medical condition/s. Risk of harm outweighs potential benefits.  .    Patient Active Problem List   Diagnosis   • Seasonal allergic rhinitis   • Diabetes mellitus (HCC)   • DUGGAN (dyspnea on exertion)   • Hypersomnia due to medical condition   • Fatigue   • Hyperlipidemia   • Obstructive sleep apnea syndrome treated with auto CPAP   • Gastroesophageal reflux disease   • Calculus of kidney   • Diabetic neuropathy, painful ([facility])   • Acute deep vein thrombosis (DVT) of distal vein of right lower extremity ([facility])   • History of pulmonary embolism   • Chronic deep vein thrombosis (DVT) of distal vein of right lower extremity ([facility])   • Class 3 severe obesity due to excess calories with serious comorbidity and body mass index (BMI) of 40.0 to 44.9 in adult ([facility])   • Thyroid disease   • Hypertension   • Vertigo   • Hyperglycemia     Advance Care Planning  Advance Directive is not on file.  ACP discussion was held with the patient during this visit. Patient does not have an advance directive, information provided.    Review of Systems   All other systems reviewed and are negative.       Objective    Vitals:    01/11/22 0935   BP: 136/80   Pulse: 81   Resp: 20   Temp: 97.1 °F (36.2 °C)   TempSrc: Oral   SpO2: 96%   Weight: (!) 155 kg (341 lb 12.8 oz)   Height: 185.4 cm (72.99\")     BMI Readings from Last 1 Encounters:   01/11/22 45.10 kg/m²   BMI is above normal parameters. Recommendations include: exercise counseling    Does the patient have evidence of cognitive impairment? " No    Physical Exam  Vitals (140/70) and nursing note reviewed.   Constitutional:       General: He is not in acute distress.     Appearance: Normal appearance. He is obese. He is not ill-appearing.   HENT:      Head: Normocephalic and atraumatic.   Cardiovascular:      Rate and Rhythm: Normal rate and regular rhythm.      Heart sounds: Normal heart sounds.   Pulmonary:      Effort: Pulmonary effort is normal. No respiratory distress.      Breath sounds: Normal breath sounds. No wheezing or rales.   Musculoskeletal:         General: Normal range of motion.   Skin:     General: Skin is warm and dry.   Neurological:      General: No focal deficit present.      Mental Status: He is alert and oriented to person, place, and time.   Psychiatric:         Mood and Affect: Mood normal.         Behavior: Behavior normal.       Lab Results   Component Value Date    CHLPL 201 (H) 01/06/2022    TRIG 366 (H) 01/06/2022    HDL 44 01/06/2022    LDL 96 01/06/2022    VLDL 61 (H) 01/06/2022    HGBA1C 6.6 (H) 01/06/2022            HEALTH RISK ASSESSMENT    Smoking Status:  Social History     Tobacco Use   Smoking Status Never Smoker   Smokeless Tobacco Never Used   Tobacco Comment    caffeine use     Alcohol Consumption:  Social History     Substance and Sexual Activity   Alcohol Use No     Fall Risk Screen:    ARMENADI Fall Risk Assessment was completed, and patient is at HIGH risk for falls. Assessment completed on:1/11/2022    Depression Screening:  PHQ-2/PHQ-9 Depression Screening 1/11/2022   Little interest or pleasure in doing things 0   Feeling down, depressed, or hopeless 0   Total Score 0       Health Habits and Functional and Cognitive Screening:  Functional & Cognitive Status 1/11/2022   Do you have difficulty preparing food and eating? No   Do you have difficulty bathing yourself, getting dressed or grooming yourself? No   Do you have difficulty using the toilet? No   Do you have difficulty moving around from place to place?  No   Do you have trouble with steps or getting out of a bed or a chair? Yes   Current Diet Well Balanced Diet   Dental Exam Up to date   Eye Exam Up to date   Exercise (times per week) Other   Current Exercises Include No Regular Exercise   Do you need help using the phone?  No   Are you deaf or do you have serious difficulty hearing?  No   Do you need help with transportation? No   Do you need help shopping? No   Do you need help preparing meals?  No   Do you need help with housework?  No   Do you need help with laundry? No   Do you need help taking your medications? No   Do you need help managing money? No   Do you ever drive or ride in a car without wearing a seat belt? No       Age-appropriate Screening Schedule:  Refer to the list below for future screening recommendations based on patient's age, sex and/or medical conditions. Orders for these recommended tests are listed in the plan section. The patient has been provided with a written plan.    Health Maintenance   Topic Date Due   • DIABETIC FOOT EXAM  08/30/2020   • URINE MICROALBUMIN  06/21/2022   • HEMOGLOBIN A1C  07/06/2022   • DIABETIC EYE EXAM  11/01/2022   • LIPID PANEL  01/06/2023   • TDAP/TD VACCINES (2 - Td or Tdap) 10/01/2027   • INFLUENZA VACCINE  Completed   • ZOSTER VACCINE  Addressed              Assessment/Plan CBC has a normal white cell count, minimally low RBC count of 4.08, hemoglobin 12.8 hematocrit 36.9.  CMP has a sugar of 132 and was otherwise normal and lipid panel is stable with total cholesterol 201, HDL 44, LDL 96.  Hemoglobin A1c is a bit higher at 6.6 but acceptable.  TSH and free T4 were normal on supplement.  #1-hypertension, borderline control  #2-hyperlipidemia, stable on medication  #3-diabetes mellitus type 2 with acceptable hemoglobin A1c, medication control  #4-obesity with no significant weight loss  5-history of DVT and PE, asymptomatic  #6-insomnia, stable on medication  #7-GERD, stable on medication    CMS  Preventative Services Quick Reference  Risk Factors Identified During Encounter  Fall Risk-High or Moderate  Immunizations Discussed/Encouraged (specific Immunizations; COVID19  Obesity/Overweight   The above risks/problems have been discussed with the patient.  Follow up actions/plans if indicated are seen below in the Assessment/Plan Section.  Pertinent information has been shared with the patient in the After Visit Summary.    There are no diagnoses linked to this encounter.    Follow Up: I would love the patient to lose weight but that I doubt that he will.  I did encourage him to try and exercise and he was given information on living will.  He will continue current medicines as now and I will plan on rechecking him in 6 months with a CBC, CMP, lipid panel, hemoglobin A1c, TSH and PSA    No follow-ups on file.     An After Visit Summary and PPPS were made available to the patient.

## 2022-01-13 ENCOUNTER — TREATMENT (OUTPATIENT)
Dept: PHYSICAL THERAPY | Facility: CLINIC | Age: 72
End: 2022-01-13

## 2022-01-13 ENCOUNTER — PATIENT MESSAGE (OUTPATIENT)
Dept: FAMILY MEDICINE CLINIC | Facility: CLINIC | Age: 72
End: 2022-01-13

## 2022-01-13 DIAGNOSIS — M54.42 CHRONIC BILATERAL LOW BACK PAIN WITH LEFT-SIDED SCIATICA: Primary | ICD-10-CM

## 2022-01-13 DIAGNOSIS — Z74.09 IMPAIRED FUNCTIONAL MOBILITY AND ACTIVITY TOLERANCE: ICD-10-CM

## 2022-01-13 DIAGNOSIS — G89.29 CHRONIC BILATERAL LOW BACK PAIN WITH LEFT-SIDED SCIATICA: Primary | ICD-10-CM

## 2022-01-13 DIAGNOSIS — M25.512 BILATERAL SHOULDER PAIN, UNSPECIFIED CHRONICITY: Primary | ICD-10-CM

## 2022-01-13 DIAGNOSIS — M25.511 BILATERAL SHOULDER PAIN, UNSPECIFIED CHRONICITY: Primary | ICD-10-CM

## 2022-01-13 PROCEDURE — 97530 THERAPEUTIC ACTIVITIES: CPT | Performed by: PHYSICAL THERAPIST

## 2022-01-13 PROCEDURE — 97110 THERAPEUTIC EXERCISES: CPT | Performed by: PHYSICAL THERAPIST

## 2022-01-13 NOTE — PROGRESS NOTES
Physical Therapy Daily Treatment Note      Patient: Barry Cam   : 1950  Referring practitioner: Cristi Gerber MD  Date of Initial Visit: Type: THERAPY  Noted: 2021  Today's Date: 2022  Patient seen for 13 sessions       Visit Diagnoses:    ICD-10-CM ICD-9-CM   1. Chronic bilateral low back pain with left-sided sciatica  M54.42 724.2    G89.29 724.3     338.29   2. Impaired functional mobility and activity tolerance  Z74.09 V49.89       Subjective   No pain currently. Very few twinges in L leg.    Objective   See Exercise, Manual, and Modality Logs for complete treatment.     Assessment/Plan  Pt SOB but pain free with exercise. Progress per POC.    Timed:         Manual Therapy:    0     mins  82465;     Therapeutic Exercise:    25     mins  53166;     Neuromuscular Elizabeth:    0    mins  99410;    Therapeutic Activity:     15     mins  27788;     Gait Trainin     mins  67875;     Ultrasound:     0     mins  91941;    Ionto                               0    mins   05472        Timed Treatment:   40   mins   Total Treatment:     45   mins    Patria Wynn PT  [place] License: [id number]

## 2022-01-26 ENCOUNTER — TREATMENT (OUTPATIENT)
Dept: PHYSICAL THERAPY | Facility: CLINIC | Age: 72
End: 2022-01-26

## 2022-01-26 DIAGNOSIS — M25.511 CHRONIC PAIN OF BOTH SHOULDERS: Primary | ICD-10-CM

## 2022-01-26 DIAGNOSIS — G89.29 CHRONIC PAIN OF BOTH SHOULDERS: Primary | ICD-10-CM

## 2022-01-26 DIAGNOSIS — G89.29 CHRONIC BILATERAL LOW BACK PAIN WITH LEFT-SIDED SCIATICA: ICD-10-CM

## 2022-01-26 DIAGNOSIS — M25.512 CHRONIC PAIN OF BOTH SHOULDERS: Primary | ICD-10-CM

## 2022-01-26 DIAGNOSIS — R29.898 UPPER EXTREMITY WEAKNESS: ICD-10-CM

## 2022-01-26 DIAGNOSIS — M54.42 CHRONIC BILATERAL LOW BACK PAIN WITH LEFT-SIDED SCIATICA: ICD-10-CM

## 2022-01-26 DIAGNOSIS — Z74.09 IMPAIRED FUNCTIONAL MOBILITY AND ACTIVITY TOLERANCE: ICD-10-CM

## 2022-01-26 PROCEDURE — 97162 PT EVAL MOD COMPLEX 30 MIN: CPT | Performed by: PHYSICAL THERAPIST

## 2022-01-26 PROCEDURE — 97110 THERAPEUTIC EXERCISES: CPT | Performed by: PHYSICAL THERAPIST

## 2022-01-26 NOTE — PROGRESS NOTES
"  Physical Therapy Initial Evaluation and Plan of Care    Patient: Barry Cam   : 1950  Diagnosis/ICD-10 Code:  Chronic pain of both shoulders [M25.511, G89.29, M25.512]  Referring practitioner: Cristi Gerber MD  Date of Initial Visit: 2022  Today's Date: 2022  Patient seen for 14 sessions           Subjective Questionnaire: QuickDASH, Oswestry: Did not log/PT error      Subjective Evaluation    History of Present Illness  Mechanism of injury: In PT for low back pain. Would also like to address bilateral shoulder pain. Tripped in ya2017 and came down on L hand. \"Destroyed shoulder\" Was told I tore ligament that runs across top of shoulder. 3 visits of PT. States he needed a joint replacement but \"couldn't get the doctors lined up\". Right shoulder now feels like the left. Cant lift anything overhead. No strength. Tremendous pain from shoulder to upper arm bilaterally. Keeps me up at night. Pain mousing with R.  Rates shoulder pain 5-6/10.    Xray PRIMO resendiz 2017 Per Denisse \"The x-rays show significant rotator cuff tear arthropathy with a diminished acromiohumeral interval, joint space narrowing, osteophyte formation, and subchondral sclerosis.\"    My back is feeling ok. Can't sit in recliner more than an hour before L leg \"goes off\". Intermittent pain in bed at night. Mild. Back and leg pain have come a long way. Rates back/leg pain 1-3/10.    Takes 2-4g Tylenol daily,1200 mg gabapentin for B foot neuropathy      Patient Occupation: Retired Pain  Current pain ratin  At worst pain ratin  Location: B shoulders, back, L leg  Quality: dull ache and sharp  Relieving factors: change in position  Aggravating factors: overhead activity, prolonged positioning, outstretched reach, lifting and sleeping  Progression: no change (Back/leg improving)    Hand dominance: right    Diagnostic Tests  X-ray: abnormal (See above)    Treatments  Previous treatment: physical therapy  Current " treatment: physical therapy  Patient Goals  Patient goals for therapy: decreased pain, increased motion, improved balance, increased strength and independence with ADLs/IADLs             Objective          Tenderness     Additional Tenderness Details  Nontender  Non tender    Cervical/Thoracic Screen   Cervical range of motion within normal limits    Active Range of Motion   Cervical/Thoracic Spine     Thoracic   Left rotation: Active left thoracic rotation: L hip pain. WFL  Left Shoulder   Flexion: 157 degrees   Abduction: 156 degrees   External rotation BTH: T2   Internal rotation BTB: L1     Right Shoulder   Flexion: 157 degrees with pain  Abduction: 152 degrees   External rotation BTH: T2   Internal rotation BTB: L1 with pain    Strength/Myotome Testing     Left Shoulder     Planes of Motion   Flexion: 4 (Pain)   Abduction: 5   External rotation at 0°: 1 (Pain)   Internal rotation at 0°: 5     Isolated Muscles   Biceps: 4     Right Shoulder     Planes of Motion   Flexion: 5   Abduction: 5   External rotation at 0°: 4- (Pain)   Internal rotation at 0°: 5     Isolated Muscles   Biceps: 5     Lumbar   Left   Normal strength    Right   Normal strength    Tests       Thoracic   Negative slump.     Left Shoulder   Negative empty can, full can, Hawkin's and Neer's.     Right Shoulder   Positive Hawkin's and Neer's.   Negative empty can and full can.     Lumbar     Left   Negative passive SLR.     Right   Negative passive SLR.     Additional Tests Details  Negative TRACY B          Assessment & Plan     Assessment  Impairments: abnormal or restricted ROM, activity intolerance, impaired physical strength, lacks appropriate home exercise program and pain with function  Functional Limitations: pushing, reaching behind back, reaching overhead and unable to perform repetitive tasks  Assessment details: Pt c/o bilateral shoulder pain since LUDWIN (L) in 2017. Objective findings include 1/5 ER strength L, 4-/5 ER strength, and  positive Mk and Dennis's R. AROM WNL. Mild pain w/ IR reach R. Suspect complete rotator cuff tear L.     Low back and left leg pain much improved since start of PT 11/17/21. Continues to have pain with L rotation and prolonged positioning.    Pt will benefit from skilled PT services in order to address listed impairments and increase tolerance to normal daily activities including ADLs and recreational activities. Would likely benefit from ortho evaluation of shoulders.  Prognosis: good    Goals  Plan Goals: SHOULDER GOALS  Short Term Goals: 2-4 weeks. Patient will:  1. Be independent with initial HEP  2. Be instructed in posture and body mechanics.  3. Report no lasting increase in pain w/ exercise    Long Term Goals: 6-12 weeks. Pt will:  1. Exhibit (B) shoulder AROM to WFL to allow for reaching overhead and out (ABD) without pain limiting function.  2. Demonstrate improved Right UE MMT of >/= 4+/5 to allow for performance of ADLs/household management/recreational activities.  3. Pt able to reach overhead and lift 10# to allow for return to doing home/yard/recreational activities with min to no pain.  4. Report perceived disability </=10% based on QuickDASH    LUMBAR GOALS est 11/17/21  Plan Goals: Short Term Goals: 4-6 weeks. Patient will:  1. Be independent with initial HEP MET  2. Be instructed in posture and body mechanics MET  3. Report pain of </= 5/10 with daily activities MET    Long Term Goals: 6-12 weeks. Patient will:  1. Demonstrate improved Bilateral lower extremity/lower abdominal MMT of >/= 4+/5 to allow for performance of daily activities without pain. MET  2. Demonstrate lower extremity flexibility and lumbar ROM WFL to allow for return to household & recreational activities w/o increased symptoms NOT MET  3. Report pain of </= 1/10 with all daily activities. NOT MET  4. Perceived disability </= 10% as measured by Oswestry Questionnaire. DNT  5. Be independent with long term HEP  MET    Plan  Therapy options: will be seen for skilled therapy services  Planned modality interventions: cryotherapy, electrical stimulation/Russian stimulation, thermotherapy (hydrocollator packs), ultrasound and iontophoresis  Planned therapy interventions: abdominal trunk stabilization, ADL retraining, balance/weight-bearing training, body mechanics training, flexibility, functional ROM exercises, home exercise program, joint mobilization, manual therapy, neuromuscular re-education, postural training, soft tissue mobilization, spinal/joint mobilization, strengthening, stretching and therapeutic activities  Frequency: 2x week  Duration in weeks: 12  Treatment plan discussed with: patient        Manual Therapy:    0     mins  08779;  Therapeutic Exercise:    15     mins  89995;     Neuromuscular Elizabeth:    0    mins  64519;    Therapeutic Activity:     0     mins  58638;     Gait Trainin     mins  22193;     Ultrasound:     0     mins  21139;    Electrical Stimulation:    0     mins  97721 ( );  Dry Needling     0     mins self-pay    Timed Treatment:   15   mins   Total Treatment:     45   mins    PT SIGNATURE: Patria Wynn, ANGELIQUE   DATE TREATMENT INITIATED: 2022    Initial Certification  Certification Period: 2022  I certify that the therapy services are furnished while this patient is under my care.  The services outlined above are required by this patient, and will be reviewed every 90 days.     PHYSICIAN: Cristi Gerber MD      DATE:     Please sign and return via fax to [phone].. Thank you, [facility].

## 2022-02-11 ENCOUNTER — TREATMENT (OUTPATIENT)
Dept: PHYSICAL THERAPY | Facility: CLINIC | Age: 72
End: 2022-02-11

## 2022-02-11 DIAGNOSIS — M54.42 CHRONIC BILATERAL LOW BACK PAIN WITH LEFT-SIDED SCIATICA: ICD-10-CM

## 2022-02-11 DIAGNOSIS — R29.898 UPPER EXTREMITY WEAKNESS: ICD-10-CM

## 2022-02-11 DIAGNOSIS — G89.29 CHRONIC PAIN OF BOTH SHOULDERS: Primary | ICD-10-CM

## 2022-02-11 DIAGNOSIS — M25.511 CHRONIC PAIN OF BOTH SHOULDERS: Primary | ICD-10-CM

## 2022-02-11 DIAGNOSIS — G89.29 CHRONIC BILATERAL LOW BACK PAIN WITH LEFT-SIDED SCIATICA: ICD-10-CM

## 2022-02-11 DIAGNOSIS — Z74.09 IMPAIRED FUNCTIONAL MOBILITY AND ACTIVITY TOLERANCE: ICD-10-CM

## 2022-02-11 DIAGNOSIS — M25.512 CHRONIC PAIN OF BOTH SHOULDERS: Primary | ICD-10-CM

## 2022-02-11 PROCEDURE — 97110 THERAPEUTIC EXERCISES: CPT | Performed by: PHYSICAL THERAPIST

## 2022-02-11 PROCEDURE — 97530 THERAPEUTIC ACTIVITIES: CPT | Performed by: PHYSICAL THERAPIST

## 2022-02-11 NOTE — PATIENT INSTRUCTIONS
Access Code: [email or web address]  URL: [email or web address]  Date: 02/11/2022  Prepared by: Patria Wynn    Exercises  Supine Hamstring Stretch with Strap - 1 x daily - 7 x weekly - 1 sets - 3 reps - 20s hold  Standing Shoulder Row with Anchored Resistance - 1 x daily - 7 x weekly - 1 sets - 30 reps  Standing Shoulder Flexion Reactive Isometrics with Elbow Extended - 1 x daily - 7 x weekly - 1 sets - 10 reps  Standing Shoulder Abduction Reactive Isometrics with Elbow Extended - 1 x daily - 7 x weekly - 1 sets - 10 reps  Seated Shoulder Horizontal Abduction with Resistance - 1 x daily - 7 x weekly - 1 sets - 10 reps

## 2022-02-11 NOTE — PROGRESS NOTES
Physical Therapy Daily Treatment Note      Patient: Barry Cam   : 1950  Referring practitioner: Cristi Gerber MD  Date of Initial Visit: Type: THERAPY  Noted: 2021    Type: THERAPY  Noted: 2022  Today's Date: 2022  Patient seen for 15 sessions       Visit Diagnoses:    ICD-10-CM ICD-9-CM   1. Chronic pain of both shoulders  M25.511 719.41    G89.29 338.29    M25.512    2. Upper extremity weakness  R29.898 729.89   3. Chronic bilateral low back pain with left-sided sciatica  M54.42 724.2    G89.29 724.3     338.29   4. Impaired functional mobility and activity tolerance  Z74.09 V49.89       Subjective   Was using drill and  remodeling bathroom and felt a painful pop in R shoulder. Describes crunching in R shoulder. Rates pain 1/10.    Objective   R shoulder PROM WNL, pain free    See Exercise, Manual, and Modality Logs for complete treatment.     Assessment/Plan  R hamstring cramp with bridge and DKTC limited ability to perform LE exercises. No c/o shoulder pain w/ new UE strengthening. Issued updated HEP and resistance bands. Progress per POC.    Timed:         Manual Therapy:    0     mins  36045;     Therapeutic Exercise:    30     mins  34886;     Neuromuscular Elizabeth:    0    mins  88567;    Therapeutic Activity:     15     mins  18981;     Gait Trainin     mins  37075;     Ultrasound:     0     mins  47515;    Ionto                               0    mins   80078        Timed Treatment:   45   mins   Total Treatment:     60   mins    Patria Wynn, PT  [place] License: [id number]

## 2022-02-16 ENCOUNTER — TREATMENT (OUTPATIENT)
Dept: PHYSICAL THERAPY | Facility: CLINIC | Age: 72
End: 2022-02-16

## 2022-02-16 DIAGNOSIS — R29.898 UPPER EXTREMITY WEAKNESS: ICD-10-CM

## 2022-02-16 DIAGNOSIS — Z74.09 IMPAIRED FUNCTIONAL MOBILITY AND ACTIVITY TOLERANCE: ICD-10-CM

## 2022-02-16 DIAGNOSIS — M54.42 CHRONIC BILATERAL LOW BACK PAIN WITH LEFT-SIDED SCIATICA: ICD-10-CM

## 2022-02-16 DIAGNOSIS — G89.29 CHRONIC PAIN OF BOTH SHOULDERS: Primary | ICD-10-CM

## 2022-02-16 DIAGNOSIS — G89.29 CHRONIC BILATERAL LOW BACK PAIN WITH LEFT-SIDED SCIATICA: ICD-10-CM

## 2022-02-16 DIAGNOSIS — M25.512 CHRONIC PAIN OF BOTH SHOULDERS: Primary | ICD-10-CM

## 2022-02-16 DIAGNOSIS — M25.511 CHRONIC PAIN OF BOTH SHOULDERS: Primary | ICD-10-CM

## 2022-02-16 PROCEDURE — 97110 THERAPEUTIC EXERCISES: CPT | Performed by: PHYSICAL THERAPIST

## 2022-02-16 PROCEDURE — 97530 THERAPEUTIC ACTIVITIES: CPT | Performed by: PHYSICAL THERAPIST

## 2022-02-16 NOTE — PROGRESS NOTES
"Physical Therapy Daily Treatment Note      Patient: Barry Cam   : 1950  Referring practitioner: Cristi Gerber MD  Date of Initial Visit: Type: THERAPY  Noted: 2021    Type: THERAPY  Noted: 2022  Today's Date: 2022  Patient seen for 16 sessions       Visit Diagnoses:    ICD-10-CM ICD-9-CM   1. Chronic pain of both shoulders  M25.511 719.41    G89.29 338.29    M25.512    2. Upper extremity weakness  R29.898 729.89   3. Chronic bilateral low back pain with left-sided sciatica  M54.42 724.2    G89.29 724.3     338.29   4. Impaired functional mobility and activity tolerance  Z74.09 V49.89       Subjective   Everything hurts. All of my joint have hurt since last PT visit. I went home and have not moved other than when nature called. Describes spasming in R bicep at night. Had pain in L leg this AM.     Objective   See Exercise, Manual, and Modality Logs for complete treatment.     Assessment/Plan  Educated pt re: exercise vs bed rest and encouraged pt to utilize HEP. After shoulder isometrics, pt stated his shoulder \"almost went out\" but declined subluxation/dislocation. Reiterated that pt may benefit from ortho consult.  Pt reported L buttock pain at end of session. This pain relieved with figure 4 stretch.   Progress core stability and functional strengthening as tolerated.    Timed:         Manual Therapy:    0     mins  00693;     Therapeutic Exercise:    30     mins  67709;     Neuromuscular Elizabeth:    0    mins  19769;    Therapeutic Activity:     10     mins  81287;     Gait Trainin     mins  98669;     Ultrasound:     0     mins  35155;    Ionto                               0    mins   71785        Timed Treatment:   40   mins   Total Treatment:     50   mins    Patria Wynn PT  [place] License: [id number]  "

## 2022-02-18 ENCOUNTER — TREATMENT (OUTPATIENT)
Dept: PHYSICAL THERAPY | Facility: CLINIC | Age: 72
End: 2022-02-18

## 2022-02-18 DIAGNOSIS — G89.29 CHRONIC BILATERAL LOW BACK PAIN WITH LEFT-SIDED SCIATICA: ICD-10-CM

## 2022-02-18 DIAGNOSIS — M25.512 CHRONIC PAIN OF BOTH SHOULDERS: Primary | ICD-10-CM

## 2022-02-18 DIAGNOSIS — G89.29 CHRONIC PAIN OF BOTH SHOULDERS: Primary | ICD-10-CM

## 2022-02-18 DIAGNOSIS — R29.898 UPPER EXTREMITY WEAKNESS: ICD-10-CM

## 2022-02-18 DIAGNOSIS — Z74.09 IMPAIRED FUNCTIONAL MOBILITY AND ACTIVITY TOLERANCE: ICD-10-CM

## 2022-02-18 DIAGNOSIS — M54.42 CHRONIC BILATERAL LOW BACK PAIN WITH LEFT-SIDED SCIATICA: ICD-10-CM

## 2022-02-18 DIAGNOSIS — M25.511 CHRONIC PAIN OF BOTH SHOULDERS: Primary | ICD-10-CM

## 2022-02-18 PROCEDURE — 97110 THERAPEUTIC EXERCISES: CPT | Performed by: PHYSICAL THERAPIST

## 2022-02-18 PROCEDURE — 97530 THERAPEUTIC ACTIVITIES: CPT | Performed by: PHYSICAL THERAPIST

## 2022-02-18 NOTE — PROGRESS NOTES
Physical Therapy Daily Treatment Note      Patient: Barry Cam   : 1950  Referring practitioner: Cristi Gerber MD  Date of Initial Visit: Type: THERAPY  Noted: 2021    Type: THERAPY  Noted: 2022  Today's Date: 2022  Patient seen for 17 sessions       Visit Diagnoses:    ICD-10-CM ICD-9-CM   1. Chronic pain of both shoulders  M25.511 719.41    G89.29 338.29    M25.512    2. Upper extremity weakness  R29.898 729.89   3. Chronic bilateral low back pain with left-sided sciatica  M54.42 724.2    G89.29 724.3     338.29   4. Impaired functional mobility and activity tolerance  Z74.09 V49.89       Subjective   No complaints.    Objective   See Exercise, Manual, and Modality Logs for complete treatment.     Assessment/Plan  Excellent tolerance to core stability and UE strengthening w/o lasting increase in pain. Declined ice for shoulders stating UE pain lasts <1hr. Progress per POC.      Timed:         Manual Therapy:    0     mins  94723;     Therapeutic Exercise:    30     mins  12225;     Neuromuscular Elizabeth:    0    mins  10478;    Therapeutic Activity:     15     mins  77786;     Gait Trainin     mins  17399;     Ultrasound:     0     mins  02990;    Ionto                               0    mins   84571        Timed Treatment:   45   mins   Total Treatment:     55   mins    ANGELIQUE Valles License: [id number]

## 2022-02-23 ENCOUNTER — TREATMENT (OUTPATIENT)
Dept: PHYSICAL THERAPY | Facility: CLINIC | Age: 72
End: 2022-02-23

## 2022-02-23 DIAGNOSIS — Z74.09 IMPAIRED FUNCTIONAL MOBILITY AND ACTIVITY TOLERANCE: ICD-10-CM

## 2022-02-23 DIAGNOSIS — M54.42 CHRONIC BILATERAL LOW BACK PAIN WITH LEFT-SIDED SCIATICA: ICD-10-CM

## 2022-02-23 DIAGNOSIS — G89.29 CHRONIC PAIN OF BOTH SHOULDERS: Primary | ICD-10-CM

## 2022-02-23 DIAGNOSIS — G89.29 CHRONIC BILATERAL LOW BACK PAIN WITH LEFT-SIDED SCIATICA: ICD-10-CM

## 2022-02-23 DIAGNOSIS — R29.898 UPPER EXTREMITY WEAKNESS: ICD-10-CM

## 2022-02-23 DIAGNOSIS — M25.511 CHRONIC PAIN OF BOTH SHOULDERS: Primary | ICD-10-CM

## 2022-02-23 DIAGNOSIS — M25.512 CHRONIC PAIN OF BOTH SHOULDERS: Primary | ICD-10-CM

## 2022-02-23 PROCEDURE — 97530 THERAPEUTIC ACTIVITIES: CPT | Performed by: PHYSICAL THERAPIST

## 2022-02-23 PROCEDURE — 97110 THERAPEUTIC EXERCISES: CPT | Performed by: PHYSICAL THERAPIST

## 2022-02-23 NOTE — PROGRESS NOTES
Physical Therapy Daily Progress Note    Patient: Barry Cam   : 1950  Diagnosis/ICD-10 Code:  Chronic pain of both shoulders [M25.511, G89.29, M25.512]  Referring practitioner: Cristi Gerber MD  Date of Initial Visit: Type: THERAPY  Noted: 2022  Today's Date: 2022  Patient seen for 5 sessions         Barry Cam reports: have a muscle down inside of leg that is a little bothersome this morning.    Subjective     Objective   See Exercise, Manual, and Modality Logs for complete treatment.       Assessment/Plan  Subjectively, pt reports no increase of pain or discomfort with interventions performed today. Performed well with continued exercise regimen with complaints of muscular fatigue, especailly in shoulders. Continues to demonstrate difficulty with standing hip abduction. Continues to benefit from verbal/tactile cues to ensure proper form and technique for exercise performance.     Progress per Plan of Care           Manual Therapy:         mins  73461;  Therapeutic Exercise:    30     mins  61255;     Neuromuscular Elizabeth:        mins  66605;    Therapeutic Activity:     15     mins  27284;     Gait Training:           mins  19885;     Ultrasound:          mins  55902;    Electrical Stimulation:         mins  05302 ( );  Dry Needling          mins self-pay    Timed Treatment:   45   mins   Total Treatment:     45   mins    Priti Benavides PTA  Physical Therapist Assistant [phone]

## 2022-02-25 ENCOUNTER — TREATMENT (OUTPATIENT)
Dept: PHYSICAL THERAPY | Facility: CLINIC | Age: 72
End: 2022-02-25

## 2022-02-25 PROCEDURE — 97110 THERAPEUTIC EXERCISES: CPT | Performed by: PHYSICAL THERAPIST

## 2022-02-25 PROCEDURE — 97530 THERAPEUTIC ACTIVITIES: CPT | Performed by: PHYSICAL THERAPIST

## 2022-02-25 NOTE — PROGRESS NOTES
Physical Therapy Daily Treatment Note      Patient: Barry Cam   : 1950  Referring practitioner: Cristi Gerber MD  Date of Initial Visit: Type: THERAPY  Noted: 2022  Today's Date: 2022  Patient seen for 6 sessions       Visit Diagnoses:  No diagnosis found.    Subjective   L thigh (anterior) is tight. Body is full of aches.    Objective   See Exercise, Manual, and Modality Logs for complete treatment.     Assessment/Plan  Quadriceps tightness alleviated with stretching. Tolerates functional strengthening well. Progress per POC.    Timed:         Manual Therapy:    0     mins  15593;     Therapeutic Exercise:    30     mins  98869;     Neuromuscular Elizabeth:    0    mins  78072;    Therapeutic Activity:     15     mins  42940;     Gait Trainin     mins  77960;     Ultrasound:     0     mins  96660;    Ionto                               0    mins   91330        Timed Treatment:   45   mins   Total Treatment:     45   mins    Patria Wynn, PT  [place] License: [id number]

## 2022-03-07 ENCOUNTER — TREATMENT (OUTPATIENT)
Dept: PHYSICAL THERAPY | Facility: CLINIC | Age: 72
End: 2022-03-07

## 2022-03-07 DIAGNOSIS — G89.29 CHRONIC PAIN OF BOTH SHOULDERS: Primary | ICD-10-CM

## 2022-03-07 DIAGNOSIS — M25.512 CHRONIC PAIN OF BOTH SHOULDERS: Primary | ICD-10-CM

## 2022-03-07 DIAGNOSIS — R29.898 UPPER EXTREMITY WEAKNESS: ICD-10-CM

## 2022-03-07 DIAGNOSIS — G89.29 CHRONIC BILATERAL LOW BACK PAIN WITH LEFT-SIDED SCIATICA: ICD-10-CM

## 2022-03-07 DIAGNOSIS — Z74.09 IMPAIRED FUNCTIONAL MOBILITY AND ACTIVITY TOLERANCE: ICD-10-CM

## 2022-03-07 DIAGNOSIS — M54.42 CHRONIC BILATERAL LOW BACK PAIN WITH LEFT-SIDED SCIATICA: ICD-10-CM

## 2022-03-07 DIAGNOSIS — M25.511 CHRONIC PAIN OF BOTH SHOULDERS: Primary | ICD-10-CM

## 2022-03-07 PROCEDURE — 97110 THERAPEUTIC EXERCISES: CPT | Performed by: PHYSICAL THERAPIST

## 2022-03-07 PROCEDURE — 97530 THERAPEUTIC ACTIVITIES: CPT | Performed by: PHYSICAL THERAPIST

## 2022-03-07 NOTE — PROGRESS NOTES
Physical Therapy Daily Treatment Note      Patient: Barry Cam   : 1950  Referring practitioner: Cristi Gerber MD  Date of Initial Visit: Type: THERAPY  Noted: 2021    Type: THERAPY  Noted: 2022  Today's Date: 3/7/2022  Patient seen for 18 sessions       Visit Diagnoses:    ICD-10-CM ICD-9-CM   1. Chronic pain of both shoulders  M25.511 719.41    G89.29 338.29    M25.512    2. Upper extremity weakness  R29.898 729.89   3. Chronic bilateral low back pain with left-sided sciatica  M54.42 724.2    G89.29 724.3     338.29   4. Impaired functional mobility and activity tolerance  Z74.09 V49.89       Subjective   Good and bad days. Had some pain in L anterior thigh and posterior hip on NuStep. Last week, I had burning pain in R shoulder.     Objective   See Exercise, Manual, and Modality Logs for complete treatment.     Assessment/Plan  Tolerated exercise well, reporting no L LE pain after NuStep. No c/o shoulder pain. Declined need for heat or ice.    Timed:         Manual Therapy:    0     mins  98677;     Therapeutic Exercise:    30     mins  81981;     Neuromuscular Elizabeth:    0    mins  26444;    Therapeutic Activity:     10     mins  16897;     Gait Trainin     mins  19816;     Ultrasound:     0     mins  16414;    Ionto                               0    mins   74696        Timed Treatment:   40   mins   Total Treatment:     50   mins    Patria Wynn PT  [place] License: [id number]

## 2022-03-09 ENCOUNTER — TREATMENT (OUTPATIENT)
Dept: PHYSICAL THERAPY | Facility: CLINIC | Age: 72
End: 2022-03-09

## 2022-03-09 DIAGNOSIS — R29.898 UPPER EXTREMITY WEAKNESS: ICD-10-CM

## 2022-03-09 DIAGNOSIS — G89.29 CHRONIC BILATERAL LOW BACK PAIN WITH LEFT-SIDED SCIATICA: ICD-10-CM

## 2022-03-09 DIAGNOSIS — Z74.09 IMPAIRED FUNCTIONAL MOBILITY AND ACTIVITY TOLERANCE: ICD-10-CM

## 2022-03-09 DIAGNOSIS — M54.42 CHRONIC BILATERAL LOW BACK PAIN WITH LEFT-SIDED SCIATICA: ICD-10-CM

## 2022-03-09 DIAGNOSIS — M25.511 CHRONIC PAIN OF BOTH SHOULDERS: Primary | ICD-10-CM

## 2022-03-09 DIAGNOSIS — G89.29 CHRONIC PAIN OF BOTH SHOULDERS: Primary | ICD-10-CM

## 2022-03-09 DIAGNOSIS — M25.512 CHRONIC PAIN OF BOTH SHOULDERS: Primary | ICD-10-CM

## 2022-03-09 PROCEDURE — 97110 THERAPEUTIC EXERCISES: CPT | Performed by: PHYSICAL THERAPIST

## 2022-03-09 PROCEDURE — 97530 THERAPEUTIC ACTIVITIES: CPT | Performed by: PHYSICAL THERAPIST

## 2022-03-09 NOTE — PROGRESS NOTES
Physical Therapy Daily Treatment Note      Patient: Barry Cam   : 1950  Referring practitioner: Cristi Gerber MD  Date of Initial Visit: Type: THERAPY  Noted: 2021    Type: THERAPY  Noted: 2022  Today's Date: 3/9/2022  Patient seen for 19 sessions       Visit Diagnoses:    ICD-10-CM ICD-9-CM   1. Chronic pain of both shoulders  M25.511 719.41    G89.29 338.29    M25.512    2. Upper extremity weakness  R29.898 729.89   3. Chronic bilateral low back pain with left-sided sciatica  M54.42 724.2    G89.29 724.3     338.29   4. Impaired functional mobility and activity tolerance  Z74.09 V49.89       Subjective   R shoulder hurts, throbbing. There are days I can't lift a cup. Pain rolling from R to L in bed.    Objective   See Exercise, Manual, and Modality Logs for complete treatment.     Assessment/Plan  Deferred most UE strengthening exercises due to increased pain. Min to no lumbar or L LE pain. Pt reported less shoulder pain at end of session. Demonstrates good balance stepping over hurdles w/o HHA. Progress per POC.    Timed:         Manual Therapy:    0     mins  79288;     Therapeutic Exercise:    15     mins  46435;     Neuromuscular Elizabeth:    0    mins  80176;    Therapeutic Activity:     30     mins  20660;     Gait Trainin     mins  04560;     Ultrasound:     0     mins  55231;    Ionto                               0    mins   22744        Timed Treatment:   45   mins   Total Treatment:     65   mins    Patria Wynn PT  [place] License: [id number]

## 2022-03-15 ENCOUNTER — TREATMENT (OUTPATIENT)
Dept: PHYSICAL THERAPY | Facility: CLINIC | Age: 72
End: 2022-03-15

## 2022-03-15 DIAGNOSIS — M25.511 CHRONIC PAIN OF BOTH SHOULDERS: Primary | ICD-10-CM

## 2022-03-15 DIAGNOSIS — M54.42 CHRONIC BILATERAL LOW BACK PAIN WITH LEFT-SIDED SCIATICA: ICD-10-CM

## 2022-03-15 DIAGNOSIS — G89.29 CHRONIC PAIN OF BOTH SHOULDERS: Primary | ICD-10-CM

## 2022-03-15 DIAGNOSIS — G89.29 CHRONIC BILATERAL LOW BACK PAIN WITH LEFT-SIDED SCIATICA: ICD-10-CM

## 2022-03-15 DIAGNOSIS — M25.512 CHRONIC PAIN OF BOTH SHOULDERS: Primary | ICD-10-CM

## 2022-03-15 DIAGNOSIS — R29.898 UPPER EXTREMITY WEAKNESS: ICD-10-CM

## 2022-03-15 DIAGNOSIS — Z74.09 IMPAIRED FUNCTIONAL MOBILITY AND ACTIVITY TOLERANCE: ICD-10-CM

## 2022-03-15 PROCEDURE — 97110 THERAPEUTIC EXERCISES: CPT | Performed by: PHYSICAL THERAPIST

## 2022-03-15 PROCEDURE — 97530 THERAPEUTIC ACTIVITIES: CPT | Performed by: PHYSICAL THERAPIST

## 2022-03-15 NOTE — PROGRESS NOTES
Physical Therapy Daily Treatment Note      Patient: Barry Cam   : 1950  Referring practitioner: Cristi Gerber MD  Date of Initial Visit: Type: THERAPY  Noted: 2021    Type: THERAPY  Noted: 2022  Today's Date: 3/15/2022  Patient seen for 20 sessions       Visit Diagnoses:    ICD-10-CM ICD-9-CM   1. Chronic pain of both shoulders  M25.511 719.41    G89.29 338.29    M25.512    2. Upper extremity weakness  R29.898 729.89   3. Chronic bilateral low back pain with left-sided sciatica  M54.42 724.2    G89.29 724.3     338.29   4. Impaired functional mobility and activity tolerance  Z74.09 V49.89       Subjective   Right shoulder hurts.    Objective   See Exercise, Manual, and Modality Logs for complete treatment.       Assessment/Plan  Deferred most UE exercise due to R shoulder pain. Pt reported pain with isometrics at wall. Declined ice. Encouraged pt to use ice and/or Voltaren gel for pain relief.    No c/o low back pain. Required intermittent verbal cuing for trunk stability.      Timed:         Manual Therapy:    3     mins  26367;     Therapeutic Exercise:    30     mins  25296;     Neuromuscular Elizabeth:    0    mins  78732;    Therapeutic Activity:     15     mins  98336;     Gait Trainin     mins  06971;     Ultrasound:     0     mins  73055;    Ionto                               0    mins   33340        Timed Treatment:   48   mins   Total Treatment:     48   mins    Patria Wynn PT  [place] License: [id number]

## 2022-03-17 ENCOUNTER — TREATMENT (OUTPATIENT)
Dept: PHYSICAL THERAPY | Facility: CLINIC | Age: 72
End: 2022-03-17

## 2022-03-17 DIAGNOSIS — M25.512 CHRONIC PAIN OF BOTH SHOULDERS: Primary | ICD-10-CM

## 2022-03-17 DIAGNOSIS — Z74.09 IMPAIRED FUNCTIONAL MOBILITY AND ACTIVITY TOLERANCE: ICD-10-CM

## 2022-03-17 DIAGNOSIS — M25.511 CHRONIC PAIN OF BOTH SHOULDERS: Primary | ICD-10-CM

## 2022-03-17 DIAGNOSIS — M54.42 CHRONIC BILATERAL LOW BACK PAIN WITH LEFT-SIDED SCIATICA: ICD-10-CM

## 2022-03-17 DIAGNOSIS — G89.29 CHRONIC BILATERAL LOW BACK PAIN WITH LEFT-SIDED SCIATICA: ICD-10-CM

## 2022-03-17 DIAGNOSIS — R29.898 UPPER EXTREMITY WEAKNESS: ICD-10-CM

## 2022-03-17 DIAGNOSIS — G89.29 CHRONIC PAIN OF BOTH SHOULDERS: Primary | ICD-10-CM

## 2022-03-17 PROCEDURE — 97110 THERAPEUTIC EXERCISES: CPT | Performed by: PHYSICAL THERAPIST

## 2022-03-17 PROCEDURE — 97530 THERAPEUTIC ACTIVITIES: CPT | Performed by: PHYSICAL THERAPIST

## 2022-03-17 NOTE — PROGRESS NOTES
Physical Therapy Daily Treatment Note      Patient: Barry Cam   : 1950  Referring practitioner: Cristi Gerber MD  Date of Initial Visit: Type: THERAPY  Noted: 2021    Type: THERAPY  Noted: 2022  Today's Date: 3/17/2022  Patient seen for 21 sessions       Visit Diagnoses:    ICD-10-CM ICD-9-CM   1. Chronic pain of both shoulders  M25.511 719.41    G89.29 338.29    M25.512    2. Upper extremity weakness  R29.898 729.89   3. Chronic bilateral low back pain with left-sided sciatica  M54.42 724.2    G89.29 724.3     338.29   4. Impaired functional mobility and activity tolerance  Z74.09 V49.89       Subjective   Right shoulder pain 12/10 this week. Not as bad today.    Objective   See Exercise, Manual, and Modality Logs for complete treatment.     Assessment/Plan  Limited UE activity due to shoulder pain. Excellent tolerance to all core strength and balance activities. Plan for reassessment next visit.    Timed:         Manual Therapy:    0     mins  92781;     Therapeutic Exercise:    30     mins  59787;     Neuromuscular Elizabeth:    0    mins  93834;    Therapeutic Activity:     10     mins  39562;     Gait Trainin     mins  70058;     Ultrasound:     0     mins  19021;    Ionto                               0    mins   61285        Timed Treatment:   40   mins   Total Treatment:     45   mins    ANGELIQUE Valles License: [id number]

## 2022-03-22 ENCOUNTER — TREATMENT (OUTPATIENT)
Dept: PHYSICAL THERAPY | Facility: CLINIC | Age: 72
End: 2022-03-22

## 2022-03-22 DIAGNOSIS — Z74.09 IMPAIRED FUNCTIONAL MOBILITY AND ACTIVITY TOLERANCE: ICD-10-CM

## 2022-03-22 DIAGNOSIS — M25.512 CHRONIC PAIN OF BOTH SHOULDERS: Primary | ICD-10-CM

## 2022-03-22 DIAGNOSIS — G89.29 CHRONIC PAIN OF BOTH SHOULDERS: Primary | ICD-10-CM

## 2022-03-22 DIAGNOSIS — M25.511 CHRONIC PAIN OF BOTH SHOULDERS: Primary | ICD-10-CM

## 2022-03-22 DIAGNOSIS — M54.42 CHRONIC BILATERAL LOW BACK PAIN WITH LEFT-SIDED SCIATICA: ICD-10-CM

## 2022-03-22 DIAGNOSIS — R29.898 UPPER EXTREMITY WEAKNESS: ICD-10-CM

## 2022-03-22 DIAGNOSIS — G89.29 CHRONIC BILATERAL LOW BACK PAIN WITH LEFT-SIDED SCIATICA: ICD-10-CM

## 2022-03-22 PROCEDURE — 97110 THERAPEUTIC EXERCISES: CPT | Performed by: PHYSICAL THERAPIST

## 2022-03-22 PROCEDURE — 97140 MANUAL THERAPY 1/> REGIONS: CPT | Performed by: PHYSICAL THERAPIST

## 2022-03-22 NOTE — PROGRESS NOTES
Re-Assessment / Re-Certification        Patient: Barry Cam   : 1950  Diagnosis/ICD-10 Code:  Chronic pain of both shoulders [M25.511, G89.29, M25.512]  Referring practitioner: Cristi Gerber MD  Date of Initial Evaluation:  Type: THERAPY  Noted: 2021    Type: THERAPY  Noted: 2022  Patient seen for 22 sessions      Subjective:   Barry Cam reports: Back and leg are fine (Rates 0/10). R shoulder is #1 problem. Describes pain in shoulder and anterior upper arm. When I role on R side with arm out, I feel fine. It doesn't want to move when I turn to left side. Shoulder pain 10/10. Pain using bands at therapy.    Subjective Questionnaire: QuickDASH: 18.18  Oswestry: 22/100  Clinical Progress: Back/leg improved, shoulder worsened  Home Program Compliance: Yes  Treatment has included: therapeutic exercise, manual therapy, therapeutic activity and cryotherapy    Subjective   Objective          Active Range of Motion   Left Shoulder   Flexion: 153 degrees   Abduction: 145 degrees   External rotation BTH: T3   Internal rotation BTB: L1     Right Shoulder   Flexion: 145 degrees   Abduction: 154 degrees   External rotation BTH: T3   Internal rotation BTB: L1 with pain    Strength/Myotome Testing     Left Shoulder     Planes of Motion   Flexion: 5   Abduction: 5   External rotation at 0°: 2-   Internal rotation at 0°: 5     Isolated Muscles   Biceps: 5     Right Shoulder     Planes of Motion   Flexion: 5   Abduction: 5   External rotation at 0°: 3+ (PAIN)   Internal rotation at 0°: 5     Isolated Muscles   Biceps: 5     Tests     Right Shoulder   Positive Hawkin's.   Negative empty can, full can and Neer's.       Goals  Plan Goals: SHOULDER GOALS  Short Term Goals: 2-4 weeks. Patient will:  1. Be independent with initial HEP MET  2. Be instructed in posture and body mechanics. MET  3. Report no lasting increase in pain w/ exercise NOT MET    Long Term Goals: 6-12 weeks. Pt will:  1. Exhibit (B)  shoulder AROM to WFL to allow for reaching overhead and out (ABD) without pain limiting function. MET  2. Demonstrate improved Right UE MMT of >/= 4+/5 to allow for performance of ADLs/household management/recreational activities. NOT MET  3. Pt able to reach overhead and lift 10# to allow for return to doing home/yard/recreational activities with min to no pain. NOT MET  4. Report perceived disability </=10% based on QuickDASH NOT MET    LUMBAR GOALS est 11/17/21  Plan Goals: Short Term Goals: 4-6 weeks. Patient will:  1. Be independent with initial HEP MET  2. Be instructed in posture and body mechanics MET  3. Report pain of </= 5/10 with daily activities MET    Long Term Goals: 6-12 weeks. Patient will:  1. Demonstrate improved Bilateral lower extremity/lower abdominal MMT of >/= 4+/5 to allow for performance of daily activities without pain. MET  2. Demonstrate lower extremity flexibility and lumbar ROM WFL to allow for return to household & recreational activities w/o increased symptoms MET  3. Report pain of </= 1/10 with all daily activities. NOT MET  4. Perceived disability </= 10% as measured by Oswestry Questionnaire. NOT MET  5. Be independent with long term HEP MET    Assessment/Plan  Progress toward previous goals: Partially Met    Met 6/8 goals pertaining to low back and leg pain. Primary complaint is R shoulder pain. Objective findings indicate tendonitis/tendinopathy/impingement. Issued updated HEP shifting focus to R shoulder. Continues to have significant L shoulder weakness indicative of tear.      Recommendations: Continue with recommendations 2x/week  Timeframe: 1 month  Prognosis to achieve goals: fair    PT Signature: Patria Wynn PT      Based upon review of the patient's progress and continued therapy plan, it is my medical opinion that Barry Cam should continue physical therapy treatment at [facility]  [facility] PH[facility]  [address] [facility], [address]  [place] [address]  [phone].    Signature: __________________________________  Cristi Gerber MD    Manual Therapy:    8     mins  23296;  Therapeutic Exercise:    15     mins  19651;     Neuromuscular Elizabeth:    0    mins  71422;    Therapeutic Activity:     0     mins  41192;     Gait Trainin     mins  99082;     Ultrasound:     0     mins  92532;    Work Hardening           0      mins 08394  Iontophoresis               0   mins 28216    Timed Treatment:   23   mins   Total Treatment:     60   mins

## 2022-03-22 NOTE — PATIENT INSTRUCTIONS
Access Code: [email or web address]  URL: [email or web address]  Date: 03/22/2022  Prepared by: Patria Wynn    Exercises  Sidelying Posterior Cuff Cross Body Stretch - 1/4 Turn Back - 1 x daily - 7 x weekly - 1 sets - 3 reps - 20s hold  Seated Shoulder Inferior Glide - 1 x daily - 7 x weekly - 1 sets - 6 reps - 10s hold  Doorway Pec Stretch at 90 Degrees Abduction - 1 x daily - 7 x weekly - 1 sets - 3 reps - 20s hold  Seated Scapular Retraction - 1 x daily - 7 x weekly - 1 sets - 10 reps - 5s hold  Standing Isometric Shoulder External Rotation with Doorway - 1 x daily - 7 x weekly - 1 sets - 10 reps - 5s hold

## 2022-03-25 ENCOUNTER — TREATMENT (OUTPATIENT)
Dept: PHYSICAL THERAPY | Facility: CLINIC | Age: 72
End: 2022-03-25

## 2022-03-25 DIAGNOSIS — G89.29 CHRONIC PAIN OF BOTH SHOULDERS: Primary | ICD-10-CM

## 2022-03-25 DIAGNOSIS — M25.511 CHRONIC PAIN OF BOTH SHOULDERS: Primary | ICD-10-CM

## 2022-03-25 DIAGNOSIS — R29.898 UPPER EXTREMITY WEAKNESS: ICD-10-CM

## 2022-03-25 DIAGNOSIS — M25.512 CHRONIC PAIN OF BOTH SHOULDERS: Primary | ICD-10-CM

## 2022-03-25 PROCEDURE — 97110 THERAPEUTIC EXERCISES: CPT | Performed by: PHYSICAL THERAPIST

## 2022-03-25 NOTE — PROGRESS NOTES
Physical Therapy Daily Treatment Note      Patient: Barry Cam   : 1950  Referring practitioner: Cristi Gerber MD  Date of Initial Visit: Type: THERAPY  Noted: 2022  Today's Date: 3/25/2022  Patient seen for 12 sessions       Visit Diagnoses:    ICD-10-CM ICD-9-CM   1. Chronic pain of both shoulders  M25.511 719.41    G89.29 338.29    M25.512    2. Upper extremity weakness  R29.898 729.89       Subjective   On a scale from 1-10, my right shoulder makes me want to cry. Increased pain after PT visit Tuesday. I have not followed up with my doctor.     Objective   R shld PROM WNL pain free  R shld MMT ER neutral 0/5 (pain)  Non-tender   See Exercise, Manual, and Modality Logs for complete treatment.     Assessment/Plan  Significant pain/weakness with manually resisted ER. Low tolerance to therapy. I'd like pt to pursue evaluation with Dr. Salcedo who he has seen previously for shoulder pain.    Timed:         Manual Therapy:    0     mins  25199;     Therapeutic Exercise:    40     mins  23167;     Neuromuscular Elizabeth:    0    mins  96688;    Therapeutic Activity:     0     mins  86112;     Gait Trainin     mins  71878;     Ultrasound:     0     mins  63401;    Ionto                               0    mins   63626        Timed Treatment:   40   mins   Total Treatment:     50   mins    ANGELIQUE Valles License: [id number]

## 2022-03-29 ENCOUNTER — PATIENT MESSAGE (OUTPATIENT)
Dept: FAMILY MEDICINE CLINIC | Facility: CLINIC | Age: 72
End: 2022-03-29

## 2022-03-29 DIAGNOSIS — M25.512 BILATERAL SHOULDER PAIN, UNSPECIFIED CHRONICITY: Primary | ICD-10-CM

## 2022-03-29 DIAGNOSIS — M25.511 BILATERAL SHOULDER PAIN, UNSPECIFIED CHRONICITY: Primary | ICD-10-CM

## 2022-03-29 NOTE — TELEPHONE ENCOUNTER
From: Barry Cam  To: Cristi Gerber MD  Sent: 3/29/2022 4:26 AM EDT  Subject: Possible Right Shoulder Injury    Hello Dr. Gerber. Physical Therapy on my right shoulder is not yielding and noticeable progress and is painful. My Physical Therapist is suggesting that I see an Orthopedic Surgeon to see what is going on inside the joint. I last saw Dr. BRIGETTE Salcedo at [facility] for my left shoulder ([place] office). I was advised that you will need to refer me to see him. Will you need for me to make an appointment to see you first or can your office make the appointment with Dr. Salcedo without seeing you first? Let me know. Barry Cam  1950.

## 2022-05-02 ENCOUNTER — OFFICE VISIT (OUTPATIENT)
Dept: ORTHOPEDIC SURGERY | Facility: CLINIC | Age: 72
End: 2022-05-02

## 2022-05-02 ENCOUNTER — PATIENT ROUNDING (BHMG ONLY) (OUTPATIENT)
Dept: ORTHOPEDIC SURGERY | Facility: CLINIC | Age: 72
End: 2022-05-02

## 2022-05-02 VITALS — WEIGHT: 315 LBS | HEIGHT: 73 IN | BODY MASS INDEX: 41.75 KG/M2 | TEMPERATURE: 97.1 F

## 2022-05-02 DIAGNOSIS — M25.512 CHRONIC LEFT SHOULDER PAIN: ICD-10-CM

## 2022-05-02 DIAGNOSIS — G89.29 CHRONIC RIGHT SHOULDER PAIN: ICD-10-CM

## 2022-05-02 DIAGNOSIS — M25.511 CHRONIC RIGHT SHOULDER PAIN: ICD-10-CM

## 2022-05-02 DIAGNOSIS — M25.512 BILATERAL SHOULDER PAIN, UNSPECIFIED CHRONICITY: Primary | ICD-10-CM

## 2022-05-02 DIAGNOSIS — G89.29 CHRONIC LEFT SHOULDER PAIN: ICD-10-CM

## 2022-05-02 DIAGNOSIS — M25.511 BILATERAL SHOULDER PAIN, UNSPECIFIED CHRONICITY: Primary | ICD-10-CM

## 2022-05-02 PROCEDURE — 99203 OFFICE O/P NEW LOW 30 MIN: CPT | Performed by: ORTHOPAEDIC SURGERY

## 2022-05-02 PROCEDURE — 73030 X-RAY EXAM OF SHOULDER: CPT | Performed by: ORTHOPAEDIC SURGERY

## 2022-05-02 NOTE — PROGRESS NOTES
Patient: Barry Cam    YOB: 1950    Medical Record Number: [id number]    Chief Complaints:  Bilateral shoulder pain and weakness, right currently greater than left    History of Present Illness:     71 y.o. male patient seen today for bilateral shoulder pain.  It is really the right shoulder that bothers him the most.  I saw him for the left shoulder about 5 years ago.  At that time we did an injection.  He says the left shoulder is still weak but the pain has gotten significantly better.  Left shoulder pain is currently very mild.  Right shoulder pain is more moderate to severe.  The pain is worse at night.  He reports weakness when trying to reach or lift overhead.  He did go to therapy but did not see any significant improvement.  He is right-hand dominant.    Allergies:   Allergies   Allergen Reactions   • Other Cough     Hay fever       Home Medications:    Current Outpatient Medications:   •  acetaminophen (TYLENOL) 500 MG tablet, Take 500 mg by mouth Every 6 (Six) Hours As Needed for Mild Pain ., Disp: , Rfl:   •  albuterol sulfate HFA (Ventolin HFA) 108 (90 Base) MCG/ACT inhaler, Inhale 2 puffs Every 4 (Four) Hours As Needed for Wheezing or Shortness of Air. for wheezing, Disp: 18 g, Rfl: 5  •  amLODIPine (NORVASC) 10 MG tablet, Take 1 tablet by mouth Daily., Disp: 90 tablet, Rfl: 3  •  apixaban (Eliquis) 5 MG tablet tablet, Take 1 tablet by mouth Every 12 (Twelve) Hours., Disp: 60 tablet, Rfl: 5  •  betamethasone valerate (VALISONE) 0.1 % cream, APPLY CREAM TOPICALLY TO AFFECTED AREA(S) TO SKIN TWICE DAILY, Disp: 15 g, Rfl: 1  •  caffeine 200 MG tablet, Take 200 mg by mouth Every 4 (Four) Hours As Needed for Headache., Disp: , Rfl:   •  Copper Gluconate (COPPER CAPS PO), Take  by mouth., Disp: , Rfl:   •  diphenhydrAMINE (BENADRYL) 50 MG capsule, Take 50 mg by mouth Every 6 (Six) Hours As Needed for Itching., Disp: , Rfl:   •  gabapentin (NEURONTIN) 300 MG capsule, Take 2 capsules by  mouth 2 (Two) Times a Day., Disp: 360 capsule, Rfl: 3  •  glimepiride (AMARYL) 2 MG tablet, Take 1 tablet by mouth Every Morning Before Breakfast., Disp: 90 tablet, Rfl: 3  •  Glucosamine-Chondroit-Vit C-Mn (GLUCOSAMINE CHONDR 500 COMPLEX) capsule, Take 2 capsules by mouth daily., Disp: , Rfl:   •  hydroCHLOROthiazide (HYDRODIURIL) 12.5 MG tablet, Take 1 tablet by mouth Daily., Disp: 90 tablet, Rfl: 3  •  levothyroxine (SYNTHROID, LEVOTHROID) 25 MCG tablet, TAKE 1 TABLET BY MOUTH EVERY DAY, Disp: 90 tablet, Rfl: 3  •  losartan (COZAAR) 100 MG tablet, TAKE 1 TABLET BY MOUTH EVERY DAY, Disp: 90 tablet, Rfl: 3  •  LUTEIN PO, Take 5 mg by mouth Daily., Disp: , Rfl:   •  Melatonin 10 MG tablet, , Disp: , Rfl:   •  metFORMIN (GLUCOPHAGE) 1000 MG tablet, TAKE 1 TABLET BY MOUTH TWICE A DAY, Disp: 180 tablet, Rfl: 3  •  modafinil (PROVIGIL) 200 MG tablet, Take 1 tablet by mouth 2 (Two) Times a Day., Disp: 180 tablet, Rfl: 0  •  MULTIPLE VITAMINS ESSENTIAL PO, Take  by mouth daily., Disp: , Rfl:   •  omeprazole (priLOSEC) 20 MG capsule, TAKE 1 CAPSULE BY MOUTH EVERY DAY, Disp: 90 capsule, Rfl: 2  •  OneTouch Delica Lancets 33G misc, USE TO TEST BLOOD SUGAR 3 TIMES DAILY DIABETES TYPE 2 E11.9, Disp: 100 each, Rfl: 5  •  OneTouch Ultra test strip, USE TO TEST BLOOD SUGAR 3 TIMES DAILY, Disp: 100 each, Rfl: 5  •  pioglitazone (ACTOS) 30 MG tablet, TAKE 1 TABLET BY MOUTH EVERY DAY, Disp: 90 tablet, Rfl: 3  •  pravastatin (PRAVACHOL) 40 MG tablet, Take 1 tablet by mouth Daily., Disp: 90 tablet, Rfl: 3  •  SITagliptin (Januvia) 100 MG tablet, Take 1 tablet by mouth Daily., Disp: 90 tablet, Rfl: 3  •  Triamcinolone Acetonide (NASACORT) 55 MCG/ACT nasal inhaler, 2 sprays into the nostril(s) as directed by provider Daily., Disp: , Rfl:   •  vitamin C (ASCORBIC ACID) 250 MG tablet, Take 250 mg by mouth Daily., Disp: , Rfl:   •  vitamin E 200 UNIT capsule, Take 200 Units by mouth Daily., Disp: , Rfl:   •  Zeaxanthin powder, , Disp: ,  Rfl:   •  Zinc Sulfate (ZINC 15 PO), Take  by mouth., Disp: , Rfl:     Past Medical History:   Diagnosis Date   • Allergic    • Allergic rhinitis    • Anxiety    • Depression    • Depression    • Diabetes mellitus (HCC)    • DUGGAN (dyspnea on exertion)    • Edema    • GERD (gastroesophageal reflux disease)    • History of hiatal hernia    • History of kidney stones    • Hx of blood clots    • Hyperglycemia    • Hyperlipidemia    • Hypertension    • Pulmonary embolism (HCC)    • Sciatica    • Sleep apnea    • SOB (shortness of breath)    • Thyroid disease        Past Surgical History:   Procedure Laterality Date   • HERNIA REPAIR     • HERNIA REPAIR     • TONSILLECTOMY         Social History     Occupational History   • Not on file   Tobacco Use   • Smoking status: Never Smoker   • Smokeless tobacco: Never Used   • Tobacco comment: caffeine use   Substance and Sexual Activity   • Alcohol use: No   • Drug use: Defer   • Sexual activity: Defer      Social History     Social History Narrative   • Not on file       Family History   Problem Relation Age of Onset   • Depression Mother    • Coronary artery disease Father    • Diabetes Father    • Stroke Father    • Hypertension Father    • Diabetes Brother    • Diabetes Maternal Grandmother        Review of Systems:      Constitutional: Denies fever, shaking or chills   Eyes: Denies change in visual acuity   HEENT: Denies nasal congestion or sore throat   Respiratory: Denies cough or shortness of breath   Cardiovascular: Denies chest pain or edema  Endocrine: Denies tremors, palpitations, intolerance of heat or cold, polyuria, polydipsia.  GI: Denies abdominal pain, nausea, vomiting, bloody stools or diarrhea  : Denies frequency, urgency, incontinence, retention, or nocturia.  Musculoskeletal: Denies numbness, tingling or loss of motor function except as above  Integument: Denies rash, lesion or ulceration   Neurologic: Denies headache or focal weakness, deficits  Heme:  "Denies spontaneous or excessive bleeding, epistaxis, hematuria, melena, fatigue, enlarged or tender lymph nodes.      All other pertinent positives and negatives as noted above in HPI.    Physical Exam:   71 y.o. male  Vitals:    05/02/22 0938   Temp: 97.1 °F (36.2 °C)   Weight: (!) 152 kg (335 lb 1.6 oz)   Height: 185.4 cm (73\")     General:  Patient is awake and alert.  Appears in no acute distress or discomfort.    Psych:  Affect and demeanor are appropriate.    Eyes:  Conjunctiva and sclera appear grossly normal.  Eyes track well and EOM seem to be intact.    Ears:  No gross abnormalities.  Hearing adequate for the exam.    Cardiovascular:  Regular rate and rhythm.    Lungs:  Good chest expansion.  Breathing unlabored.    Spine:  Neck appears grossly normal.  No palpable masses or adenopathy.  Good motion.  Spurling's maneuver is negative for any shoulder or arm symptoms.    Extremities:  Right shoulder is examined.  Skin is benign.  No obvious gross abnormalities.  No palpable masses or adenopathy.  Mild tenderness noted over anterior glenohumeral joint and rotator interval.  Motion is full.  No instability.  4+ out of 5 strength with resistive testing of elevation in the scapular plane and external rotation.  Negative external rotation lag sign good motor function in the lower arm and hand including wrist flexion, extension,  and pinch.  Intact sensation.  Palpable radial pulse.  Brisk capillary refill.  Good skin turgor.    Left shoulder is just briefly examined.  Skin is benign.  No significant tenderness.  His motion is functional.  He has good active elevation and abduction.  He has significant weakness with elevation in the scapular plane and external rotation with a positive external rotation lag sign.         Radiology:  AP, scapular Y, and axillary views of bilateral shoulders are ordered by myself and reviewed to evaluate the patient's complaint.  These are compared to previous x-rays on the left. "  He has left cuff tear arthropathy with abutment of the humeral head to the undersurface of the acromion.  This is stable relative to previous films.  On the right side, his acromiohumeral interval measures normal.  He does not appear to have any significant right-sided glenohumeral arthritis.  No other concerning findings noted.    Assessment/Plan:  1.  Right rotator cuff tear 2.  Left rotator cuff tear arthropathy    We discussed his options.  I strongly suspect he has a right rotator cuff tear.  He probably has a fairly large tear which may or may not be repairable at this point.  He says that if there is any chance his right shoulder is still salvageable, he may be interested in pursuing surgery.  He wants to try to prevent his right shoulder from getting to the point where he would potentially need to be replaced.  I have agreed to refer him for an MRI of the right shoulder.  I told him I will call him as soon as I see the results.  We will come up with a plan pending review of that study.    For his left shoulder, we discussed options including another injection versus an arthroplasty.  He says the left shoulder is doing okay for now and he does not consider that any intervention is warranted at this time.  He will follow-up as needed for that issue.    Noah Salcedo MD    05/02/2022    CC to Cristi Gerber MD

## 2022-05-02 NOTE — PROGRESS NOTES
A [employer] Message has been sent to the patient for PATIENT ROUNDING with [facility]

## 2022-05-06 ENCOUNTER — HOSPITAL ENCOUNTER (OUTPATIENT)
Dept: MRI IMAGING | Facility: HOSPITAL | Age: 72
End: 2022-05-06

## 2022-05-09 DIAGNOSIS — E11.9 TYPE 2 DIABETES MELLITUS WITHOUT COMPLICATION, WITHOUT LONG-TERM CURRENT USE OF INSULIN: ICD-10-CM

## 2022-05-09 RX ORDER — BLOOD SUGAR DIAGNOSTIC
STRIP MISCELLANEOUS
Qty: 1 EACH | Refills: 5 | Status: SHIPPED | OUTPATIENT
Start: 2022-05-09

## 2022-05-24 ENCOUNTER — HOSPITAL ENCOUNTER (OUTPATIENT)
Dept: MRI IMAGING | Facility: HOSPITAL | Age: 72
Discharge: HOME | End: 2022-05-24
Admitting: ORTHOPAEDIC SURGERY

## 2022-05-24 DIAGNOSIS — G89.29 CHRONIC RIGHT SHOULDER PAIN: ICD-10-CM

## 2022-05-24 DIAGNOSIS — M25.511 CHRONIC RIGHT SHOULDER PAIN: ICD-10-CM

## 2022-05-24 PROCEDURE — 73221 MRI JOINT UPR EXTREM W/O DYE: CPT

## 2022-05-27 ENCOUNTER — TELEPHONE (OUTPATIENT)
Dept: ORTHOPEDIC SURGERY | Facility: CLINIC | Age: 72
End: 2022-05-27

## 2022-06-03 ENCOUNTER — TELEPHONE (OUTPATIENT)
Dept: ORTHOPEDIC SURGERY | Facility: CLINIC | Age: 72
End: 2022-06-03

## 2022-06-06 ENCOUNTER — TELEPHONE (OUTPATIENT)
Dept: ORTHOPEDIC SURGERY | Facility: CLINIC | Age: 72
End: 2022-06-06

## 2022-06-06 NOTE — TELEPHONE ENCOUNTER
----- Message from Barry Cam sent at 6/3/2022  7:06 PM EDT -----  Regarding: MRI Results  Hi Dr. Salcedo. It would seem that us trying to like up by phone so far has been a bust. I am either not by my cell phone or can't seem to get to it before it's too late to answer. I have seen the MRI report and have a reasonable idea of what is going on, just not how bad or if this is a repair or replace situation. I am going to request an office visit so that you can let me know what the options are and hopefully see the MRI photo results. I am thinking that this is an October event depending  upon the length of the recovery cycle. See you soon. Mann Cam

## 2022-06-13 ENCOUNTER — OFFICE VISIT (OUTPATIENT)
Dept: ORTHOPEDIC SURGERY | Facility: CLINIC | Age: 72
End: 2022-06-13

## 2022-06-13 VITALS — TEMPERATURE: 97.8 F | BODY MASS INDEX: 41.75 KG/M2 | HEIGHT: 73 IN | WEIGHT: 315 LBS

## 2022-06-13 DIAGNOSIS — M75.100 NONTRAUMATIC TEAR OF ROTATOR CUFF, UNSPECIFIED LATERALITY, UNSPECIFIED TEAR EXTENT: ICD-10-CM

## 2022-06-13 DIAGNOSIS — G89.29 CHRONIC RIGHT SHOULDER PAIN: Primary | ICD-10-CM

## 2022-06-13 DIAGNOSIS — M25.511 CHRONIC RIGHT SHOULDER PAIN: Primary | ICD-10-CM

## 2022-06-13 PROCEDURE — 99214 OFFICE O/P EST MOD 30 MIN: CPT | Performed by: ORTHOPAEDIC SURGERY

## 2022-06-13 NOTE — PROGRESS NOTES
Medical Record Number: [id number]    Chief Complaints: Follow-up regarding right shoulder pain    History of Present Illness:     72 y.o. male patient who presents for follow-up of the right shoulder.  He reports persistent pain and symptoms.  He recently got an MRI and presents today to review that study and discuss further options.    Allergies   Allergen Reactions   • Other Cough     Hay fever     Home Medications:    Current Outpatient Medications:   •  acetaminophen (TYLENOL) 500 MG tablet, Take 500 mg by mouth Every 6 (Six) Hours As Needed for Mild Pain ., Disp: , Rfl:   •  albuterol sulfate HFA (Ventolin HFA) 108 (90 Base) MCG/ACT inhaler, Inhale 2 puffs Every 4 (Four) Hours As Needed for Wheezing or Shortness of Air. for wheezing, Disp: 18 g, Rfl: 5  •  amLODIPine (NORVASC) 10 MG tablet, Take 1 tablet by mouth Daily., Disp: 90 tablet, Rfl: 3  •  apixaban (Eliquis) 5 MG tablet tablet, Take 1 tablet by mouth Every 12 (Twelve) Hours., Disp: 60 tablet, Rfl: 5  •  betamethasone valerate (VALISONE) 0.1 % cream, APPLY CREAM TOPICALLY TO AFFECTED AREA(S) TO SKIN TWICE DAILY, Disp: 15 g, Rfl: 1  •  caffeine 200 MG tablet, Take 200 mg by mouth Every 4 (Four) Hours As Needed for Headache., Disp: , Rfl:   •  Copper Gluconate (COPPER CAPS PO), Take  by mouth., Disp: , Rfl:   •  diphenhydrAMINE (BENADRYL) 50 MG capsule, Take 50 mg by mouth Every 6 (Six) Hours As Needed for Itching., Disp: , Rfl:   •  gabapentin (NEURONTIN) 300 MG capsule, Take 2 capsules by mouth 2 (Two) Times a Day., Disp: 360 capsule, Rfl: 3  •  glimepiride (AMARYL) 2 MG tablet, Take 1 tablet by mouth Every Morning Before Breakfast., Disp: 90 tablet, Rfl: 3  •  Glucosamine-Chondroit-Vit C-Mn (GLUCOSAMINE CHONDR 500 COMPLEX) capsule, Take 2 capsules by mouth daily., Disp: , Rfl:   •  hydroCHLOROthiazide (HYDRODIURIL) 12.5 MG tablet, Take 1 tablet by mouth Daily., Disp: 90 tablet, Rfl: 3  •  levothyroxine (SYNTHROID, LEVOTHROID) 25 MCG tablet, TAKE 1  TABLET BY MOUTH EVERY DAY, Disp: 90 tablet, Rfl: 3  •  losartan (COZAAR) 100 MG tablet, TAKE 1 TABLET BY MOUTH EVERY DAY, Disp: 90 tablet, Rfl: 3  •  LUTEIN PO, Take 5 mg by mouth Daily., Disp: , Rfl:   •  Melatonin 10 MG tablet, , Disp: , Rfl:   •  metFORMIN (GLUCOPHAGE) 1000 MG tablet, TAKE 1 TABLET BY MOUTH TWICE A DAY, Disp: 180 tablet, Rfl: 3  •  modafinil (PROVIGIL) 200 MG tablet, Take 1 tablet by mouth 2 (Two) Times a Day., Disp: 180 tablet, Rfl: 0  •  MULTIPLE VITAMINS ESSENTIAL PO, Take  by mouth daily., Disp: , Rfl:   •  omeprazole (priLOSEC) 20 MG capsule, TAKE 1 CAPSULE BY MOUTH EVERY DAY, Disp: 90 capsule, Rfl: 2  •  OneTouch Delica Lancets 33G misc, USE TO TEST BLOOD SUGAR 3 TIMES DAILY DIABETES TYPE 2 E11.9, Disp: 100 each, Rfl: 5  •  OneTouch Ultra test strip, USE TO TEST BLOOD SUGAR 3 TIMES DAILY, Disp: 1 each, Rfl: 5  •  pioglitazone (ACTOS) 30 MG tablet, TAKE 1 TABLET BY MOUTH EVERY DAY, Disp: 90 tablet, Rfl: 3  •  pravastatin (PRAVACHOL) 40 MG tablet, Take 1 tablet by mouth Daily., Disp: 90 tablet, Rfl: 3  •  SITagliptin (Januvia) 100 MG tablet, Take 1 tablet by mouth Daily., Disp: 90 tablet, Rfl: 3  •  Triamcinolone Acetonide (NASACORT) 55 MCG/ACT nasal inhaler, 2 sprays into the nostril(s) as directed by provider Daily., Disp: , Rfl:   •  vitamin C (ASCORBIC ACID) 250 MG tablet, Take 250 mg by mouth Daily., Disp: , Rfl:   •  vitamin E 200 UNIT capsule, Take 200 Units by mouth Daily., Disp: , Rfl:   •  Zeaxanthin powder, , Disp: , Rfl:   •  Zinc Sulfate (ZINC 15 PO), Take  by mouth., Disp: , Rfl:     Past Medical History:   Diagnosis Date   • Allergic    • Allergic rhinitis    • Anxiety    • Depression    • Depression    • Diabetes mellitus (HCC)    • DUGGAN (dyspnea on exertion)    • Edema    • GERD (gastroesophageal reflux disease)    • History of hiatal hernia    • History of kidney stones    • Hx of blood clots    • Hyperglycemia    • Hyperlipidemia    • Hypertension    • Pulmonary embolism  "(HCC)    • Sciatica    • Sleep apnea    • SOB (shortness of breath)    • Thyroid disease        Past Surgical History:   Procedure Laterality Date   • HERNIA REPAIR     • HERNIA REPAIR     • TONSILLECTOMY         Social History     Occupational History   • Not on file   Tobacco Use   • Smoking status: Never Smoker   • Smokeless tobacco: Never Used   • Tobacco comment: caffeine use   Substance and Sexual Activity   • Alcohol use: No   • Drug use: Defer   • Sexual activity: Defer      Social History     Social History Narrative   • Not on file       Family History   Problem Relation Age of Onset   • Depression Mother    • Coronary artery disease Father    • Diabetes Father    • Stroke Father    • Hypertension Father    • Diabetes Brother    • Diabetes Maternal Grandmother        Review of Systems:      Constitutional: Denies fever, shaking or chills   Eyes: Denies change in visual acuity   HEENT: Denies nasal congestion or sore throat   Respiratory: Denies cough or shortness of breath   Cardiovascular: Denies chest pain or edema  Endocrine: Denies tremors, palpitations, intolerance of heat or cold, polyuria, polydipsia.  GI: Denies abdominal pain, nausea, vomiting, bloody stools or diarrhea  : Denies frequency, urgency, incontinence, retention, or nocturia.  Musculoskeletal: Denies numbness, tingling or loss of motor function except as above  Integument: Denies rash, lesion or ulceration   Neurologic: Denies headache or focal weakness, deficits  Heme: Denies spontaneous or excessive bleeding, epistaxis, hematuria, melena, fatigue, enlarged or tender lymph nodes.      All other pertinent positives and negatives as noted above in HPI.    Physical Exam:   72 y.o. male  Vitals:    06/13/22 0906   Temp: 97.8 °F (36.6 °C)   Weight: (!) 152 kg (336 lb 3.2 oz)   Height: 185.4 cm (73\")     General:  Patient is awake and alert.  Appears in no acute distress or discomfort.    Psych:  Affect and demeanor are " appropriate.    Cardiovascular:  Regular rate and rhythm.    Lungs:  Good chest expansion.  Breathing unlabored.    Extremities: Right shoulder is examined.  Skin is benign.  Moderate anterior tenderness without effusion.  Full motion.  He remains weak with forward elevation and ER.  Negative ER lag sign.  Positive Speeds manuever.  Normal motor and sensory function in his lower arm and hand.  Palpable radial pulse.  Brisk capillary refill.    Imaging: MRI of the right shoulder is reviewed along with the associated report.  Findings are listed below:    IMPRESSION:  1. Full-thickness tear of the central to anterior supraspinatus tendon  centered 1.5 cm proximal to its insertion. This tear is larger at the  articular surface where it extends into the posterior supraspinatus  tendon and infraspinatus tendon and there is mild supraspinatus  myotendinous retraction.  2. Moderate AC and mild glenohumeral joint osteoarthritis. Small foci of  chondral debris subscapularis recess.  3. Long head biceps tendon is dislocated medially with respect to the  bicipital groove and courses into an insertional interstitial tear of  the subscapularis tendon    Assessment/Plan:  Right rotator cuff tear, long head of biceps tendon dislocation     We discussed the natural history of this condition and all available treatment options, both surgical and nonsurgical. His tear does look like it is still repairable at this point.  We thoroughly discussed the natural history of rotator cuff tears and the risk of tear progression.  He acknowledged understanding of this information.  All questions posed by him were answered in detail.  He says he is leaning towards getting this repaired but he wants to wait until the fall.  He will call when/if he decides to schedule surgery.  I did advise him he will need to get medical clearance prior to any surgery.    Noah Salcedo MD    06/13/2022

## 2022-06-29 ENCOUNTER — OFFICE VISIT (OUTPATIENT)
Dept: SLEEP MEDICINE | Facility: HOSPITAL | Age: 72
End: 2022-06-29

## 2022-06-29 VITALS — HEIGHT: 73 IN | WEIGHT: 315 LBS | BODY MASS INDEX: 41.75 KG/M2 | OXYGEN SATURATION: 95 % | HEART RATE: 95 BPM

## 2022-06-29 DIAGNOSIS — G47.14 HYPERSOMNIA DUE TO MEDICAL CONDITION: ICD-10-CM

## 2022-06-29 DIAGNOSIS — E66.01 CLASS 3 SEVERE OBESITY DUE TO EXCESS CALORIES WITH SERIOUS COMORBIDITY AND BODY MASS INDEX (BMI) OF 40.0 TO 44.9 IN ADULT: ICD-10-CM

## 2022-06-29 DIAGNOSIS — G47.33 OBSTRUCTIVE SLEEP APNEA SYNDROME: Primary | ICD-10-CM

## 2022-06-29 PROCEDURE — 99213 OFFICE O/P EST LOW 20 MIN: CPT | Performed by: INTERNAL MEDICINE

## 2022-06-29 PROCEDURE — G0463 HOSPITAL OUTPT CLINIC VISIT: HCPCS

## 2022-07-13 DIAGNOSIS — E07.9 THYROID DISEASE: ICD-10-CM

## 2022-07-13 DIAGNOSIS — E78.2 MIXED HYPERLIPIDEMIA: Primary | ICD-10-CM

## 2022-07-13 DIAGNOSIS — Z12.5 SPECIAL SCREENING FOR MALIGNANT NEOPLASM OF PROSTATE: ICD-10-CM

## 2022-07-13 DIAGNOSIS — E11.40 DIABETIC NEUROPATHY, PAINFUL: ICD-10-CM

## 2022-07-13 DIAGNOSIS — E11.9 TYPE 2 DIABETES MELLITUS WITHOUT COMPLICATION, WITHOUT LONG-TERM CURRENT USE OF INSULIN: ICD-10-CM

## 2022-07-13 RX ORDER — GABAPENTIN 300 MG/1
CAPSULE ORAL
Qty: 360 CAPSULE | Refills: 1 | Status: SHIPPED | OUTPATIENT
Start: 2022-07-13 | End: 2023-01-25

## 2022-07-20 ENCOUNTER — OFFICE VISIT (OUTPATIENT)
Dept: ORTHOPEDIC SURGERY | Facility: CLINIC | Age: 72
End: 2022-07-20

## 2022-07-20 VITALS — WEIGHT: 315 LBS | HEIGHT: 73 IN | BODY MASS INDEX: 41.75 KG/M2 | TEMPERATURE: 98 F

## 2022-07-20 DIAGNOSIS — M17.10 ARTHRITIS OF KNEE: ICD-10-CM

## 2022-07-20 DIAGNOSIS — M25.562 LEFT KNEE PAIN, UNSPECIFIED CHRONICITY: Primary | ICD-10-CM

## 2022-07-20 PROCEDURE — 99214 OFFICE O/P EST MOD 30 MIN: CPT | Performed by: ORTHOPAEDIC SURGERY

## 2022-07-20 PROCEDURE — 73562 X-RAY EXAM OF KNEE 3: CPT | Performed by: ORTHOPAEDIC SURGERY

## 2022-07-20 PROCEDURE — 20610 DRAIN/INJ JOINT/BURSA W/O US: CPT | Performed by: ORTHOPAEDIC SURGERY

## 2022-07-20 RX ORDER — METHYLPREDNISOLONE ACETATE 80 MG/ML
80 INJECTION, SUSPENSION INTRA-ARTICULAR; INTRALESIONAL; INTRAMUSCULAR; SOFT TISSUE
Status: COMPLETED | OUTPATIENT
Start: 2022-07-20 | End: 2022-07-20

## 2022-07-20 RX ADMIN — METHYLPREDNISOLONE ACETATE 80 MG: 80 INJECTION, SUSPENSION INTRA-ARTICULAR; INTRALESIONAL; INTRAMUSCULAR; SOFT TISSUE at 11:27

## 2022-07-20 NOTE — PROGRESS NOTES
Patient:Barry Cam    YOB: 1950    Medical Record Number:[id number]    Chief Complaints:  Left knee pain    History of Present Illness:     72 y.o. male patient who presents for his left knee.  This is a new complaint.  He was going upstairs about 10 days ago when he felt something pop on the inner aspect of his knee.  He does admit that he had had problems with his knee off and on over the years but there was an distinct change in the severity of his pain and symptoms since this pop.  He reports severe medial sided pain and swelling.  He reports grinding, clicking and popping in the knee since the injury happened.  He has noticed some associated stiffness.  All of his pain is medial.  Denies any other complaints or issues.    Allergies:  Allergies   Allergen Reactions   • Other Cough     Hay fever       Home Medications:    Current Outpatient Medications:   •  acetaminophen (TYLENOL) 500 MG tablet, Take 500 mg by mouth Every 6 (Six) Hours As Needed for Mild Pain ., Disp: , Rfl:   •  albuterol sulfate HFA (Ventolin HFA) 108 (90 Base) MCG/ACT inhaler, Inhale 2 puffs Every 4 (Four) Hours As Needed for Wheezing or Shortness of Air. for wheezing, Disp: 18 g, Rfl: 5  •  amLODIPine (NORVASC) 10 MG tablet, Take 1 tablet by mouth Daily., Disp: 90 tablet, Rfl: 3  •  apixaban (Eliquis) 5 MG tablet tablet, Take 1 tablet by mouth Every 12 (Twelve) Hours., Disp: 60 tablet, Rfl: 5  •  betamethasone valerate (VALISONE) 0.1 % cream, APPLY CREAM TOPICALLY TO AFFECTED AREA(S) TO SKIN TWICE DAILY, Disp: 15 g, Rfl: 1  •  caffeine 200 MG tablet, Take 200 mg by mouth Every 4 (Four) Hours As Needed for Headache., Disp: , Rfl:   •  Copper Gluconate (COPPER CAPS PO), Take  by mouth., Disp: , Rfl:   •  diphenhydrAMINE (BENADRYL) 50 MG capsule, Take 50 mg by mouth Every 6 (Six) Hours As Needed for Itching., Disp: , Rfl:   •  gabapentin (NEURONTIN) 300 MG capsule, TAKE 2 CAPSULES BY MOUTH 2 TIMES A DAY., Disp: 360 capsule,  Rfl: 1  •  glimepiride (AMARYL) 2 MG tablet, Take 1 tablet by mouth Every Morning Before Breakfast., Disp: 90 tablet, Rfl: 3  •  Glucosamine-Chondroit-Vit C-Mn (GLUCOSAMINE CHONDR 500 COMPLEX) capsule, Take 2 capsules by mouth daily., Disp: , Rfl:   •  hydroCHLOROthiazide (HYDRODIURIL) 12.5 MG tablet, Take 1 tablet by mouth Daily., Disp: 90 tablet, Rfl: 3  •  levothyroxine (SYNTHROID, LEVOTHROID) 25 MCG tablet, TAKE 1 TABLET BY MOUTH EVERY DAY, Disp: 90 tablet, Rfl: 3  •  losartan (COZAAR) 100 MG tablet, TAKE 1 TABLET BY MOUTH EVERY DAY, Disp: 90 tablet, Rfl: 3  •  LUTEIN PO, Take 5 mg by mouth Daily., Disp: , Rfl:   •  Melatonin 10 MG tablet, , Disp: , Rfl:   •  metFORMIN (GLUCOPHAGE) 1000 MG tablet, TAKE 1 TABLET BY MOUTH TWICE A DAY, Disp: 180 tablet, Rfl: 3  •  modafinil (PROVIGIL) 200 MG tablet, Take 1 tablet by mouth 2 (Two) Times a Day., Disp: 180 tablet, Rfl: 0  •  MULTIPLE VITAMINS ESSENTIAL PO, Take  by mouth daily., Disp: , Rfl:   •  omeprazole (priLOSEC) 20 MG capsule, TAKE 1 CAPSULE BY MOUTH EVERY DAY, Disp: 90 capsule, Rfl: 2  •  OneTouch Delica Lancets 33G misc, USE TO TEST BLOOD SUGAR 3 TIMES DAILY DIABETES TYPE 2 E11.9, Disp: 100 each, Rfl: 5  •  OneTouch Ultra test strip, USE TO TEST BLOOD SUGAR 3 TIMES DAILY, Disp: 1 each, Rfl: 5  •  pioglitazone (ACTOS) 30 MG tablet, TAKE 1 TABLET BY MOUTH EVERY DAY, Disp: 90 tablet, Rfl: 3  •  pravastatin (PRAVACHOL) 40 MG tablet, Take 1 tablet by mouth Daily., Disp: 90 tablet, Rfl: 3  •  SITagliptin (Januvia) 100 MG tablet, Take 1 tablet by mouth Daily., Disp: 90 tablet, Rfl: 3  •  Triamcinolone Acetonide (NASACORT) 55 MCG/ACT nasal inhaler, 2 sprays into the nostril(s) as directed by provider Daily., Disp: , Rfl:   •  vitamin C (ASCORBIC ACID) 250 MG tablet, Take 250 mg by mouth Daily., Disp: , Rfl:   •  vitamin E 200 UNIT capsule, Take 200 Units by mouth Daily., Disp: , Rfl:   •  Zeaxanthin powder, , Disp: , Rfl:   •  Zinc Sulfate (ZINC 15 PO), Take  by  mouth., Disp: , Rfl:     Past Medical History:   Diagnosis Date   • Allergic    • Allergic rhinitis    • Anxiety    • Depression    • Depression    • Diabetes mellitus (HCC)    • DUGGAN (dyspnea on exertion)    • Edema    • GERD (gastroesophageal reflux disease)    • History of hiatal hernia    • History of kidney stones    • Hx of blood clots    • Hyperglycemia    • Hyperlipidemia    • Hypertension    • Pulmonary embolism (HCC)    • Sciatica    • Sleep apnea    • SOB (shortness of breath)    • Thyroid disease        Past Surgical History:   Procedure Laterality Date   • HERNIA REPAIR     • HERNIA REPAIR     • TONSILLECTOMY         Social History     Occupational History   • Not on file   Tobacco Use   • Smoking status: Never Smoker   • Smokeless tobacco: Never Used   • Tobacco comment: caffeine use   Substance and Sexual Activity   • Alcohol use: No   • Drug use: Defer   • Sexual activity: Defer      Social History     Social History Narrative   • Not on file       Family History   Problem Relation Age of Onset   • Depression Mother    • Coronary artery disease Father    • Diabetes Father    • Stroke Father    • Hypertension Father    • Diabetes Brother    • Diabetes Maternal Grandmother        Review of Systems:      Constitutional: Denies fever, shaking or chills   Eyes: Denies change in visual acuity   HEENT: Denies nasal congestion or sore throat   Respiratory: Denies cough or shortness of breath   Cardiovascular: Denies chest pain or edema  Endocrine: Denies tremors, palpitations, intolerance of heat or cold, polyuria, polydipsia.  GI: Denies abdominal pain, nausea, vomiting, bloody stools or diarrhea  : Denies frequency, urgency, incontinence, retention, or nocturia.  Musculoskeletal: Denies numbness, tingling or loss of motor function except as above  Integument: Denies rash, lesion or ulceration   Neurologic: Denies headache or focal weakness, deficits  Heme: Denies spontaneous or excessive bleeding,  "epistaxis, hematuria, melena, fatigue, enlarged or tender lymph nodes.      All other pertinent positives and negatives as noted above in HPI.    Physical Exam:72 y.o. male  Vitals:    07/20/22 1057   Temp: 98 °F (36.7 °C)   Weight: (!) 154 kg (339 lb)   Height: 185.4 cm (72.99\")       General:  Patient is awake and alert.  Appears in no acute distress or discomfort.    Psych:  Affect and demeanor are appropriate.    Extremities: Left knee is examined.  Skin is benign.  He has a large effusion.  He has significant medial joint line tenderness and a positive Per's test for pain.  He can fully extend the knee.  Flexion is to about 110 degrees.  Hyperflexion is uncomfortable.  His knee is stable although both varus and valgus stress are uncomfortable.  He has normal motor and sensory function throughout the leg and foot.  Palpable pedal pulses.    Imaging: AP, merchant's and lateral views of the left knee are ordered and reviewed evaluate his complaint.  No comparison films are available.  He has medial joint space narrowing measuring 1.8 mm.  No acute abnormalities or other concerning findings.    Assessment/Plan: Left knee osteoarthritis with suspected degenerative medial meniscal tear    I think he has a meniscus tear.  That would explain the change in his symptoms as well as the mechanical nature of his symptoms.  He has actually been doing some online research about this and he agrees.  He asked about the surgery for the meniscus.  I told him that an arthroscopy to address his meniscus could put him at risk for exacerbation of his arthritis.  The only indication for arthroscopic surgery would be to address the mechanical symptoms but ultimately he is likely to require replacement.  My recommendation today is that we try managing this conservatively.  If we can get him better with conservative treatment then hopefully we can put off an arthroplasty indefinitely.  If conservative treatment fails then I would " recommend further work-up but, again, I think the more predictable surgical option for him is likely to be an arthroplasty.  We had a long discussion.  I answered all of his questions in detail.  He elected to try an injection today.  The risk, benefits and alternatives were discussed.  I specifically outlined for him the increased risk with his diabetes.  He acknowledged understanding and consented to the injection.  This was performed as described below without complication.  I told him to call me if the pain persist or recurs and I will see him back.    Large Joint Arthrocentesis: L knee  Date/Time: 7/20/2022 11:27 AM  Consent given by: patient  Site marked: site marked  Timeout: Immediately prior to procedure a time out was called to verify the correct patient, procedure, equipment, support staff and site/side marked as required   Supporting Documentation  Indications: pain and joint swelling   Procedure Details  Location: knee - L knee  Preparation: Patient was prepped and draped in the usual sterile fashion  Needle gauge: 21g.  Approach: anterolateral  Medications administered: 80 mg methylPREDNISolone acetate 80 MG/ML; 1 mL lidocaine (cardiac)  Patient tolerance: patient tolerated the procedure well with no immediate complications          Noah Salcedo MD    07/20/2022

## 2022-07-28 ENCOUNTER — OFFICE VISIT (OUTPATIENT)
Dept: FAMILY MEDICINE CLINIC | Facility: CLINIC | Age: 72
End: 2022-07-28

## 2022-07-28 VITALS
WEIGHT: 315 LBS | TEMPERATURE: 98.5 F | OXYGEN SATURATION: 96 % | SYSTOLIC BLOOD PRESSURE: 134 MMHG | RESPIRATION RATE: 18 BRPM | DIASTOLIC BLOOD PRESSURE: 60 MMHG | BODY MASS INDEX: 41.75 KG/M2 | HEIGHT: 73 IN | HEART RATE: 85 BPM

## 2022-07-28 DIAGNOSIS — E07.9 THYROID DISEASE: ICD-10-CM

## 2022-07-28 DIAGNOSIS — G47.33 OBSTRUCTIVE SLEEP APNEA SYNDROME: ICD-10-CM

## 2022-07-28 DIAGNOSIS — Z20.822 CLOSE EXPOSURE TO COVID-19 VIRUS: Primary | ICD-10-CM

## 2022-07-28 DIAGNOSIS — E66.01 CLASS 3 SEVERE OBESITY DUE TO EXCESS CALORIES WITH SERIOUS COMORBIDITY AND BODY MASS INDEX (BMI) OF 40.0 TO 44.9 IN ADULT: ICD-10-CM

## 2022-07-28 DIAGNOSIS — E11.9 TYPE 2 DIABETES MELLITUS WITHOUT COMPLICATION, WITHOUT LONG-TERM CURRENT USE OF INSULIN: ICD-10-CM

## 2022-07-28 DIAGNOSIS — I10 HYPERTENSION, UNSPECIFIED TYPE: ICD-10-CM

## 2022-07-28 DIAGNOSIS — E78.2 MIXED HYPERLIPIDEMIA: ICD-10-CM

## 2022-07-28 DIAGNOSIS — K21.9 GASTROESOPHAGEAL REFLUX DISEASE, UNSPECIFIED WHETHER ESOPHAGITIS PRESENT: ICD-10-CM

## 2022-07-28 DIAGNOSIS — Z86.711 HISTORY OF PULMONARY EMBOLISM: ICD-10-CM

## 2022-07-28 PROCEDURE — 99214 OFFICE O/P EST MOD 30 MIN: CPT | Performed by: INTERNAL MEDICINE

## 2022-07-28 NOTE — PROGRESS NOTES
Subjective   Barry Cam is a 72 y.o. male. Patient is here today for follow-up on his hypertension, hyperlipidemia, history of PE, obesity, diabetes mellitus type 2, hypothyroidism, GERD.  The patient also tells me that his wife was tested positive for COVID-19 yesterday and is mildly ill.  Patient himself is asymptomatic.    Chief Complaint   Patient presents with   • Follow-up     Pt here for 6 month f/u.           Vitals:    07/28/22 0752   BP: 134/60   Pulse: 85   Resp: 18   Temp: 98.5 °F (36.9 °C)   SpO2: 96%     Body mass index is 44.74 kg/m².  The following portions of the patient's history were reviewed and updated as appropriate: allergies, current medications, past family history, past medical history, past social history, past surgical history and problem list.    Past Medical History:   Diagnosis Date   • Allergic    • Allergic rhinitis    • Anxiety    • Depression    • Depression    • Diabetes mellitus (HCC)    • DUGGAN (dyspnea on exertion)    • Edema    • GERD (gastroesophageal reflux disease)    • History of hiatal hernia    • History of kidney stones    • Hx of blood clots    • Hyperglycemia    • Hyperlipidemia    • Hypertension    • Pulmonary embolism (HCC)    • Sciatica    • Sleep apnea    • SOB (shortness of breath)    • Thyroid disease       Allergies   Allergen Reactions   • Other Cough     Hay fever      Social History     Socioeconomic History   • Marital status:    Tobacco Use   • Smoking status: Never Smoker   • Smokeless tobacco: Never Used   • Tobacco comment: caffeine use   Substance and Sexual Activity   • Alcohol use: No   • Drug use: Defer   • Sexual activity: Defer        Current Outpatient Medications:   •  acetaminophen (TYLENOL) 500 MG tablet, Take 500 mg by mouth Every 6 (Six) Hours As Needed for Mild Pain ., Disp: , Rfl:   •  albuterol sulfate HFA (Ventolin HFA) 108 (90 Base) MCG/ACT inhaler, Inhale 2 puffs Every 4 (Four) Hours As Needed for Wheezing or Shortness of  Air. for wheezing, Disp: 18 g, Rfl: 5  •  amLODIPine (NORVASC) 10 MG tablet, Take 1 tablet by mouth Daily., Disp: 90 tablet, Rfl: 3  •  apixaban (Eliquis) 5 MG tablet tablet, Take 1 tablet by mouth Every 12 (Twelve) Hours., Disp: 60 tablet, Rfl: 5  •  betamethasone valerate (VALISONE) 0.1 % cream, APPLY CREAM TOPICALLY TO AFFECTED AREA(S) TO SKIN TWICE DAILY, Disp: 15 g, Rfl: 1  •  caffeine 200 MG tablet, Take 200 mg by mouth Every 4 (Four) Hours As Needed for Headache., Disp: , Rfl:   •  Copper Gluconate (COPPER CAPS PO), Take  by mouth., Disp: , Rfl:   •  diphenhydrAMINE (BENADRYL) 50 MG capsule, Take 50 mg by mouth Every 6 (Six) Hours As Needed for Itching., Disp: , Rfl:   •  gabapentin (NEURONTIN) 300 MG capsule, TAKE 2 CAPSULES BY MOUTH 2 TIMES A DAY., Disp: 360 capsule, Rfl: 1  •  glimepiride (AMARYL) 2 MG tablet, Take 1 tablet by mouth Every Morning Before Breakfast., Disp: 90 tablet, Rfl: 3  •  Glucosamine-Chondroit-Vit C-Mn (GLUCOSAMINE CHONDR 500 COMPLEX) capsule, Take 2 capsules by mouth daily., Disp: , Rfl:   •  hydroCHLOROthiazide (HYDRODIURIL) 12.5 MG tablet, Take 1 tablet by mouth Daily., Disp: 90 tablet, Rfl: 3  •  levothyroxine (SYNTHROID, LEVOTHROID) 25 MCG tablet, TAKE 1 TABLET BY MOUTH EVERY DAY, Disp: 90 tablet, Rfl: 3  •  losartan (COZAAR) 100 MG tablet, TAKE 1 TABLET BY MOUTH EVERY DAY, Disp: 90 tablet, Rfl: 3  •  LUTEIN PO, Take 5 mg by mouth Daily., Disp: , Rfl:   •  Melatonin 10 MG tablet, , Disp: , Rfl:   •  metFORMIN (GLUCOPHAGE) 1000 MG tablet, TAKE 1 TABLET BY MOUTH TWICE A DAY, Disp: 180 tablet, Rfl: 3  •  modafinil (PROVIGIL) 200 MG tablet, Take 1 tablet by mouth 2 (Two) Times a Day., Disp: 180 tablet, Rfl: 0  •  MULTIPLE VITAMINS ESSENTIAL PO, Take  by mouth daily., Disp: , Rfl:   •  omeprazole (priLOSEC) 20 MG capsule, TAKE 1 CAPSULE BY MOUTH EVERY DAY, Disp: 90 capsule, Rfl: 2  •  OneTouch Delica Lancets 33G misc, USE TO TEST BLOOD SUGAR 3 TIMES DAILY DIABETES TYPE 2 E11.9, Disp:  100 each, Rfl: 5  •  OneTouch Ultra test strip, USE TO TEST BLOOD SUGAR 3 TIMES DAILY, Disp: 1 each, Rfl: 5  •  pioglitazone (ACTOS) 30 MG tablet, TAKE 1 TABLET BY MOUTH EVERY DAY, Disp: 90 tablet, Rfl: 3  •  pravastatin (PRAVACHOL) 40 MG tablet, Take 1 tablet by mouth Daily., Disp: 90 tablet, Rfl: 3  •  SITagliptin (Januvia) 100 MG tablet, Take 1 tablet by mouth Daily., Disp: 90 tablet, Rfl: 3  •  Triamcinolone Acetonide (NASACORT) 55 MCG/ACT nasal inhaler, 2 sprays into the nostril(s) as directed by provider Daily., Disp: , Rfl:   •  vitamin C (ASCORBIC ACID) 250 MG tablet, Take 250 mg by mouth Daily., Disp: , Rfl:   •  vitamin E 200 UNIT capsule, Take 200 Units by mouth Daily., Disp: , Rfl:   •  Zeaxanthin powder, , Disp: , Rfl:   •  Zinc Sulfate (ZINC 15 PO), Take  by mouth., Disp: , Rfl:      Objective     History of Present Illness     Review of Systems   All other systems reviewed and are negative.      Physical Exam  Vitals and nursing note reviewed.   Constitutional:       General: He is not in acute distress.     Appearance: Normal appearance. He is obese. He is not ill-appearing.   HENT:      Head: Normocephalic and atraumatic.   Cardiovascular:      Rate and Rhythm: Normal rate and regular rhythm.      Heart sounds: Normal heart sounds.   Pulmonary:      Effort: Pulmonary effort is normal. No respiratory distress.      Breath sounds: Normal breath sounds. No wheezing or rales.   Skin:     General: Skin is warm and dry.   Neurological:      General: No focal deficit present.      Mental Status: He is alert and oriented to person, place, and time.   Psychiatric:         Mood and Affect: Mood normal.         Behavior: Behavior normal.         ASSESSMENT CBC is normal.  CMP has a sugar of 129 and is otherwise normal.  Lipid panel has total cholesterol 179, HDL 51, LDL 90.  Hemoglobin A1c is a bit higher but acceptable at 6.8.  TSH is normal on supplement and PSA remains low normal and  stable.  #1-hypertension controlled on medication  #2-hyperlipidemia controlled on medication  #3-diabetes mellitus type 2 with acceptable hemoglobin A1c  #4-obesity with no significant weight loss  #5-hypothyroidism, euthyroid on replacement  #6-GERD, stable on omeprazole  #7-close exposure to COVID-19 infection, his wife is ill and positive     Problems Addressed this Visit        Cardiac and Vasculature    Hyperlipidemia    Hypertension       Coag and Thromboembolic    History of pulmonary embolism       Endocrine and Metabolic    Diabetes mellitus ([facility])    Class 3 severe obesity due to excess calories with serious comorbidity and body mass index (BMI) of 40.0 to 44.9 in adult ([facility])    Thyroid disease       Gastrointestinal Abdominal     Gastroesophageal reflux disease       Sleep    Obstructive sleep apnea syndrome treated with auto CPAP      Other Visit Diagnoses     Close exposure to COVID-19 virus    -  Primary    Relevant Orders    COVID-19,[facility] ROUTINE, NP/OP SWAB IN TRANSPORT MEDIA OR ESWAB 72 HR TAT - Swab, Nasopharynx      Diagnoses       Codes Comments    Close exposure to COVID-19 virus    -  Primary ICD-10-CM: Z20.822  ICD-9-CM: V01.79     Mixed hyperlipidemia     ICD-10-CM: E78.2  ICD-9-CM: 272.2     Hypertension, unspecified type     ICD-10-CM: I10  ICD-9-CM: 401.9     History of pulmonary embolism     ICD-10-CM: Z86.711  ICD-9-CM: V12.55     Class 3 severe obesity due to excess calories with serious comorbidity and body mass index (BMI) of 40.0 to 44.9 in adult ([facility])     ICD-10-CM: E66.01, Z68.41  ICD-9-CM: 278.01, V85.41     Type 2 diabetes mellitus without complication, without long-term current use of insulin ([facility])     ICD-10-CM: E11.9  ICD-9-CM: 250.00     Thyroid disease     ICD-10-CM: E07.9  ICD-9-CM: 246.9     Gastroesophageal reflux disease, unspecified whether esophagitis present     ICD-10-CM: K21.9  ICD-9-CM: 530.81     Obstructive sleep apnea syndrome treated with auto CPAP      ICD-10-CM: G47.33  ICD-9-CM: 327.23           PLAN the patient will continue current medicines as now.  I encouraged him to try and lose weight.  He is being tested for COVID-19 at the office today and we will review those results when available.  He would be a candidate for paxlovid.  I recommended he get a flu shot in October and the fourth COVID-19 vaccination.  He also will self quarantine.  I plan on rechecking him in 6 months with laboratory studies  There are no Patient Instructions on file for this visit.  Return in about 6 months (around 1/28/2023) for with labs.

## 2022-07-29 LAB
LABCORP SARS-COV-2, NAA 2 DAY TAT: NORMAL
SARS-COV-2 RNA RESP QL NAA+PROBE: NOT DETECTED

## 2022-08-01 ENCOUNTER — TELEPHONE (OUTPATIENT)
Dept: FAMILY MEDICINE CLINIC | Facility: CLINIC | Age: 72
End: 2022-08-01

## 2022-08-01 NOTE — TELEPHONE ENCOUNTER
Hub staff attempted to follow warm transfer process and was unsuccessful     Caller: Barry Cam    Relationship to patient: Self    Best call back number: [phone]    Patient is needing: PATIENT NEEDS TO SCHEDULE LABS. PLEASE REACH OUT TO SCHEDULE WITH PATIENT.

## 2022-08-17 RX ORDER — AMLODIPINE BESYLATE 10 MG/1
TABLET ORAL
Qty: 90 TABLET | Refills: 3 | Status: SHIPPED | OUTPATIENT
Start: 2022-08-17

## 2022-09-07 RX ORDER — LOSARTAN POTASSIUM 100 MG/1
TABLET ORAL
Qty: 90 TABLET | Refills: 3 | Status: SHIPPED | OUTPATIENT
Start: 2022-09-07

## 2022-09-07 RX ORDER — OMEPRAZOLE 20 MG/1
CAPSULE, DELAYED RELEASE ORAL
Qty: 90 CAPSULE | Refills: 2 | Status: SHIPPED | OUTPATIENT
Start: 2022-09-07

## 2022-09-16 RX ORDER — APIXABAN 5 MG/1
TABLET, FILM COATED ORAL
Qty: 60 TABLET | Refills: 5 | Status: SHIPPED | OUTPATIENT
Start: 2022-09-16 | End: 2023-03-13

## 2022-10-04 ENCOUNTER — PREP FOR SURGERY (OUTPATIENT)
Dept: OTHER | Facility: HOSPITAL | Age: 72
End: 2022-10-04

## 2022-10-04 DIAGNOSIS — M17.10 ARTHRITIS OF KNEE: Primary | ICD-10-CM

## 2022-10-04 RX ORDER — PREGABALIN 75 MG/1
150 CAPSULE ORAL ONCE
Status: CANCELLED | OUTPATIENT
Start: 2023-03-02 | End: 2022-10-04

## 2022-10-04 RX ORDER — ACETAMINOPHEN 500 MG
1000 TABLET ORAL ONCE
Status: CANCELLED | OUTPATIENT
Start: 2023-03-02 | End: 2022-10-04

## 2022-10-04 RX ORDER — MELOXICAM 15 MG/1
15 TABLET ORAL ONCE
Status: CANCELLED | OUTPATIENT
Start: 2023-03-02 | End: 2022-10-04

## 2022-10-04 RX ORDER — CHLORHEXIDINE GLUCONATE 500 MG/1
1 CLOTH TOPICAL TAKE AS DIRECTED
Status: CANCELLED | OUTPATIENT
Start: 2022-10-04

## 2022-10-04 RX ORDER — CEFAZOLIN SODIUM IN 0.9 % NACL 3 G/100 ML
3 INTRAVENOUS SOLUTION, PIGGYBACK (ML) INTRAVENOUS ONCE
Status: CANCELLED | OUTPATIENT
Start: 2023-03-02 | End: 2022-10-04

## 2022-12-01 ENCOUNTER — OFFICE VISIT (OUTPATIENT)
Dept: SLEEP MEDICINE | Facility: HOSPITAL | Age: 72
End: 2022-12-01

## 2022-12-01 VITALS — WEIGHT: 315 LBS | OXYGEN SATURATION: 97 % | HEIGHT: 73 IN | BODY MASS INDEX: 41.75 KG/M2 | HEART RATE: 95 BPM

## 2022-12-01 DIAGNOSIS — G47.33 OBSTRUCTIVE SLEEP APNEA SYNDROME: Primary | ICD-10-CM

## 2022-12-01 DIAGNOSIS — E66.01 CLASS 3 SEVERE OBESITY DUE TO EXCESS CALORIES WITH SERIOUS COMORBIDITY AND BODY MASS INDEX (BMI) OF 45.0 TO 49.9 IN ADULT: ICD-10-CM

## 2022-12-01 DIAGNOSIS — G47.14 HYPERSOMNIA DUE TO MEDICAL CONDITION: ICD-10-CM

## 2022-12-01 PROCEDURE — G0463 HOSPITAL OUTPT CLINIC VISIT: HCPCS

## 2022-12-01 PROCEDURE — 99213 OFFICE O/P EST LOW 20 MIN: CPT | Performed by: INTERNAL MEDICINE

## 2022-12-04 NOTE — PROGRESS NOTES
"Follow Up [facility] Note     Chief Complaint:  GRICEL     Primary Care Physician: Cristi Gerber MD    Interval History:   The patient is a 72 y.o. male  who I last saw 6/29/2022 and that note was reviewed.  The patient received a new ResMed auto CPAP device and he is here today for follow-up.  He states he is unchanged but he likes the new device better than his old one.  The patient's bedtime and awake time varies.  He will use the restroom during his bedtime.    Review of Systems:    A complete review of systems was done and all were negative with the exception of the above    Social History:    Social History     Socioeconomic History   • Marital status:    Tobacco Use   • Smoking status: Never   • Smokeless tobacco: Never   • Tobacco comments:     caffeine use   Substance and Sexual Activity   • Alcohol use: No   • Drug use: No   • Sexual activity: Not Currently     Partners: Female     Birth control/protection: Other       Allergies:  Other     Medication Review: His list was reviewed.      Vital Signs:    Vitals:    12/01/22 0900   Pulse: 95   SpO2: 97%   Weight: (!) 158 kg (349 lb)   Height: 185.4 cm (72.99\")     Body mass index is 46.06 kg/m².    Physical Exam:    Constitutional:  Well developed 72 y.o. male that appears in no apparent distress.  Awake & oriented times 3.  Normal mood with normal recent and remote memory and normal judgement.  Eyes:  Conjunctivae normal.  Oropharynx: Previously, moist mucous membranes without exudate and a large tongue and uvula and mild narrowing of the posterior pharyngeal opening and class II-3 Mallampati airway, patient is wearing a facemask.    Self-administered [place] Sleepiness Scale test results: 12, previously 11  0-5 Lower normal daytime sleepiness  6-10 Higher normal daytime sleepiness  11-12 Mild, 13-15 Moderate, & 16-24 Severe excessive daytime sleepiness     Downloaded PAP Data Reviewed For Compliance:  DME is [facility] then he uses a " fullface mask.  Downloads between 10/27 and 11/29/2022 compliance 100%.  Average usage is 10 hours and 27 minutes.  Average AHI is mildly abnormal at 8.3 but all subsets are normal and he has no significant leak.  Average auto CPAP pressure is 14.3 and his ResMed auto CPAP is 10-15    I have reviewed the above results and compared them with the patient's last downloads and reviewed with the patient.    Impression:   Obstructive sleep apnea adequately treated with ResMed auto CPAP. The patient appears to be at goal with good compliance and usage. The patient has persistent complaints of hypersomnolence.    Plan:  Good sleep hygiene measures should be maintained.  Weight loss would be beneficial in this patient who has class III severe obesity by Body mass index is 46.06 kg/m²..      After evaluating the patient and assessing results available, the patient is benefiting from the treatment being provided.     The patient will continue ResMed auto CPAP.  After clinical evaluation and review of downloads, I recommend no changes to the patient's pressures.  A new prescription will be sent to the patient's DME.    Previously, the patient did use armodafinil.  However, price is too high for him to take.  I asked him to see if he could check on the prices modafinil.  Additionally, it is noted on [facility] that the patient takes gabapentin which can contribute to his hypersomnolence.  He will let me know.    I answered all of the patient's questions.  The patient will call for any problems and will follow up in 1 year.      Barry Gillespie MD  Sleep Medicine  12/04/22  18:40 EST

## 2022-12-05 RX ORDER — PIOGLITAZONEHYDROCHLORIDE 30 MG/1
TABLET ORAL
Qty: 90 TABLET | Refills: 3 | Status: SHIPPED | OUTPATIENT
Start: 2022-12-05

## 2023-01-10 PROBLEM — M17.10 ARTHRITIS OF KNEE: Status: ACTIVE | Noted: 2023-01-10

## 2023-01-16 RX ORDER — LEVOTHYROXINE SODIUM 0.03 MG/1
TABLET ORAL
Qty: 90 TABLET | Refills: 3 | Status: SHIPPED | OUTPATIENT
Start: 2023-01-16

## 2023-01-16 RX ORDER — ALBUTEROL SULFATE 90 UG/1
2 AEROSOL, METERED RESPIRATORY (INHALATION) EVERY 4 HOURS PRN
Qty: 18 G | Refills: 5 | Status: SHIPPED | OUTPATIENT
Start: 2023-01-16

## 2023-01-19 LAB
ALBUMIN SERPL-MCNC: 4.7 G/DL (ref 3.5–5.2)
ALBUMIN/CREAT UR: 6 MG/G CREAT (ref 0–29)
ALBUMIN/GLOB SERPL: 2.2 G/DL
ALP SERPL-CCNC: 55 U/L (ref 39–117)
ALT SERPL-CCNC: 18 U/L (ref 1–41)
AST SERPL-CCNC: 26 U/L (ref 1–40)
BASOPHILS # BLD AUTO: 0.04 10*3/MM3 (ref 0–0.2)
BASOPHILS NFR BLD AUTO: 0.6 % (ref 0–1.5)
BILIRUB SERPL-MCNC: 0.3 MG/DL (ref 0–1.2)
BUN SERPL-MCNC: 13 MG/DL (ref 8–23)
BUN/CREAT SERPL: 13.3 (ref 7–25)
CALCIUM SERPL-MCNC: 9.8 MG/DL (ref 8.6–10.5)
CHLORIDE SERPL-SCNC: 105 MMOL/L (ref 98–107)
CHOLEST SERPL-MCNC: 219 MG/DL (ref 0–200)
CO2 SERPL-SCNC: 22.9 MMOL/L (ref 22–29)
CREAT SERPL-MCNC: 0.98 MG/DL (ref 0.76–1.27)
CREAT UR-MCNC: 152.9 MG/DL
EGFRCR SERPLBLD CKD-EPI 2021: 83 ML/MIN/1.73
EOSINOPHIL # BLD AUTO: 0.19 10*3/MM3 (ref 0–0.4)
EOSINOPHIL NFR BLD AUTO: 2.7 % (ref 0.3–6.2)
ERYTHROCYTE [DISTWIDTH] IN BLOOD BY AUTOMATED COUNT: 13.6 % (ref 12.3–15.4)
GLOBULIN SER CALC-MCNC: 2.1 GM/DL
GLUCOSE SERPL-MCNC: 153 MG/DL (ref 65–99)
HCT VFR BLD AUTO: 37.3 % (ref 37.5–51)
HDLC SERPL-MCNC: 51 MG/DL (ref 40–60)
HGB BLD-MCNC: 12.8 G/DL (ref 13–17.7)
IMM GRANULOCYTES # BLD AUTO: 0.03 10*3/MM3 (ref 0–0.05)
IMM GRANULOCYTES NFR BLD AUTO: 0.4 % (ref 0–0.5)
LDLC SERPL CALC-MCNC: 107 MG/DL (ref 0–100)
LDLC/HDLC SERPL: 1.89 {RATIO}
LYMPHOCYTES # BLD AUTO: 1.64 10*3/MM3 (ref 0.7–3.1)
LYMPHOCYTES NFR BLD AUTO: 23 % (ref 19.6–45.3)
MCH RBC QN AUTO: 31.3 PG (ref 26.6–33)
MCHC RBC AUTO-ENTMCNC: 34.3 G/DL (ref 31.5–35.7)
MCV RBC AUTO: 91.2 FL (ref 79–97)
MICROALBUMIN UR-MCNC: 8.6 UG/ML
MONOCYTES # BLD AUTO: 0.46 10*3/MM3 (ref 0.1–0.9)
MONOCYTES NFR BLD AUTO: 6.5 % (ref 5–12)
NEUTROPHILS # BLD AUTO: 4.77 10*3/MM3 (ref 1.7–7)
NEUTROPHILS NFR BLD AUTO: 66.8 % (ref 42.7–76)
NRBC BLD AUTO-RTO: 0 /100 WBC (ref 0–0.2)
PLATELET # BLD AUTO: 204 10*3/MM3 (ref 140–450)
POTASSIUM SERPL-SCNC: 4 MMOL/L (ref 3.5–5.2)
PROT SERPL-MCNC: 6.8 G/DL (ref 6–8.5)
RBC # BLD AUTO: 4.09 10*6/MM3 (ref 4.14–5.8)
SODIUM SERPL-SCNC: 141 MMOL/L (ref 136–145)
T4 FREE SERPL-MCNC: 1.04 NG/DL (ref 0.93–1.7)
TRIGL SERPL-MCNC: 357 MG/DL (ref 0–150)
TSH SERPL DL<=0.005 MIU/L-ACNC: 3.29 UIU/ML (ref 0.27–4.2)
VLDLC SERPL CALC-MCNC: 61 MG/DL (ref 5–40)
WBC # BLD AUTO: 7.13 10*3/MM3 (ref 3.4–10.8)

## 2023-01-20 LAB
HBA1C MFR BLD: 7.4 % (ref 4.8–5.6)
Lab: NORMAL
WRITTEN AUTHORIZATION: NORMAL

## 2023-01-25 ENCOUNTER — OFFICE VISIT (OUTPATIENT)
Dept: FAMILY MEDICINE CLINIC | Facility: CLINIC | Age: 73
End: 2023-01-25
Payer: MEDICARE

## 2023-01-25 VITALS
HEIGHT: 73 IN | RESPIRATION RATE: 16 BRPM | BODY MASS INDEX: 41.75 KG/M2 | OXYGEN SATURATION: 95 % | DIASTOLIC BLOOD PRESSURE: 80 MMHG | HEART RATE: 78 BPM | WEIGHT: 315 LBS | TEMPERATURE: 97.3 F | SYSTOLIC BLOOD PRESSURE: 135 MMHG

## 2023-01-25 DIAGNOSIS — E07.9 THYROID DISEASE: ICD-10-CM

## 2023-01-25 DIAGNOSIS — E11.9 TYPE 2 DIABETES MELLITUS WITHOUT COMPLICATION, WITHOUT LONG-TERM CURRENT USE OF INSULIN: ICD-10-CM

## 2023-01-25 DIAGNOSIS — E78.2 MIXED HYPERLIPIDEMIA: ICD-10-CM

## 2023-01-25 DIAGNOSIS — E11.40 DIABETIC NEUROPATHY, PAINFUL: ICD-10-CM

## 2023-01-25 DIAGNOSIS — R53.82 CHRONIC FATIGUE: ICD-10-CM

## 2023-01-25 DIAGNOSIS — J30.2 SEASONAL ALLERGIC RHINITIS, UNSPECIFIED TRIGGER: Primary | ICD-10-CM

## 2023-01-25 DIAGNOSIS — I10 HYPERTENSION, UNSPECIFIED TYPE: ICD-10-CM

## 2023-01-25 DIAGNOSIS — E66.01 CLASS 3 SEVERE OBESITY DUE TO EXCESS CALORIES WITH SERIOUS COMORBIDITY AND BODY MASS INDEX (BMI) OF 45.0 TO 49.9 IN ADULT: ICD-10-CM

## 2023-01-25 DIAGNOSIS — M17.10 ARTHRITIS OF KNEE: ICD-10-CM

## 2023-01-25 DIAGNOSIS — K21.9 GASTROESOPHAGEAL REFLUX DISEASE, UNSPECIFIED WHETHER ESOPHAGITIS PRESENT: ICD-10-CM

## 2023-01-25 PROCEDURE — 99214 OFFICE O/P EST MOD 30 MIN: CPT | Performed by: INTERNAL MEDICINE

## 2023-01-25 RX ORDER — DEXTROAMPHETAMINE SACCHARATE, AMPHETAMINE ASPARTATE, DEXTROAMPHETAMINE SULFATE AND AMPHETAMINE SULFATE 5; 5; 5; 5 MG/1; MG/1; MG/1; MG/1
20 TABLET ORAL DAILY
Qty: 30 TABLET | Refills: 0 | Status: SHIPPED | OUTPATIENT
Start: 2023-01-25 | End: 2023-02-23 | Stop reason: SDUPTHER

## 2023-01-25 RX ORDER — GABAPENTIN 300 MG/1
CAPSULE ORAL
Qty: 360 CAPSULE | Refills: 1 | Status: SHIPPED | OUTPATIENT
Start: 2023-01-25

## 2023-01-25 RX ORDER — GLIMEPIRIDE 4 MG/1
4 TABLET ORAL
Qty: 90 TABLET | Refills: 3 | Status: SHIPPED | OUTPATIENT
Start: 2023-01-25

## 2023-01-25 NOTE — PROGRESS NOTES
Subjective   Barry Cam is a 72 y.o. male. Patient is here today for follow-up on his hyperlipidemia, hypertension, history of CHF, obesity, hypothyroidism, GERD and general fatigue and sleepiness during the day.  He does not think Provigil is doing much for him now.  He is scheduled for knee replacement in early March.  He also had an episode of RSV pulmonary infection earlier and is slowly recovering.  Chief Complaint   Patient presents with   • Results     PT HERE FOR FOLLOW UP ON LABS    • Fatigue     Since flu vaccine and RSV diagnosis 12/12/22   • Hypertension          Vitals:    01/25/23 0811   BP: (!) 200/100   Pulse: 78   Resp: 16   Temp: 97.3 °F (36.3 °C)   SpO2: 95%     Body mass index is 45.4 kg/m².  The following portions of the patient's history were reviewed and updated as appropriate: allergies, current medications, past family history, past medical history, past social history, past surgical history and problem list.    Past Medical History:   Diagnosis Date   • Allergic    • Allergic rhinitis    • Anxiety    • Arthritis     Serious   • Asthma 06-    PE Blood Clots   • Cataract     Removed   • CHF (congestive heart failure) ([facility]) 4-1-2021    Pending   • Clotting disorder ([facility]) 06-    Eliquis Related   • Deep vein thrombosis ([facility]) 06-    Diagnosed   • Depression    • Depression    • Diabetes mellitus ([facility])    • Diverticulosis     Mild   • DUGGAN (dyspnea on exertion)    • Edema    • Fibromyalgia, primary     ?????   • GERD (gastroesophageal reflux disease)    • History of hiatal hernia    • History of kidney stones    • HL (hearing loss)     Mild   • Hx of blood clots    • Hyperglycemia    • Hyperlipidemia    • Hypertension    • Inflammatory bowel disease     Mild   • Irritable bowel syndrome     Mild   • Kidney stone     Ongoing   • Low back pain     Never Ending   • Obesity     Can't Win   • Pulmonary embolism (HCC)    • Sciatica    • Sleep apnea    • SOB (shortness of  breath)    • Thyroid disease    • Visual impairment     Diabetes Related      Allergies   Allergen Reactions   • Other Cough     Hay fever      Social History     Socioeconomic History   • Marital status:    Tobacco Use   • Smoking status: Never   • Smokeless tobacco: Never   • Tobacco comments:     caffeine use   Vaping Use   • Vaping Use: Never used   Substance and Sexual Activity   • Alcohol use: No   • Drug use: No   • Sexual activity: Not Currently     Partners: Female     Birth control/protection: Other        Current Outpatient Medications:   •  acetaminophen (TYLENOL) 500 MG tablet, Take 500 mg by mouth Every 6 (Six) Hours As Needed for Mild Pain ., Disp: , Rfl:   •  albuterol sulfate  (90 Base) MCG/ACT inhaler, INHALE 2 PUFFS EVERY 4 (FOUR) HOURS AS NEEDED FOR WHEEZING OR SHORTNESS OF AIR. FOR WHEEZING, Disp: 18 g, Rfl: 5  •  amLODIPine (NORVASC) 10 MG tablet, TAKE 1 TABLET BY MOUTH EVERY DAY, Disp: 90 tablet, Rfl: 3  •  betamethasone valerate (VALISONE) 0.1 % cream, APPLY CREAM TOPICALLY TO AFFECTED AREA(S) TO SKIN TWICE DAILY, Disp: 15 g, Rfl: 1  •  caffeine 200 MG tablet, Take 200 mg by mouth Every 4 (Four) Hours As Needed for Headache., Disp: , Rfl:   •  Copper Gluconate (COPPER CAPS PO), Take  by mouth., Disp: , Rfl:   •  diphenhydrAMINE (BENADRYL) 50 MG capsule, Take 50 mg by mouth Every 6 (Six) Hours As Needed for Itching., Disp: , Rfl:   •  Eliquis 5 MG tablet tablet, TAKE 1 TABLET BY MOUTH EVERY 12 HOURS, Disp: 60 tablet, Rfl: 5  •  gabapentin (NEURONTIN) 300 MG capsule, TAKE 2 CAPSULES BY MOUTH 2 TIMES A DAY., Disp: 360 capsule, Rfl: 1  •  glimepiride (AMARYL) 4 MG tablet, Take 1 tablet by mouth Every Morning Before Breakfast., Disp: 90 tablet, Rfl: 3  •  Glucosamine-Chondroit-Vit C-Mn (GLUCOSAMINE CHONDR 500 COMPLEX) capsule, Take 2 capsules by mouth daily., Disp: , Rfl:   •  hydroCHLOROthiazide (HYDRODIURIL) 12.5 MG tablet, Take 1 tablet by mouth Daily., Disp: 90 tablet, Rfl: 3  •   levothyroxine (SYNTHROID, LEVOTHROID) 25 MCG tablet, TAKE 1 TABLET BY MOUTH EVERY DAY, Disp: 90 tablet, Rfl: 3  •  losartan (COZAAR) 100 MG tablet, TAKE 1 TABLET BY MOUTH EVERY DAY, Disp: 90 tablet, Rfl: 3  •  LUTEIN PO, Take 5 mg by mouth Daily., Disp: , Rfl:   •  Melatonin 10 MG tablet, , Disp: , Rfl:   •  metFORMIN (GLUCOPHAGE) 1000 MG tablet, TAKE 1 TABLET BY MOUTH TWICE A DAY, Disp: 180 tablet, Rfl: 3  •  MULTIPLE VITAMINS ESSENTIAL PO, Take  by mouth daily., Disp: , Rfl:   •  omeprazole (priLOSEC) 20 MG capsule, TAKE 1 CAPSULE BY MOUTH EVERY DAY, Disp: 90 capsule, Rfl: 2  •  OneTouch Delica Lancets 33G misc, USE TO TEST BLOOD SUGAR 3 TIMES DAILY DIABETES TYPE 2 E11.9, Disp: 100 each, Rfl: 5  •  OneTouch Ultra test strip, USE TO TEST BLOOD SUGAR 3 TIMES DAILY, Disp: 1 each, Rfl: 5  •  pioglitazone (ACTOS) 30 MG tablet, TAKE 1 TABLET BY MOUTH EVERY DAY, Disp: 90 tablet, Rfl: 3  •  pravastatin (PRAVACHOL) 40 MG tablet, Take 1 tablet by mouth Daily., Disp: 90 tablet, Rfl: 3  •  SITagliptin (Januvia) 100 MG tablet, Take 1 tablet by mouth Daily., Disp: 90 tablet, Rfl: 3  •  Triamcinolone Acetonide (NASACORT) 55 MCG/ACT nasal inhaler, 2 sprays into the nostril(s) as directed by provider Daily., Disp: , Rfl:   •  vitamin C (ASCORBIC ACID) 250 MG tablet, Take 250 mg by mouth Daily., Disp: , Rfl:   •  vitamin E 200 UNIT capsule, Take 200 Units by mouth Daily., Disp: , Rfl:   •  Zeaxanthin powder, , Disp: , Rfl:   •  Zinc Sulfate (ZINC 15 PO), Take  by mouth., Disp: , Rfl:   •  amphetamine-dextroamphetamine (Adderall) 20 MG tablet, Take 1 tablet by mouth Daily., Disp: 30 tablet, Rfl: 0     Objective     History of Present Illness     Review of Systems    Physical Exam  Vitals and nursing note reviewed.   Constitutional:       General: He is not in acute distress.     Appearance: Normal appearance. He is obese. He is not ill-appearing.   HENT:      Head: Normocephalic and atraumatic.   Cardiovascular:      Rate and  Rhythm: Normal rate and regular rhythm.      Heart sounds: Normal heart sounds.   Pulmonary:      Effort: Pulmonary effort is normal. No respiratory distress.      Breath sounds: Normal breath sounds. No wheezing or rales.   Skin:     General: Skin is warm and dry.   Neurological:      General: No focal deficit present.      Mental Status: He is alert and oriented to person, place, and time.   Psychiatric:         Mood and Affect: Mood normal.         Behavior: Behavior normal.         ASSESSMENT CBC has a minimally low RBC count of 4.09, hemoglobin 12.8 and hematocrit 37.3.  CMP has a sugar of 153 and was otherwise essentially normal.  Lipid panel has total cholesterol 219, HDL 51, .  Urine microalbumin remains low.  TSH and free T4 normal on supplement.  Hemoglobin A1c is increased to 7.4.  #1-hypertension, reasonable reading by me today, will monitor at home  #2-hyperlipidemia, generally stable  #3-diabetes mellitus type 2 with increased hemoglobin A1c  #4-severe obesity with no weight loss  #5-hypothyroidism, euthyroid on replacement  #6-daytime sleepiness, not controlled with Provigil       Problems Addressed this Visit        Allergies and Adverse Reactions    Seasonal allergic rhinitis - Primary       Cardiac and Vasculature    Hyperlipidemia    Hypertension       Endocrine and Metabolic    Diabetes mellitus (HCC)    Relevant Medications    glimepiride (AMARYL) 4 MG tablet    Class 3 severe obesity due to excess calories with serious comorbidity and body mass index (BMI) of 45.0 to 49.9 in adult (HCC)    Thyroid disease       Gastrointestinal Abdominal     Gastroesophageal reflux disease       Neuro    Diabetic neuropathy, painful (HCC)    Relevant Medications    glimepiride (AMARYL) 4 MG tablet   Diagnoses       Codes Comments    Seasonal allergic rhinitis, unspecified trigger    -  Primary ICD-10-CM: J30.2  ICD-9-CM: 477.9     Mixed hyperlipidemia     ICD-10-CM: E78.2  ICD-9-CM: 272.2      Hypertension, unspecified type     ICD-10-CM: I10  ICD-9-CM: 401.9     Class 3 severe obesity due to excess calories with serious comorbidity and body mass index (BMI) of 45.0 to 49.9 in adult ([facility])     ICD-10-CM: E66.01, Z68.42  ICD-9-CM: 278.01, V85.42     Type 2 diabetes mellitus without complication, without long-term current use of insulin ([facility])     ICD-10-CM: E11.9  ICD-9-CM: 250.00     Thyroid disease     ICD-10-CM: E07.9  ICD-9-CM: 246.9     Gastroesophageal reflux disease, unspecified whether esophagitis present     ICD-10-CM: K21.9  ICD-9-CM: 530.81     Diabetic neuropathy, painful ([facility])     ICD-10-CM: E11.40  ICD-9-CM: 250.60, 357.2           PLAN I am going to discontinue the Provigil and try the patient on Adderall 20 mg daily.  I am increasing his glimepiride to 4 mg daily and he will continue other medicines as now.  I encouraged him to try and stay active and really work on trying to lose some weight.  I want to recheck him in 1 month to see how he is doing on the Adderall and before his knee replacement in March.    There are no Patient Instructions on file for this visit.  Return in about 1 month (around 2/25/2023).

## 2023-01-25 NOTE — TELEPHONE ENCOUNTER
Rx Refill Note  Requested Prescriptions     Pending Prescriptions Disp Refills   • gabapentin (NEURONTIN) 300 MG capsule [Pharmacy Med Name: GABAPENTIN 300 MG CAPSULE] 360 capsule      Sig: TAKE 2 CAPSULES BY MOUTH TWICE A DAY      Last office visit with prescribing clinician: 1/25/2023   Last telemedicine visit with prescribing clinician: 2/23/2023   Next office visit with prescribing clinician: 2/23/2023                         Would you like a call back once the refill request has been completed: [] Yes [] No    If the office needs to give you a call back, can they leave a voicemail: [] Yes [] No    Beth Anderson MA  01/25/23, 15:01 EST

## 2023-02-14 ENCOUNTER — PRE-ADMISSION TESTING (OUTPATIENT)
Dept: PREADMISSION TESTING | Facility: HOSPITAL | Age: 73
End: 2023-02-14
Payer: MEDICARE

## 2023-02-14 VITALS
HEART RATE: 86 BPM | BODY MASS INDEX: 41.75 KG/M2 | OXYGEN SATURATION: 96 % | SYSTOLIC BLOOD PRESSURE: 142 MMHG | DIASTOLIC BLOOD PRESSURE: 79 MMHG | RESPIRATION RATE: 16 BRPM | WEIGHT: 315 LBS | HEIGHT: 73 IN | TEMPERATURE: 97.8 F

## 2023-02-14 LAB
ANION GAP SERPL CALCULATED.3IONS-SCNC: 15 MMOL/L (ref 5–15)
BUN SERPL-MCNC: 13 MG/DL (ref 8–23)
BUN/CREAT SERPL: 11.9 (ref 7–25)
CALCIUM SPEC-SCNC: 9.6 MG/DL (ref 8.6–10.5)
CHLORIDE SERPL-SCNC: 104 MMOL/L (ref 98–107)
CO2 SERPL-SCNC: 20 MMOL/L (ref 22–29)
CREAT SERPL-MCNC: 1.09 MG/DL (ref 0.76–1.27)
DEPRECATED RDW RBC AUTO: 46.3 FL (ref 37–54)
EGFRCR SERPLBLD CKD-EPI 2021: 73 ML/MIN/1.73
ERYTHROCYTE [DISTWIDTH] IN BLOOD BY AUTOMATED COUNT: 13.6 % (ref 12.3–15.4)
GLUCOSE SERPL-MCNC: 186 MG/DL (ref 65–99)
HCT VFR BLD AUTO: 36.6 % (ref 37.5–51)
HGB BLD-MCNC: 12.6 G/DL (ref 13–17.7)
MCH RBC QN AUTO: 31.6 PG (ref 26.6–33)
MCHC RBC AUTO-ENTMCNC: 34.4 G/DL (ref 31.5–35.7)
MCV RBC AUTO: 91.7 FL (ref 79–97)
PLATELET # BLD AUTO: 210 10*3/MM3 (ref 140–450)
PMV BLD AUTO: 9.7 FL (ref 6–12)
POTASSIUM SERPL-SCNC: 3.5 MMOL/L (ref 3.5–5.2)
QT INTERVAL: 477 MS
RBC # BLD AUTO: 3.99 10*6/MM3 (ref 4.14–5.8)
SODIUM SERPL-SCNC: 139 MMOL/L (ref 136–145)
WBC NRBC COR # BLD: 7.12 10*3/MM3 (ref 3.4–10.8)

## 2023-02-14 PROCEDURE — 36415 COLL VENOUS BLD VENIPUNCTURE: CPT

## 2023-02-14 PROCEDURE — 85027 COMPLETE CBC AUTOMATED: CPT

## 2023-02-14 PROCEDURE — 80048 BASIC METABOLIC PNL TOTAL CA: CPT

## 2023-02-14 PROCEDURE — 93005 ELECTROCARDIOGRAM TRACING: CPT

## 2023-02-14 PROCEDURE — 93010 ELECTROCARDIOGRAM REPORT: CPT | Performed by: INTERNAL MEDICINE

## 2023-02-14 RX ORDER — MODAFINIL 200 MG/1
200 TABLET ORAL DAILY
COMMUNITY

## 2023-02-14 RX ORDER — MODAFINIL 200 MG/1
200 TABLET ORAL DAILY PRN
COMMUNITY

## 2023-02-14 RX ORDER — LORATADINE 10 MG/1
10 TABLET ORAL DAILY
COMMUNITY

## 2023-02-14 NOTE — DISCHARGE INSTRUCTIONS
CHLORHEXIDINE CLOTH INSTRUCTIONS  The morning of surgery follow these instructions using the Chlorhexidine cloths you've been given.  These steps reduce bacteria on the body.  Do not use the cloths near your eyes, ears mouth, genitalia or on open wounds.  Throw the cloths away after use but do not try to flush them down a toilet.      Open and remove one cloth at a time from the package.    Leave the cloth unfolded and begin the bathing.  Massage the skin with the cloths using gentle pressure to remove bacteria.  Do not scrub harshly.   Follow the steps below with one 2% CHG cloth per area (6 total cloths).  One cloth for neck, shoulders and chest.  One cloth for both arms, hands, fingers and underarms (do underarms last).  One cloth for the abdomen followed by groin.  One cloth for right leg and foot including between the toes.  One cloth for left leg and foot including between the toes.  The last cloth is to be used for the back of the neck, back and buttocks.    Allow the CHG to air dry 3 minutes on the skin which will give it time to work and decrease the chance of irritation.  The skin may feel sticky until it is dry.  Do not rinse with water or any other liquid or you will lose the beneficial effects of the CHG.  If mild skin irritation occurs, do rinse the skin to remove the CHG.  Report this to the nurse at time of admission.  Do not apply lotions, creams, ointments, deodorants or perfumes after using the clothes. Dress in clean clothes before coming to the hospital.    Take the following medications the morning of surgery: GABAPENTIN, LEVOTHYROXINE    PLEASE ASK PCP ABOUT STOPPING ELIQUIS PRIOR TO SURGERY AND STOP PROVIGIL 48 HOURS PRIOR TO SURGERY      If you are on prescription narcotic pain medication to control your pain you may also take that medication the morning of surgery.    General Instructions:  Do not eat solid food after midnight the night before surgery.  You may drink clear liquids day of  surgery but must stop at least one hour before your hospital arrival time.  It is beneficial for you to have a clear drink that contains carbohydrates the day of surgery.  We suggest a 12 to 20 ounce bottle of Gatorade or Powerade for non-diabetic patients or a 12 to 20 ounce bottle of G2 or Powerade Zero for diabetic patients. (Pediatric patients, are not advised to drink a 12 to 20 ounce carbohydrate drink)    Clear liquids are liquids you can see through.  Nothing red in color.     Plain water                               Sports drinks  Sodas                                   Gelatin (Jell-O)  Fruit juices without pulp such as white grape juice and apple juice  Popsicles that contain no fruit or yogurt  Tea or coffee (no cream or milk added)  Gatorade / Powerade  G2 / Powerade Zero    Patients who avoid smoking, chewing tobacco and alcohol for 4 weeks prior to surgery have a reduced risk of post-operative complications.  Quit smoking as many days before surgery as you can.  Do not smoke, use chewing tobacco or drink alcohol the day of surgery.   If applicable bring your C-PAP/ BI-PAP machine.  Bring any papers given to you in the doctor’s office.  Wear clean comfortable clothes.  Do not wear contact lenses, false eyelashes or make-up.  Bring a case for your glasses.   Bring crutches or walker if applicable.  Remove all piercings.  Leave jewelry and any other valuables at home.  Hair extensions with metal clips must be removed prior to surgery.  The Pre-Admission Testing nurse will instruct you to bring medications if unable to obtain an accurate list in Pre-Admission Testing.        Preventing a Surgical Site Infection:  For 2 to 3 days before surgery, avoid shaving with a razor because the razor can irritate skin and make it easier to develop an infection.    Any areas of open skin can increase the risk of a post-operative wound infection by allowing bacteria to enter and travel throughout the body.  Notify  your surgeon if you have any skin wounds / rashes even if it is not near the expected surgical site.  The area will need assessed to determine if surgery should be delayed until it is healed.  The night prior to surgery shower using a fresh bar of anti-bacterial soap (such as Dial) and clean washcloth.  Sleep in a clean bed with clean clothing.  Do not allow pets to sleep with you.  Shower on the morning of surgery using a fresh bar of anti-bacterial soap (such as Dial) and clean washcloth.  Dry with a clean towel and dress in clean clothing.  Ask your surgeon if you will be receiving antibiotics prior to surgery.  Make sure you, your family, and all healthcare providers clean their hands with soap and water or an alcohol based hand  before caring for you or your wound.    Day of surgery:  Your arrival time is approximately two hours before your scheduled surgery time.  Upon arrival, a Pre-op nurse and Anesthesiologist will review your health history, obtain vital signs, and answer questions you may have.  The only belongings needed at this time will be a list of your home medications and if applicable your C-PAP/BI-PAP machine.  A Pre-op nurse will start an IV and you may receive medication in preparation for surgery, including something to help you relax.     Please be aware that surgery does come with discomfort.  We want to make every effort to control your discomfort so please discuss any uncontrolled symptoms with your nurse.   Your doctor will most likely have prescribed pain medications.      If you are going home after surgery you will receive individualized written care instructions before being discharged.  A responsible adult must drive you to and from the hospital on the day of your surgery and stay with you for 24 hours.  Discharge prescriptions can be filled by the hospital pharmacy during regular pharmacy hours.  If you are having surgery late in the day/evening your prescription may be  e-prescribed to your pharmacy.  Please verify your pharmacy hours or chose a 24 hour pharmacy to avoid not having access to your prescription because your pharmacy has closed for the day.    If you are staying overnight following surgery, you will be transported to your hospital room following the recovery period.  [facility] has all private rooms.    If you have any questions please call Pre-Admission Testing at [phone].  Deductibles and co-payments are collected on the day of service. Please be prepared to pay the required co-pay, deductible or deposit on the day of service as defined by your plan.    Call your surgeon immediately if you experience any of the following symptoms:  Sore Throat  Shortness of Breath or difficulty breathing  Cough  Chills  Body soreness or muscle pain  Headache  Fever  New loss of taste or smell  Do not arrive for your surgery ill.  Your procedure will need to be rescheduled to another time.  You will need to call your physician before the day of surgery to avoid any unnecessary exposure to hospital staff as well as other patients.

## 2023-02-15 RX ORDER — GLIMEPIRIDE 2 MG/1
TABLET ORAL
Qty: 90 TABLET | Refills: 3 | Status: SHIPPED | OUTPATIENT
Start: 2023-02-15 | End: 2023-02-23 | Stop reason: SDUPTHER

## 2023-02-15 RX ORDER — SITAGLIPTIN 100 MG/1
TABLET, FILM COATED ORAL
Qty: 90 TABLET | Refills: 3 | Status: SHIPPED | OUTPATIENT
Start: 2023-02-15

## 2023-02-17 ENCOUNTER — OFFICE VISIT (OUTPATIENT)
Dept: ORTHOPEDIC SURGERY | Facility: CLINIC | Age: 73
End: 2023-02-17
Payer: MEDICARE

## 2023-02-17 VITALS — TEMPERATURE: 97.1 F | BODY MASS INDEX: 40.43 KG/M2 | WEIGHT: 315 LBS | HEIGHT: 74 IN

## 2023-02-17 DIAGNOSIS — Z01.818 PREOP EXAMINATION: Primary | ICD-10-CM

## 2023-02-17 PROCEDURE — 73562 X-RAY EXAM OF KNEE 3: CPT | Performed by: NURSE PRACTITIONER

## 2023-02-17 PROCEDURE — S0260 H&P FOR SURGERY: HCPCS | Performed by: NURSE PRACTITIONER

## 2023-02-17 PROCEDURE — 77077 JOINT SURVEY SINGLE VIEW: CPT | Performed by: ORTHOPAEDIC SURGERY

## 2023-02-17 NOTE — PROGRESS NOTES
History & Physical       Patient: Barry Cam    YOB: 1950    Medical Record Number: [id number]    Chief Complaints: Left knee endstage osteoarthritis    History of Present Illness: 72 y.o. male presents today in anticipation of upcoming knee replacement surgery.  Patient has a long history of worsening symptoms.  Describes the pain as severe, constant, and typically dull and achy. He has tried and failed prolonged conservative treatment. He is struggling with routine daily activities.  This has become a significant issue for overall quality of life. He cannot walk any prolonged distances without having to rest.     Allergies:   Allergies   Allergen Reactions   • Other Cough     Hay fever       Medications:   Home Medications:    Current Outpatient Medications:   •  acetaminophen (TYLENOL) 500 MG tablet, Take 500 mg by mouth Every 6 (Six) Hours As Needed for Mild Pain ., Disp: , Rfl:   •  albuterol sulfate  (90 Base) MCG/ACT inhaler, INHALE 2 PUFFS EVERY 4 (FOUR) HOURS AS NEEDED FOR WHEEZING OR SHORTNESS OF AIR. FOR WHEEZING, Disp: 18 g, Rfl: 5  •  amLODIPine (NORVASC) 10 MG tablet, TAKE 1 TABLET BY MOUTH EVERY DAY (Patient taking differently: Take 10 mg by mouth Every Evening.), Disp: 90 tablet, Rfl: 3  •  amphetamine-dextroamphetamine (Adderall) 20 MG tablet, Take 1 tablet by mouth Daily., Disp: 30 tablet, Rfl: 0  •  betamethasone valerate (VALISONE) 0.1 % cream, APPLY CREAM TOPICALLY TO AFFECTED AREA(S) TO SKIN TWICE DAILY (Patient taking differently: 2 (Two) Times a Day As Needed.), Disp: 15 g, Rfl: 1  •  caffeine 200 MG tablet, Take 200 mg by mouth Every 4 (Four) Hours As Needed for Headache., Disp: , Rfl:   •  Chlorhexidine Gluconate (HIBICLENS EX), Apply  topically. AS DIRECTED PREOP, Disp: , Rfl:   •  diphenhydrAMINE (BENADRYL) 50 MG capsule, Take 50 mg by mouth Every 6 (Six) Hours As Needed for Itching., Disp: , Rfl:   •  Eliquis 5 MG tablet tablet, TAKE 1 TABLET BY MOUTH  EVERY 12 HOURS (Patient taking differently: Take 5 mg by mouth Every 12 (Twelve) Hours. INSTRUCTED TO CALL MD TO SEE WHEN TO HOLD FOR SURGERY), Disp: 60 tablet, Rfl: 5  •  gabapentin (NEURONTIN) 300 MG capsule, TAKE 2 CAPSULES BY MOUTH TWICE A DAY (Patient taking differently: Take 600 mg by mouth 2 (Two) Times a Day.), Disp: 360 capsule, Rfl: 1  •  glimepiride (AMARYL) 2 MG tablet, TAKE 1 TABLET BY MOUTH EVERY DAY BEFORE BREAKFAST, Disp: 90 tablet, Rfl: 3  •  glimepiride (AMARYL) 4 MG tablet, Take 1 tablet by mouth Every Morning Before Breakfast., Disp: 90 tablet, Rfl: 3  •  Glucosamine-Chondroit-Vit C-Mn (GLUCOSAMINE CHONDR 500 COMPLEX) capsule, Take 2 capsules by mouth daily., Disp: , Rfl:   •  hydroCHLOROthiazide (HYDRODIURIL) 12.5 MG tablet, Take 1 tablet by mouth Daily., Disp: 90 tablet, Rfl: 3  •  Januvia 100 MG tablet, TAKE 1 TABLET BY MOUTH EVERY DAY, Disp: 90 tablet, Rfl: 3  •  levothyroxine (SYNTHROID, LEVOTHROID) 25 MCG tablet, TAKE 1 TABLET BY MOUTH EVERY DAY, Disp: 90 tablet, Rfl: 3  •  loratadine (CLARITIN) 10 MG tablet, Take 10 mg by mouth Daily., Disp: , Rfl:   •  losartan (COZAAR) 100 MG tablet, TAKE 1 TABLET BY MOUTH EVERY DAY (Patient taking differently: Take 100 mg by mouth Daily.), Disp: 90 tablet, Rfl: 3  •  LUTEIN PO, Take 5 mg by mouth Daily. PT TO STOP PER MD INSTRUCTION, Disp: , Rfl:   •  Melatonin 10 MG tablet, Daily As Needed., Disp: , Rfl:   •  metFORMIN (GLUCOPHAGE) 1000 MG tablet, TAKE 1 TABLET BY MOUTH TWICE A DAY, Disp: 180 tablet, Rfl: 3  •  modafinil (PROVIGIL) 200 MG tablet, Take 200 mg by mouth Daily., Disp: , Rfl:   •  modafinil (PROVIGIL) 200 MG tablet, Take 200 mg by mouth Daily As Needed., Disp: , Rfl:   •  MULTIPLE VITAMINS ESSENTIAL PO, Take  by mouth Daily. PT TO STOP PER RN INSTRUCTION, Disp: , Rfl:   •  omeprazole (priLOSEC) 20 MG capsule, TAKE 1 CAPSULE BY MOUTH EVERY DAY (Patient taking differently: 20 mg Every Evening.), Disp: 90 capsule, Rfl: 2  •  OneTouch Delica  Lancets 33G misc, USE TO TEST BLOOD SUGAR 3 TIMES DAILY DIABETES TYPE 2 E11.9, Disp: 100 each, Rfl: 5  •  OneTouch Ultra test strip, USE TO TEST BLOOD SUGAR 3 TIMES DAILY, Disp: 1 each, Rfl: 5  •  pioglitazone (ACTOS) 30 MG tablet, TAKE 1 TABLET BY MOUTH EVERY DAY (Patient taking differently: Take 30 mg by mouth Daily.), Disp: 90 tablet, Rfl: 3  •  pravastatin (PRAVACHOL) 40 MG tablet, Take 1 tablet by mouth Daily., Disp: 90 tablet, Rfl: 3  •  Triamcinolone Acetonide (NASACORT) 55 MCG/ACT nasal inhaler, 2 sprays into the nostril(s) as directed by provider Daily As Needed., Disp: , Rfl:   •  vitamin C (ASCORBIC ACID) 250 MG tablet, Take 250 mg by mouth Daily. PT TO STOP PER RN INSTRUCTION, Disp: , Rfl:   •  vitamin E 200 UNIT capsule, Take 200 Units by mouth Daily. PT TO STOP PER MD INSTRUCTION, Disp: , Rfl:   •  Zeaxanthin powder, Daily. PT TO STOP PER MD INSTRUCTION, Disp: , Rfl:   •  Zinc Sulfate (ZINC 15 PO), Take  by mouth Daily. PT TO STOP PER RN INSTRUCTION, Disp: , Rfl:     Past Medical History:   Diagnosis Date   • Allergic    • Allergic rhinitis    • Anxiety    • Arthritis     Serious   • Asthma 06-    PE Blood Clots   • Cataract     Removed   • CHF (congestive heart failure) (HCC) 4-1-2021    Pending   • Clotting disorder ([facility]) 06-    Eliquis Related   • Deep vein thrombosis (HCC) 06-    Diagnosed   • Depression    • Depression    • Diabetes mellitus (HCC)    • Diverticulosis     Mild   • DUGGAN (dyspnea on exertion)    • Fibromyalgia, primary     ?????   • GERD (gastroesophageal reflux disease)    • History of hiatal hernia    • History of kidney stones    • HL (hearing loss)     Mild   • Hx of blood clots    • Hyperlipidemia    • Hypertension    • Hypothyroidism    • Irritable bowel syndrome     Mild   • Kidney stone     Ongoing   • Low back pain     Never Ending   • Narcolepsy    • Obesity     Can't Win   • Pulmonary embolism (HCC)    • Sciatica    • Sleep apnea     CPAP USED   •  "Umbilical hernia    • Visual impairment     Diabetes Related          Past Surgical History:   Procedure Laterality Date   • EYE SURGERY      Cataract Removal   • FRACTURE SURGERY      Left Ankle/Foot/Leg   • HERNIA REPAIR     • HERNIA REPAIR     • INGUINAL HERNIA REPAIR     • TONSILLECTOMY            Social History     Occupational History   • Not on file   Tobacco Use   • Smoking status: Never   • Smokeless tobacco: Never   • Tobacco comments:     caffeine use   Vaping Use   • Vaping Use: Never used   Substance and Sexual Activity   • Alcohol use: No   • Drug use: No   • Sexual activity: Not Currently     Partners: Female     Birth control/protection: Other      Social History     Social History Narrative   • Not on file          Family History   Problem Relation Age of Onset   • Depression Mother    • Diabetes Mother    • Coronary artery disease Father    • Diabetes Father    • Stroke Father    • Hypertension Father    • Heart disease Father    • Diabetes Brother    • Diabetes Maternal Grandmother    • Malig Hyperthermia Neg Hx        Review of Systems:  A 14-point review of systems is reviewed with the patient.  Pertinent positives are listed above.  All others are negative.    Physical Exam: 72 y.o. male    Vitals:    02/17/23 1310   Temp: 97.1 °F (36.2 °C)   Weight: (!) 157 kg (346 lb 11.2 oz)   Height: 186.7 cm (73.5\")       General:  Patient is awake and alert.  Appears in no acute distress or discomfort.    Psych:  Affect and demeanor are appropriate.    Eyes:  Conjunctivae and sclerae; appear grossly normal.  Eyes track well and EOM seems to be intact.    Ears:  No gross abnormalities.  Hearing adequate for the exam.    Cardiovascular:  Regular rate and rhythm.    Lungs:  Good chest expansion.  Breathing unlabored.    Lymph:  No palpable adenopathy about neck or axillae.    Left lower extremity:  Skin benign and intact without evidence for swelling, masses or atrophy.  No palpable masses.  Focal tenderness " noted over medial joint line.  ROM is from 0-110°.   Positive medial Per's test.  Knee is stable on exam.  Good strength throughout the lower leg and foot.  Intact sensation throughout.  Palpable pedal pulses with brisk cap refill.    Diagnostic Tests:  Lab Results   Component Value Date    GLUCOSE 186 (H) 02/14/2023    CALCIUM 9.6 02/14/2023     02/14/2023    K 3.5 02/14/2023    CO2 20.0 (L) 02/14/2023     02/14/2023    BUN 13 02/14/2023    CREATININE 1.09 02/14/2023    EGFRIFAFRI 78 01/06/2022    EGFRIFNONA 67 01/06/2022    BCR 11.9 02/14/2023    ANIONGAP 15.0 02/14/2023     Lab Results   Component Value Date    WBC 7.12 02/14/2023    HGB 12.6 (L) 02/14/2023    HCT 36.6 (L) 02/14/2023    MCV 91.7 02/14/2023     02/14/2023     Lab Results   Component Value Date    INR 1.2 06/12/2018    PROTIME 13.8 (H) 06/12/2018       Imaging:  AP, merchant and lateral views of the left knee are ordered and reviewed along with a full length alignment x-ray.  These x-rays were taken to evaluate his complaint and presurgical planning.  These compared to previous x-rays.  The x-rays show advanced medial compartment arthritis with compartment osteoarthritis with bone-on-bone and subchondral sclerosis.  The alignment film shows alignment mild varus malalignment with the weightbearing axis passing through the medial compartment.    Assessment:  1.  Left knee osteoarthritis with suspected degenerative medial meniscal tear   2.  History of DVT    Plan: We will plan on proceeding with a left total knee arthroplasty at the patient's request.  I reviewed details of procedure with patient today and discussed all the risks, benefits, alternatives, and limitations of the procedure in laymen's terms with the risks including but not limited to:  neurovascular damage resulting in permanent dysfunction or footdrop and potential need for further surgery, bleeding, infection, hematoma, chronic pain, worsening of pain,  persistent symptoms potentially necessitating revision, prosthesis-related problems including loosening or allergy, swelling, loss of motion and arthrofibrosis, weakness, stiffness, instability, DVT, pulmonary embolus, death, stroke, complex regional pain syndrome, and need for additional procedures.  Patient verbalized understanding, and was given the opportunity to ask and have all questions answered today.  No guarantees were given regarding results of surgery.    We reviewed his labs and EKG together.  Patient denies any new or concerning symptoms.  I explained his EKG revealed some abnormalities. Patient was previously followed by Dr. Donahue, but has not been seen by a cardiologist since 2019.  I explained his glucose level must be less than 200 on the morning of surgery to prevent being canceled.  He will follow Dr. Gerber's advice in regards to holding Eliquis prior to surgery.  I will consult with Dr. Salcedo regarding medial and/or cardiac clearance needed prior to surgery.  Patient is planning for hospital dismissal same day of surgery.     Madeleine Masterson, APRN     02/17/23

## 2023-02-23 ENCOUNTER — OFFICE VISIT (OUTPATIENT)
Dept: FAMILY MEDICINE CLINIC | Facility: CLINIC | Age: 73
End: 2023-02-23
Payer: MEDICARE

## 2023-02-23 ENCOUNTER — TELEPHONE (OUTPATIENT)
Dept: ORTHOPEDIC SURGERY | Facility: HOSPITAL | Age: 73
End: 2023-02-23
Payer: MEDICARE

## 2023-02-23 VITALS
WEIGHT: 315 LBS | BODY MASS INDEX: 40.43 KG/M2 | RESPIRATION RATE: 7 BRPM | OXYGEN SATURATION: 95 % | HEART RATE: 67 BPM | DIASTOLIC BLOOD PRESSURE: 65 MMHG | HEIGHT: 74 IN | TEMPERATURE: 95.3 F | SYSTOLIC BLOOD PRESSURE: 140 MMHG

## 2023-02-23 DIAGNOSIS — M17.10 ARTHRITIS OF KNEE: ICD-10-CM

## 2023-02-23 DIAGNOSIS — Z01.818 PRE-OPERATIVE CLEARANCE: Primary | ICD-10-CM

## 2023-02-23 DIAGNOSIS — I10 HYPERTENSION, UNSPECIFIED TYPE: ICD-10-CM

## 2023-02-23 DIAGNOSIS — G47.14 HYPERSOMNIA DUE TO MEDICAL CONDITION: ICD-10-CM

## 2023-02-23 DIAGNOSIS — E78.2 MIXED HYPERLIPIDEMIA: ICD-10-CM

## 2023-02-23 DIAGNOSIS — E66.01 CLASS 3 SEVERE OBESITY DUE TO EXCESS CALORIES WITH SERIOUS COMORBIDITY AND BODY MASS INDEX (BMI) OF 45.0 TO 49.9 IN ADULT: ICD-10-CM

## 2023-02-23 DIAGNOSIS — E11.65 TYPE 2 DIABETES MELLITUS WITH HYPERGLYCEMIA, WITHOUT LONG-TERM CURRENT USE OF INSULIN: ICD-10-CM

## 2023-02-23 PROCEDURE — 99214 OFFICE O/P EST MOD 30 MIN: CPT | Performed by: INTERNAL MEDICINE

## 2023-02-23 RX ORDER — DEXTROAMPHETAMINE SACCHARATE, AMPHETAMINE ASPARTATE, DEXTROAMPHETAMINE SULFATE AND AMPHETAMINE SULFATE 5; 5; 5; 5 MG/1; MG/1; MG/1; MG/1
20 TABLET ORAL 2 TIMES DAILY
Qty: 60 TABLET | Refills: 0 | Status: SHIPPED | OUTPATIENT
Start: 2023-02-23

## 2023-02-23 RX ORDER — PRAVASTATIN SODIUM 40 MG
TABLET ORAL
Qty: 90 TABLET | Refills: 3 | Status: SHIPPED | OUTPATIENT
Start: 2023-02-23

## 2023-02-23 RX ORDER — HYDROCHLOROTHIAZIDE 12.5 MG/1
TABLET ORAL
Qty: 90 TABLET | Refills: 3 | Status: SHIPPED | OUTPATIENT
Start: 2023-02-23

## 2023-02-23 NOTE — PROGRESS NOTES
Subjective   Barry Cam is a 72 y.o. male. Patient is here today for follow-up on his Adderall.  He is also here for examination prior to total knee replacement planned for 1 week.  He is feeling well.  He says on Adderall twice a day he is awake and doing fine.  He has no other acute complaints at all.  Chief Complaint   Patient presents with   • Surgical Clearance   • Hypertension          Vitals:    02/23/23 1038   BP: 140/65   Pulse:    Resp:    Temp:    SpO2:      Body mass index is 44.5 kg/m².  The following portions of the patient's history were reviewed and updated as appropriate: allergies, current medications, past family history, past medical history, past social history, past surgical history and problem list.    Past Medical History:   Diagnosis Date   • Allergic    • Allergic rhinitis    • Anxiety    • Arthritis     Serious   • Asthma 06-    PE Blood Clots   • Cataract     Removed   • CHF (congestive heart failure) ([facility]) 4-1-2021    Pending   • Clotting disorder ([facility]) 06-    Eliquis Related   • Deep vein thrombosis ([facility]) 06-    Diagnosed   • Depression    • Depression    • Diabetes mellitus ([facility])    • Diverticulosis     Mild   • DUGGAN (dyspnea on exertion)    • Fibromyalgia, primary     ?????   • GERD (gastroesophageal reflux disease)    • History of hiatal hernia    • History of kidney stones    • HL (hearing loss)     Mild   • Hx of blood clots    • Hyperlipidemia    • Hypertension    • Hypothyroidism    • Irritable bowel syndrome     Mild   • Kidney stone     Ongoing   • Low back pain     Never Ending   • Narcolepsy    • Obesity     Can't Win   • Pulmonary embolism (HCC)    • Sciatica    • Sleep apnea     CPAP USED   • Umbilical hernia    • Visual impairment     Diabetes Related      Allergies   Allergen Reactions   • Other Cough     Hay fever      Social History     Socioeconomic History   • Marital status:    Tobacco Use   • Smoking status: Never   • Smokeless  tobacco: Never   • Tobacco comments:     caffeine use   Vaping Use   • Vaping Use: Never used   Substance and Sexual Activity   • Alcohol use: No   • Drug use: No   • Sexual activity: Not Currently     Partners: Female     Birth control/protection: Other        Current Outpatient Medications:   •  acetaminophen (TYLENOL) 500 MG tablet, Take 500 mg by mouth Every 6 (Six) Hours As Needed for Mild Pain ., Disp: , Rfl:   •  albuterol sulfate  (90 Base) MCG/ACT inhaler, INHALE 2 PUFFS EVERY 4 (FOUR) HOURS AS NEEDED FOR WHEEZING OR SHORTNESS OF AIR. FOR WHEEZING, Disp: 18 g, Rfl: 5  •  amLODIPine (NORVASC) 10 MG tablet, TAKE 1 TABLET BY MOUTH EVERY DAY (Patient taking differently: Take 10 mg by mouth Every Evening.), Disp: 90 tablet, Rfl: 3  •  amphetamine-dextroamphetamine (Adderall) 20 MG tablet, Take 1 tablet by mouth 2 (Two) Times a Day., Disp: 60 tablet, Rfl: 0  •  betamethasone valerate (VALISONE) 0.1 % cream, APPLY CREAM TOPICALLY TO AFFECTED AREA(S) TO SKIN TWICE DAILY (Patient taking differently: 2 (Two) Times a Day As Needed.), Disp: 15 g, Rfl: 1  •  caffeine 200 MG tablet, Take 200 mg by mouth Every 4 (Four) Hours As Needed for Headache., Disp: , Rfl:   •  Chlorhexidine Gluconate (HIBICLENS EX), Apply  topically. AS DIRECTED PREOP, Disp: , Rfl:   •  diphenhydrAMINE (BENADRYL) 50 MG capsule, Take 50 mg by mouth Every 6 (Six) Hours As Needed for Itching., Disp: , Rfl:   •  Eliquis 5 MG tablet tablet, TAKE 1 TABLET BY MOUTH EVERY 12 HOURS (Patient taking differently: Take 5 mg by mouth Every 12 (Twelve) Hours. INSTRUCTED TO CALL MD TO SEE WHEN TO HOLD FOR SURGERY), Disp: 60 tablet, Rfl: 5  •  gabapentin (NEURONTIN) 300 MG capsule, TAKE 2 CAPSULES BY MOUTH TWICE A DAY (Patient taking differently: Take 600 mg by mouth 2 (Two) Times a Day.), Disp: 360 capsule, Rfl: 1  •  glimepiride (AMARYL) 4 MG tablet, Take 1 tablet by mouth Every Morning Before Breakfast., Disp: 90 tablet, Rfl: 3  •   Glucosamine-Chondroit-Vit C-Mn (GLUCOSAMINE CHONDR 500 COMPLEX) capsule, Take 2 capsules by mouth daily., Disp: , Rfl:   •  hydroCHLOROthiazide (HYDRODIURIL) 12.5 MG tablet, Take 1 tablet by mouth Daily., Disp: 90 tablet, Rfl: 3  •  Januvia 100 MG tablet, TAKE 1 TABLET BY MOUTH EVERY DAY, Disp: 90 tablet, Rfl: 3  •  levothyroxine (SYNTHROID, LEVOTHROID) 25 MCG tablet, TAKE 1 TABLET BY MOUTH EVERY DAY, Disp: 90 tablet, Rfl: 3  •  loratadine (CLARITIN) 10 MG tablet, Take 10 mg by mouth Daily., Disp: , Rfl:   •  losartan (COZAAR) 100 MG tablet, TAKE 1 TABLET BY MOUTH EVERY DAY (Patient taking differently: Take 100 mg by mouth Daily.), Disp: 90 tablet, Rfl: 3  •  LUTEIN PO, Take 5 mg by mouth Daily. PT TO STOP PER MD INSTRUCTION, Disp: , Rfl:   •  Melatonin 10 MG tablet, Daily As Needed., Disp: , Rfl:   •  metFORMIN (GLUCOPHAGE) 1000 MG tablet, TAKE 1 TABLET BY MOUTH TWICE A DAY, Disp: 180 tablet, Rfl: 3  •  modafinil (PROVIGIL) 200 MG tablet, Take 200 mg by mouth Daily., Disp: , Rfl:   •  modafinil (PROVIGIL) 200 MG tablet, Take 200 mg by mouth Daily As Needed., Disp: , Rfl:   •  MULTIPLE VITAMINS ESSENTIAL PO, Take  by mouth Daily. PT TO STOP PER RN INSTRUCTION, Disp: , Rfl:   •  omeprazole (priLOSEC) 20 MG capsule, TAKE 1 CAPSULE BY MOUTH EVERY DAY (Patient taking differently: 20 mg Every Evening.), Disp: 90 capsule, Rfl: 2  •  OneTouch Delica Lancets 33G misc, USE TO TEST BLOOD SUGAR 3 TIMES DAILY DIABETES TYPE 2 E11.9, Disp: 100 each, Rfl: 5  •  OneTouch Ultra test strip, USE TO TEST BLOOD SUGAR 3 TIMES DAILY, Disp: 1 each, Rfl: 5  •  pioglitazone (ACTOS) 30 MG tablet, TAKE 1 TABLET BY MOUTH EVERY DAY (Patient taking differently: Take 30 mg by mouth Daily.), Disp: 90 tablet, Rfl: 3  •  pravastatin (PRAVACHOL) 40 MG tablet, Take 1 tablet by mouth Daily., Disp: 90 tablet, Rfl: 3  •  Triamcinolone Acetonide (NASACORT) 55 MCG/ACT nasal inhaler, 2 sprays into the nostril(s) as directed by provider Daily As Needed.,  Disp: , Rfl:   •  vitamin C (ASCORBIC ACID) 250 MG tablet, Take 250 mg by mouth Daily. PT TO STOP PER RN INSTRUCTION, Disp: , Rfl:   •  vitamin E 200 UNIT capsule, Take 200 Units by mouth Daily. PT TO STOP PER MD INSTRUCTION, Disp: , Rfl:   •  Zeaxanthin powder, Daily. PT TO STOP PER MD INSTRUCTION, Disp: , Rfl:   •  Zinc Sulfate (ZINC 15 PO), Take  by mouth Daily. PT TO STOP PER RN INSTRUCTION, Disp: , Rfl:      Objective     History of Present Illness     Review of Systems    Physical Exam  Vitals and nursing note reviewed.   Constitutional:       General: He is not in acute distress.     Appearance: Normal appearance. He is obese. He is not ill-appearing.   HENT:      Head: Normocephalic and atraumatic.   Cardiovascular:      Rate and Rhythm: Normal rate and regular rhythm.      Heart sounds: Normal heart sounds.   Pulmonary:      Effort: Pulmonary effort is normal. No respiratory distress.      Breath sounds: Normal breath sounds. No wheezing or rales.   Skin:     General: Skin is warm and dry.   Neurological:      General: No focal deficit present.      Mental Status: He is alert and oriented to person, place, and time.   Psychiatric:         Mood and Affect: Mood normal.         Behavior: Behavior normal.         ASSESSMENT CBC was stable with just a minimally low RBC count of 3.99, hemoglobin 12.6 and hematocrit 36.6 that is been longstanding for the patient and stable.  BMP nonfasting had a sugar of 186 and was otherwise normal.  EKG shows a sinus rhythm with no acute changes, mention of slightly prolonged QT interval  #1-hypertension, stable on medication  #2-hyperlipidemia, asymptomatic  #3-diabetes mellitus type 2 on medication  #4-severe obesity  #5-chronic anticoagulation on Eliquis  #6-osteoarthritis of the knee       Problems Addressed this Visit    None  Diagnoses    None.         PLAN I reviewed the patient's laboratory studies which are stable and also his EKG.  He is significantly obese and has  obvious health issues with diabetes, hypertension hyperlipidemia but is stable with nothing acute going on and seems an adequate risk for the planned surgery.  The patient is well aware that he is a high-risk patient but wants to proceed with the surgery as the knee is causing him significant quality of life issues.  I advised the patient to stop the Eliquis for a day and a half prior to his surgery and it can be restarted whenever appropriate postop.     I will plan on seeing the patient back in about 2 months with a CBC, CMP, hemoglobin A1c and lipid panel    There are no Patient Instructions on file for this visit.  Return in about 2 months (around 4/23/2023) for with labs.

## 2023-02-23 NOTE — TELEPHONE ENCOUNTER
Risk Factor yes no   Age >75  X   BMI <20 >40 X    Patient History     Chronic Pain (2 or more meds/Pain Management)  X   ETOH (more than 3 drinks Daily)  X   Uncontrolled Depression/Bipolar/Schizoaffective Disorder  X   Arrhythmias  X   Stent placement/MI  X   DVT/PE X    Pacemaker  X   HTN (uncontrolled or requiring more than 2 medications)  X   CHF/Retained fluids/Edema X    Stroke with Residual   X   COPD/Asthma  X   GRICEL--Non-compliant with CPAP  X   Diabetes (on insulin or more than 2 meds)         A1C: 7.4 X    BPH/Urinary retention (on medication)  X   CKD  X   Home environment and support     Current ambulation status (use of cane, walker, W/C, Multiple falls/weakness)  X   Stairs to enter and throughout home X    Lives Alone  X   Doesn’t have support at home  X     Outpatient Screening Assessment    Home needs: (Walker/BSC):  Has equipment   ? Steps 6;12   Caregiver 24-48hrs post-discharge: Wife    Discharge Plan:   PT    Prescriptions: Meds to bed    Home medications:   ? Blood thinner/anti-coag therapy--Eliquis    ? BPH or diuretic  ? BP meds--Norvasc, Losartan  ? Pain/Anti-inflammatories--Gabapentin  Pre-op Education:  Educate patient on spinal anesthesia/pain control:  ? patient verbalize understanding    Educate patient on hospital course/timeline:  ?  patient verbalize understanding    Joint Care Class:  ?  yes ? no    Notes:   Has an elevator and stair lift wanted to go over steps as well.   DM--Accu Check 186 on Actos, Metformin, Glimepiride  Albuterol  Uses CPAP told to bring it with him  Creatinine 1.09

## 2023-03-02 ENCOUNTER — ANESTHESIA EVENT (OUTPATIENT)
Dept: PERIOP | Facility: HOSPITAL | Age: 73
End: 2023-03-02
Payer: MEDICARE

## 2023-03-02 ENCOUNTER — APPOINTMENT (OUTPATIENT)
Dept: GENERAL RADIOLOGY | Facility: HOSPITAL | Age: 73
End: 2023-03-02
Payer: MEDICARE

## 2023-03-02 ENCOUNTER — HOSPITAL ENCOUNTER (OUTPATIENT)
Facility: HOSPITAL | Age: 73
Discharge: HOME HEALTH | End: 2023-03-03
Attending: ORTHOPAEDIC SURGERY | Admitting: ORTHOPAEDIC SURGERY
Payer: MEDICARE

## 2023-03-02 ENCOUNTER — ANESTHESIA (OUTPATIENT)
Dept: PERIOP | Facility: HOSPITAL | Age: 73
End: 2023-03-02
Payer: MEDICARE

## 2023-03-02 DIAGNOSIS — Z96.652 TOTAL KNEE REPLACEMENT STATUS, LEFT: Primary | ICD-10-CM

## 2023-03-02 DIAGNOSIS — M17.10 ARTHRITIS OF KNEE: ICD-10-CM

## 2023-03-02 LAB
GLUCOSE BLDC GLUCOMTR-MCNC: 149 MG/DL (ref 70–130)
GLUCOSE BLDC GLUCOMTR-MCNC: 245 MG/DL (ref 70–130)

## 2023-03-02 PROCEDURE — 97530 THERAPEUTIC ACTIVITIES: CPT

## 2023-03-02 PROCEDURE — 25010000002 PROPOFOL 10 MG/ML EMULSION: Performed by: NURSE ANESTHETIST, CERTIFIED REGISTERED

## 2023-03-02 PROCEDURE — 25010000002 FENTANYL CITRATE (PF) 50 MCG/ML SOLUTION: Performed by: ANESTHESIOLOGY

## 2023-03-02 PROCEDURE — 25010000002 MIDAZOLAM PER 1 MG: Performed by: ANESTHESIOLOGY

## 2023-03-02 PROCEDURE — 25010000002 MORPHINE PER 10 MG: Performed by: ORTHOPAEDIC SURGERY

## 2023-03-02 PROCEDURE — G0378 HOSPITAL OBSERVATION PER HR: HCPCS

## 2023-03-02 PROCEDURE — 97162 PT EVAL MOD COMPLEX 30 MIN: CPT

## 2023-03-02 PROCEDURE — C1776 JOINT DEVICE (IMPLANTABLE): HCPCS | Performed by: ORTHOPAEDIC SURGERY

## 2023-03-02 PROCEDURE — 27447 TOTAL KNEE ARTHROPLASTY: CPT | Performed by: NURSE PRACTITIONER

## 2023-03-02 PROCEDURE — 25010000002 ONDANSETRON PER 1 MG: Performed by: NURSE ANESTHETIST, CERTIFIED REGISTERED

## 2023-03-02 PROCEDURE — 27447 TOTAL KNEE ARTHROPLASTY: CPT | Performed by: ORTHOPAEDIC SURGERY

## 2023-03-02 PROCEDURE — C1713 ANCHOR/SCREW BN/BN,TIS/BN: HCPCS | Performed by: ORTHOPAEDIC SURGERY

## 2023-03-02 PROCEDURE — 25010000002 CEFAZOLIN PER 500 MG: Performed by: ORTHOPAEDIC SURGERY

## 2023-03-02 PROCEDURE — 25010000002 EPINEPHRINE 1 MG/ML SOLUTION 30 ML VIAL: Performed by: ORTHOPAEDIC SURGERY

## 2023-03-02 PROCEDURE — 25010000002 CEFAZOLIN IN DEXTROSE 2-4 GM/100ML-% SOLUTION: Performed by: ORTHOPAEDIC SURGERY

## 2023-03-02 PROCEDURE — 73560 X-RAY EXAM OF KNEE 1 OR 2: CPT

## 2023-03-02 PROCEDURE — 25010000002 KETOROLAC TROMETHAMINE PER 15 MG: Performed by: ORTHOPAEDIC SURGERY

## 2023-03-02 PROCEDURE — 25010000002 DEXAMETHASONE SODIUM PHOSPHATE 20 MG/5ML SOLUTION: Performed by: NURSE ANESTHETIST, CERTIFIED REGISTERED

## 2023-03-02 PROCEDURE — 25010000002 FENTANYL CITRATE (PF) 100 MCG/2ML SOLUTION: Performed by: NURSE ANESTHETIST, CERTIFIED REGISTERED

## 2023-03-02 PROCEDURE — 25010000002 ROPIVACAINE PER 1 MG: Performed by: ORTHOPAEDIC SURGERY

## 2023-03-02 PROCEDURE — 82962 GLUCOSE BLOOD TEST: CPT

## 2023-03-02 DEVICE — IMPLANTABLE DEVICE: Type: IMPLANTABLE DEVICE | Status: FUNCTIONAL

## 2023-03-02 DEVICE — GENESIS II NON-POROUS TIBIAL                                    BASEPLATE SIZE 6 LT
Type: IMPLANTABLE DEVICE | Site: KNEE | Status: FUNCTIONAL
Brand: GENESIS II

## 2023-03-02 DEVICE — GEN II 7.5MM RESUR PAT 35MM
Type: IMPLANTABLE DEVICE | Site: KNEE | Status: FUNCTIONAL
Brand: GENESIS II

## 2023-03-02 DEVICE — LEGION POSTERIOR STABILIZED                                    OXINIUM FEMORAL SIZE 6 LEFT
Type: IMPLANTABLE DEVICE | Site: KNEE | Status: FUNCTIONAL
Brand: LEGION

## 2023-03-02 DEVICE — DEV CONTRL TISS STRATAFIX SYMM PDS PLUS VIL CT-1 60CM: Type: IMPLANTABLE DEVICE | Site: KNEE | Status: FUNCTIONAL

## 2023-03-02 DEVICE — CMT BONE PALACOS R HI/VISC 1X40: Type: IMPLANTABLE DEVICE | Site: KNEE | Status: FUNCTIONAL

## 2023-03-02 DEVICE — LEGION POSTERIOR STABILIZED HIGH                                    FLEX HIGHLY CROSS LINKED                                    POLYETHYLENE SIZE 5-6 13MM
Type: IMPLANTABLE DEVICE | Site: KNEE | Status: FUNCTIONAL
Brand: LEGION

## 2023-03-02 DEVICE — DEV CONTRL TISS STRATAFIX SPIRAL MNCRYL UD 3/0 PLS 30CM: Type: IMPLANTABLE DEVICE | Site: KNEE | Status: FUNCTIONAL

## 2023-03-02 RX ORDER — PANTOPRAZOLE SODIUM 40 MG/1
40 TABLET, DELAYED RELEASE ORAL
Status: DISCONTINUED | OUTPATIENT
Start: 2023-03-03 | End: 2023-03-03 | Stop reason: HOSPADM

## 2023-03-02 RX ORDER — DIPHENHYDRAMINE HYDROCHLORIDE 50 MG/ML
12.5 INJECTION INTRAMUSCULAR; INTRAVENOUS
Status: DISCONTINUED | OUTPATIENT
Start: 2023-03-02 | End: 2023-03-02 | Stop reason: HOSPADM

## 2023-03-02 RX ORDER — ONDANSETRON 2 MG/ML
INJECTION INTRAMUSCULAR; INTRAVENOUS AS NEEDED
Status: DISCONTINUED | OUTPATIENT
Start: 2023-03-02 | End: 2023-03-02 | Stop reason: SURG

## 2023-03-02 RX ORDER — IBUPROFEN 600 MG/1
1 TABLET ORAL
Status: DISCONTINUED | OUTPATIENT
Start: 2023-03-02 | End: 2023-03-03 | Stop reason: HOSPADM

## 2023-03-02 RX ORDER — ALBUTEROL SULFATE 90 UG/1
AEROSOL, METERED RESPIRATORY (INHALATION) AS NEEDED
Status: DISCONTINUED | OUTPATIENT
Start: 2023-03-02 | End: 2023-03-02 | Stop reason: SURG

## 2023-03-02 RX ORDER — AMLODIPINE BESYLATE 10 MG/1
10 TABLET ORAL DAILY
Status: DISCONTINUED | OUTPATIENT
Start: 2023-03-03 | End: 2023-03-03 | Stop reason: HOSPADM

## 2023-03-02 RX ORDER — CEFAZOLIN SODIUM IN 0.9 % NACL 3 G/100 ML
3 INTRAVENOUS SOLUTION, PIGGYBACK (ML) INTRAVENOUS ONCE
Status: COMPLETED | OUTPATIENT
Start: 2023-03-02 | End: 2023-03-02

## 2023-03-02 RX ORDER — HYDROCODONE BITARTRATE AND ACETAMINOPHEN 7.5; 325 MG/1; MG/1
1 TABLET ORAL EVERY 4 HOURS PRN
Status: DISCONTINUED | OUTPATIENT
Start: 2023-03-02 | End: 2023-03-02 | Stop reason: HOSPADM

## 2023-03-02 RX ORDER — GABAPENTIN 300 MG/1
600 CAPSULE ORAL 2 TIMES DAILY
Status: DISCONTINUED | OUTPATIENT
Start: 2023-03-02 | End: 2023-03-03 | Stop reason: HOSPADM

## 2023-03-02 RX ORDER — NICOTINE POLACRILEX 4 MG
15 LOZENGE BUCCAL
Status: DISCONTINUED | OUTPATIENT
Start: 2023-03-02 | End: 2023-03-03 | Stop reason: HOSPADM

## 2023-03-02 RX ORDER — LIDOCAINE HYDROCHLORIDE 10 MG/ML
0.5 INJECTION, SOLUTION EPIDURAL; INFILTRATION; INTRACAUDAL; PERINEURAL ONCE AS NEEDED
Status: DISCONTINUED | OUTPATIENT
Start: 2023-03-02 | End: 2023-03-02 | Stop reason: HOSPADM

## 2023-03-02 RX ORDER — PROMETHAZINE HYDROCHLORIDE 25 MG/1
25 TABLET ORAL ONCE AS NEEDED
Status: DISCONTINUED | OUTPATIENT
Start: 2023-03-02 | End: 2023-03-02 | Stop reason: HOSPADM

## 2023-03-02 RX ORDER — SODIUM CHLORIDE 0.9 % (FLUSH) 0.9 %
3-10 SYRINGE (ML) INJECTION AS NEEDED
Status: DISCONTINUED | OUTPATIENT
Start: 2023-03-02 | End: 2023-03-02 | Stop reason: HOSPADM

## 2023-03-02 RX ORDER — OXYCODONE AND ACETAMINOPHEN 7.5; 325 MG/1; MG/1
2 TABLET ORAL ONCE AS NEEDED
Status: COMPLETED | OUTPATIENT
Start: 2023-03-02 | End: 2023-03-02

## 2023-03-02 RX ORDER — ACETAMINOPHEN 500 MG
500 TABLET ORAL EVERY 6 HOURS PRN
Status: DISCONTINUED | OUTPATIENT
Start: 2023-03-02 | End: 2023-03-03 | Stop reason: HOSPADM

## 2023-03-02 RX ORDER — LOSARTAN POTASSIUM 100 MG/1
100 TABLET ORAL DAILY
Status: DISCONTINUED | OUTPATIENT
Start: 2023-03-03 | End: 2023-03-03 | Stop reason: HOSPADM

## 2023-03-02 RX ORDER — ONDANSETRON 4 MG/1
4 TABLET, FILM COATED ORAL ONCE AS NEEDED
Status: DISCONTINUED | OUTPATIENT
Start: 2023-03-02 | End: 2023-03-03 | Stop reason: HOSPADM

## 2023-03-02 RX ORDER — PROMETHAZINE HYDROCHLORIDE 25 MG/1
25 SUPPOSITORY RECTAL ONCE AS NEEDED
Status: DISCONTINUED | OUTPATIENT
Start: 2023-03-02 | End: 2023-03-02 | Stop reason: HOSPADM

## 2023-03-02 RX ORDER — DROPERIDOL 2.5 MG/ML
0.62 INJECTION, SOLUTION INTRAMUSCULAR; INTRAVENOUS
Status: DISCONTINUED | OUTPATIENT
Start: 2023-03-02 | End: 2023-03-02 | Stop reason: HOSPADM

## 2023-03-02 RX ORDER — FENTANYL CITRATE 50 UG/ML
INJECTION, SOLUTION INTRAMUSCULAR; INTRAVENOUS AS NEEDED
Status: DISCONTINUED | OUTPATIENT
Start: 2023-03-02 | End: 2023-03-02 | Stop reason: SURG

## 2023-03-02 RX ORDER — HYDROCODONE BITARTRATE AND ACETAMINOPHEN 7.5; 325 MG/1; MG/1
1 TABLET ORAL EVERY 4 HOURS PRN
Status: DISCONTINUED | OUTPATIENT
Start: 2023-03-02 | End: 2023-03-02

## 2023-03-02 RX ORDER — MAGNESIUM HYDROXIDE 1200 MG/15ML
LIQUID ORAL AS NEEDED
Status: DISCONTINUED | OUTPATIENT
Start: 2023-03-02 | End: 2023-03-02 | Stop reason: HOSPADM

## 2023-03-02 RX ORDER — KETOROLAC TROMETHAMINE 30 MG/ML
15 INJECTION, SOLUTION INTRAMUSCULAR; INTRAVENOUS ONCE AS NEEDED
Status: DISPENSED | OUTPATIENT
Start: 2023-03-02 | End: 2023-03-03

## 2023-03-02 RX ORDER — ASPIRIN 81 MG/1
81 TABLET ORAL ONCE
Status: DISCONTINUED | OUTPATIENT
Start: 2023-03-02 | End: 2023-03-03 | Stop reason: HOSPADM

## 2023-03-02 RX ORDER — PIOGLITAZONEHYDROCHLORIDE 30 MG/1
30 TABLET ORAL DAILY
Status: DISCONTINUED | OUTPATIENT
Start: 2023-03-03 | End: 2023-03-03 | Stop reason: HOSPADM

## 2023-03-02 RX ORDER — DEXAMETHASONE SODIUM PHOSPHATE 4 MG/ML
INJECTION, SOLUTION INTRA-ARTICULAR; INTRALESIONAL; INTRAMUSCULAR; INTRAVENOUS; SOFT TISSUE AS NEEDED
Status: DISCONTINUED | OUTPATIENT
Start: 2023-03-02 | End: 2023-03-02 | Stop reason: SURG

## 2023-03-02 RX ORDER — ROCURONIUM BROMIDE 10 MG/ML
INJECTION, SOLUTION INTRAVENOUS AS NEEDED
Status: DISCONTINUED | OUTPATIENT
Start: 2023-03-02 | End: 2023-03-02 | Stop reason: SURG

## 2023-03-02 RX ORDER — GLIPIZIDE 10 MG/1
10 TABLET ORAL
Status: DISCONTINUED | OUTPATIENT
Start: 2023-03-03 | End: 2023-03-03 | Stop reason: HOSPADM

## 2023-03-02 RX ORDER — CETIRIZINE HYDROCHLORIDE 10 MG/1
10 TABLET ORAL DAILY
Status: DISCONTINUED | OUTPATIENT
Start: 2023-03-03 | End: 2023-03-03 | Stop reason: HOSPADM

## 2023-03-02 RX ORDER — LEVOTHYROXINE SODIUM 0.03 MG/1
25 TABLET ORAL
Status: DISCONTINUED | OUTPATIENT
Start: 2023-03-03 | End: 2023-03-03 | Stop reason: HOSPADM

## 2023-03-02 RX ORDER — ENALAPRILAT 2.5 MG/2ML
1.25 INJECTION INTRAVENOUS ONCE AS NEEDED
Status: DISCONTINUED | OUTPATIENT
Start: 2023-03-02 | End: 2023-03-02 | Stop reason: HOSPADM

## 2023-03-02 RX ORDER — MELOXICAM 15 MG/1
15 TABLET ORAL ONCE
Status: COMPLETED | OUTPATIENT
Start: 2023-03-02 | End: 2023-03-02

## 2023-03-02 RX ORDER — IPRATROPIUM BROMIDE AND ALBUTEROL SULFATE 2.5; .5 MG/3ML; MG/3ML
3 SOLUTION RESPIRATORY (INHALATION) ONCE AS NEEDED
Status: DISCONTINUED | OUTPATIENT
Start: 2023-03-02 | End: 2023-03-02 | Stop reason: HOSPADM

## 2023-03-02 RX ORDER — DEXTROSE MONOHYDRATE 25 G/50ML
25 INJECTION, SOLUTION INTRAVENOUS
Status: DISCONTINUED | OUTPATIENT
Start: 2023-03-02 | End: 2023-03-03 | Stop reason: HOSPADM

## 2023-03-02 RX ORDER — OXYCODONE AND ACETAMINOPHEN 7.5; 325 MG/1; MG/1
1 TABLET ORAL EVERY 4 HOURS PRN
Status: DISCONTINUED | OUTPATIENT
Start: 2023-03-02 | End: 2023-03-03 | Stop reason: HOSPADM

## 2023-03-02 RX ORDER — FENTANYL CITRATE 50 UG/ML
50 INJECTION, SOLUTION INTRAMUSCULAR; INTRAVENOUS
Status: DISCONTINUED | OUTPATIENT
Start: 2023-03-02 | End: 2023-03-02 | Stop reason: HOSPADM

## 2023-03-02 RX ORDER — HYDROCHLOROTHIAZIDE 12.5 MG/1
12.5 TABLET ORAL DAILY
Status: DISCONTINUED | OUTPATIENT
Start: 2023-03-03 | End: 2023-03-03 | Stop reason: HOSPADM

## 2023-03-02 RX ORDER — INSULIN LISPRO 100 [IU]/ML
0-7 INJECTION, SOLUTION INTRAVENOUS; SUBCUTANEOUS
Status: DISCONTINUED | OUTPATIENT
Start: 2023-03-03 | End: 2023-03-03 | Stop reason: HOSPADM

## 2023-03-02 RX ORDER — HYDROCODONE BITARTRATE AND ACETAMINOPHEN 7.5; 325 MG/1; MG/1
2 TABLET ORAL EVERY 6 HOURS PRN
Status: DISCONTINUED | OUTPATIENT
Start: 2023-03-02 | End: 2023-03-02

## 2023-03-02 RX ORDER — TRANEXAMIC ACID 100 MG/ML
INJECTION, SOLUTION INTRAVENOUS AS NEEDED
Status: DISCONTINUED | OUTPATIENT
Start: 2023-03-02 | End: 2023-03-02 | Stop reason: SURG

## 2023-03-02 RX ORDER — FAMOTIDINE 10 MG/ML
20 INJECTION, SOLUTION INTRAVENOUS ONCE
Status: COMPLETED | OUTPATIENT
Start: 2023-03-02 | End: 2023-03-02

## 2023-03-02 RX ORDER — ACETAMINOPHEN 325 MG/1
650 TABLET ORAL 2 TIMES DAILY PRN
Qty: 60 TABLET | Refills: 0 | Status: SHIPPED | OUTPATIENT
Start: 2023-03-02 | End: 2023-03-09 | Stop reason: SDUPTHER

## 2023-03-02 RX ORDER — NALOXONE HCL 0.4 MG/ML
0.2 VIAL (ML) INJECTION AS NEEDED
Status: DISCONTINUED | OUTPATIENT
Start: 2023-03-02 | End: 2023-03-02 | Stop reason: HOSPADM

## 2023-03-02 RX ORDER — GLYCOPYRROLATE 0.2 MG/ML
INJECTION INTRAMUSCULAR; INTRAVENOUS AS NEEDED
Status: DISCONTINUED | OUTPATIENT
Start: 2023-03-02 | End: 2023-03-02 | Stop reason: SURG

## 2023-03-02 RX ORDER — OXYCODONE AND ACETAMINOPHEN 7.5; 325 MG/1; MG/1
1 TABLET ORAL EVERY 4 HOURS PRN
Qty: 42 TABLET | Refills: 0 | Status: SHIPPED | OUTPATIENT
Start: 2023-03-02 | End: 2023-03-09 | Stop reason: SDUPTHER

## 2023-03-02 RX ORDER — ONDANSETRON 2 MG/ML
4 INJECTION INTRAMUSCULAR; INTRAVENOUS ONCE AS NEEDED
Status: DISCONTINUED | OUTPATIENT
Start: 2023-03-02 | End: 2023-03-03 | Stop reason: HOSPADM

## 2023-03-02 RX ORDER — BUPIVACAINE HYDROCHLORIDE AND EPINEPHRINE 2.5; 5 MG/ML; UG/ML
INJECTION, SOLUTION EPIDURAL; INFILTRATION; INTRACAUDAL; PERINEURAL
Status: COMPLETED | OUTPATIENT
Start: 2023-03-02 | End: 2023-03-02

## 2023-03-02 RX ORDER — LIDOCAINE HYDROCHLORIDE 20 MG/ML
INJECTION, SOLUTION INTRAVENOUS AS NEEDED
Status: DISCONTINUED | OUTPATIENT
Start: 2023-03-02 | End: 2023-03-02 | Stop reason: SURG

## 2023-03-02 RX ORDER — FENTANYL CITRATE 50 UG/ML
25 INJECTION, SOLUTION INTRAMUSCULAR; INTRAVENOUS
Status: DISCONTINUED | OUTPATIENT
Start: 2023-03-02 | End: 2023-03-02 | Stop reason: HOSPADM

## 2023-03-02 RX ORDER — HYDROCODONE BITARTRATE AND ACETAMINOPHEN 5; 325 MG/1; MG/1
1 TABLET ORAL ONCE AS NEEDED
Status: DISCONTINUED | OUTPATIENT
Start: 2023-03-02 | End: 2023-03-02 | Stop reason: HOSPADM

## 2023-03-02 RX ORDER — FLUMAZENIL 0.1 MG/ML
0.2 INJECTION INTRAVENOUS AS NEEDED
Status: DISCONTINUED | OUTPATIENT
Start: 2023-03-02 | End: 2023-03-02 | Stop reason: HOSPADM

## 2023-03-02 RX ORDER — ONDANSETRON 2 MG/ML
4 INJECTION INTRAMUSCULAR; INTRAVENOUS ONCE AS NEEDED
Status: DISCONTINUED | OUTPATIENT
Start: 2023-03-02 | End: 2023-03-02 | Stop reason: HOSPADM

## 2023-03-02 RX ORDER — SODIUM CHLORIDE 0.9 % (FLUSH) 0.9 %
3 SYRINGE (ML) INJECTION EVERY 12 HOURS SCHEDULED
Status: DISCONTINUED | OUTPATIENT
Start: 2023-03-02 | End: 2023-03-02 | Stop reason: HOSPADM

## 2023-03-02 RX ORDER — CHLORHEXIDINE GLUCONATE 500 MG/1
1 CLOTH TOPICAL TAKE AS DIRECTED
Status: DISCONTINUED | OUTPATIENT
Start: 2023-03-02 | End: 2023-03-03 | Stop reason: HOSPADM

## 2023-03-02 RX ORDER — MIDAZOLAM HYDROCHLORIDE 1 MG/ML
0.5 INJECTION INTRAMUSCULAR; INTRAVENOUS
Status: DISCONTINUED | OUTPATIENT
Start: 2023-03-02 | End: 2023-03-02 | Stop reason: HOSPADM

## 2023-03-02 RX ORDER — OXYCODONE AND ACETAMINOPHEN 7.5; 325 MG/1; MG/1
2 TABLET ORAL EVERY 4 HOURS PRN
Status: DISCONTINUED | OUTPATIENT
Start: 2023-03-02 | End: 2023-03-03 | Stop reason: HOSPADM

## 2023-03-02 RX ORDER — HYDROMORPHONE HYDROCHLORIDE 1 MG/ML
0.25 INJECTION, SOLUTION INTRAMUSCULAR; INTRAVENOUS; SUBCUTANEOUS
Status: DISCONTINUED | OUTPATIENT
Start: 2023-03-02 | End: 2023-03-02 | Stop reason: HOSPADM

## 2023-03-02 RX ORDER — PREGABALIN 75 MG/1
150 CAPSULE ORAL ONCE
Status: DISCONTINUED | OUTPATIENT
Start: 2023-03-02 | End: 2023-03-02 | Stop reason: HOSPADM

## 2023-03-02 RX ORDER — SODIUM CHLORIDE, SODIUM LACTATE, POTASSIUM CHLORIDE, CALCIUM CHLORIDE 600; 310; 30; 20 MG/100ML; MG/100ML; MG/100ML; MG/100ML
9 INJECTION, SOLUTION INTRAVENOUS CONTINUOUS
Status: DISCONTINUED | OUTPATIENT
Start: 2023-03-02 | End: 2023-03-03 | Stop reason: HOSPADM

## 2023-03-02 RX ORDER — ACETAMINOPHEN 500 MG
1000 TABLET ORAL ONCE
Status: COMPLETED | OUTPATIENT
Start: 2023-03-02 | End: 2023-03-02

## 2023-03-02 RX ORDER — DIPHENHYDRAMINE HCL 25 MG
50 CAPSULE ORAL EVERY 6 HOURS PRN
Status: DISCONTINUED | OUTPATIENT
Start: 2023-03-02 | End: 2023-03-03 | Stop reason: HOSPADM

## 2023-03-02 RX ORDER — TRANEXAMIC ACID 100 MG/ML
INJECTION, SOLUTION INTRAVENOUS AS NEEDED
Status: DISCONTINUED | OUTPATIENT
Start: 2023-03-02 | End: 2023-03-02

## 2023-03-02 RX ORDER — DOCUSATE SODIUM 100 MG/1
100 CAPSULE, LIQUID FILLED ORAL 2 TIMES DAILY
Qty: 60 CAPSULE | Refills: 0 | Status: SHIPPED | OUTPATIENT
Start: 2023-03-02

## 2023-03-02 RX ORDER — PRAVASTATIN SODIUM 40 MG
40 TABLET ORAL DAILY
Status: DISCONTINUED | OUTPATIENT
Start: 2023-03-03 | End: 2023-03-03 | Stop reason: HOSPADM

## 2023-03-02 RX ORDER — PROPOFOL 10 MG/ML
VIAL (ML) INTRAVENOUS AS NEEDED
Status: DISCONTINUED | OUTPATIENT
Start: 2023-03-02 | End: 2023-03-02 | Stop reason: SURG

## 2023-03-02 RX ORDER — EPHEDRINE SULFATE 50 MG/ML
5 INJECTION, SOLUTION INTRAVENOUS ONCE AS NEEDED
Status: DISCONTINUED | OUTPATIENT
Start: 2023-03-02 | End: 2023-03-02 | Stop reason: HOSPADM

## 2023-03-02 RX ORDER — ONDANSETRON 4 MG/1
4 TABLET, FILM COATED ORAL EVERY 8 HOURS PRN
Qty: 12 TABLET | Refills: 0 | Status: SHIPPED | OUTPATIENT
Start: 2023-03-02

## 2023-03-02 RX ORDER — CEFAZOLIN SODIUM 2 G/100ML
2 INJECTION, SOLUTION INTRAVENOUS EVERY 8 HOURS
Status: DISPENSED | OUTPATIENT
Start: 2023-03-02 | End: 2023-03-03

## 2023-03-02 RX ADMIN — ONDANSETRON 4 MG: 2 INJECTION INTRAMUSCULAR; INTRAVENOUS at 10:46

## 2023-03-02 RX ADMIN — BUPIVACAINE HYDROCHLORIDE AND EPINEPHRINE BITARTRATE 15 ML: 2.5; .005 INJECTION, SOLUTION EPIDURAL; INFILTRATION; INTRACAUDAL; PERINEURAL at 08:41

## 2023-03-02 RX ADMIN — FENTANYL CITRATE 50 MCG: 50 INJECTION, SOLUTION INTRAMUSCULAR; INTRAVENOUS at 09:47

## 2023-03-02 RX ADMIN — SODIUM CHLORIDE, POTASSIUM CHLORIDE, SODIUM LACTATE AND CALCIUM CHLORIDE: 600; 310; 30; 20 INJECTION, SOLUTION INTRAVENOUS at 09:14

## 2023-03-02 RX ADMIN — SUGAMMADEX 200 MG: 100 INJECTION, SOLUTION INTRAVENOUS at 11:12

## 2023-03-02 RX ADMIN — FAMOTIDINE 20 MG: 10 INJECTION INTRAVENOUS at 07:52

## 2023-03-02 RX ADMIN — MIDAZOLAM 0.5 MG: 1 INJECTION INTRAMUSCULAR; INTRAVENOUS at 08:34

## 2023-03-02 RX ADMIN — FENTANYL CITRATE 25 MCG: 50 INJECTION, SOLUTION INTRAMUSCULAR; INTRAVENOUS at 12:16

## 2023-03-02 RX ADMIN — GABAPENTIN 600 MG: 300 CAPSULE ORAL at 21:39

## 2023-03-02 RX ADMIN — FENTANYL CITRATE 50 MCG: 50 INJECTION, SOLUTION INTRAMUSCULAR; INTRAVENOUS at 09:14

## 2023-03-02 RX ADMIN — CEFAZOLIN SODIUM 2 G: 2 INJECTION, SOLUTION INTRAVENOUS at 22:00

## 2023-03-02 RX ADMIN — GLYCOPYRROLATE 0.1 MG: 1 INJECTION INTRAMUSCULAR; INTRAVENOUS at 09:14

## 2023-03-02 RX ADMIN — OXYCODONE HYDROCHLORIDE AND ACETAMINOPHEN 2 TABLET: 7.5; 325 TABLET ORAL at 11:49

## 2023-03-02 RX ADMIN — TRANEXAMIC ACID 1000 MG: 1 INJECTION, SOLUTION INTRAVENOUS at 10:39

## 2023-03-02 RX ADMIN — OXYCODONE HYDROCHLORIDE AND ACETAMINOPHEN 1 TABLET: 7.5; 325 TABLET ORAL at 23:03

## 2023-03-02 RX ADMIN — FENTANYL CITRATE 50 MCG: 50 INJECTION, SOLUTION INTRAMUSCULAR; INTRAVENOUS at 08:34

## 2023-03-02 RX ADMIN — FENTANYL CITRATE 25 MCG: 50 INJECTION, SOLUTION INTRAMUSCULAR; INTRAVENOUS at 12:06

## 2023-03-02 RX ADMIN — DEXAMETHASONE SODIUM PHOSPHATE 6 MG: 4 INJECTION, SOLUTION INTRAMUSCULAR; INTRAVENOUS at 09:14

## 2023-03-02 RX ADMIN — PROPOFOL 200 MG: 10 INJECTION, EMULSION INTRAVENOUS at 09:14

## 2023-03-02 RX ADMIN — MUPIROCIN 1 APPLICATION: 20 OINTMENT TOPICAL at 21:39

## 2023-03-02 RX ADMIN — SODIUM CHLORIDE, POTASSIUM CHLORIDE, SODIUM LACTATE AND CALCIUM CHLORIDE: 600; 310; 30; 20 INJECTION, SOLUTION INTRAVENOUS at 10:35

## 2023-03-02 RX ADMIN — SODIUM CHLORIDE, POTASSIUM CHLORIDE, SODIUM LACTATE AND CALCIUM CHLORIDE 500 ML: 600; 310; 30; 20 INJECTION, SOLUTION INTRAVENOUS at 07:20

## 2023-03-02 RX ADMIN — ROCURONIUM BROMIDE 50 MG: 10 INJECTION, SOLUTION INTRAVENOUS at 09:14

## 2023-03-02 RX ADMIN — LIDOCAINE HYDROCHLORIDE 50 MG: 20 INJECTION, SOLUTION INTRAVENOUS at 09:14

## 2023-03-02 RX ADMIN — ROCURONIUM BROMIDE 20 MG: 10 INJECTION, SOLUTION INTRAVENOUS at 09:39

## 2023-03-02 RX ADMIN — KETOROLAC TROMETHAMINE 15 MG: 30 INJECTION, SOLUTION INTRAMUSCULAR at 12:06

## 2023-03-02 RX ADMIN — ALBUTEROL SULFATE 3 PUFF: 90 AEROSOL, METERED RESPIRATORY (INHALATION) at 10:47

## 2023-03-02 RX ADMIN — CEFAZOLIN 3 G: 10 INJECTION, POWDER, FOR SOLUTION INTRAVENOUS at 09:00

## 2023-03-02 RX ADMIN — MELOXICAM 15 MG: 15 TABLET ORAL at 07:20

## 2023-03-02 RX ADMIN — ACETAMINOPHEN 1000 MG: 500 TABLET, FILM COATED ORAL at 07:20

## 2023-03-02 NOTE — ANESTHESIA PROCEDURE NOTES
Airway  Urgency: elective    Date/Time: 3/2/2023 9:18 AM  Airway not difficult    General Information and Staff    Patient location during procedure: OR  Anesthesiologist: Javier Ziegler MD  CRNA/CAA: Tammy Mendez CRNA    Indications and Patient Condition  Indications for airway management: airway protection    Preoxygenated: yes  Mask difficulty assessment: 2 - vent by mask + OA or adjuvant +/- NMBA    Final Airway Details  Final airway type: endotracheal airway      Successful airway: ETT  Cuffed: yes   Successful intubation technique: direct laryngoscopy  Facilitating devices/methods: Bougie and cricoid pressure  Endotracheal tube insertion site: oral  Blade: Matias  Blade size: 4  ETT size (mm): 7.5  Cormack-Lehane Classification: grade I - full view of glottis  Placement verified by: chest auscultation and capnometry   Cuff volume (mL): 6  Measured from: teeth  ETT/EBT  to teeth (cm): 23  Number of attempts at approach: 1  Assessment: lips, teeth, and gum same as pre-op and atraumatic intubation    Additional Comments  Grade1 view with Cricoid pressure, but unable to pass tube. Bougie used and easily placed over bougie. No resistance met. BBS and +Etc02

## 2023-03-02 NOTE — CASE MANAGEMENT/SOCIAL WORK
Discharge Planning Assessment  [facility]     Patient Name: Barry Cam  MRN: [id number]  Today's Date: 3/2/2023    Admit Date: 3/2/2023    Plan: home with wife and [facility]   Discharge Needs Assessment     Row Name 03/02/23 1558       Living Environment    People in Home spouse    Name(s) of People in Home Swapna worthington    Current Living Arrangements home    Primary Care Provided by self    Provides Primary Care For no one    Family Caregiver if Needed spouse    Family Caregiver Names Swapna worthington    Quality of Family Relationships helpful;involved;supportive    Able to Return to Prior Arrangements yes       Resource/Environmental Concerns    Resource/Environmental Concerns none       Transition Planning    Patient/Family Anticipates Transition to home with family    Patient/Family Anticipated Services at Transition none    Transportation Anticipated family or friend will provide       Discharge Needs Assessment    Readmission Within the Last 30 Days no previous admission in last 30 days    Equipment Currently Used at Home glucometer;cpap               Discharge Plan     Row Name 03/02/23 7651       Plan    Plan home with wife and [facility]    Patient/Family in Agreement with Plan yes    Plan Comments Spoke with patient and wife, Swapna Cam [phone], at bedside.  Introduced self and explained role.  Facesheet verified.  Patient lives lives in a multi level house with his wife, but there are lifts from the garage and inside the house to make it accessible.  At baseline, patient is IADLS.  States that he has walker, cane, c-pap and wheelchair.  At SD, he plans to return home.  Wife states that she has already spoken with [facility].  Referral palced in EPIC and accepted.  [facility] will follow at [place].  [facility] will follow. Rosibel TRACY RN    Row Name 03/02/23 5927       Plan    Plan Home with [facility]               Continued Care and Services - Admitted Since 3/2/2023     Home Medical Care  Coordination complete.    Service Provider Request Status Selected Services Address Phone Fax Patient Preferred    [facility]  Selected Home Rehabilitation [address] [address] [phone] --              Expected Discharge Date and Time     Expected Discharge Date Expected Discharge Time    Mar 2, 2023          Demographic Summary     Row Name 03/02/23 1558       General Information    Admission Type inpatient;observation    Arrived From emergency department    Referral Source admission list    Reason for Consult discharge planning    Preferred Language English               Functional Status     Row Name 03/02/23 1558       Functional Status    Usual Activity Tolerance good    Current Activity Tolerance moderate       Functional Status, IADL    Medications independent    Meal Preparation independent    Housekeeping independent    Laundry independent    Shopping independent       Mental Status    General Appearance WDL WDL               Psychosocial    No documentation.                Abuse/Neglect    No documentation.                Legal    No documentation.                Substance Abuse    No documentation.                Patient Forms    No documentation.                   Rosibel Terrazas, RN

## 2023-03-02 NOTE — ANESTHESIA POSTPROCEDURE EVALUATION
Patient: Barry Cam    Procedure Summary     Date: 03/02/23 Room / Location:  [facility] [address] [place] [address] [facility]    Anesthesia Start: 0905 Anesthesia Stop: 1127    Procedure: TOTAL KNEE ARTHROPLASTY (Left: Knee) Diagnosis:       Arthritis of knee      (Arthritis of knee [M17.10])    Surgeons: Noah Salcedo MD Provider: Javier Ziegler MD    Anesthesia Type: general ASA Status: 3          Anesthesia Type: general    Vitals  Vitals Value Taken Time   /73 03/02/23 1415   Temp 36.8 °C (98.3 °F) 03/02/23 1330   Pulse 76 03/02/23 1418   Resp 16 03/02/23 1400   SpO2 97 % 03/02/23 1418   Vitals shown include unvalidated device data.        Post Anesthesia Care and Evaluation    Patient location during evaluation: PACU  Patient participation: complete - patient participated  Level of consciousness: awake  Pain management: adequate    Airway patency: patent  Anesthetic complications: No anesthetic complications    Cardiovascular status: acceptable  Respiratory status: acceptable  Hydration status: acceptable    Comments: /62   Pulse 78   Temp 36.8 °C (98.3 °F)   Resp 16   SpO2 97%

## 2023-03-02 NOTE — BRIEF OP NOTE
TOTAL KNEE ARTHROPLASTY  Progress Note    Barry Cam  3/2/2023    Pre-op Diagnosis:   Arthritis of knee [M17.10]       Post-Op Diagnosis Codes:     * Arthritis of knee [M17.10]    Procedure/CPT® Codes:        Procedure(s):  TOTAL KNEE ARTHROPLASTY    Surgical Approach: Knee Medial Parapatellar      Surgeon(s):  Noah Salcedo MD    Anesthesia: General with Block    Staff:   Circulator: Rickey Bunn RN  Scrub Person: Sosa Davis  Vendor Representative: Bill Wise  Assistant: Madeleine Masterson APRN  Assistant: Madeleine Masterson APRN      Estimated Blood Loss: minimal    Urine Voided: * No values recorded between 3/2/2023  9:05 AM and 3/2/2023 11:00 AM *    Specimens:                None          Drains: * No LDAs found *    Findings: see dictation        Complications: none    Assistant: Madeleine Masterson APRN  was responsible for performing the following activities: Retraction and their skilled assistance was necessary for the success of this case.    Noah Salcedo MD     Date: 3/2/2023  Time: 11:00 EST

## 2023-03-02 NOTE — CASE MANAGEMENT/SOCIAL WORK
Continued Stay Note  [facility]     Patient Name: Barry Cam  MRN: [id number]  Today's Date: 3/2/2023    Admit Date: 3/2/2023    Plan: Home with [facility]    Discharge Plan     Plan     Row Name 03/02/23 1318    Plan Home with [facility]                      Discharge Codes    No documentation.               Expected Discharge Date and Time     Expected Discharge Date Expected Discharge Time    Mar 2, 2023             Shannon Epley, RN

## 2023-03-02 NOTE — DISCHARGE INSTRUCTIONS
What to expect after a Nerve Block    Nerve blocks administered to block pain affect many types of nerves, including those nerves that control movement, pain, and normal sensation. Following a nerve block, you may notice some bruising at the site where the block was given. You may experience sensations such as: numbness of the affected area or limb, tingling, heaviness (that is the limb feels heavy to you), weakness or inability to move the affected arm or leg, or a feeling as if your arm or leg has “fallen asleep.”     A nerve block can last from 2 to 36 hours depending on the medications used.  Usually the weakness wears off first followed by the tingling and heaviness. As the block wears off, you may begin to notice pain; however, this sequence of events may occur in any order. Typically, you will be able to move your limb before you will feel it. Once a nerve block begins to wear off, the effects are usually completely gone within 60 minutes.  If you experience continued side effects that you believe are block related for longer than 48 hours, please call your healthcare provider. Please see block-specific instructions below.    Instructions for any Block involving the leg/foot:   If you have had a leg /foot block, you should not bear weight on the affected leg until the block has worn off. After the block has worn off, weight bearing should be as directed by your surgeon. You may be sent home with crutches. You are at high risk for falling because of the anesthetic effects on your leg. Please use caution when standing or trying to move or walk. Have someone assist you until your leg and foot function have returned to normal.     Protection of a “blocked” limb  After a nerve block, you cannot feel pain, pressure, or extremes of temperature in the affected limb. And because of this, your blocked limb is at more risk for injury. For example, it is possible to burn your limb on an extremely hot surface without  feeling it.     When resting, it is important to reposition your limb periodically to avoid prolonged pressure on it. This may require the use of pillows and padding.    While sleeping, you should avoid rolling onto the affected limb or putting too much pressure on it.     If you have a cast or tight dressing, check the color of your fingers or toes of the affected limb. Call your surgeon if they look discolored (that is, dusky, dark colored).    Use caution in cold weather. Cover your limb appropriately to protect it from the cold.    Pain Management:  Your surgeon will give you a prescription for pain medication. Begin taking this before the nerve block wears off. Bear in mind that sometimes the block can wear off in the middle of the night.

## 2023-03-02 NOTE — OP NOTE
Orthopaedic Operative Note    Facility: [facility]    Patient: Barry Cam    Medical Record Number: [id number]    YOB: 1950    Dictating Surgeon: Noah Salcedo M.D.*    Primary Care Physician: Cristi Gerber MD    Date of Operation: 3/2/2023    Pre-Operative Diagnosis:  Left knee end-stage osteoarthritis    Post-Operative Diagnosis:  Left knee end-stage osteoarthritis    Procedure Performed:   Left total knee arthroplasty    Surgeon: Noah Salcedo MD     Assistant: LISA Mckeon whose assistance was critical for help with patient positioning, suctioning and irrigation, retraction, manipulation of the extremity for insertion of the implants, wound closure and application of the bandages.  Her assistance was critical to the success of this case.    Anesthesia: Regional followed by general.  Local administration of Ortho cocktail solution.    Complications: None.     Estimated Blood Loss: Less than 50 mL.     Implants:     1.  Smith & Nephew size 6 Legion Oxinium PS femoral component  2.  Smith and Nephew size 6 tibial component with size 13 PS polyethylene liner  3.  Smith & Nephew size 35 by 7.5 patellar component    Specimens: * No orders in the log *    Brief Operative Indication:  Mr. Cam has a history of worsening left knee osteoarthritis which had been refractory to prolonged conservative treatment.  The risk, benefits and alternatives to a total knee arthroplasty were discussed with the patient in detail.  He acknowledged understanding of the information and consented to proceed.    Description of the procedure in detail: The patient and operative site were identified in the preoperative holding area.  The surgical site was marked.  Adequate regional anesthesia of the left lower extremity was administered. He was then taken to the operating room.  Adequate general anesthesia was administered. He was then repositioned on the operating table in the supine  position.  A timeout was taken and preoperative antibiotics administered.    The left lower extremity was prepped and draped in the standard, sterile fashion.  I began by cleaning the extremity with an alcohol solution.  A Hibiclens scrub was performed.  The extremity was then prepped with 2 ChloraPreps.  I allowed those to dry for 3 minutes before the draping procedure was carried out.  The leg was exsanguinated with an Esmarch bandage.  The tourniquet was inflated to 250 mmHg.  The leg was positioned at approximately 60 degrees of flexion across the knee in a DeMayo leg positioner.    I fashioned an approximately 8 cm incision anteriorly for a standard medial parapatellar approach.  Full-thickness medial and lateral skin flaps were developed.  The extensor mechanism was carefully exposed.  I performed a medial parapatellar arthrotomy, careful to maintain a cuff of tendinous tissue for later anatomic repair.  The joint was entered.  The infrapatellar fat pad was carefully removed.    Next, the anteromedial soft tissues were carefully elevated off of the anterior face of the tibia.  The MCL was kept protected at all times.  At this point, the joint was inspected.  There was marked arthrosis throughout the joint.  The periarticular osteophytes were carefully removed with a rongeur.    An opening was created in the distal femur.  The wound was irrigated out and then the distal femur alignment shamir was inserted down the canal.  A 5 degree valgus distal femoral cutting guide was pinned into position and then the distal femoral cut carried out in the typical fashion.  I inspected and measured to make sure the cut was appropriate.  The cut portion of bone was removed followed by the guide.    Next, the knee was flexed up further to allow for insertion of the posterior referencing guide.  I measured the distal femur.  I determined the appropriate size as referenced off of the posterior condyles.  The femur measured a size  6.  The guide was positioned, taking care to align this properly and then the anterior, posterior and chamfer cuts were carried out.  The cut portions of bone were then removed.      I then directed my attention to the tibia.  Retractors were positioned to keep the collateral ligaments, PCL and posterior neurovascular structures protected.  The extramedullary guide was positioned.  I took care to align the shamir with the anterior face of the tibia.  I made sure that this was parallel and that the guide was centered at the knee and ankle.  I measured and carefully positioned the guide to allow for correction of the preoperative deformity.  I pinned the guide and then checked the alignment one more time with an tom wing.  Once we had the guide in good position and secure, an oscillating saw was used to carry out the proximal tibia cut.  The cut portion of bone was removed and the PCL inspected.  The PCL was very thin and functionally incompetent.  I determined that he was best served by placement of a cruciate substituting implant.  The menisci were removed and the posterior capsule was infiltrated with some of the Ortho cocktail solution.    Next, I measured the proximal tibia cut.  This measured a size 6.  The trial implant was pinned into position, taking care to maintain appropriate rotation.  The proximal tibial preparations were then completed.  I then trialed with a size 6 femur and size 6 tibia.  The knee seemed to be well balanced and demonstrated excellent motion with a size 13 trial polyethylene liner.    I then examined the patella.  The patella demonstrated extensive arthrosis.  I determined that resurfacing was indicated.  The patellar preparations were carried out at this time and then I trialed with a size 35 x 7.5 mm patella.  With this implant in place, the patella tracked well.  A lateral release was deemed unnecessary in this case.    The final preparations were then completed.  The distal femur  was prepared and then the trial implants were removed.  The appropriate size implants were opened at this point.  My assistant mixed the bone cement on the back table using current generation cement mixing technique and a centrifuge.  Once the cement was prepared, cement was applied to the bony surfaces and implants.  The implants were carefully impacted into position.  I made sure that these were fully seated.  The excess, extruded cement was carefully removed with a [place] elevator.  The knee was taken out into full extension and the trial polyethylene liner inserted.  The patella was then clamped into position.  Again, the excess, extruded cement was removed.  The knee was left in extension with the patella clamped until the cement had fully cured.    While the cement was curing, the periarticular soft tissue structures were carefully infiltrated with the Ortho cocktail solution.  Once the cement had fully cured, I again checked the balancing of the knee.  Again, the knee demonstrated excellent motion and stability with the 13 mm trial liner.  The trial was removed.  The final implant was impacted into position.  I took care to make sure that the dovetails were fully interdigitated.  Again the knee was carried through range of motion.  The patella tracked well and the knee demonstrated excellent motion and stability.    The wound was irrigated with 500 cc of a Betadine containing saline solution.  This was left in place for 3 minutes.  I then irrigated with 3 L of sterile saline via pulsatile lavage.  The tourniquet was deflated.  A gram of transexamic acid was administered.  I made sure that we had good hemostasis.  The parapatellar arthrotomy was anatomically repaired using a PDS Stratafix suture and multiple #1 Vicryl sutures.  The subcutaneous tissues were repaired using 2-0 Vicryl.  A running Stratafix Monocryl suture was used to close the skin followed by a Zipline adhesive.  Sterile dressings were applied.   The drapes were withdrawn. He was awakened and taken to the recovery room in good condition.    Noah Salcedo MD  03/02/23

## 2023-03-02 NOTE — THERAPY EVALUATION
Patient Name: Barry Cam  : 1950    MRN: [id number]                              Today's Date: 3/2/2023       Admit Date: 3/2/2023    Visit Dx:     ICD-10-CM ICD-9-CM   1. Total knee replacement status, left  Z96.652 V43.65   2. Arthritis of knee  M17.10 716.96     Patient Active Problem List   Diagnosis   • Seasonal allergic rhinitis   • Type 2 diabetes mellitus with hyperglycemia, without long-term current use of insulin ([facility])   • DUGGAN (dyspnea on exertion)   • Hypersomnia due to medical condition   • Fatigue   • Hyperlipidemia   • Obstructive sleep apnea syndrome treated with auto CPAP   • Gastroesophageal reflux disease   • Calculus of kidney   • Diabetic neuropathy, painful ([facility])   • Acute deep vein thrombosis (DVT) of distal vein of right lower extremity ([facility])   • History of pulmonary embolism   • Chronic deep vein thrombosis (DVT) of distal vein of right lower extremity ([facility])   • Class 3 severe obesity due to excess calories with serious comorbidity and body mass index (BMI) of 45.0 to 49.9 in adult ([facility])   • Thyroid disease   • Hypertension   • Vertigo   • Arthritis of knee   • Total knee replacement status, left     Past Medical History:   Diagnosis Date   • Allergic    • Allergic rhinitis    • Anxiety    • Arthritis     Serious   • Asthma 2018    PE Blood Clots   • Cataract     Removed   • CHF (congestive heart failure) ([facility]) 2021    Pending   • Clotting disorder ([facility]) 2018    Eliquis Related   • Deep vein thrombosis ([facility]) 2018    Diagnosed   • Depression    • Depression    • Diabetes mellitus ([facility])    • Diverticulosis     Mild   • DUGGAN (dyspnea on exertion)    • Fibromyalgia, primary     ?????   • GERD (gastroesophageal reflux disease)    • History of hiatal hernia    • History of kidney stones    • HL (hearing loss)     Mild   • Hx of blood clots    • Hyperlipidemia    • Hypertension    • Hypothyroidism    • Irritable bowel syndrome     Mild   • Kidney stone      Ongoing   • Low back pain     Never Ending   • Narcolepsy    • Obesity     Can't Win   • Pulmonary embolism ([facility])    • Sciatica    • Sleep apnea     CPAP USED   • Umbilical hernia    • Visual impairment     Diabetes Related     Past Surgical History:   Procedure Laterality Date   • EYE SURGERY      Cataract Removal   • FRACTURE SURGERY      Left Ankle/Foot/Leg   • HERNIA REPAIR     • HERNIA REPAIR     • INGUINAL HERNIA REPAIR     • TONSILLECTOMY        General Information     Row Name 03/02/23 1622          Physical Therapy Time and Intention    Document Type evaluation  -DB     Mode of Treatment individual therapy;physical therapy  -DB     Row Name 03/02/23 1622          General Information    Patient Profile Reviewed yes  -DB     Prior Level of Function independent:;ADL's  -DB     Existing Precautions/Restrictions fall  -DB     Barriers to Rehab none identified  -DB     Row Name 03/02/23 1622          Living Environment    People in Home spouse  -DB     Row Name 03/02/23 1622          Home Main Entrance    Number of Stairs, Main Entrance none  -DB     Row Name 03/02/23 1622          Stairs Within Home, Primary    Number of Stairs, Within Home, Primary none  -DB     Row Name 03/02/23 1622          Cognition    Orientation Status (Cognition) oriented x 4  -DB     Row Name 03/02/23 1622          Safety Issues, Functional Mobility    Impairments Affecting Function (Mobility) balance;range of motion (ROM);strength;pain;endurance/activity tolerance  -DB           User Key  (r) = Recorded By, (t) = Taken By, (c) = Cosigned By    Initials Name Provider Type    DB Chrissy Garay PT Physical Therapist               Mobility     Row Name 03/02/23 1622          Bed Mobility    Bed Mobility supine-sit;sit-supine  -DB     Supine-Sit [place] (Bed Mobility) standby assist;verbal cues  -DB     Sit-Supine [place] (Bed Mobility) standby assist;verbal cues  -DB     Assistive Device (Bed Mobility) bed rails;head of bed  elevated  -DB     Row Name 03/02/23 1622          Sit-Stand Transfer    Sit-Stand [place] (Transfers) contact guard;verbal cues  -DB     Assistive Device (Sit-Stand Transfers) walker, front-wheeled  -DB     Row Name 03/02/23 1622          Gait/Stairs (Locomotion)    [place] Level (Gait) contact guard;verbal cues  -DB     Assistive Device (Gait) walker, front-wheeled  -DB     Distance in Feet (Gait) 60'  -DB     Deviations/Abnormal Patterns (Gait) gait speed decreased;antalgic;stride length decreased;karen decreased  -DB     Bilateral Gait Deviations forward flexed posture;heel strike decreased  -DB           User Key  (r) = Recorded By, (t) = Taken By, (c) = Cosigned By    Initials Name Provider Type    DB Chrissy Garay PT Physical Therapist               Obj/Interventions     Row Name 03/02/23 1624          Range of Motion Comprehensive    Comment, General Range of Motion LLE AROM 5-95 degrees  -DB     Row Name 03/02/23 1624          Strength Comprehensive (MMT)    Comment, General Manual Muscle Testing (MMT) Assessment post op weakness  -DB     Row Name 03/02/23 1624          Motor Skills    Therapeutic Exercise other (see comments)  TKA protocol x 10  -DB     Row Name 03/02/23 1624          Balance    Balance Assessment sitting static balance;sitting dynamic balance;standing static balance;standing dynamic balance  -DB     Static Sitting Balance supervision  -DB     Dynamic Sitting Balance supervision  -DB     Position, Sitting Balance unsupported;sitting edge of bed  -DB     Static Standing Balance contact guard  -DB     Dynamic Standing Balance contact guard  -DB     Position/Device Used, Standing Balance supported;walker, front-wheeled  -DB     Balance Interventions sitting;standing;sit to stand  -DB           User Key  (r) = Recorded By, (t) = Taken By, (c) = Cosigned By    Initials Name Provider Type    DB Chrissy Garay PT Physical Therapist               Goals/Plan     Row Name 03/02/23  1627          Bed Mobility Goal 1 (PT)    Activity/Assistive Device (Bed Mobility Goal 1, PT) bed mobility activities, all  -DB     [place] Level/Cues Needed (Bed Mobility Goal 1, PT) supervision required  -DB     Time Frame (Bed Mobility Goal 1, PT) 1 week  -DB     Row Name 03/02/23 1627          Transfer Goal 1 (PT)    Activity/Assistive Device (Transfer Goal 1, PT) transfers, all  -DB     [place] Level/Cues Needed (Transfer Goal 1, PT) supervision required  -DB     Time Frame (Transfer Goal 1, PT) 1 week  -DB     Row Name 03/02/23 1627          Gait Training Goal 1 (PT)    Activity/Assistive Device (Gait Training Goal 1, PT) gait (walking locomotion)  -DB     [place] Level (Gait Training Goal 1, PT) standby assist  -DB     Time Frame (Gait Training Goal 1, PT) 1 week  -DB     Row Name 03/02/23 1627          Therapy Assessment/Plan (PT)    Planned Therapy Interventions (PT) balance training;bed mobility training;gait training;home exercise program;neuromuscular re-education;postural re-education;transfer training;patient/family education;stair training;strengthening;ROM (range of motion)  -DB           User Key  (r) = Recorded By, (t) = Taken By, (c) = Cosigned By    Initials Name Provider Type    DB Chrissy Garay, PT Physical Therapist               Clinical Impression     Row Name 03/02/23 1625          Pain    Pain Intervention(s) Ambulation/increased activity;Repositioned  -DB     Row Name 03/02/23 1625          Plan of Care Review    Plan of Care Reviewed With patient  -DB     Outcome Evaluation Pt is a 73 y/o M POD0 L TKA. Pt lives with his wife, 0 ARMEN/stairs, and denies DME needs. Pt was able to perform bed mobility with SBA and ambulates 60' with RW and CGA today, step-to gait pattern. Pt returns to room and performs LE ther ex TKA protocol before returning to supine in bed. Pt demo's dec'd strength, balance, and endurance and will benefit from skilled PT intervention. Rec D/C home with  assist and HHPT.  -DB     Row Name 03/02/23 1625          Therapy Assessment/Plan (PT)    Rehab Potential (PT) good, to achieve stated therapy goals  -DB     Criteria for Skilled Interventions Met (PT) yes  -DB     Therapy Frequency (PT) daily  -DB     Row Name 03/02/23 1625          Vital Signs    O2 Delivery Pre Treatment supplemental O2  -DB     O2 Delivery Intra Treatment room air  -DB     O2 Delivery Post Treatment supplemental O2  -DB     Pre Patient Position Supine  -DB     Intra Patient Position Standing  -DB     Post Patient Position Supine  -DB     Row Name 03/02/23 1625          Positioning and Restraints    Pre-Treatment Position in bed  -DB     Post Treatment Position bed  -DB     In Bed supine;call light within reach;encouraged to call for assist;exit alarm on;with family/caregiver  -DB           User Key  (r) = Recorded By, (t) = Taken By, (c) = Cosigned By    Initials Name Provider Type    DB Chrissy Garay, PT Physical Therapist               Outcome Measures     Row Name 03/02/23 1628 03/02/23 1433       How much help from another person do you currently need...    Turning from your back to your side while in flat bed without using bedrails? 4  -DB 3  -DAVID    Moving from lying on back to sitting on the side of a flat bed without bedrails? 3  -DB 3  -DAVID    Moving to and from a bed to a chair (including a wheelchair)? 3  -DB 3  -DAVID    Standing up from a chair using your arms (e.g., wheelchair, bedside chair)? 3  -DB 3  -DAVID    Climbing 3-5 steps with a railing? 3  -DB 2  -DAVID    To walk in hospital room? 3  -DB 3  -DAVID    AM-PAC 6 Clicks Score (PT) 19  -DB 17  -DAVID    Highest level of mobility 6 --> Walked 10 steps or more  -DB 5 --> Static standing  -DAVID    Row Name 03/02/23 1628          Functional Assessment    Outcome Measure Options AM-PAC 6 Clicks Basic Mobility (PT)  -DB           User Key  (r) = Recorded By, (t) = Taken By, (c) = Cosigned By    Initials Name Provider Type    Chrissy Marr,  PT Physical Therapist    Gabriel Reaves, RN Registered Nurse                             Physical Therapy Education     Title: PT OT SLP Therapies (Done)     Topic: Physical Therapy (Done)     Point: Mobility training (Done)     Learning Progress Summary           Patient Acceptance, E, VU by DB at 3/2/2023 1628                   Point: Home exercise program (Done)     Learning Progress Summary           Patient Acceptance, E, VU by DB at 3/2/2023 1628                   Point: Body mechanics (Done)     Learning Progress Summary           Patient Acceptance, E, VU by DB at 3/2/2023 1628                   Point: Precautions (Done)     Learning Progress Summary           Patient Acceptance, E, VU by DB at 3/2/2023 1628                               User Key     Initials Effective Dates Name Provider Type Discipline    DB 06/16/21 -  Chrissy Garay, PT Physical Therapist PT              PT Recommendation and Plan  Planned Therapy Interventions (PT): balance training, bed mobility training, gait training, home exercise program, neuromuscular re-education, postural re-education, transfer training, patient/family education, stair training, strengthening, ROM (range of motion)  Plan of Care Reviewed With: patient  Outcome Evaluation: Pt is a 73 y/o M POD0 L TKA. Pt lives with his wife, 0 ARMEN/stairs, and denies DME needs. Pt was able to perform bed mobility with SBA and ambulates 60' with RW and CGA today, step-to gait pattern. Pt returns to room and performs LE ther ex TKA protocol before returning to supine in bed. Pt demo's dec'd strength, balance, and endurance and will benefit from skilled PT intervention. Rec D/C home with assist and HHPT.     Time Calculation:    PT Charges     Row Name 03/02/23 1629             Time Calculation    Start Time 1512  -DB      Stop Time 1531  -DB      Time Calculation (min) 19 min  -DB      PT Received On 03/02/23  -DB      PT - Next Appointment 03/03/23  -DB      PT Goal Re-Cert  Due Date 03/09/23  -DB         Time Calculation- PT    Total Timed Code Minutes- PT 8 minute(s)  -DB            User Key  (r) = Recorded By, (t) = Taken By, (c) = Cosigned By    Initials Name Provider Type    DB Chrissy Garay, PT Physical Therapist              Therapy Charges for Today     Code Description Service Date Service Provider Modifiers Qty    [id number]  PT EVAL MOD COMPLEXITY 3 3/2/2023 Chrissy Garay, PT GP 1    [id number]  PT THERAPEUTIC ACT EA 15 MIN 3/2/2023 Chrissy Garay, PT GP 1          PT G-Codes  Outcome Measure Options: AM-PAC 6 Clicks Basic Mobility (PT)  AM-PAC 6 Clicks Score (PT): 19  PT Discharge Summary  Anticipated Discharge Disposition (PT): home with 24/7 care, home with home health    Chrissy Garay, PT  3/2/2023

## 2023-03-02 NOTE — ANESTHESIA PROCEDURE NOTES
Adductor Canal BLock      Patient reassessed immediately prior to procedure    Patient location during procedure: pre-op  Start time: 3/2/2023 8:39 AM  Stop time: 3/2/2023 8:41 AM  Reason for block: at surgeon's request and post-op pain management  Performed by  Anesthesiologist: Javier Ziegler MD  Preanesthetic Checklist  Completed: patient identified, IV checked, site marked, risks and benefits discussed, surgical consent, monitors and equipment checked, pre-op evaluation and timeout performed  Prep:  Pt Position: supine  Sterile barriers:gloves and cap  Prep: ChloraPrep  Patient monitoring: blood pressure monitoring, continuous pulse oximetry and EKG  Procedure    Sedation: yes    Guidance:ultrasound guided    ULTRASOUND INTERPRETATION.  Using ultrasound guidance a 21 G gauge needle was placed in close proximity to the femoral nerve, at which point, under ultrasound guidance anesthetic was injected in the area of the nerve and spread of the anesthesia was seen on ultrasound in close proximity thereto.  There were no abnormalities seen on ultrasound; a digital image was taken; and the patient tolerated the procedure with no complications. Images:still images obtained    Laterality:left  Block Type:adductor canal block  Injection Technique:single-shot  Needle Type:echogenic  Needle Gauge:21 G  Resistance on Injection: none    Medications Used: bupivacaine-EPINEPHrine PF (MARCAINE w/EPI) 0.25% -[id number] injection - Injection   15 mL - 3/2/2023 8:41:00 AM      Post Assessment  Injection Assessment: negative aspiration for heme, no paresthesia on injection and incremental injection  Patient Tolerance:comfortable throughout block  Complications:no  Additional Notes  Ultrasound guidance was used to view and verify needle placement and medication disbursement.

## 2023-03-02 NOTE — PERIOPERATIVE NURSING NOTE
Dr Salcedo notified of patient inability to maintain oxygen sats.  Orders to admit for observation.  Patient's wife notified, c-pap with wife and to be delivered to the floor

## 2023-03-02 NOTE — DISCHARGE PLACEMENT REQUEST
"Marques Campbell (72 y.o. Male)    YOB: 1950  Social Security Number:   Address: [address] [place] [id number]  Home Phone: [phone]  MRN: [id number]  Restorationism: Congregational  Marital Status:         Admission Date: 3/2/23  Admission Type: Elective  Admitting Provider: Noah Salcedo MD  Attending Provider: Noah Salcedo MD  Department, Room/Bed: [facility] OSC OR, OSC OR/OSC OR  Discharge Date:   Discharge Disposition:   Discharge Destination:               Attending Provider: Noah Salcedo MD    Allergies: Other    Isolation: None   Infection: None   Code Status: Not on file    Ht: 186.7 cm (73.5\")   Wt: 155 kg (342 lb)    Admission Cmt: None   Principal Problem: Arthritis of knee [M17.10]                 Active Insurance as of 3/2/2023     Primary Coverage     Payor Plan Insurance Group Employer/Plan Group    MEDICARE MEDICARE A & B      Payor Plan Address Payor Plan Phone Number Payor Plan Fax Number Effective Dates    [address] [phone]  5/1/2017 - None Entered    [facility] [address]       Subscriber Name Subscriber Birth Date Member ID       MARQUES CAMPBELL 1950 [id number]           Secondary Coverage     Payor Plan Insurance Group Employer/Plan Group    [facility] SUPP [id number]     Payor Plan Address Payor Plan Phone Number Payor Plan Fax Number Effective Dates    [address]   5/1/2017 - None Entered    [facility] [id number]       Subscriber Name Subscriber Birth Date Member ID       MARQUES CAMPBELL 1950 [id number]                 Emergency Contacts      (Rel.) Home Phone Work Phone Mobile Phone    Swapna Campbell (Spouse) [phone] None [phone]              "

## 2023-03-02 NOTE — PLAN OF CARE
Goal Outcome Evaluation:  Plan of Care Reviewed With: patient           Outcome Evaluation: Pt is a 73 y/o M POD0 L TKA. Pt lives with his wife, 0 ARMEN/stairs, and denies DME needs. Pt was able to perform bed mobility with SBA and ambulates 60' with RW and CGA today, step-to gait pattern. Pt returns to room and performs LE ther ex TKA protocol before returning to supine in bed. Pt demo's dec'd strength, balance, and endurance and will benefit from skilled PT intervention. Rec D/C home with assist and HHPT.

## 2023-03-02 NOTE — ANESTHESIA PREPROCEDURE EVALUATION
Anesthesia Evaluation     Patient summary reviewed and Nursing notes reviewed   history of anesthetic complications (History of waking up combative):  NPO Solid Status: > 8 hours  NPO Liquid Status: > 2 hours           Airway   Mallampati: II  TM distance: >3 FB  Neck ROM: full  Dental - normal exam     Pulmonary - normal exam    breath sounds clear to auscultation  (+) pulmonary embolism, asthma,sleep apnea on CPAP,   Cardiovascular - normal exam    ECG reviewed  PT is on anticoagulation therapy  Rhythm: regular  Rate: normal    (+) hypertension 2 medications or greater, DUGGAN, DVT resolved, hyperlipidemia,   (-) angina, orthopnea, PND    ROS comment: Sinus rhythm  LVH with secondary repolarization abnormality  Prolonged QT interval  No Prior Tracing for Comparison    Neuro/Psych  (+) dizziness/light headedness, numbness,    GI/Hepatic/Renal/Endo    (+) morbid obesity, GERD,  renal disease stones, diabetes mellitus type 2, thyroid problem hypothyroidism    Musculoskeletal     (+) myalgias (Fibromyalgia),   Abdominal    Substance History - negative use     OB/GYN negative ob/gyn ROS         Other   arthritis,                    Anesthesia Plan    ASA 3     general     (Adductor Canal Block for POPC)  intravenous induction     Anesthetic plan, risks, benefits, and alternatives have been provided, discussed and informed consent has been obtained with: patient.        CODE STATUS:

## 2023-03-03 ENCOUNTER — READMISSION MANAGEMENT (OUTPATIENT)
Dept: CALL CENTER | Facility: HOSPITAL | Age: 73
End: 2023-03-03
Payer: MEDICARE

## 2023-03-03 ENCOUNTER — HOME HEALTH ADMISSION (OUTPATIENT)
Dept: HOME HEALTH SERVICES | Facility: HOME HEALTHCARE | Age: 73
End: 2023-03-03
Payer: MEDICARE

## 2023-03-03 VITALS
OXYGEN SATURATION: 93 % | SYSTOLIC BLOOD PRESSURE: 109 MMHG | TEMPERATURE: 98.4 F | DIASTOLIC BLOOD PRESSURE: 66 MMHG | HEART RATE: 79 BPM | HEIGHT: 73 IN | BODY MASS INDEX: 41.75 KG/M2 | WEIGHT: 315 LBS | RESPIRATION RATE: 18 BRPM

## 2023-03-03 LAB
HCT VFR BLD AUTO: 31.8 % (ref 37.5–51)
HGB BLD-MCNC: 10.9 G/DL (ref 13–17.7)

## 2023-03-03 PROCEDURE — G0378 HOSPITAL OBSERVATION PER HR: HCPCS

## 2023-03-03 PROCEDURE — 97530 THERAPEUTIC ACTIVITIES: CPT | Performed by: PHYSICAL THERAPIST

## 2023-03-03 PROCEDURE — 99024 POSTOP FOLLOW-UP VISIT: CPT | Performed by: NURSE PRACTITIONER

## 2023-03-03 PROCEDURE — 97110 THERAPEUTIC EXERCISES: CPT | Performed by: PHYSICAL THERAPIST

## 2023-03-03 PROCEDURE — 85018 HEMOGLOBIN: CPT | Performed by: ORTHOPAEDIC SURGERY

## 2023-03-03 PROCEDURE — 85014 HEMATOCRIT: CPT | Performed by: ORTHOPAEDIC SURGERY

## 2023-03-03 PROCEDURE — 63710000001 INSULIN LISPRO (HUMAN) PER 5 UNITS: Performed by: NURSE PRACTITIONER

## 2023-03-03 RX ADMIN — LOSARTAN POTASSIUM 100 MG: 100 TABLET, FILM COATED ORAL at 08:06

## 2023-03-03 RX ADMIN — GABAPENTIN 600 MG: 300 CAPSULE ORAL at 08:05

## 2023-03-03 RX ADMIN — PIOGLITAZONE HYDROCHLORIDE 30 MG: 30 TABLET ORAL at 08:06

## 2023-03-03 RX ADMIN — OXYCODONE HYDROCHLORIDE AND ACETAMINOPHEN 1 TABLET: 7.5; 325 TABLET ORAL at 10:29

## 2023-03-03 RX ADMIN — MUPIROCIN 1 APPLICATION: 20 OINTMENT TOPICAL at 08:05

## 2023-03-03 RX ADMIN — GLIPIZIDE 10 MG: 10 TABLET ORAL at 08:09

## 2023-03-03 RX ADMIN — PRAVASTATIN SODIUM 40 MG: 40 TABLET ORAL at 08:06

## 2023-03-03 RX ADMIN — AMLODIPINE BESYLATE 10 MG: 10 TABLET ORAL at 08:06

## 2023-03-03 RX ADMIN — HYDROCHLOROTHIAZIDE 12.5 MG: 12.5 TABLET ORAL at 08:10

## 2023-03-03 RX ADMIN — METFORMIN HYDROCHLORIDE 1000 MG: 1000 TABLET, FILM COATED ORAL at 08:06

## 2023-03-03 RX ADMIN — PANTOPRAZOLE SODIUM 40 MG: 40 TABLET, DELAYED RELEASE ORAL at 05:53

## 2023-03-03 RX ADMIN — INSULIN LISPRO 3 UNITS: 100 INJECTION, SOLUTION INTRAVENOUS; SUBCUTANEOUS at 08:11

## 2023-03-03 RX ADMIN — APIXABAN 5 MG: 5 TABLET, FILM COATED ORAL at 08:10

## 2023-03-03 RX ADMIN — LEVOTHYROXINE SODIUM 25 MCG: 0.03 TABLET ORAL at 05:53

## 2023-03-03 NOTE — THERAPY DISCHARGE NOTE
Patient Name: Barry Cam  : 1950    MRN: [id number]                              Today's Date: 3/3/2023       Admit Date: 3/2/2023    Visit Dx:     ICD-10-CM ICD-9-CM   1. Total knee replacement status, left  Z96.652 V43.65   2. Arthritis of knee  M17.10 716.96     Patient Active Problem List   Diagnosis   • Seasonal allergic rhinitis   • Type 2 diabetes mellitus with hyperglycemia, without long-term current use of insulin ([facility])   • DUGGAN (dyspnea on exertion)   • Hypersomnia due to medical condition   • Fatigue   • Hyperlipidemia   • Obstructive sleep apnea syndrome treated with auto CPAP   • Gastroesophageal reflux disease   • Calculus of kidney   • Diabetic neuropathy, painful ([facility])   • Acute deep vein thrombosis (DVT) of distal vein of right lower extremity ([facility])   • History of pulmonary embolism   • Chronic deep vein thrombosis (DVT) of distal vein of right lower extremity ([facility])   • Class 3 severe obesity due to excess calories with serious comorbidity and body mass index (BMI) of 45.0 to 49.9 in adult ([facility])   • Thyroid disease   • Hypertension   • Vertigo   • Arthritis of knee   • Total knee replacement status, left     Past Medical History:   Diagnosis Date   • Allergic    • Allergic rhinitis    • Anxiety    • Arthritis     Serious   • Asthma 2018    PE Blood Clots   • Cataract     Removed   • CHF (congestive heart failure) ([facility]) 2021    Pending   • Clotting disorder ([facility]) 2018    Eliquis Related   • Deep vein thrombosis ([facility]) 2018    Diagnosed   • Depression    • Depression    • Diabetes mellitus ([facility])    • Diverticulosis     Mild   • DUGGAN (dyspnea on exertion)    • Fibromyalgia, primary     ?????   • GERD (gastroesophageal reflux disease)    • History of hiatal hernia    • History of kidney stones    • HL (hearing loss)     Mild   • Hx of blood clots    • Hyperlipidemia    • Hypertension    • Hypothyroidism    • Irritable bowel syndrome     Mild   • Kidney stone      Ongoing   • Low back pain     Never Ending   • Narcolepsy    • Obesity     Can't Win   • Pulmonary embolism (HCC)    • Sciatica    • Sleep apnea     CPAP USED   • Umbilical hernia    • Visual impairment     Diabetes Related     Past Surgical History:   Procedure Laterality Date   • EYE SURGERY      Cataract Removal   • FRACTURE SURGERY      Left Ankle/Foot/Leg   • HERNIA REPAIR     • HERNIA REPAIR     • INGUINAL HERNIA REPAIR     • TONSILLECTOMY        General Information     Row Name 03/03/23 0959          Physical Therapy Time and Intention    Document Type therapy note (daily note)  -     Mode of Treatment individual therapy;physical therapy  -           User Key  (r) = Recorded By, (t) = Taken By, (c) = Cosigned By    Initials Name Provider Type    Gianna Georges, PT Physical Therapist               Mobility     Row Name 03/03/23 1002          Bed Mobility    Supine-Sit [place] (Bed Mobility) standby assist;verbal cues  -     Sit-Supine [place] (Bed Mobility) standby assist;verbal cues  -     Assistive Device (Bed Mobility) bed rails;head of bed elevated  -     Row Name 03/03/23 1002          Sit-Stand Transfer    Sit-Stand [place] (Transfers) contact guard;verbal cues  -     Assistive Device (Sit-Stand Transfers) walker, front-wheeled  -     Row Name 03/03/23 1002          Gait/Stairs (Locomotion)    [place] Level (Gait) contact guard;verbal cues  -     Assistive Device (Gait) walker, front-wheeled  -     Distance in Feet (Gait) 50 ft  -     Deviations/Abnormal Patterns (Gait) gait speed decreased;antalgic;stride length decreased;karen decreased  -     Bilateral Gait Deviations forward flexed posture;heel strike decreased  -     Comment, (Gait/Stairs) distance limited by pain, unmedicated this AM  -           User Key  (r) = Recorded By, (t) = Taken By, (c) = Cosigned By    Initials Name Provider Type    Gianna Georges, PT Physical  Therapist               Obj/Interventions     Row Name 03/03/23 1003          Range of Motion Comprehensive    Comment, General Range of Motion L knee 5- 95  -     Row Name 03/03/23 1003          Motor Skills    Therapeutic Exercise --  Tka protocol x 15 reps  -           User Key  (r) = Recorded By, (t) = Taken By, (c) = Cosigned By    Initials Name Provider Type    Gianna Georges, PT Physical Therapist               Goals/Plan    No documentation.                Clinical Impression     Row Name 03/03/23 1004          Pain    Pretreatment Pain Rating 5/10  -     Posttreatment Pain Rating 6/10  -     Pain Location - Side/Orientation Left  -     Pain Intervention(s) Repositioned;Cold applied;Ambulation/increased activity  -     Row Name 03/03/23 1004          Plan of Care Review    Plan of Care Reviewed With patient  -     Outcome Evaluation Pt is ambulating well with Rwx despite pain. Demonstrated independence with HEP. Has a stair lift so denies a need to practice stairs.  Pt is safe to d/c home when medically stable.PT will sign off.  -     Row Name 03/03/23 1004          Positioning and Restraints    Pre-Treatment Position in bed  -     Post Treatment Position chair  -     In Chair reclined;call light within reach;encouraged to call for assist;exit alarm on;notified [facility]           User Key  (r) = Recorded By, (t) = Taken By, (c) = Cosigned By    Initials Name Provider Type    Gianna Georges, PT Physical Therapist               Outcome Measures     Row Name 03/03/23 1012 03/03/23 0806       How much help from another person do you currently need...    Turning from your back to your side while in flat bed without using bedrails? 4  -KH 4  -DAVID    Moving from lying on back to sitting on the side of a flat bed without bedrails? 3  -KH 3  -DAVID    Moving to and from a bed to a chair (including a wheelchair)? 3  -KH 3  -DAVID    Standing up from a chair using your arms (e.g.,  wheelchair, bedside chair)? 3  -KH 3  -DAVID    Climbing 3-5 steps with a railing? 3  -KH 3  -DAVID    To walk in hospital room? 3  -KH 3  -DAVID    AM-PAC 6 Clicks Score (PT) 19  -KH 19  -DAVID    Highest level of mobility 6 --> Walked 10 steps or more  -KH 6 --> Walked 10 steps or more  -DAVID    Row Name 03/03/23 1012          Functional Assessment    Outcome Measure Options AM-PAC 6 Clicks Basic Mobility (PT)  -           User Key  (r) = Recorded By, (t) = Taken By, (c) = Cosigned By    Initials Name Provider Type    Gianna Georges, PT Physical Therapist    Gabriel Reaves, RN Registered Nurse              Physical Therapy Education     Title: PT OT SLP Therapies (Done)     Topic: Physical Therapy (Done)     Point: Mobility training (Done)     Learning Progress Summary           Patient Acceptance, E, VU by DB at 3/2/2023 1628                   Point: Home exercise program (Done)     Learning Progress Summary           Patient Acceptance, E, VU by DB at 3/2/2023 1628                   Point: Body mechanics (Done)     Learning Progress Summary           Patient Acceptance, E, VU by DB at 3/2/2023 1628                   Point: Precautions (Done)     Learning Progress Summary           Patient Acceptance, E, VU by DB at 3/2/2023 1628                               User Key     Initials Effective Dates Name Provider Type Discipline    DB 06/16/21 -  Chrissy Garay, PT Physical Therapist PT              PT Recommendation and Plan     Plan of Care Reviewed With: patient  Outcome Evaluation: Pt is ambulating well with Rwx despite pain. Demonstrated independence with HEP. Has a stair lift so denies a need to practice stairs.  Pt is safe to d/c home when medically stable.PT will sign off.     Time Calculation:    PT Charges     Row Name 03/03/23 1013             Time Calculation    Start Time 0905  -      Stop Time 0928  -      Time Calculation (min) 23 min  -      PT Received On 03/03/23  -      PT - Next  Appointment 03/04/23  -         Time Calculation- PT    Total Timed Code Minutes- PT 23 minute(s)  -KH         Timed Charges    71849 - PT Therapeutic Exercise Minutes 15  -KH      63636 - PT Therapeutic Activity Minutes 8  -KH         Total Minutes    Timed Charges Total Minutes 23  -KH       Total Minutes 23  -KH            User Key  (r) = Recorded By, (t) = Taken By, (c) = Cosigned By    Initials Name Provider Type    Gianna Georges, PT Physical Therapist              Therapy Charges for Today     Code Description Service Date Service Provider Modifiers Qty    [id number]  PT THER PROC EA 15 MIN 3/3/2023 Gianna Sanchez, PT GP 1    [id number]  PT THERAPEUTIC ACT EA 15 MIN 3/3/2023 Gianna Sanchez, PT GP 1          PT G-Codes  Outcome Measure Options: AM-PAC 6 Clicks Basic Mobility (PT)  AM-PAC 6 Clicks Score (PT): 19    PT Discharge Summary  Anticipated Discharge Disposition (PT): home with assist, home with home health    Gianna Sanchez, PT  3/3/2023

## 2023-03-03 NOTE — PROGRESS NOTES
[facility] following for home care needs.  D/Cing today.  All info was verified yesterday and please do call spouse to schedule visits [phone].  Noted referral in per Dr Salcedo for home PT.  Denies any current home health used.

## 2023-03-03 NOTE — DISCHARGE SUMMARY
Date of Admission:  3/2/2023    Date of Discharge:  3/3/2023    Discharge Diagnosis: s/p Left total knee arthroplasty    Admitting Physician: Noah Salcedo    Consults: none    DETAILS OF HOSPITAL STAY:  Patient is a 72 y.o. male was admitted to the floor following a total knee arthroplasty.  Post-operatively the patient was transferred to the post-operative floor where the patient underwent physical therapy that included active as well as passive ROM exercises. Opioids were titrated to achieve appropriate pain management to allow for participation in mobilization exercises. Vital signs are now stable. The incision is benign without signs or symptoms of infection. Operative extremity neurovascular status remains intact. Appropriate education re: incision care, activity levels, medications, and follow up visits was completed and all questions were answered. The patient is now deemed stable for discharge to home.    Condition on Discharge:  Stable    Discharge Medications     Discharge Medications      New Medications      Instructions Start Date   docusate sodium 100 MG capsule  Commonly known as: COLACE   100 mg, Oral, 2 Times Daily      ondansetron 4 MG tablet  Commonly known as: Zofran   4 mg, Oral, Every 8 Hours PRN      oxyCODONE-acetaminophen 7.5-325 MG per tablet  Commonly known as: PERCOCET   1 tablet, Oral, Every 4 Hours PRN         Changes to Medications      Instructions Start Date   acetaminophen 500 MG tablet  Commonly known as: TYLENOL  What changed: Another medication with the same name was added. Make sure you understand how and when to take each.   500 mg, Oral, Every 6 Hours PRN      acetaminophen 325 MG tablet  Commonly known as: TYLENOL  What changed: You were already taking a medication with the same name, and this prescription was added. Make sure you understand how and when to take each.   650 mg, Oral, 2 Times Daily PRN      amLODIPine 10 MG tablet  Commonly known as: NORVASC  What changed:  when to take this   TAKE 1 TABLET BY MOUTH EVERY DAY      betamethasone valerate 0.1 % cream  Commonly known as: VALISONE  What changed: See the new instructions.   APPLY CREAM TOPICALLY TO AFFECTED AREA(S) TO SKIN TWICE DAILY      Eliquis 5 MG tablet tablet  Generic drug: apixaban  What changed:   · how much to take  · when to take this  · additional instructions   TAKE 1 TABLET BY MOUTH EVERY 12 HOURS      omeprazole 20 MG capsule  Commonly known as: priLOSEC  What changed:   · how to take this  · when to take this   TAKE 1 CAPSULE BY MOUTH EVERY DAY         Continue These Medications      Instructions Start Date   albuterol sulfate  (90 Base) MCG/ACT inhaler  Commonly known as: PROVENTIL HFA;VENTOLIN HFA;PROAIR HFA   2 puffs, Inhalation, Every 4 Hours PRN, for wheezing      amphetamine-dextroamphetamine 20 MG tablet  Commonly known as: Adderall   20 mg, Oral, 2 Times Daily      caffeine 200 MG tablet   200 mg, Oral, Every 4 Hours PRN      diphenhydrAMINE 50 MG capsule  Commonly known as: BENADRYL   50 mg, Oral, Every 6 Hours PRN      gabapentin 300 MG capsule  Commonly known as: NEURONTIN   TAKE 2 CAPSULES BY MOUTH TWICE A DAY      glimepiride 4 MG tablet  Commonly known as: AMARYL   4 mg, Oral, Every Morning Before Breakfast      Glucosamine Chondr 500 Complex capsule   2 capsules, Oral, Daily      hydroCHLOROthiazide 12.5 MG tablet  Commonly known as: HYDRODIURIL   TAKE 1 TABLET BY MOUTH EVERY DAY      Januvia 100 MG tablet  Generic drug: SITagliptin   TAKE 1 TABLET BY MOUTH EVERY DAY      levothyroxine 25 MCG tablet  Commonly known as: SYNTHROID, LEVOTHROID   TAKE 1 TABLET BY MOUTH EVERY DAY      loratadine 10 MG tablet  Commonly known as: CLARITIN   10 mg, Oral, Daily      losartan 100 MG tablet  Commonly known as: COZAAR   TAKE 1 TABLET BY MOUTH EVERY DAY      LUTEIN PO   5 mg, Oral, Daily, PT TO STOP PER MD INSTRUCTION      Melatonin 10 MG tablet   Daily PRN      metFORMIN 1000 MG tablet  Commonly  known as: GLUCOPHAGE   TAKE 1 TABLET BY MOUTH TWICE A DAY      modafinil 200 MG tablet  Commonly known as: PROVIGIL   200 mg, Oral, Daily      modafinil 200 MG tablet  Commonly known as: PROVIGIL   200 mg, Oral, Daily PRN      multivitamin tablet tablet  Commonly known as: THERAGRAN   Oral, Daily, PT TO STOP PER RN INSTRUCTION      OneTouch Delica Lancets 33G misc   USE TO TEST BLOOD SUGAR 3 TIMES DAILY DIABETES TYPE 2 E11.9      OneTouch Ultra test strip  Generic drug: glucose blood   USE TO TEST BLOOD SUGAR 3 TIMES DAILY      pioglitazone 30 MG tablet  Commonly known as: ACTOS   TAKE 1 TABLET BY MOUTH EVERY DAY      pravastatin 40 MG tablet  Commonly known as: PRAVACHOL   TAKE 1 TABLET BY MOUTH EVERY DAY      Triamcinolone Acetonide 55 MCG/ACT nasal inhaler  Commonly known as: NASACORT   2 sprays, Nasal, Daily PRN      vitamin C 250 MG tablet  Commonly known as: ASCORBIC ACID   250 mg, Oral, Daily, PT TO STOP PER RN INSTRUCTION      vitamin E 200 UNIT capsule   200 Units, Oral, Daily, PT TO STOP PER MD INSTRUCTION      Zeaxanthin powder   Does not apply, Daily, PT TO STOP PER MD INSTRUCTION      ZINC 15 PO   Oral, Daily, PT TO STOP PER RN INSTRUCTION         Stop These Medications    HIBICLENS EX            Discharge Diet: regular    Activity at Discharge: as tolerated    Discharge Instructions:   1)  Patient is to continue with physical therapy exercises daily and continue working with the physical therapist as ordered.  2)  Continue to follow precautions as instructed.   3)  Patient may weight bear as tolerated.   4)  Continue to ice regularly. Patient instructed on frequent calf pumping exercises.  Patient also instructed on incentive spirometer during hospitalization and encouraged to continue to use at home regularly.   5)  Patient is instructed to continue DVT prophylaxis.  6)  The dressing should be left in place. If waterproof dressing is intact the patient may shower immediately following discharge. If  the dressing becomes dislodged or saturated it should be changed and patient must wait until POD #5 to shower. If dressing is changed, apply dry sterile dressing after showering.  7)  Follow up appointment in 2 weeks - patient to call the office at [phone] to schedule.     Follow-up Appointments  2 weeks with Dr Denisse Masterson, LISA  03/03/23  16:50 EST

## 2023-03-03 NOTE — DISCHARGE PLACEMENT REQUEST
"Marques Campbell (72 y.o. Male)     Date of Birth   1950    Social Security Number       Address   [address] [place] [address]    Home Phone   [phone]    MRN   [id number]       [employer]   Ep[employer]    Marital Status                               Admission Date   3/2/23    Admission Type   Elective    Admitting Provider   Noah Salcedo MD    Attending Provider   Noah Salcedo MD    Department, Room/Bed   [address], P884/1       Discharge Date       Discharge Disposition   Home-Health Care [facility]    Discharge Destination                               Attending Provider: Noah Salcedo MD    Allergies: Other    Isolation: None   Infection: None   Code Status: Not on file    Ht: 185.4 cm (72.99\")   Wt: 159 kg (349 lb 10.4 oz)    Admission Cmt: None   Principal Problem: Arthritis of knee [M17.10]                 Active Insurance as of 3/2/2023     Primary Coverage     Payor Plan Insurance Group Employer/Plan Group    MEDICARE MEDICARE A & B      Payor Plan Address Payor Plan Phone Number Payor Plan Fax Number Effective Dates    [address] [phone]  5/1/2017 - None Entered    [facility] [id number]       Subscriber Name Subscriber Birth Date Member ID       MARQUES CAMPBELL 1950 [id number]           Secondary Coverage     Payor Plan Insurance Group Employer/Plan Group    [facility] SUPP [id number]     Payor Plan Address Payor Plan Phone Number Payor Plan Fax Number Effective Dates    [address]   5/1/2017 - None Entered    [facility] [address]       Subscriber Name Subscriber Birth Date Member ID       MARQUES CAMPBELL 1950 [id number]                 Emergency Contacts      (Rel.) Home Phone Work Phone Mobile Phone    Swapna Campbell (Spouse) [phone] -- [phone]        "

## 2023-03-03 NOTE — PROGRESS NOTES
"  Patients Name:  Barry Cam  YOB: 1950  Medical Records Number:  [id number]    HPI:  No complaints or issues.  Tolerating regular diet.  Pain adequately controlled.    Exam:  /65 (BP Location: Right arm, Patient Position: Lying)   Pulse 83   Temp 98.3 °F (36.8 °C) (Oral)   Resp 16   Ht 185.4 cm (72.99\")   Wt (!) 159 kg (349 lb 10.4 oz)   SpO2 93%   BMI 46.14 kg/m²     Lab Results (last 48 hours)     Procedure Component Value Units Date/Time    Hemoglobin & Hematocrit, Blood [[id number]]  (Abnormal) Collected: 03/03/23 0448    Specimen: Blood Updated: 03/03/23 0527     Hemoglobin 10.9 g/dL      Hematocrit 31.8 %     POC Glucose Once [[id number]]  (Abnormal) Collected: 03/02/23 1552    Specimen: Blood Updated: 03/02/23 1553     Glucose 245 mg/dL      Comment: Meter: [id number] : [id number] Sue DIAZ       POC Glucose Once [[id number]]  (Abnormal) Collected: 03/02/23 0652    Specimen: Blood Updated: 03/02/23 0654     Glucose 149 mg/dL      Comment: Meter: [id number] : [id number] Wiliam Davey RN             Incision: Clean, benign appearing.    Extremity:  Calf soft.  Negative Homans sign.  Good motor and sensory function in foot.  Good pedal pulses with brisk cap refill.    Postoperative x-rays show good alignment of the implant without complicating process.    Plan:   1.  POD#1 s/p left TKA   2.  Continue PT for mobilization, ROM   3.  Eliquis for DVT prophylaxis    Noah Salcedo MD    "

## 2023-03-03 NOTE — CASE MANAGEMENT/SOCIAL WORK
Case Management Discharge Note      Final Note: Patient dc'd home with [facility] to follow. Rosibel Terrazas RN         Selected Continued Care - Discharged on 3/3/2023 Admission date: 3/2/2023 - Discharge disposition: Home-Health Care Svc    Destination    No services have been selected for the patient.              Durable Medical Equipment    No services have been selected for the patient.              Dialysis/Infusion    No services have been selected for the patient.              Home Medical Care Coordination complete.    Service Provider Selected Services Address Phone Fax Patient Preferred    [facility] [id number] [facility]  [facility] [address] [place] [address] [phone] --          Therapy    No services have been selected for the patient.              Community Resources    No services have been selected for the patient.              Community & DME    No services have been selected for the patient.                  Transportation Services  Private: Car    Final Discharge Disposition Code: 06 - home with home health care

## 2023-03-03 NOTE — OUTREACH NOTE
Prep Survey    Flowsheet Row Responses   [facility] facility patient discharged from? [place]   Is LACE score < 7 ? Yes   Eligibility [facility]   Date of Admission 03/02/23   Date of Discharge 03/03/23   Discharge Disposition Home-Health Care Sv   Discharge diagnosis Left total knee arthroplasty   Does the patient have one of the following disease processes/diagnoses(primary or secondary)? Total Joint Replacement   Does the patient have Home health ordered? Yes   What is the [facility] health agency?  [facility]    Is there a DME ordered? Yes   What DME was ordered? walker   Prep survey completed? Yes          Cielo PEARCE - Registered Nurse

## 2023-03-03 NOTE — PLAN OF CARE
Goal Outcome Evaluation:   Patient is POD 0 after a L Total Knee Arthroplasty. Aox4, VSS. Patient was able to ambulate to the bathroom assist x1 w/walker upon arrival to the unit and urinate without difficulty. Peripheral IV came out while ambulating from gripping the walker, IV team notified to place a new peripheral IV. Patient has sleep apnea and oxygen saturation will intermittently dip into the 80s while sleeping, CPAP machine in place with 2 liters. Will continue to monitor.

## 2023-03-03 NOTE — PLAN OF CARE
Goal Outcome Evaluation:           Progress: improving   Pt is a post op day 1 of a left total knee, dressing is clean dry and intact. Pt continues with the ACE and SUGEY, green light flashing. Pt has ambulated to the bathroom with a assist x1. Voiding function intact.. Pt educated on the importance of monitoring blood sugars related to comorbidity of DM. Pt voiced understanding. Pt resting at this time, will continue to monitor.

## 2023-03-06 ENCOUNTER — TELEPHONE (OUTPATIENT)
Dept: ORTHOPEDIC SURGERY | Facility: HOSPITAL | Age: 73
End: 2023-03-06
Payer: MEDICARE

## 2023-03-06 ENCOUNTER — TRANSITIONAL CARE MANAGEMENT TELEPHONE ENCOUNTER (OUTPATIENT)
Dept: CALL CENTER | Facility: HOSPITAL | Age: 73
End: 2023-03-06
Payer: MEDICARE

## 2023-03-06 NOTE — OUTREACH NOTE
Call Center TCM Note    Flowsheet Row Responses   [facility] patient discharged from? [place]   Does the patient have one of the following disease processes/diagnoses(primary or secondary)? Total Joint Replacement   Joint surgery performed? Knee   TCM attempt successful? Yes   Call start time 1503   Call end time 1517   Has the patient been back in either the hospital or Emergency Department since discharge? No   Discharge diagnosis Left total knee arthroplasty   Is patient permission given to speak with other caregiver? Yes   List who call center can speak with Swapna Cam Spouse   Person spoke with today (if not patient) and relationship wSapna Cam Spouse   Does the patient have all medications related to this admission filled (includes all antibiotics, pain medications, etc.) Yes   Is the patient taking all medications as directed (includes completed medication regime)? Yes   Is the patient able to teach back alternate methods of pain control? Ice, Knee-elevation/no pillow under knee  [Wife reports patient is not using his ice]   Comments PCP Cristi Gerber MD. Declined need for PCP HFU appt at this time. Pateint will follow up with ortho 3/15   Does the patient have an appointment with their PCP within 7 days of discharge? No   Nursing Interventions Patient desires to follow up with specialty only   What is the Home health agency?  [facility]   Has home health visited the patient within 72 hours of discharge? Yes   Home health comments Wife reports HH came on Saturday and expecting PT this afternoon   What DME was ordered? walker listed in discharge orders, but notes stated patient had walker at home.   DME comments Wife states patient needing larger walker that what they have. Reports will have todays PT assess and call office with order need.   Psychosocial issues? No   Has the patient began therapy sessions (either in the home or as an out patient)? Yes   If the patient has started attending  Patient called wanting to ask Dr. Teddy Dong if she will prescribe the pro air instead of the Proventil. Informed patient it is the same medication and that it is up to her insurance as to which one she receives. Her insurance only covers the Proventil. Patient stated it is not lasting as long as the proair and it doesn't have a number to tell her when she is out of the medication on the back of the inhaler. Informed her that I will forward message to Dr. Teddy Dong. therapy, what post op day did they begin to attend (either in home or as an out patient)?   HH PT started POD #2   Does the patient have a wound vac in place? N/A   Has the patient fallen since discharge? No   Did the patient receive a copy of their discharge instructions? Yes   Nursing interventions Reviewed instructions with patient  [wife]   What is the patient's perception of their functional status since discharge? Improving   Is the patient able to teach back signs and symptoms of infection? Increased swelling or redness around incision (not associated with surgical edema), Incisional drainage   Is the patient able to teach back how to prevent infection? Check incision daily   If the patient is a current smoker, are they able to teach back resources for cessation? Not a smoker   Is the patient/caregiver able to teach back the hierarchy of who to call/visit for symptoms/problems? PCP, Specialist, Home health nurse, Urgent Care, ED, 911 Yes   TCM call completed? Yes   Call end time 1517   Would this patient benefit from a Referral to [facility] Social Work? No   Is the patient interested in additional calls from an ambulatory ?  NOTE:  applies to high risk patients requiring additional follow-up. No          Swapna Clark RN    3/6/2023, 15:18 EST

## 2023-03-06 NOTE — TELEPHONE ENCOUNTER
Post op day 4  Discharge Instructions:  Ask patient about his or her discharge instructions  ?  Patient confirmed understanding   ?  Further instruction needed   What, if any, recommendations, teaching, or interventions did you provide? Click or tap here to enter text.  Health status:  Pain controlled Yes   The pain is controlled with the medications.   Recommended interventions:  Yes  incision/dressing status   ?  Clean without redness, drainage, odor  ?  Redness    ?  Drainage - color Click or tap here to enter text.  ?  Odor  SUGEY - Green light blinking Choose an item.  Difficulties urination No  Last BM 3/3/2023 (if no BM by day 3-recommend OTC suppository or fleets enema)  No BM at this time. Is taking the medications. Other options given.   Medications:  ?Medications reviewed with patient/family/caregiver  Patient taking medications as prescribed?   Yes  If not taking medications as prescribed, note specific medicine(s) and reason for each:  Click or tap here to enter text.  Hospital Follow Up Plan:  Follow up Appointment with Orthopedic surgeon:  ?Has f/u appointment                ?Scheduled f/u appointment  Home Care ordered at discharge?    Yes        Home Care started, or contact made?    Yes   If no, action taken: Click or tap here to enter text.  DME obtained/used in home?         Yes   Using IS  Yes   Other information: Spoke with Ms. Cam, She said he was doing better. He was to be a same day but had to stay overnight because he was unable to maintain is O2 sats. That has since improved. She said he is doing ok. He is still struggling with getting up and down, but PT is coming today, and he hopes to work on that. He is walking around the house. Pain is controlled with the pain medication. Dressing remains CDI. He hasn’t had a BM yet, said they are working on it. He is taking the medication. Other options given at this time. Ms. Cam doesn’t have any questions for me at this time. She was given my  contact information should they need anything. she voiced understanding.

## 2023-03-09 DIAGNOSIS — Z96.652 TOTAL KNEE REPLACEMENT STATUS, LEFT: ICD-10-CM

## 2023-03-09 RX ORDER — OXYCODONE AND ACETAMINOPHEN 7.5; 325 MG/1; MG/1
1 TABLET ORAL EVERY 4 HOURS PRN
Qty: 42 TABLET | Refills: 0 | Status: SHIPPED | OUTPATIENT
Start: 2023-03-09 | End: 2023-03-16 | Stop reason: SDUPTHER

## 2023-03-09 RX ORDER — ACETAMINOPHEN 325 MG/1
650 TABLET ORAL 2 TIMES DAILY PRN
Qty: 60 TABLET | Refills: 0 | Status: SHIPPED | OUTPATIENT
Start: 2023-03-09

## 2023-03-09 NOTE — TELEPHONE ENCOUNTER
----- Message from Barry Cam sent at 3/9/2023 11:14 AM EST -----  Regarding: Post op follow up  Contact: [phone]  Maddy Salcedo. Things are progressing slowly but surely on this end. I am going to need some more Percocet before our post-op appointment on the 15th.  I have 10 tabs left at this time.  There is considerable bruising in the calf area and sharp pain on the inside of the joint.  See you next week.  Mann Cam

## 2023-03-13 ENCOUNTER — TELEPHONE (OUTPATIENT)
Dept: ORTHOPEDIC SURGERY | Facility: HOSPITAL | Age: 73
End: 2023-03-13
Payer: MEDICARE

## 2023-03-13 RX ORDER — APIXABAN 5 MG/1
TABLET, FILM COATED ORAL
Qty: 60 TABLET | Refills: 5 | Status: SHIPPED | OUTPATIENT
Start: 2023-03-13

## 2023-03-13 NOTE — TELEPHONE ENCOUNTER
Attempted to reach Mr. Cam to see how he is doing as he is 2 weeks SP LTK, Message left at this time.

## 2023-03-15 ENCOUNTER — OFFICE VISIT (OUTPATIENT)
Dept: ORTHOPEDIC SURGERY | Facility: CLINIC | Age: 73
End: 2023-03-15
Payer: MEDICARE

## 2023-03-15 VITALS — WEIGHT: 315 LBS | HEIGHT: 73 IN | TEMPERATURE: 97.7 F | BODY MASS INDEX: 41.75 KG/M2

## 2023-03-15 DIAGNOSIS — Z96.652 TOTAL KNEE REPLACEMENT STATUS, LEFT: Primary | ICD-10-CM

## 2023-03-15 PROCEDURE — 73562 X-RAY EXAM OF KNEE 3: CPT | Performed by: ORTHOPAEDIC SURGERY

## 2023-03-15 PROCEDURE — 99024 POSTOP FOLLOW-UP VISIT: CPT | Performed by: ORTHOPAEDIC SURGERY

## 2023-03-15 PROCEDURE — 1160F RVW MEDS BY RX/DR IN RCRD: CPT | Performed by: ORTHOPAEDIC SURGERY

## 2023-03-15 PROCEDURE — 1159F MED LIST DOCD IN RCRD: CPT | Performed by: ORTHOPAEDIC SURGERY

## 2023-03-15 RX ORDER — HYDROCODONE BITARTRATE AND ACETAMINOPHEN 7.5; 325 MG/1; MG/1
1 TABLET ORAL EVERY 6 HOURS PRN
Qty: 50 TABLET | Refills: 0 | Status: SHIPPED | OUTPATIENT
Start: 2023-03-15

## 2023-03-15 NOTE — PROGRESS NOTES
Barry Cam     : 1950     MRN: [id number]     DATE: 3/15/2023    DIAGNOSIS: Follow-up 2 weeks status post total knee arthroplasty    SUBJECTIVE:  Patient returns today for 2 week follow up of recent knee replacement. Patient reports doing well with no unusual complaints.  Patient reports compliance with the anticoagulation.    OBJECTIVE:     Exam: The incision is healing appropriately.  No sign of infection.  Range of motion is progressing as expected.  The calf is soft and nontender with a negative Homans sign.    DIAGNOSTIC STUDIES     Xrays: AP and lateral views of the left knee were ordered and reviewed for evaluation of recent knee replacement. They demonstrate a well positioned, well aligned knee replacement without complicating factors noted. In comparison with previous films there has been interval implant placement.    ASSESSMENT: 2 week status post left knee replacement.    PLAN:   1) Continue PT   2) Continue to ice as needed.   3) Continue anticoagulation as discussed   4) Follow-up in 6 weeks    5)  I agreed to refill the [place].  Risks were discussed    Noah Salcedo MD    3/15/2023

## 2023-03-16 DIAGNOSIS — Z96.652 TOTAL KNEE REPLACEMENT STATUS, LEFT: ICD-10-CM

## 2023-03-17 RX ORDER — OXYCODONE AND ACETAMINOPHEN 7.5; 325 MG/1; MG/1
1 TABLET ORAL EVERY 4 HOURS PRN
Qty: 42 TABLET | Refills: 0 | Status: SHIPPED | OUTPATIENT
Start: 2023-03-17

## 2023-03-21 ENCOUNTER — TREATMENT (OUTPATIENT)
Dept: PHYSICAL THERAPY | Facility: CLINIC | Age: 73
End: 2023-03-21
Payer: MEDICARE

## 2023-03-21 DIAGNOSIS — Z96.652 STATUS POST TOTAL KNEE REPLACEMENT, LEFT: ICD-10-CM

## 2023-03-21 DIAGNOSIS — M25.562 ACUTE PAIN OF LEFT KNEE: Primary | ICD-10-CM

## 2023-03-21 DIAGNOSIS — R68.89 DECREASED FUNCTIONAL ACTIVITY TOLERANCE: ICD-10-CM

## 2023-03-21 DIAGNOSIS — R29.898 DECREASED STRENGTH OF LOWER EXTREMITY: ICD-10-CM

## 2023-03-21 PROCEDURE — 97140 MANUAL THERAPY 1/> REGIONS: CPT | Performed by: PHYSICAL THERAPIST

## 2023-03-21 PROCEDURE — 97162 PT EVAL MOD COMPLEX 30 MIN: CPT | Performed by: PHYSICAL THERAPIST

## 2023-03-21 PROCEDURE — 97110 THERAPEUTIC EXERCISES: CPT | Performed by: PHYSICAL THERAPIST

## 2023-03-21 NOTE — PATIENT INSTRUCTIONS
Access Code: [email or web address]  URL: [email or web address]  Date: 03/21/2023  Prepared by: Nancy Fierro    Exercises  Supine Active Straight Leg Raise - 1 x daily - 7 x weekly - 2 sets - 10 reps  Seated Long Arc Quad - 1 x daily - 7 x weekly - 3 sets - 10 reps  Supine Quad Set - 1 x daily - 7 x weekly - 3 sets - 10 reps - 5s hold  Supine Gluteal Sets - 1 x daily - 7 x weekly - 3 sets - 10 reps  Supine Heel Slide with Strap - 1 x daily - 7 x weekly - 3 sets - 10 reps  Supine Short Arc Quad - 1 x daily - 7 x weekly - 2 sets - 10 reps

## 2023-03-21 NOTE — PROGRESS NOTES
Physical Therapy Daily Treatment Note  [address] [facility] [address] [place] [address]  P: [phone]  F: [phone]    Patient: Barry Cam   : 1950  Referring practitioner: Noah Salcedo MD  Date of Initial Visit: Type: THERAPY  Noted: 3/21/2023  Today's Date: 3/21/2023  Patient seen for 1 sessions       Visit Diagnoses:    ICD-10-CM ICD-9-CM   1. Acute pain of left knee  M25.562 719.46   2. Status post total knee replacement, left  Z96.652 V43.65   3. Decreased functional activity tolerance  R68.89 780.99   4. Decreased strength of lower extremity  R29.898 729.89       Barry Cam reports: WOMAC: 61/96    Subjective Evaluation    History of Present Illness  Date of surgery: 3/2/2023  Mechanism of injury: Pt is a 73 y/o male who presents to physical therapy s/p L TKA on 23. Pt presents in a wheelchair, but reports he uses a FWW for walking short distances in his home. He states he had home health for two weeks.     Pain  Current pain ratin  At worst pain rating: 10  Quality: dull ache  Relieving factors: rest, change in position, ice and medications (percocet)  Aggravating factors: stairs, standing, movement, squatting, ambulation, prolonged positioning and sleeping    Social Support  Lives in: multiple-level home  Lives with: spouse    Treatments  Previous treatment: home therapy  Current treatment: physical therapy  Patient Goals  Patient goals for therapy: increased strength, independence with ADLs/IADLs, decreased pain, decreased edema, improved balance and increased motion           Objective          Neurological Testing     Additional Neurological Details  No neurological symptoms reported (besides neuropathy, previously had neuropathy)    Active Range of Motion   Left Knee   Flexion: 110 degrees with pain  Extension: 10 degrees     Right Knee   Normal active range of motion    Additional Active Range of Motion Details  Extension lacking 10 degrees to  0    Passive Range of Motion   Left Knee   Flexion: 112 degrees with pain  Extension: 8 degrees with pain    Right Knee   Normal passive range of motion    Patellar Mobility   Left Knee Hypomobile in the left medial and left inferior patellar tendon(s).     Strength/Myotome Testing     Left Knee   Flexion: 4  Extension: 3+  Quadriceps contraction: fair    Right Knee   Normal strength  Quadriceps contraction: good    Swelling     Left Knee Girth Measurement (cm)   Joint line: 47 cm  10 cm above joint line: 56 cm  10 cm below joint line: 47 cm    Right Knee Girth Measurement (cm)   Joint line: 44 cm  10 cm above joint line: 56 cm  10 cm below joint line: 47 cm    Ambulation   Weight-Bearing Status   Weight-Bearing Status (Left): full weight bearing   Weight-Bearing Status (Right): full weight-bearing    Assistive device used: front-wheeled walker    Ambulation: Level Surfaces   Ambulation with assistive device: independent  Ambulation without assistive device: unable    Ambulation: Stairs   Ascend stairs: unable  Descend stairs: unable    Additional Stairs Ambulation Details  Could do stairs prior to surgery      See Exercise, Manual, and Modality Logs for complete treatment.       Assessment & Plan     Assessment  Impairments: abnormal gait, abnormal muscle firing, abnormal or restricted ROM, activity intolerance, impaired balance, impaired physical strength, lacks appropriate home exercise program, pain with function, safety issue and weight-bearing intolerance  Functional Limitations: lifting, sleeping, walking, uncomfortable because of pain, moving in bed, sitting, standing and unable to perform repetitive tasks  Assessment details: Pt is a 73 y/o male who presents to physical therapy with signs and symptoms appropriate at this stage s/p L TKA. Pt has decreased L knee ROM. In addition, pt has L LE strength deficits which limits his ability to perform his ADLs/IADLs pain free. Pt has pain and difficulty performing  sit to stand transfers, ambulation, and stair negotiation tasks. Pt would benefit from skilled physical therapy in order to address the above impairments and activity limitations outlined in this initial evaluation, in order to decrease pain, improve functional mobility, and return to PLOF.     Barriers to therapy: neuropathy, diabetes (II), HTN  Prognosis: good    Goals  Plan Goals: STG 2-6 weeks    1. Decrease WOMAC score by 10 points to show improvement in overall functional activities  2. Increase knee flexion ROM to 120 in order to be able to improve transfers  3. Knee extension ROM to 0 degrees in order to improve stability and terminal stance in gait  4. Pt will be independent in home exercise program    LTG 6-12 weeks    1.Decrease WOMAC score by 20 points from IE to show improvement in overall functional activities  2. Increase MMT for knee to 5/5 in order to be able to improve functional activities  3. Pt will be able to ascend/descend a flight of stairs pain free using a hand rail and step to gait pattern  4. Pt will be able to perform 5TSTS using BUEs for push off in <14.5 seconds pain free and independently  5. Pt will be able to ambulate without AD pain free and perform TUG test <14.5 seconds safely and independently      Plan  Therapy options: will be seen for skilled therapy services  Planned modality interventions: cryotherapy, electrical stimulation/Russian stimulation, TENS and thermotherapy (hydrocollator packs)  Planned therapy interventions: ADL retraining, balance/weight-bearing training, functional ROM exercises, gait training, home exercise program, IADL retraining, joint mobilization, flexibility, manual therapy, motor coordination training, neuromuscular re-education, soft tissue mobilization, strengthening, stretching, therapeutic activities and transfer training  Frequency: 2x week  Duration in weeks: 12  Treatment plan discussed with: patient          Timed:         Manual Therapy:     10     mins  84472;     Therapeutic Exercise:    15     mins  14021;     Neuromuscular Elizabeth:    0    mins  83758;    Therapeutic Activity:     0     mins  62923;     Gait Trainin     mins  34061;     Ultrasound:     0     mins  59259;    Ionto                               0    mins   14405  Self Care                       0     mins   77753  Canalith Repos    0     mins 32965      Un-Timed:  Electrical Stimulation:    0     mins  48162 ( );  Dry Needling     0     mins self-pay  Traction     0     mins 30881      Timed Treatment:   25   mins   Total Treatment:    55    mins    Nancy Fierro PT  [place] License: [id number]

## 2023-03-24 ENCOUNTER — TREATMENT (OUTPATIENT)
Dept: PHYSICAL THERAPY | Facility: CLINIC | Age: 73
End: 2023-03-24
Payer: MEDICARE

## 2023-03-24 DIAGNOSIS — M25.562 ACUTE PAIN OF LEFT KNEE: Primary | ICD-10-CM

## 2023-03-24 DIAGNOSIS — R29.898 DECREASED STRENGTH OF LOWER EXTREMITY: ICD-10-CM

## 2023-03-24 DIAGNOSIS — Z96.652 STATUS POST TOTAL KNEE REPLACEMENT, LEFT: ICD-10-CM

## 2023-03-24 DIAGNOSIS — R68.89 DECREASED FUNCTIONAL ACTIVITY TOLERANCE: ICD-10-CM

## 2023-03-24 PROCEDURE — 97116 GAIT TRAINING THERAPY: CPT | Performed by: PHYSICAL THERAPIST

## 2023-03-24 PROCEDURE — 97112 NEUROMUSCULAR REEDUCATION: CPT | Performed by: PHYSICAL THERAPIST

## 2023-03-24 PROCEDURE — 97110 THERAPEUTIC EXERCISES: CPT | Performed by: PHYSICAL THERAPIST

## 2023-03-24 NOTE — PROGRESS NOTES
Physical Therapy Daily Treatment Note  [facility] Physical Therapy [place]   [address] [place] [address], [address]  [place] [address]  P: [phone]       F: [phone]    Patient: Barry Cam   : 1950  Diagnosis/ICD-10 Code:  Acute pain of left knee [M25.562]  Referring practitioner: No ref. provider found  Date of Initial Visit: Type: THERAPY  Noted: 3/21/2023  Today's Date: 3/24/2023  Patient seen for 2 sessions         Barry Cam reports: L knee hurts. I don't really do the exercises because they hurt. I use my wheelchair and sometimes walker to get around the house.     Subjective     Objective   Pt came into clinic in wheelchair and brought walker for use in clinic. No shoes. Instructed patient to bring shoes next session.   See Exercise, Manual, and Modality Logs for complete treatment.       Assessment/Plan  Subjectively, pt reports no increase of pain or discomfort with interventions performed today. Performed well with gait training with walker around the clinic, able to perform 2 laps with step through gait. Continues to demonstrate tendency to offload L LE, especially with sit to stand activity. Continues to benefit from verbal/tactile cues to ensure proper form and technique for exercise performance.     Progress per Plan of Care           Manual Therapy:         mins  33425;  Therapeutic Exercise:    25     mins  93651;     Neuromuscular Elizabeth:    10    mins  14115;    Therapeutic Activity:          mins  90865;     Gait Training:      10     mins  28326;     Ultrasound:          mins  86638;    Electrical Stimulation:         mins  48630 ( );  Dry Needling          mins self-pay    Timed Treatment:   45   mins   Total Treatment:     55   mins    Priti Benavides PTA  Physical Therapist Assistant [id number]

## 2023-03-28 ENCOUNTER — TREATMENT (OUTPATIENT)
Dept: PHYSICAL THERAPY | Facility: CLINIC | Age: 73
End: 2023-03-28
Payer: MEDICARE

## 2023-03-28 DIAGNOSIS — R68.89 DECREASED FUNCTIONAL ACTIVITY TOLERANCE: ICD-10-CM

## 2023-03-28 DIAGNOSIS — R29.898 DECREASED STRENGTH OF LOWER EXTREMITY: ICD-10-CM

## 2023-03-28 DIAGNOSIS — M25.562 ACUTE PAIN OF LEFT KNEE: Primary | ICD-10-CM

## 2023-03-28 DIAGNOSIS — Z96.652 STATUS POST TOTAL KNEE REPLACEMENT, LEFT: ICD-10-CM

## 2023-03-28 PROCEDURE — 97110 THERAPEUTIC EXERCISES: CPT | Performed by: PHYSICAL THERAPIST

## 2023-03-28 PROCEDURE — 97140 MANUAL THERAPY 1/> REGIONS: CPT | Performed by: PHYSICAL THERAPIST

## 2023-03-28 PROCEDURE — 97530 THERAPEUTIC ACTIVITIES: CPT | Performed by: PHYSICAL THERAPIST

## 2023-03-28 NOTE — PROGRESS NOTES
"Physical Therapy Daily Treatment Note  [address] [facility] [address] [place] [address]  P: [phone]  F: [phone]    Patient: Barry Cam   : 1950  Referring practitioner: No ref. provider found  Date of Initial Visit: Type: THERAPY  Noted: 3/21/2023  Today's Date: 3/28/2023  Patient seen for 3 sessions       Visit Diagnoses:    ICD-10-CM ICD-9-CM   1. Acute pain of left knee  M25.562 719.46   2. Status post total knee replacement, left  Z96.652 V43.65   3. Decreased functional activity tolerance  R68.89 780.99   4. Decreased strength of lower extremity  R29.898 729.89       Barry Cam reports:     Subjective   Pt reports that he's \"not having a good day today\" and \"loaded up on percocet before I got here\". Pt reports his knee \"Just feels so tight\".   Objective   See Exercise, Manual, and Modality Logs for complete treatment.   L knee flex AROM- 110 degrees  L knee flex PROM- 112 degrees    Assessment/Plan  Pt continues to report pain during active left knee flexion. He c/o pain across the top of his knee. No reports of calf pain, pt denies fever/chills etc. Pt able to ambulate around the clinic with walker for 2 laps continuously with SBA.  He continues to benefit from t/c for improved quad activation during quad sets. Continue plan of care.     Timed:         Manual Therapy:    8     mins  79308;     Therapeutic Exercise:    20     mins  52813;     Neuromuscular Elizabeth:    0    mins  78341;    Therapeutic Activity:     10     mins  48729;     Gait Trainin     mins  23460;     Ultrasound:     0     mins  92526;    Ionto                               0    mins   65792  Self Care                       0     mins   72329  Canalith Repos    0     mins 19924      Un-Timed:  Electrical Stimulation:    0     mins  49566 ( );  Dry Needling     0     mins self-pay  Traction     0     mins 56361      Timed Treatment:   38   mins   Total Treatment:     48   " mins    Nancy Fierro, PT  [place] License: [id number]

## 2023-03-31 ENCOUNTER — TREATMENT (OUTPATIENT)
Dept: PHYSICAL THERAPY | Facility: CLINIC | Age: 73
End: 2023-03-31
Payer: MEDICARE

## 2023-03-31 DIAGNOSIS — M25.562 ACUTE PAIN OF LEFT KNEE: Primary | ICD-10-CM

## 2023-03-31 DIAGNOSIS — R29.898 DECREASED STRENGTH OF LOWER EXTREMITY: ICD-10-CM

## 2023-03-31 DIAGNOSIS — R68.89 DECREASED FUNCTIONAL ACTIVITY TOLERANCE: ICD-10-CM

## 2023-03-31 DIAGNOSIS — Z96.652 STATUS POST TOTAL KNEE REPLACEMENT, LEFT: ICD-10-CM

## 2023-03-31 PROCEDURE — 97110 THERAPEUTIC EXERCISES: CPT | Performed by: PHYSICAL THERAPIST

## 2023-03-31 PROCEDURE — 97530 THERAPEUTIC ACTIVITIES: CPT | Performed by: PHYSICAL THERAPIST

## 2023-03-31 PROCEDURE — 97112 NEUROMUSCULAR REEDUCATION: CPT | Performed by: PHYSICAL THERAPIST

## 2023-03-31 NOTE — PROGRESS NOTES
Physical Therapy Daily Treatment Note  [facility] Therapy [place]   [address], [address]  [place] [address]  P: [phone]       F: [phone]    Patient: Barry Cam   : 1950  Diagnosis/ICD-10 Code:  Acute pain of left knee [M25.562]  Referring practitioner: No ref. provider found  Date of Initial Visit: Type: THERAPY  Noted: 3/21/2023  Today's Date: 3/31/2023  Patient seen for 4 sessions         Barry Cam reports: I stopped taking the opioid pain medicine on Wednesday. I am trying not to use the wheelchair as much anymore either. L knee pain rated at 5/10 today.     Subjective     Objective   Pt presented to clinic without wheelchair today and was walking with FWW.   See Exercise, Manual, and Modality Logs for complete treatment.       Assessment/Plan  Subjectively, pt reports no increase of pain or discomfort with interventions performed today. Performed well with continued knee mobility and strengthening interventions. Continues to demonstrate fairly significant pain with knee extension exercises, utilized diaphragmatic breathing techniques with extension stretch with ice.  Continues to benefit from verbal/tactile cues to ensure proper form and technique for exercise performance.     Progress per Plan of Care           Manual Therapy:         mins  01133;  Therapeutic Exercise:    20     mins  84049;     Neuromuscular Elizaebth:    8    mins  59856;    Therapeutic Activity:     10     mins  24129;     Gait Training:           mins  16784;     Ultrasound:          mins  72926;    Electrical Stimulation:         mins  80939 ( );  Dry Needling          mins self-pay    Timed Treatment:   38   mins   Total Treatment:     48   mins    Priti Benavides PTA  Physical Therapist Assistant [id number]

## 2023-04-04 ENCOUNTER — TREATMENT (OUTPATIENT)
Dept: PHYSICAL THERAPY | Facility: CLINIC | Age: 73
End: 2023-04-04
Payer: MEDICARE

## 2023-04-04 DIAGNOSIS — Z96.652 STATUS POST TOTAL KNEE REPLACEMENT, LEFT: ICD-10-CM

## 2023-04-04 DIAGNOSIS — M25.562 ACUTE PAIN OF LEFT KNEE: Primary | ICD-10-CM

## 2023-04-04 DIAGNOSIS — R29.898 DECREASED STRENGTH OF LOWER EXTREMITY: ICD-10-CM

## 2023-04-04 DIAGNOSIS — R68.89 DECREASED FUNCTIONAL ACTIVITY TOLERANCE: ICD-10-CM

## 2023-04-04 PROCEDURE — 97110 THERAPEUTIC EXERCISES: CPT | Performed by: PHYSICAL THERAPIST

## 2023-04-04 PROCEDURE — 97530 THERAPEUTIC ACTIVITIES: CPT | Performed by: PHYSICAL THERAPIST

## 2023-04-04 NOTE — PROGRESS NOTES
Physical Therapy Daily Treatment Note  [address] [facility] [address]   [facility] [address]  P: [phone]  F: [phone]    Patient: Barry Cam   : 1950  Referring practitioner: Noah Salcedo MD  Date of Initial Visit: Type: THERAPY  Noted: 3/21/2023  Today's Date: 2023  Patient seen for 5 sessions       Visit Diagnoses:    ICD-10-CM ICD-9-CM   1. Acute pain of left knee  M25.562 719.46   2. Status post total knee replacement, left  Z96.652 V43.65   3. Decreased functional activity tolerance  R68.89 780.99   4. Decreased strength of lower extremity  R29.898 729.89       Barry Cam reports:     Subjective   Pt reports that around the house he uses his walker for stability, but when he is walking in the clinic with the bars he feels confident because he knows the bars are there. He reports tightness in his knee.   Objective   See Exercise, Manual, and Modality Logs for complete treatment.       Assessment/Plan  Pt able to perform long arc quad with LLE with added three pound weight with full range of knee extension. Pt continues to have the most difficulty with left knee flexion and continues to benefit from heel slides. V/c intermittently required during stepping over small obstacle to prevent LLE circumduction over bright. Pt will continue to benefit from PT in order to improve gait, transfers, and overall functional mobility.     Timed:         Manual Therapy:    0     mins  53917;     Therapeutic Exercise:    20     mins  73039;     Neuromuscular Elizabeth:    0    mins  78607;    Therapeutic Activity:     10     mins  69258;     Gait Trainin     mins  50997;     Ultrasound:     0     mins  12111;    Ionto                               0    mins   87766  Self Care                       0     mins   44434  Canalith Repos    0     mins 38360      Un-Timed:  Electrical Stimulation:    0     mins  98488 ( );  Dry Needling     0     mins self-pay  Traction     0      mins 24357      Timed Treatment:   30   mins   Total Treatment:     30   mins    Nancy Fierro, PT  [place] License: [id number]

## 2023-04-07 ENCOUNTER — TREATMENT (OUTPATIENT)
Dept: PHYSICAL THERAPY | Facility: CLINIC | Age: 73
End: 2023-04-07
Payer: MEDICARE

## 2023-04-07 DIAGNOSIS — R68.89 DECREASED FUNCTIONAL ACTIVITY TOLERANCE: ICD-10-CM

## 2023-04-07 DIAGNOSIS — Z96.652 STATUS POST TOTAL KNEE REPLACEMENT, LEFT: ICD-10-CM

## 2023-04-07 DIAGNOSIS — M25.562 ACUTE PAIN OF LEFT KNEE: Primary | ICD-10-CM

## 2023-04-07 DIAGNOSIS — R29.898 DECREASED STRENGTH OF LOWER EXTREMITY: ICD-10-CM

## 2023-04-07 PROCEDURE — 97110 THERAPEUTIC EXERCISES: CPT | Performed by: PHYSICAL THERAPIST

## 2023-04-07 PROCEDURE — 97530 THERAPEUTIC ACTIVITIES: CPT | Performed by: PHYSICAL THERAPIST

## 2023-04-07 PROCEDURE — 97112 NEUROMUSCULAR REEDUCATION: CPT | Performed by: PHYSICAL THERAPIST

## 2023-04-07 NOTE — PROGRESS NOTES
Physical Therapy Daily Treatment Note  [facility] Physical Therapy [place]   [address]  [place] [address]  P: [phone]       F: [phone]    Patient: Barry Cam   : 1950  Diagnosis/ICD-10 Code:  Acute pain of left knee [M25.562]  Referring practitioner: Noah Salcedo MD  Date of Initial Visit: Type: THERAPY  Noted: 3/21/2023  Today's Date: 2023  Patient seen for 6 sessions         Barry Cam reports: a little sore, ice does not help my pain, makes it worse.    Subjective     Objective   L knee AAROM with strap: 120 degrees    See Exercise, Manual, and Modality Logs for complete treatment.       Assessment/Plan  Subjectively, pt reports no increase of pain or discomfort with interventions performed today. Performed well with continued knee mobility and strengthening interventions. Continues to demonstrate improved knee mobility. Continues to refuse ice in clinic. Continues to benefit from verbal/tactile cues to ensure proper form and technique for exercise performance.     Progress per Plan of Care           Manual Therapy:         mins  59280;  Therapeutic Exercise:    20     mins  69762;     Neuromuscular Elizabeth:   8     mins  06890;    Therapeutic Activity:     10     mins  27246;     Gait Training:           mins  79299;     Ultrasound:          mins  23153;    Electrical Stimulation:         mins  12499 ( );  Dry Needling          mins self-pay    Timed Treatment:  38    mins   Total Treatment:     38   mins    Priti Benavides PTA  Physical Therapist Assistant [id number]

## 2023-04-11 ENCOUNTER — TREATMENT (OUTPATIENT)
Dept: PHYSICAL THERAPY | Facility: CLINIC | Age: 73
End: 2023-04-11
Payer: MEDICARE

## 2023-04-11 DIAGNOSIS — Z96.652 STATUS POST TOTAL KNEE REPLACEMENT, LEFT: ICD-10-CM

## 2023-04-11 DIAGNOSIS — R29.898 DECREASED STRENGTH OF LOWER EXTREMITY: ICD-10-CM

## 2023-04-11 DIAGNOSIS — M25.562 ACUTE PAIN OF LEFT KNEE: Primary | ICD-10-CM

## 2023-04-11 DIAGNOSIS — R68.89 DECREASED FUNCTIONAL ACTIVITY TOLERANCE: ICD-10-CM

## 2023-04-11 PROCEDURE — 97530 THERAPEUTIC ACTIVITIES: CPT | Performed by: PHYSICAL THERAPIST

## 2023-04-11 PROCEDURE — 97110 THERAPEUTIC EXERCISES: CPT | Performed by: PHYSICAL THERAPIST

## 2023-04-11 NOTE — PROGRESS NOTES
Physical Therapy Daily Treatment Note  [address] [facility] [address] [place] [address]  P: [phone]  F: [phone]    Patient: Barry Cam   : 1950  Referring practitioner: Noah Salcedo MD  Date of Initial Visit: Type: THERAPY  Noted: 3/21/2023  Today's Date: 2023  Patient seen for 7 sessions       Visit Diagnoses:    ICD-10-CM ICD-9-CM   1. Acute pain of left knee  M25.562 719.46   2. Status post total knee replacement, left  Z96.652 V43.65   3. Decreased functional activity tolerance  R68.89 780.99   4. Decreased strength of lower extremity  R29.898 729.89       Barry Cam reports:     Subjective   Pt reports that he was able to walk without assistance for ~30-40 feet yesterday. He reports a lot of tightness in the front of his left knee.  Objective   See Exercise, Manual, and Modality Logs for complete treatment.   L knee flexion AROM: 123 degrees pain free    Assessment/Plan  Pt improved in active left knee flexion range of motion today post heel slide intervention. Pt continues to require v/c during supine SLR to improve activation. Pt did a nice job today ambulating over mini bright with step to gait pattern and no UE support. He continues to benefit from skilled PT in order to continue to increase LLE strength and knee mobility to improve transfers, gait, and stair negotiation.    Timed:         Manual Therapy:    3     mins  26937;     Therapeutic Exercise:    25     mins  43512;     Neuromuscular Elizabeth:    0    mins  87947;    Therapeutic Activity:     10     mins  78108;     Gait Trainin     mins  40609;     Ultrasound:     0     mins  61288;    Ionto                               0    mins   99201  Self Care                       0     mins   31019  Canalith Repos    0     mins 40157      Un-Timed:  Electrical Stimulation:    0     mins  12366 (MC );  Dry Needling     0     mins self-pay  Traction     0     mins 06094      Timed Treatment:   38    mins   Total Treatment:     38   mins    Nancy Fierro, PT  [place] License: [id number]

## 2023-04-12 ENCOUNTER — OFFICE VISIT (OUTPATIENT)
Dept: ORTHOPEDIC SURGERY | Facility: CLINIC | Age: 73
End: 2023-04-12
Payer: MEDICARE

## 2023-04-12 VITALS — WEIGHT: 315 LBS | BODY MASS INDEX: 41.75 KG/M2 | HEIGHT: 73 IN | TEMPERATURE: 97.8 F

## 2023-04-12 DIAGNOSIS — Z96.652 TOTAL KNEE REPLACEMENT STATUS, LEFT: Primary | ICD-10-CM

## 2023-04-12 PROCEDURE — 1159F MED LIST DOCD IN RCRD: CPT | Performed by: NURSE PRACTITIONER

## 2023-04-12 PROCEDURE — 1160F RVW MEDS BY RX/DR IN RCRD: CPT | Performed by: NURSE PRACTITIONER

## 2023-04-12 PROCEDURE — 99024 POSTOP FOLLOW-UP VISIT: CPT | Performed by: NURSE PRACTITIONER

## 2023-04-12 PROCEDURE — 73562 X-RAY EXAM OF KNEE 3: CPT | Performed by: NURSE PRACTITIONER

## 2023-04-12 NOTE — PROGRESS NOTES
Barry Cam : 1950 MRN: [id number] DATE: 2023    DIAGNOSIS: 6 week follow up left TKA      SUBJECTIVE:  Patient returns today for 6 week follow up of left total knee replacement. Patient reports doing well with no unusual complaints.     Vitals:    23 1048   Temp: 97.8 °F (36.6 °C)       OBJECTIVE:     Exam:  The incision is well healed. No sign of infection. Range of motion is measured at 0 to 120°. The calf is soft and nontender with a negative Homans sign.  Gait is reciprocal heel-to-toe and only mildly antalgic.    DIAGNOSTIC STUDIES    Xrays: 3 views of the left knee (AP, lateral, and sunrise) were ordered and reviewed for evaluation of recent knee replacement. They demonstrate a well positioned, well aligned knee replacement without complicating factors noted. In comparison with previous films there has been no change.    ASSESSMENT:  6 week status post left knee replacement    PLAN:   1) Continue with PT exercises as prescribed  2) We discussed the need for prophylactic antibiotics prior to dental appointments.    3) Follow up in 6 weeks with Dr. Salcedo.    Madeleine Masterson, LISA  2023     Answers for HPI/ROS submitted by the patient on 2023  What is the primary reason for your visit?: Other  Please describe your symptoms.: None  Have you had these symptoms before?: No  How long have you been having these symptoms?: 1-4 days  Please list any medications you are currently taking for this condition.: None  Please describe any probable cause for these symptoms. : Knee Replacement

## 2023-04-14 ENCOUNTER — TREATMENT (OUTPATIENT)
Dept: PHYSICAL THERAPY | Facility: CLINIC | Age: 73
End: 2023-04-14
Payer: MEDICARE

## 2023-04-14 DIAGNOSIS — Z96.652 STATUS POST TOTAL KNEE REPLACEMENT, LEFT: ICD-10-CM

## 2023-04-14 DIAGNOSIS — R68.89 DECREASED FUNCTIONAL ACTIVITY TOLERANCE: ICD-10-CM

## 2023-04-14 DIAGNOSIS — R29.898 DECREASED STRENGTH OF LOWER EXTREMITY: ICD-10-CM

## 2023-04-14 DIAGNOSIS — M25.562 ACUTE PAIN OF LEFT KNEE: Primary | ICD-10-CM

## 2023-04-14 PROCEDURE — 97530 THERAPEUTIC ACTIVITIES: CPT | Performed by: PHYSICAL THERAPIST

## 2023-04-14 PROCEDURE — 97112 NEUROMUSCULAR REEDUCATION: CPT | Performed by: PHYSICAL THERAPIST

## 2023-04-14 PROCEDURE — 97110 THERAPEUTIC EXERCISES: CPT | Performed by: PHYSICAL THERAPIST

## 2023-04-14 NOTE — PROGRESS NOTES
Physical Therapy Daily Treatment Note  [facility] Physical Therapy [place]   [address], [address]  [place] [address]  P: [phone]       F: [phone]    Patient: Barry Cam   : 1950  Diagnosis/ICD-10 Code:  Acute pain of left knee [M25.562]  Referring practitioner: Noah Salcedo MD  Date of Initial Visit: Type: THERAPY  Noted: 3/21/2023  Today's Date: 2023  Patient seen for 8 sessions         Barry Cam reports: not using the wheelchair, walker, or cane anymore. Not having as much pain, still have one spot that hurts.     Subjective     Objective   See Exercise, Manual, and Modality Logs for complete treatment.       Assessment/Plan  Subjectively, pt reports no increase of pain or discomfort with interventions performed today. Performed well with continued functional knee strengthening interventions. Pt ambulated into and around clinic without any assistive device today. Continues to demonstrate improved gait throughout clinic. Continues to benefit from verbal/tactile cues to ensure proper form and technique for exercise performance.     Progress per Plan of Care           Manual Therapy:         mins  70681;  Therapeutic Exercise:    25     mins  27493;     Neuromuscular Elizabeth:    10    mins  67060;    Therapeutic Activity:     10     mins  30689;     Gait Training:           mins  76435;     Ultrasound:          mins  01315;    Electrical Stimulation:         mins  47462;  Traction          mins 51993    Timed Treatment:   45   mins   Total Treatment:     45   mins    Priti Benavides PTA  Physical Therapist Assistant [id number]

## 2023-04-18 ENCOUNTER — TREATMENT (OUTPATIENT)
Dept: PHYSICAL THERAPY | Facility: CLINIC | Age: 73
End: 2023-04-18
Payer: MEDICARE

## 2023-04-18 DIAGNOSIS — R68.89 DECREASED FUNCTIONAL ACTIVITY TOLERANCE: ICD-10-CM

## 2023-04-18 DIAGNOSIS — Z96.652 STATUS POST TOTAL KNEE REPLACEMENT, LEFT: ICD-10-CM

## 2023-04-18 DIAGNOSIS — M25.562 ACUTE PAIN OF LEFT KNEE: Primary | ICD-10-CM

## 2023-04-18 DIAGNOSIS — R29.898 DECREASED STRENGTH OF LOWER EXTREMITY: ICD-10-CM

## 2023-04-18 NOTE — PROGRESS NOTES
Call Regarding: Pt had a sinus infection,  prescribed antibiotic. Pt completed taking antibiotic approx 1 week ago and is still feeling the congestion and pressure in his sinus'. Pt requests a call back    Phone Number to be reach at: [phone]    Please advise    Re-Assessment / Progress Note  [facility] Physical Therapy [place]   [address], [address]  [place] [address]  P: [phone]  F: [phone]    Patient: Barry Cam   : 1950  Diagnosis/ICD-10 Code:  Acute pain of left knee [M25.562]  Referring practitioner: Noah Salcedo MD  Date of Initial Visit: Episode Type: THERAPY  Noted: 3/21/2023    Today's Date: 2023  Patient seen for 9 sessions.    Visit Diagnoses:    ICD-10-CM ICD-9-CM   1. Acute pain of left knee  M25.562 719.46   2. Status post total knee replacement, left  Z96.652 V43.65   3. Decreased functional activity tolerance  R68.89 780.99   4. Decreased strength of lower extremity  R29.898 729.89       Subjective:   Barry Cam reports:     Subjective Questionnaire: WOMAC:   Clinical Progress: improved  Home Program Compliance: Yes  Treatment has included: therapeutic exercise, neuromuscular re-education, manual therapy, therapeutic activity, gait training and cryotherapy    Subjective   Pt reports that he has been walking around his house without a cane or walker. He also states that he is able to go up and down his stairs holding on to the railing, but steps one step at a time. In addition, he reports the hardest thing is getting up out of a chair, but once he's up he feels like he does okay.  Pain  Current pain ratin  At worst pain 9  Objective   Active Range of Motion   Left Knee   Flexion: 125 degrees   Extension: 7 degrees      Right Knee   Normal active range of motion    Additional Active Range of Motion Details  Extension lacking 10 degrees to 0     Passive Range of Motion   Left Knee   Flexion: 127 degrees with pain  Extension: 5 degrees with pain    Strength/Myotome Testing      Left Knee   Flexion: 5  Extension: 4+  Quadriceps contraction: fair     Right Knee   Normal strength  Quadriceps contraction: good          Ambulation   Weight-Bearing Status   Weight-Bearing Status (Left): full weight  bearing   Weight-Bearing Status (Right): full weight-bearing    Assistive device used: none     Ambulation: Level Surfaces   Ambulation with assistive device: independent  Ambulation without assistive device: independent     Ambulation: Stairs   Ascend stairs: step to  rail ascending/descending    Functional test: 5TSTS 04/18- 28 seconds without UE support and no AD  Assessment/Plan   Pt is a 73 y/o male who has been attending physical therapy s/p L TKA. Pt is approximately 6 weeks post-op. He has been progressing well in his overall left knee range of motion in left knee flexion, however, he is still lacking full left knee extension. Pt has difficulty tolerating left knee extension during seated extension with ice due to complaints of pain. Pt has an increased 5TSTS time at 28 seconds, but does not require UE support, but pushes left leg out farther from right in order to perform transfer. He is able to ambulate without AD on level surfaces safely and independently. He would continue to benefit from skilled PT in order to continue to address the above impairments and activity limitations in order to decrease pain, improve functional mobility, and return to PLOF.  Progress toward previous goals: Partially Met    Goals  Plan Goals: STG 2-6 weeks    1. Decrease WOMAC score by 10 points to show improvement in overall functional activities MET  2. Increase knee flexion ROM to 120 in order to be able to improve transfers MET  3. Knee extension ROM to 0 degrees in order to improve stability and terminal stance in gait PROGRESSING  4. Pt will be independent in home exercise program MET    LTG 6-12 weeks    1.Decrease WOMAC score by 20 points from IE to show improvement in overall functional activities PROGRESSING  2. Increase MMT for knee to 5/5 in order to be able to improve functional activities PROGRESSING  3. Pt will be able to ascend/descend a flight of stairs pain free using a hand rail and step to gait  pattern MET  4. Pt will be able to perform 5TSTS using BUEs for push off in <14.5 seconds pain free and independently PROGRESSING  5. Pt will be able to ambulate without AD pain free and perform TUG test <14.5 seconds safely and independently PROGRESSING        Recommendations: Continue as planned  Timeframe: 2 months  Prognosis to achieve goals: good    PT Signature: Nancy Chaparrolock, PT  [place] Lic. # [id number]      Based upon review of the patient's progress and continued therapy plan, it is my medical opinion that Barry Cam should continue physical therapy treatment at [facility]  [address] [place] [address] [phone] ; Fax Number [phone]    Signature: __________________________________  Noah Salcedo MD    Manual Therapy:     5     mins  51763;  Therapeutic Exercise:     15     mins  18880;     Neuromuscular Elizabeth:     0    mins  56319;    Therapeutic Activity:      10     mins  65051;     Gait Trainin     mins  77096;     Ultrasound:      0     mins  66390;    Electrical Stimulation:     0     mins  01656 ( );  Dry Needling      0     mins self-pay  Traction      0     mins 38719  Canalith Repositioning    0     mins 17472      Timed Treatment:   25   mins   Total Treatment:     25   mins

## 2023-04-21 ENCOUNTER — TREATMENT (OUTPATIENT)
Dept: PHYSICAL THERAPY | Facility: CLINIC | Age: 73
End: 2023-04-21
Payer: MEDICARE

## 2023-04-21 DIAGNOSIS — M25.562 ACUTE PAIN OF LEFT KNEE: Primary | ICD-10-CM

## 2023-04-21 DIAGNOSIS — R68.89 DECREASED FUNCTIONAL ACTIVITY TOLERANCE: ICD-10-CM

## 2023-04-21 DIAGNOSIS — Z96.652 STATUS POST TOTAL KNEE REPLACEMENT, LEFT: ICD-10-CM

## 2023-04-21 DIAGNOSIS — R29.898 DECREASED STRENGTH OF LOWER EXTREMITY: ICD-10-CM

## 2023-04-21 PROCEDURE — 97530 THERAPEUTIC ACTIVITIES: CPT | Performed by: PHYSICAL THERAPIST

## 2023-04-21 PROCEDURE — 97116 GAIT TRAINING THERAPY: CPT | Performed by: PHYSICAL THERAPIST

## 2023-04-21 PROCEDURE — 97110 THERAPEUTIC EXERCISES: CPT | Performed by: PHYSICAL THERAPIST

## 2023-04-21 NOTE — PROGRESS NOTES
Physical Therapy Daily Treatment Note  [facility] [place]   [address], [address]  [place] [address]  P: [phone]       F: [phone]    Patient: Barry Cam   : 1950  Diagnosis/ICD-10 Code:  Acute pain of left knee [M25.562]  Referring practitioner: Noah Salcedo MD  Date of Initial Visit: Type: THERAPY  Noted: 3/21/2023  Today's Date: 2023  Patient seen for 10 sessions         Barry Cam reports: L knee is feeling better today. Going down stairs is hard. I have only tried going up a few steps.     Subjective     Objective   See Exercise, Manual, and Modality Logs for complete treatment.       Assessment/Plan  Subjectively, pt reports no increase of pain or discomfort with interventions performed today. Performed well with continued knee mobility and strengthening interventions. Continues to demonstrate lack of full extension on L knee.  Continues to benefit from verbal/tactile cues to ensure proper form and technique for exercise performance.     Progress per Plan of Care           Manual Therapy:         mins  89769;  Therapeutic Exercise:    20     mins  20551;     Neuromuscular Elizabeth:        mins  71771;    Therapeutic Activity:     10     mins  23392;     Gait Trainin     mins  63026;     Ultrasound:          mins  65035;    Electrical Stimulation:         mins  74802;  Traction          mins 06128    Timed Treatment:   38   mins   Total Treatment:     38   mins    Priti Benavides PTA  Physical Therapist Assistant [id number]

## 2023-04-25 ENCOUNTER — TREATMENT (OUTPATIENT)
Dept: PHYSICAL THERAPY | Facility: CLINIC | Age: 73
End: 2023-04-25
Payer: MEDICARE

## 2023-04-25 DIAGNOSIS — M25.562 ACUTE PAIN OF LEFT KNEE: Primary | ICD-10-CM

## 2023-04-25 DIAGNOSIS — R68.89 DECREASED FUNCTIONAL ACTIVITY TOLERANCE: ICD-10-CM

## 2023-04-25 DIAGNOSIS — R29.898 DECREASED STRENGTH OF LOWER EXTREMITY: ICD-10-CM

## 2023-04-25 DIAGNOSIS — Z96.652 STATUS POST TOTAL KNEE REPLACEMENT, LEFT: ICD-10-CM

## 2023-04-25 NOTE — PROGRESS NOTES
Physical Therapy Daily Treatment Note  [address] [facility] [address]   [facility] [address]  P: [phone]  F: [phone]    Patient: Barry Cam   : 1950  Referring practitioner: Noah Salcedo MD  Date of Initial Visit: Type: THERAPY  Noted: 3/21/2023  Today's Date: 2023  Patient seen for 11 sessions       Visit Diagnoses:    ICD-10-CM ICD-9-CM   1. Acute pain of left knee  M25.562 719.46   2. Status post total knee replacement, left  Z96.652 V43.65   3. Decreased functional activity tolerance  R68.89 780.99   4. Decreased strength of lower extremity  R29.898 729.89       Barry Cam reports:     Subjective   Pt reports that sometimes he can go up stairs, but sometimes he states he feels like he lacks the control  Objective   See Exercise, Manual, and Modality Logs for complete treatment.   L knee flexion: 128 degrees     Assessment/Plan  Pt progressing well in therapy interventions and was able to increase in weight with seated LAQ and leg press today without c/o pain. Pt benefits from v/c during sit to stands from low mat table to improve eccentric control with descent. Pt also needs improvement with step height when ambulating over obstacles. Continue POC.    Timed:         Manual Therapy:    2     mins  74086;     Therapeutic Exercise:    23     mins  17395;     Neuromuscular Elizabeth:    0    mins  46634;    Therapeutic Activity:     13     mins  34371;     Gait Trainin     mins  50572;     Ultrasound:     0     mins  19297;    Ionto                               0    mins   38527  Self Care                       0     mins   25168  Canalith Repos    0     mins 65541      Un-Timed:  Electrical Stimulation:    0     mins  45896 ( );  Dry Needling     0     mins self-pay  Traction     0     mins 04665      Timed Treatment:   38   mins   Total Treatment:     38   mins    Nancy Fierro, PT  [place] License: [id number]

## 2023-04-28 ENCOUNTER — TREATMENT (OUTPATIENT)
Dept: PHYSICAL THERAPY | Facility: CLINIC | Age: 73
End: 2023-04-28
Payer: MEDICARE

## 2023-04-28 DIAGNOSIS — R29.898 DECREASED STRENGTH OF LOWER EXTREMITY: ICD-10-CM

## 2023-04-28 DIAGNOSIS — Z96.652 STATUS POST TOTAL KNEE REPLACEMENT, LEFT: ICD-10-CM

## 2023-04-28 DIAGNOSIS — M25.562 ACUTE PAIN OF LEFT KNEE: Primary | ICD-10-CM

## 2023-04-28 DIAGNOSIS — R68.89 DECREASED FUNCTIONAL ACTIVITY TOLERANCE: ICD-10-CM

## 2023-04-28 PROCEDURE — 97112 NEUROMUSCULAR REEDUCATION: CPT | Performed by: PHYSICAL THERAPIST

## 2023-04-28 PROCEDURE — 97110 THERAPEUTIC EXERCISES: CPT | Performed by: PHYSICAL THERAPIST

## 2023-04-28 PROCEDURE — 97530 THERAPEUTIC ACTIVITIES: CPT | Performed by: PHYSICAL THERAPIST

## 2023-04-28 NOTE — PROGRESS NOTES
Physical Therapy Daily Treatment Note  [facility] Physical Therapy [place]   [address]  [place] [address]  P: [phone]       F: [phone]    Patient: Barry Cam   : 1950  Diagnosis/ICD-10 Code:  Acute pain of left knee [M25.562]  Referring practitioner: Noah Salcedo MD  Date of Initial Visit: Type: THERAPY  Noted: 3/21/2023  Today's Date: 2023  Patient seen for 12 sessions         Barry Cam reports: not a great day, the weather affects my whole body. Also, kind of dizzy and foggy headed today.     Subjective     Objective   See Exercise, Manual, and Modality Logs for complete treatment.       Assessment/Plan  Subjectively, pt reports no increase of pain or discomfort with interventions performed today. Performed well with continued LE strengthening and stability interventions with decreased stamina today. Continues to demonstrate difficulty with high bright ambulation with inability to clear hurdles without circumduction.  Continues to benefit from verbal/tactile cues to ensure proper form and technique for exercise performance.     Progress per Plan of Care           Manual Therapy:         mins  77423;  Therapeutic Exercise:    20     mins  77728;     Neuromuscular Elizabeth:    10    mins  81784;    Therapeutic Activity:     10     mins  67741;     Gait Training:           mins  37422;     Ultrasound:          mins  54792;    Electrical Stimulation:         mins  38363;  Traction          mins 77784    Timed Treatment:   40   mins   Total Treatment:     40   mins    Priti Benavides PTA  Physical Therapist Assistant [id number]

## 2023-05-02 ENCOUNTER — TREATMENT (OUTPATIENT)
Dept: PHYSICAL THERAPY | Facility: CLINIC | Age: 73
End: 2023-05-02
Payer: MEDICARE

## 2023-05-02 DIAGNOSIS — M25.562 ACUTE PAIN OF LEFT KNEE: Primary | ICD-10-CM

## 2023-05-02 DIAGNOSIS — R68.89 DECREASED FUNCTIONAL ACTIVITY TOLERANCE: ICD-10-CM

## 2023-05-02 DIAGNOSIS — Z96.652 STATUS POST TOTAL KNEE REPLACEMENT, LEFT: ICD-10-CM

## 2023-05-02 DIAGNOSIS — R29.898 DECREASED STRENGTH OF LOWER EXTREMITY: ICD-10-CM

## 2023-05-02 NOTE — PROGRESS NOTES
Physical Therapy Daily Treatment Note  [address] [address]  P: [phone]  F: [phone]    Patient: Barry Cam   : 1950  Referring practitioner: Noah Salcedo MD  Date of Initial Visit: Type: THERAPY  Noted: 3/21/2023  Today's Date: 2023  Patient seen for 13 sessions       Visit Diagnoses:    ICD-10-CM ICD-9-CM   1. Acute pain of left knee  M25.562 719.46   2. Status post total knee replacement, left  Z96.652 V43.65   3. Decreased functional activity tolerance  R68.89 780.99   4. Decreased strength of lower extremity  R29.898 729.89       Barry Cam reports:     Subjective   Pt reports that he has the most difficulty stepping up with his left leg on to the next step and getting up out of a chair after sitting for a couple of hours.   Objective   See Exercise, Manual, and Modality Logs for complete treatment.       Assessment/Plan  Pt benefits from v/c to encourage increased LLE hip flexion with trail leg to step over high bright obstacle. Pt relies a lot on UEs to ascend stairs with alternating pattern, he compensates with forward trunk lean to assist his left leg in pushing up to the next stair. He would benefit from additional LLE strengthening to improve eccentric control when descending in sit to stand transfers and when descending stairs.     Timed:         Manual Therapy:    0     mins  95173;     Therapeutic Exercise:    30     mins  03313;     Neuromuscular Elizabeth:    0    mins  86574;    Therapeutic Activity:     10     mins  07706;     Gait Trainin     mins  13200;     Ultrasound:     0     mins  47841;    Ionto                               0    mins   16153  Self Care                       0     mins   61431  Canalith Repos    0     mins 25510      Un-Timed:  Electrical Stimulation:    0     mins  56348 (MC );  Dry Needling     0     mins self-pay  Traction     0     mins 60919      Timed Treatment:   40   mins   Total  Treatment:     40   mins    Nancy Fierro, PT  [place] License: [id number]

## 2023-05-03 DIAGNOSIS — E11.65 TYPE 2 DIABETES MELLITUS WITH HYPERGLYCEMIA, WITHOUT LONG-TERM CURRENT USE OF INSULIN: ICD-10-CM

## 2023-05-03 DIAGNOSIS — E78.2 MIXED HYPERLIPIDEMIA: Primary | ICD-10-CM

## 2023-05-04 ENCOUNTER — TREATMENT (OUTPATIENT)
Dept: PHYSICAL THERAPY | Facility: CLINIC | Age: 73
End: 2023-05-04
Payer: MEDICARE

## 2023-05-04 DIAGNOSIS — R29.898 DECREASED STRENGTH OF LOWER EXTREMITY: ICD-10-CM

## 2023-05-04 DIAGNOSIS — Z96.652 STATUS POST TOTAL KNEE REPLACEMENT, LEFT: ICD-10-CM

## 2023-05-04 DIAGNOSIS — M25.562 ACUTE PAIN OF LEFT KNEE: Primary | ICD-10-CM

## 2023-05-04 DIAGNOSIS — R68.89 DECREASED FUNCTIONAL ACTIVITY TOLERANCE: ICD-10-CM

## 2023-05-04 NOTE — PROGRESS NOTES
"Physical Therapy Daily Treatment Note  [address] [facility] [address]   [facility] [address]  P: [phone]  F: [phone]    Patient: Barry Cam   : 1950  Referring practitioner: Noah Salcedo MD  Date of Initial Visit: Type: THERAPY  Noted: 3/21/2023  Today's Date: 2023  Patient seen for 14 sessions       Visit Diagnoses:    ICD-10-CM ICD-9-CM   1. Acute pain of left knee  M25.562 719.46   2. Status post total knee replacement, left  Z96.652 V43.65   3. Decreased functional activity tolerance  R68.89 780.99   4. Decreased strength of lower extremity  R29.898 729.89       Barry Cam reports:     Subjective   Pt states \"You know, it's actually doing pretty good today\".   Objective   See Exercise, Manual, and Modality Logs for complete treatment.       Assessment/Plan  Pt needs continued improvement in left quad eccentric control in order to improve stability when descending stairs. Pt has improved with step length and height when ambulating over mini hurdles and was able to clear every bright without catching his foot on them safely and independently. He is progressing very well towards goals.     Timed:         Manual Therapy:    0     mins  85216;     Therapeutic Exercise:    30     mins  57326;     Neuromuscular Elizabeth:    0    mins  88340;    Therapeutic Activity:     10     mins  15149;     Gait Trainin     mins  99926;     Ultrasound:     0     mins  65040;    Ionto                               0    mins   38172  Self Care                       0     mins   71408  Canalith Repos    0     mins 72321      Un-Timed:  Electrical Stimulation:    0     mins  68148 ( );  Dry Needling     0     mins self-pay  Traction     0     mins 40544      Timed Treatment:   40   mins   Total Treatment:     40   mins    Nancy Fierro, PT  [place] License: [id number]                  "

## 2023-05-09 ENCOUNTER — TREATMENT (OUTPATIENT)
Dept: PHYSICAL THERAPY | Facility: CLINIC | Age: 73
End: 2023-05-09
Payer: MEDICARE

## 2023-05-09 DIAGNOSIS — Z96.652 STATUS POST TOTAL KNEE REPLACEMENT, LEFT: ICD-10-CM

## 2023-05-09 DIAGNOSIS — M25.562 ACUTE PAIN OF LEFT KNEE: Primary | ICD-10-CM

## 2023-05-09 DIAGNOSIS — R68.89 DECREASED FUNCTIONAL ACTIVITY TOLERANCE: ICD-10-CM

## 2023-05-09 DIAGNOSIS — R29.898 DECREASED STRENGTH OF LOWER EXTREMITY: ICD-10-CM

## 2023-05-09 NOTE — PROGRESS NOTES
"Physical Therapy Daily Treatment Note  [address] [facility] [address]   [facility] [address]  P: [phone]  F: [phone]    Patient: Barry Cam   : 1950  Referring practitioner: Noah Salcedo MD  Date of Initial Visit: Type: THERAPY  Noted: 3/21/2023  Today's Date: 2023  Patient seen for 15 sessions       Visit Diagnoses:    ICD-10-CM ICD-9-CM   1. Acute pain of left knee  M25.562 719.46   2. Status post total knee replacement, left  Z96.652 V43.65   3. Decreased functional activity tolerance  R68.89 780.99   4. Decreased strength of lower extremity  R29.898 729.89       Barry Cam reports:     Subjective   Pt reports that his knee is \"sore\" today.   Objective   See Exercise, Manual, and Modality Logs for complete treatment.       Assessment/Plan  Pt demonstrated signs of fatigue during standing activities today, requiring more rest breaks throughout therapy interventions. Pt tolerated mat table activities well and able to progress with weight added to supine straight leg raises. Pt would benefit from additional skilled PT to help improve muscular endurance during standing, walking, and stair negotiation tasks. Continue POC.    Timed:         Manual Therapy:    0     mins  29469;     Therapeutic Exercise:    28     mins  67459;     Neuromuscular Elizabeth:    0    mins  63836;    Therapeutic Activity:     10     mins  34563;     Gait Trainin     mins  20044;     Ultrasound:     0     mins  41406;    Ionto                               0    mins   71459  Self Care                       0     mins   37072  Canalith Repos    0     mins 95069      Un-Timed:  Electrical Stimulation:    0     mins  46754 ( );  Dry Needling     0     mins self-pay  Traction     0     mins 71208      Timed Treatment:   38   mins   Total Treatment:     38   mins    Nancy Fierro PT  [place] License: [id number]                  "

## 2023-05-12 ENCOUNTER — TREATMENT (OUTPATIENT)
Dept: PHYSICAL THERAPY | Facility: CLINIC | Age: 73
End: 2023-05-12
Payer: MEDICARE

## 2023-05-12 DIAGNOSIS — Z96.652 STATUS POST TOTAL KNEE REPLACEMENT, LEFT: ICD-10-CM

## 2023-05-12 DIAGNOSIS — R68.89 DECREASED FUNCTIONAL ACTIVITY TOLERANCE: ICD-10-CM

## 2023-05-12 DIAGNOSIS — R29.898 DECREASED STRENGTH OF LOWER EXTREMITY: ICD-10-CM

## 2023-05-12 DIAGNOSIS — M25.562 ACUTE PAIN OF LEFT KNEE: Primary | ICD-10-CM

## 2023-05-12 NOTE — PROGRESS NOTES
Physical Therapy Daily Treatment Note  [facility] [place]   [address], [address]  [place] [address]  P: [phone]       F: [phone]    Patient: Barry Cam   : 1950  Diagnosis/ICD-10 Code:  Acute pain of left knee [M25.562]  Referring practitioner: Noah Salcedo MD  Date of Initial Visit: Type: THERAPY  Noted: 3/21/2023  Today's Date: 2023  Patient seen for 16 sessions         Barry Cam reports: I don't move for the days in between PT sessions because I am too sore and in pain. Not in my L knee but the muscles around it.     Subjective     Objective   See Exercise, Manual, and Modality Logs for complete treatment.       Assessment/Plan  Subjectively, pt reports no increase of pain or discomfort with interventions performed today. Performed well with decreased LE strengthening and stability interventions. Continues to demonstrate decreased activity tolerance today due to soreness and lack of muscular endurance. Continues to benefit from verbal/tactile cues to ensure proper form and technique for exercise performance.     Progress per Plan of Care           Manual Therapy:         mins  80831;  Therapeutic Exercise:    25     mins  80588;     Neuromuscular Elizabeth:        mins  88430;    Therapeutic Activity:     10     mins  47512;     Gait Training:           mins  67971;     Ultrasound:          mins  34766;    Electrical Stimulation:         mins  27298;  Traction          mins 90924    Timed Treatment:   35   mins   Total Treatment:     35   mins    Priti Benavides PTA  Physical Therapist Assistant [id number]

## 2023-05-16 ENCOUNTER — TREATMENT (OUTPATIENT)
Dept: PHYSICAL THERAPY | Facility: CLINIC | Age: 73
End: 2023-05-16
Payer: MEDICARE

## 2023-05-16 DIAGNOSIS — M25.562 ACUTE PAIN OF LEFT KNEE: Primary | ICD-10-CM

## 2023-05-16 DIAGNOSIS — Z96.652 STATUS POST TOTAL KNEE REPLACEMENT, LEFT: ICD-10-CM

## 2023-05-16 DIAGNOSIS — R29.898 DECREASED STRENGTH OF LOWER EXTREMITY: ICD-10-CM

## 2023-05-16 DIAGNOSIS — R68.89 DECREASED FUNCTIONAL ACTIVITY TOLERANCE: ICD-10-CM

## 2023-05-16 NOTE — PROGRESS NOTES
"Physical Therapy Daily Treatment Note  [address] [facility] [address] [place] [address]  P: [phone]  F: [phone]    Patient: Barry Cam   : 1950  Referring practitioner: Noah Salcedo MD  Date of Initial Visit: Type: THERAPY  Noted: 3/21/2023  Today's Date: 2023  Patient seen for 17 sessions       Visit Diagnoses:    ICD-10-CM ICD-9-CM   1. Acute pain of left knee  M25.562 719.46   2. Status post total knee replacement, left  Z96.652 V43.65   3. Decreased functional activity tolerance  R68.89 780.99   4. Decreased strength of lower extremity  R29.898 729.89       Barry Cam reports:     Subjective   Pt reports \"The knee is not doing so good today, I think we are doing too much here, and it's hard on the knee\" \"It's just really bothering me today\"  Objective   See Exercise, Manual, and Modality Logs for complete treatment.       Assessment/Plan  Weights removed from straight leg raises and LAQs today due to reports of pain in left knee. Pt still has full left knee range of motion, but had increased pain when transitioning from sit to stand tasks and between therapy interventions. Weight also decreased on leg press today to 140 lbs. Pt able to perform all of these without weights/decreased weights without c/o knee pain. Continue to strengthen LLE to improve stability in transfers and gait. Pt educated on the importance of continued strengthening in order to continue to progress in PT. He demonstrates good understanding.     Timed:         Manual Therapy:    0     mins  42067;     Therapeutic Exercise:    25     mins  49311;     Neuromuscular Elizabeth:    0    mins  99251;    Therapeutic Activity:     15     mins  73238;     Gait Trainin     mins  70504;     Ultrasound:     0     mins  70996;    Ionto                               0    mins   79304  Self Care                       0     mins   69479  Canalith Repos    0     mins 90149      Un-Timed:  Electrical " Stimulation:    0     mins  05778 ( );  Dry Needling     0     mins self-pay  Traction     0     mins 21943      Timed Treatment:   40   mins   Total Treatment:     40   mins    Nancy Fierro, PT  [place] License: [id number]

## 2023-05-17 ENCOUNTER — OFFICE VISIT (OUTPATIENT)
Dept: FAMILY MEDICINE CLINIC | Facility: CLINIC | Age: 73
End: 2023-05-17
Payer: MEDICARE

## 2023-05-17 VITALS
HEART RATE: 94 BPM | SYSTOLIC BLOOD PRESSURE: 138 MMHG | WEIGHT: 315 LBS | RESPIRATION RATE: 18 BRPM | DIASTOLIC BLOOD PRESSURE: 58 MMHG | TEMPERATURE: 97.5 F | HEIGHT: 73 IN | BODY MASS INDEX: 41.75 KG/M2 | OXYGEN SATURATION: 95 %

## 2023-05-17 DIAGNOSIS — Z96.652 TOTAL KNEE REPLACEMENT STATUS, LEFT: ICD-10-CM

## 2023-05-17 DIAGNOSIS — Z00.00 MEDICARE ANNUAL WELLNESS VISIT, SUBSEQUENT: ICD-10-CM

## 2023-05-17 DIAGNOSIS — E11.40 DIABETIC NEUROPATHY, PAINFUL: ICD-10-CM

## 2023-05-17 DIAGNOSIS — E66.01 CLASS 3 SEVERE OBESITY DUE TO EXCESS CALORIES WITH SERIOUS COMORBIDITY AND BODY MASS INDEX (BMI) OF 40.0 TO 44.9 IN ADULT: ICD-10-CM

## 2023-05-17 DIAGNOSIS — E78.2 MIXED HYPERLIPIDEMIA: ICD-10-CM

## 2023-05-17 DIAGNOSIS — E11.65 TYPE 2 DIABETES MELLITUS WITH HYPERGLYCEMIA, WITHOUT LONG-TERM CURRENT USE OF INSULIN: ICD-10-CM

## 2023-05-17 DIAGNOSIS — I10 HYPERTENSION, UNSPECIFIED TYPE: Primary | ICD-10-CM

## 2023-05-17 RX ORDER — DEXTROAMPHETAMINE SACCHARATE, AMPHETAMINE ASPARTATE, DEXTROAMPHETAMINE SULFATE AND AMPHETAMINE SULFATE 7.5; 7.5; 7.5; 7.5 MG/1; MG/1; MG/1; MG/1
30 TABLET ORAL 2 TIMES DAILY
Qty: 60 TABLET | Refills: 0 | Status: SHIPPED | OUTPATIENT
Start: 2023-05-17

## 2023-05-17 NOTE — PROGRESS NOTES
The ABCs of the Annual Wellness Visit  Subsequent Medicare Wellness Visit    Subjective    Barry Cam is a 72 y.o. male who presents for a Subsequent Medicare Wellness Visit.    The following portions of the patient's history were reviewed and   updated as appropriate: allergies, current medications, past family history, past medical history, past social history, past surgical history and problem list.    Compared to one year ago, the patient feels his physical   health is better.    Compared to one year ago, the patient feels his mental   health is the same.    Recent Hospitalizations:  He was admitted within the past 365 days at [facility].       Current Medical Providers:  Patient Care Team:  Cristi Gerber MD as PCP - General (Internal Medicine)  Barry Gillespie MD as Consulting Physician (Sleep Medicine)    Outpatient Medications Prior to Visit   Medication Sig Dispense Refill   • acetaminophen (TYLENOL) 500 MG tablet Take 1 tablet by mouth Every 6 (Six) Hours As Needed for Mild Pain.     • albuterol sulfate  (90 Base) MCG/ACT inhaler INHALE 2 PUFFS EVERY 4 (FOUR) HOURS AS NEEDED FOR WHEEZING OR SHORTNESS OF AIR. FOR WHEEZING 18 g 5   • amLODIPine (NORVASC) 10 MG tablet TAKE 1 TABLET BY MOUTH EVERY DAY (Patient taking differently: Take 1 tablet by mouth Every Evening.) 90 tablet 3   • amphetamine-dextroamphetamine (Adderall) 20 MG tablet Take 1 tablet by mouth 2 (Two) Times a Day. 60 tablet 0   • betamethasone valerate (VALISONE) 0.1 % cream APPLY CREAM TOPICALLY TO AFFECTED AREA(S) TO SKIN TWICE DAILY (Patient taking differently: 2 (Two) Times a Day As Needed.) 15 g 1   • caffeine 200 MG tablet Take 1 tablet by mouth Every 4 (Four) Hours As Needed for Headache.     • diphenhydrAMINE (BENADRYL) 50 MG capsule Take 1 capsule by mouth Every 6 (Six) Hours As Needed for Itching.     • Eliquis 5 MG tablet tablet TAKE 1 TABLET BY MOUTH EVERY 12 HOURS 60 tablet 5   • gabapentin  (NEURONTIN) 300 MG capsule TAKE 2 CAPSULES BY MOUTH TWICE A DAY (Patient taking differently: Take 2 capsules by mouth 2 (Two) Times a Day.) 360 capsule 1   • glimepiride (AMARYL) 4 MG tablet Take 1 tablet by mouth Every Morning Before Breakfast. 90 tablet 3   • Glucosamine-Chondroit-Vit C-Mn (GLUCOSAMINE CHONDR 500 COMPLEX) capsule Take 2 capsules by mouth daily.     • hydroCHLOROthiazide (HYDRODIURIL) 12.5 MG tablet TAKE 1 TABLET BY MOUTH EVERY DAY 90 tablet 3   • Januvia 100 MG tablet TAKE 1 TABLET BY MOUTH EVERY DAY 90 tablet 3   • levothyroxine (SYNTHROID, LEVOTHROID) 25 MCG tablet TAKE 1 TABLET BY MOUTH EVERY DAY 90 tablet 3   • loratadine (CLARITIN) 10 MG tablet Take 1 tablet by mouth Daily.     • losartan (COZAAR) 100 MG tablet TAKE 1 TABLET BY MOUTH EVERY DAY (Patient taking differently: Take 1 tablet by mouth Daily.) 90 tablet 3   • LUTEIN PO Take 5 mg by mouth Daily. PT TO STOP PER MD INSTRUCTION     • Melatonin 10 MG tablet Daily As Needed.     • metFORMIN (GLUCOPHAGE) 1000 MG tablet TAKE 1 TABLET BY MOUTH TWICE A  tablet 3   • MULTIPLE VITAMINS ESSENTIAL PO Take  by mouth Daily. PT TO STOP PER RN INSTRUCTION     • omeprazole (priLOSEC) 20 MG capsule TAKE 1 CAPSULE BY MOUTH EVERY DAY (Patient taking differently: 1 capsule Every Evening.) 90 capsule 2   • OneTouch Delica Lancets 33G misc USE TO TEST BLOOD SUGAR 3 TIMES DAILY DIABETES TYPE 2 E11.9 100 each 5   • OneTouch Ultra test strip USE TO TEST BLOOD SUGAR 3 TIMES DAILY 1 each 5   • pioglitazone (ACTOS) 30 MG tablet TAKE 1 TABLET BY MOUTH EVERY DAY (Patient taking differently: Take 1 tablet by mouth Daily.) 90 tablet 3   • pravastatin (PRAVACHOL) 40 MG tablet TAKE 1 TABLET BY MOUTH EVERY DAY 90 tablet 3   • Triamcinolone Acetonide (NASACORT) 55 MCG/ACT nasal inhaler 2 sprays into the nostril(s) as directed by provider Daily As Needed.     • vitamin C (ASCORBIC ACID) 250 MG tablet Take 1 tablet by mouth Daily. PT TO STOP PER RN INSTRUCTION     •  vitamin E 200 UNIT capsule Take 1 capsule by mouth Daily. PT TO STOP PER MD INSTRUCTION     • Zeaxanthin powder Daily. PT TO STOP PER MD INSTRUCTION     • Zinc Sulfate (ZINC 15 PO) Take  by mouth Daily. PT TO STOP PER RN INSTRUCTION     • acetaminophen (TYLENOL) 325 MG tablet Take 2 tablets by mouth 2 (Two) Times a Day As Needed for Mild Pain. 60 tablet 0   • docusate sodium (COLACE) 100 MG capsule Take 1 capsule by mouth 2 (Two) Times a Day. 60 capsule 0   • modafinil (PROVIGIL) 200 MG tablet Take 1 tablet by mouth Daily.     • modafinil (PROVIGIL) 200 MG tablet Take 1 tablet by mouth Daily As Needed.       No facility-administered medications prior to visit.       No opioid medication identified on active medication list. I have reviewed chart for other potential  high risk medication/s and harmful drug interactions in the elderly.          Aspirin is not on active medication list.  Aspirin use is not indicated based on review of current medical condition/s. Risk of harm outweighs potential benefits.  .    Patient Active Problem List   Diagnosis   • Seasonal allergic rhinitis   • Type 2 diabetes mellitus with hyperglycemia, without long-term current use of insulin   • DUGGAN (dyspnea on exertion)   • Hypersomnia due to medical condition   • Fatigue   • Hyperlipidemia   • Obstructive sleep apnea syndrome treated with auto CPAP   • Gastroesophageal reflux disease   • Calculus of kidney   • Diabetic neuropathy, painful   • Acute deep vein thrombosis (DVT) of distal vein of right lower extremity   • History of pulmonary embolism   • Chronic deep vein thrombosis (DVT) of distal vein of right lower extremity   • Class 3 severe obesity due to excess calories with serious comorbidity and body mass index (BMI) of 45.0 to 49.9 in adult (HCC)   • Thyroid disease   • Hypertension   • Vertigo   • Arthritis of knee   • Total knee replacement status, left     Advance Care Planning   Advance Care Planning     Advance Directive is on  "file.  ACP discussion was held with the patient during this visit. Patient has an advance directive in EMR which is still valid.      Objective    Vitals:    23 0801   BP: 138/58   BP Location: Left arm   Patient Position: Sitting   Cuff Size: Large Adult   Pulse: 94   Resp: 18   Temp: 97.5 °F (36.4 °C)   TempSrc: Temporal   SpO2: 95%   Weight: (!) 148 kg (327 lb)   Height: 185.4 cm (72.99\")     Estimated body mass index is 43.15 kg/m² as calculated from the following:    Height as of this encounter: 185.4 cm (72.99\").    Weight as of this encounter: 148 kg (327 lb).    Class 3 Severe Obesity (BMI >=40). Obesity-related health conditions include the following: hypertension and osteoarthritis. Obesity is improving with lifestyle modifications. BMI is is above average; no BMI management plan is appropriate. We discussed portion control and increasing exercise.      Does the patient have evidence of cognitive impairment? No    Lab Results   Component Value Date    CHLPL 198 2023    TRIG 327 (H) 2023    HDL 51 2023    LDL 93 2023    VLDL 54 (H) 2023    HGBA1C 7.30 (H) 2023        HEALTH RISK ASSESSMENT    Smoking Status:  Social History     Tobacco Use   Smoking Status Never   Smokeless Tobacco Never   Tobacco Comments    caffeine use     Alcohol Consumption:  Social History     Substance and Sexual Activity   Alcohol Use No     Fall Risk Screen:    STEADI Fall Risk Assessment was completed, and patient is at HIGH risk for falls. Assessment completed on:2023    Depression Screenin/17/2023     8:00 AM   PHQ-2/PHQ-9 Depression Screening   Little Interest or Pleasure in Doing Things 0-->not at all   Feeling Down, Depressed or Hopeless 0-->not at all   PHQ-9: Brief Depression Severity Measure Score 0       Health Habits and Functional and Cognitive Screenin/17/2023     8:00 AM   Functional & Cognitive Status   Do you have difficulty preparing food and eating? " No   Do you have difficulty bathing yourself, getting dressed or grooming yourself? No   Do you have difficulty using the toilet? No   Do you have difficulty moving around from place to place? Yes   Do you have trouble with steps or getting out of a bed or a chair? Yes   Current Diet Frequent Junk Food   Dental Exam Not up to date   Eye Exam Not up to date   Exercise (times per week) 0 times per week   Current Exercises Include No Regular Exercise   Do you need help using the phone?  No   Are you deaf or do you have serious difficulty hearing?  Yes   Do you need help with transportation? No   Do you need help shopping? No   Do you need help preparing meals?  No   Do you need help with housework?  No   Do you need help with laundry? No   Do you need help taking your medications? No   Do you need help managing money? No   Do you ever drive or ride in a car without wearing a seat belt? No   Have you felt unusual stress, anger or loneliness in the last month? No   Who do you live with? Spouse   If you need help, do you have trouble finding someone available to you? No   Do you have difficulty concentrating, remembering or making decisions? No       Age-appropriate Screening Schedule:  Refer to the list below for future screening recommendations based on patient's age, sex and/or medical conditions. Orders for these recommended tests are listed in the plan section. The patient has been provided with a written plan.    Health Maintenance   Topic Date Due   • ANNUAL WELLNESS VISIT  01/11/2023   • DIABETIC FOOT EXAM  07/28/2023   • INFLUENZA VACCINE  08/01/2023   • HEMOGLOBIN A1C  11/09/2023   • DIABETIC EYE EXAM  11/14/2023   • URINE MICROALBUMIN  01/18/2024   • LIPID PANEL  05/09/2024   • TDAP/TD VACCINES (2 - Td or Tdap) 10/01/2027   • COLORECTAL CANCER SCREENING  10/10/2027   • COVID-19 Vaccine  Completed   • Pneumococcal Vaccine 65+  Completed   • HEPATITIS C SCREENING  Addressed   • ZOSTER VACCINE  Addressed  "                 CMS Preventative Services Quick Reference  Risk Factors Identified During Encounter  Immunizations Discussed/Encouraged: COVID19  The above risks/problems have been discussed with the patient.  Pertinent information has been shared with the patient in the After Visit Summary.  An After Visit Summary and PPPS were made available to the patient.    Follow Up:   Next Medicare Wellness visit to be scheduled in 1 year.       Additional E&M Note during same encounter follows:  Patient has multiple medical problems which are significant and separately identifiable that require additional work above and beyond the Medicare Wellness Visit.      Chief Complaint  Medicare Wellness-subsequent    Subjective        HPI  Barry Cam is also being seen today for follow-up on his hypertension, hyperlipidemia, obesity, diabetes mellitus type 2 and left total knee replacement.  He has been going through physical therapy but is recovering.  He tells me he will be having shockwave therapy for kidney stone by urology.         Objective   Vital Signs:  /58 (BP Location: Left arm, Patient Position: Sitting, Cuff Size: Large Adult)   Pulse 94   Temp 97.5 °F (36.4 °C) (Temporal)   Resp 18   Ht 185.4 cm (72.99\")   Wt (!) 148 kg (327 lb)   SpO2 95%   BMI 43.15 kg/m²     Physical Exam  Vitals and nursing note reviewed.   Constitutional:       General: He is not in acute distress.     Appearance: Normal appearance. He is obese. He is not ill-appearing.   HENT:      Head: Normocephalic and atraumatic.   Cardiovascular:      Rate and Rhythm: Normal rate and regular rhythm.      Heart sounds: Normal heart sounds.   Pulmonary:      Effort: Pulmonary effort is normal. No respiratory distress.      Breath sounds: Normal breath sounds. No wheezing or rales.   Musculoskeletal:         General: Normal range of motion.   Skin:     General: Skin is warm and dry.   Neurological:      General: No focal deficit present.    "   Mental Status: He is alert and oriented to person, place, and time.   Psychiatric:         Mood and Affect: Mood normal.         Behavior: Behavior normal.                         Assessment and Plan CBC has improved and is nearly completely normal with a white count of 7700, normal RBC count of 4.22 minimally low hemoglobin of 12.7 and normal hematocrit of 38.  CMP has an improved sugar of 145 and other values normal and hemoglobin A1c is slightly improved at 7.3.  Lipid panel is total cholesterol 198, HDL 51, LDL 93.  Patient has lost some weight.  #1-hypertension controlled on medication  #2-hyperlipidemia, controlled on medication  #3-diabetes mellitus type 2 with improved hemoglobin A1c  #4-obesity with some weight loss  #5-recovery from left total knee replacement  #6-kidney stone by history with upcoming shockwave therapy       There are no diagnoses linked to this encounter.         Follow Up I recommended the patient get a COVID-19 booster.  I encouraged him to continue losing weight and watch dietary carbs and sweets.  He will continue current medicines as now and I would like to recheck him in 6 months with a CBC, CMP, lipid panel, urine microalbumin, hemoglobin A1c, TSH and free T4       No follow-ups on file.  Patient was given instructions and counseling regarding his condition or for health maintenance advice. Please see specific information pulled into the AVS if appropriate.         Answers for HPI/ROS submitted by the patient on 5/16/2023  What is the primary reason for your visit?: Physical

## 2023-05-19 ENCOUNTER — TREATMENT (OUTPATIENT)
Dept: PHYSICAL THERAPY | Facility: CLINIC | Age: 73
End: 2023-05-19
Payer: MEDICARE

## 2023-05-19 DIAGNOSIS — M25.562 ACUTE PAIN OF LEFT KNEE: Primary | ICD-10-CM

## 2023-05-19 DIAGNOSIS — Z96.652 STATUS POST TOTAL KNEE REPLACEMENT, LEFT: ICD-10-CM

## 2023-05-19 DIAGNOSIS — R29.898 DECREASED STRENGTH OF LOWER EXTREMITY: ICD-10-CM

## 2023-05-19 DIAGNOSIS — R68.89 DECREASED FUNCTIONAL ACTIVITY TOLERANCE: ICD-10-CM

## 2023-05-19 NOTE — PROGRESS NOTES
Physical Therapy Daily Treatment Note  [facility] [place]   [address] [place], [address]  [place] [address]  P: [phone]       F: [phone]    Patient: Barry Cam   : 1950  Diagnosis/ICD-10 Code:  Acute pain of left knee [M25.562]  Referring practitioner: Noah Salcedo MD  Date of Initial Visit: Type: THERAPY  Noted: 3/21/2023  Today's Date: 2023  Patient seen for 18 sessions         Barry Cam reports: L knee is feeling okay, not as bad as it has been the past few weeks. Still trying to do more at home and wean out of the wheelchair.     Subjective     Objective   See Exercise, Manual, and Modality Logs for complete treatment.       Assessment/Plan  Subjectively, pt reports no increase of pain or discomfort with interventions performed today. Performed well with continued LE strengthening and stability interventions. Continues to demonstrate slightly increased tolerance to resisted strengthening exercises.  Continues to benefit from verbal/tactile cues to ensure proper form and technique for exercise performance.     Progress per Plan of Care           Manual Therapy:         mins  65169;  Therapeutic Exercise:    20     mins  41372;     Neuromuscular Elizabeth:    8    mins  36277;    Therapeutic Activity:     10     mins  07570;     Gait Training:           mins  63265;     Ultrasound:          mins  10973;    Electrical Stimulation:         mins  22083;  Traction          mins 90991    Timed Treatment:   38   mins   Total Treatment:     38   mins    Priti Benavides PTA  Physical Therapist Assistant [id number]

## 2023-05-23 ENCOUNTER — TREATMENT (OUTPATIENT)
Dept: PHYSICAL THERAPY | Facility: CLINIC | Age: 73
End: 2023-05-23
Payer: MEDICARE

## 2023-05-23 DIAGNOSIS — R68.89 DECREASED FUNCTIONAL ACTIVITY TOLERANCE: ICD-10-CM

## 2023-05-23 DIAGNOSIS — Z96.652 STATUS POST TOTAL KNEE REPLACEMENT, LEFT: ICD-10-CM

## 2023-05-23 DIAGNOSIS — R29.898 DECREASED STRENGTH OF LOWER EXTREMITY: ICD-10-CM

## 2023-05-23 DIAGNOSIS — M25.562 ACUTE PAIN OF LEFT KNEE: Primary | ICD-10-CM

## 2023-05-23 NOTE — PROGRESS NOTES
Re-Assessment / Progress Note  [facility]   [address], [address]  [place] [address]  P: [phone]  F: [phone]    Patient: Barry Cam   : 1950  Diagnosis/ICD-10 Code:  Acute pain of left knee [M25.562]  Referring practitioner: Noah Salcedo MD  Date of Initial Visit: Episode Type: THERAPY  Noted: 3/21/2023    Today's Date: 2023  Patient seen for 19 sessions.    Visit Diagnoses:    ICD-10-CM ICD-9-CM   1. Acute pain of left knee  M25.562 719.46   2. Status post total knee replacement, left  Z96.652 V43.65   3. Decreased functional activity tolerance  R68.89 780.99   4. Decreased strength of lower extremity  R29.898 729.89       Subjective:   Barry Cam reports:     Subjective Questionnaire: WOMAC: 3296  Clinical Progress: improved  Home Program Compliance: Yes  Treatment has included: therapeutic exercise, neuromuscular re-education, manual therapy, therapeutic activity, gait training and cryotherapy    Subjective   Pain  Current pain ratin  At worst pain 1  Objective   Active Range of Motion   Left Knee   Flexion: 125 degrees   Extension: 1 degrees      Right Knee   Normal active range of motion    Additional Active Range of Motion Details  Extension lacking 1 degree lacking      Passive Range of Motion   Left Knee   Flexion: 127 degrees with pain  Extension: 0 degrees     Strength/Myotome Testing      Left Knee   Flexion: 5  Extension: 5  Quadriceps contraction: good     Right Knee   Normal strength  Quadriceps contraction: good           Ambulation   Weight-Bearing Status   Weight-Bearing Status (Left): full weight bearing   Weight-Bearing Status (Right): full weight-bearing    Assistive device used: none       Ambulation: Stairs   Ascend stairs: step to  rail ascending/descending     Functional test: 5TSTS - 28 seconds without UE support and no AD : 9.62 seconds using UE for support and no AD    : TUG  16.51 seconds without AD  Assessment/Plan   Pt is a 72 y/o male who has been attending physical therapy for 2 months s/p L TKA. Pt has made good progress towards his goals. Pt reports the most difficulty with endurance and going up and down stairs. Pt is able to go up pain free and down with step to pattern, but unable to alternate feet going up.  Pt has progressed well with LLE strengthening interventions and is compliant with attending physical therapy. He would benefit from additional skilled PT in order to continue to address the above impairments and activity limitations in order to decrease pain, improve functional mobility, and return to PLOF. Pt is ready to decrease frequency to one time a week for an additional month.  Progress toward previous goals: Partially Met    Goals  Plan Goals: STG 2-6 weeks    1. Decrease WOMAC score by 10 points to show improvement in overall functional activities MET  2. Increase knee flexion ROM to 120 in order to be able to improve transfers MET  3. Knee extension PROM to 0 degrees in order to improve stability and terminal stance in gait MET  4. Pt will be independent in home exercise program MET    LTG 6-12 weeks    1.Decrease WOMAC score by 20 points from IE to show improvement in overall functional activities PROGRESSING  2. Increase MMT for knee to 5/5 in order to be able to improve functional activities MET  3. Pt will be able to ascend/descend a flight of stairs pain free using a hand rail and step to gait pattern MET  4. Pt will be able to perform 5TSTS using BUEs for push off in <14.5 seconds pain free and independently MET  5. Pt will be able to ambulate without AD pain free and perform TUG test <14.5 seconds safely and independently PROGRESSING    Recommendations: Continue with recommendations to decrease frequency to one time a week for 4 weeks  Timeframe: 1 month  Prognosis to achieve goals: good    PT Signature: Nancy Fierro, PT  [place] Lic. # [id number]      Based  upon review of the patient's progress and continued therapy plan, it is my medical opinion that Barry Cam should continue physical therapy treatment at [facility]  [address] [place] [id number] [phone] ; Fax Number [phone]    Signature: __________________________________  Noah Salcedo MD    Manual Therapy:     0     mins  99859;  Therapeutic Exercise:     30     mins  67036;     Neuromuscular Elizabeth:     0    mins  70849;    Therapeutic Activity:      8     mins  70346;     Gait Trainin     mins  49865;     Ultrasound:      0     mins  32761;    Electrical Stimulation:     0     mins  47816 ( );  Dry Needling      0     mins self-pay  Traction      0     mins 46212  Canalith Repositioning    0     mins 20623      Timed Treatment:   38   mins   Total Treatment:     38   mins

## 2023-05-26 ENCOUNTER — NURSE TRIAGE (OUTPATIENT)
Dept: CALL CENTER | Facility: HOSPITAL | Age: 73
End: 2023-05-26
Payer: MEDICARE

## 2023-05-26 NOTE — TELEPHONE ENCOUNTER
"[facility] called. Lithroscopy scheduled for 7/11. EKG showed abnormal. Needs cardiac clearnace in order to proceed. [phone] ext [phone] and fax is [phone]    Reason for Disposition  • Heart rhythm alert (e.g., \"you have irregular heartbeat\") from personal wearable device (e.g., Apple Watch)    Additional Information  • Negative: Passed out (i.e., lost consciousness, collapsed and was not responding)  • Negative: Shock suspected (e.g., cold/pale/clammy skin, too weak to stand, low BP, rapid pulse)  • Negative: Difficult to awaken or acting confused (e.g., disoriented, slurred speech)  • Negative: Visible sweat on face or sweat dripping down face  • Negative: Unable to walk, or can only walk with assistance (e.g., requires support)  • Negative: [1] Received SHOCK from implantable cardiac defibrillator AND [2] persisting symptoms (i.e., palpitations, lightheadedness)  • Negative: [1] Dizziness, lightheadedness, or weakness AND [2] heart beating very rapidly (e.g., > 140 / minute)  • Negative: [1] Dizziness, lightheadedness, or weakness AND [2] heart beating very slowly (e.g., < 50 / minute)  • Negative: Sounds like a life-threatening emergency to the triager  • Negative: Chest pain  • Negative: Implantable Cardiac Defibrillator (ICD) or a pacemaker symptoms or questions  • Negative: Difficulty breathing  • Negative: Dizziness, lightheadedness, or weakness  • Negative: [1] Heart beating very rapidly (e.g., > 140 / minute) AND [2] present now  (Exception: During exercise.)  • Negative: Heart beating very slowly (e.g., < 50 / minute)  (Exception: Athlete and heart rate normal for caller.)  • Negative: New or worsened shortness of breath with activity (dyspnea on exertion)  • Negative: Patient sounds very sick or weak to the triager  • Negative: [1] Heart beating very rapidly (e.g., > 140 / minute) AND [2] not present now  (Exception: During exercise.)  • Negative: [1] Skipped or extra beat(s) AND [2] increases " "with exercise or exertion  • Negative: [1] Skipped or extra beat(s) AND [2] occurs 4 or more times per minute  • Negative: New or worsened ankle swelling  • Negative: History of heart disease (i.e., heart attack, bypass surgery, angina, angioplasty, CHF)  (Exception: Brief heartbeat symptoms that went away and now feels well.)  • Negative: Age > 60 years  (Exception: Brief heartbeat symptoms that went away and now feels well.)  • Negative: Taking water pill (i.e., diuretic) or heart medication (e.g., digoxin)  • Negative: Wearing a \"Holter monitor\" or \"cardiac event monitor\"  • Negative: [1] Received SHOCK from implantable cardiac defibrillator AND [2] now feels well  • Negative: History of hyperthyroidism or taking thyroid medication  • Negative: Substance use (drug use) or misuse, known or suspected    Answer Assessment - Initial Assessment Questions  [facility] called. Lithroscopy scheduled for 7/11. EKG showed abnormal. Needs cardiac clearnace in order to proceed. [phone] ext [phone] and fax is [phone]    Unable to get EKG results aside from abnormal. For MD to review.    Protocols used: HEART RATE AND HEARTBEAT QUESTIONS-ADULT-    "

## 2023-05-30 ENCOUNTER — TREATMENT (OUTPATIENT)
Dept: PHYSICAL THERAPY | Facility: CLINIC | Age: 73
End: 2023-05-30

## 2023-05-30 DIAGNOSIS — R29.898 DECREASED STRENGTH OF LOWER EXTREMITY: ICD-10-CM

## 2023-05-30 DIAGNOSIS — Z96.652 STATUS POST TOTAL KNEE REPLACEMENT, LEFT: ICD-10-CM

## 2023-05-30 DIAGNOSIS — R68.89 DECREASED FUNCTIONAL ACTIVITY TOLERANCE: ICD-10-CM

## 2023-05-30 DIAGNOSIS — M25.562 ACUTE PAIN OF LEFT KNEE: Primary | ICD-10-CM

## 2023-05-30 RX ORDER — OMEPRAZOLE 20 MG/1
CAPSULE, DELAYED RELEASE ORAL
Qty: 90 CAPSULE | Refills: 2 | Status: SHIPPED | OUTPATIENT
Start: 2023-05-30

## 2023-05-30 NOTE — PROGRESS NOTES
"Physical Therapy Daily Treatment Note  [address]   [facility] [address]  P: [phone]  F: [phone]    Patient: Barry Cam   : 1950  Referring practitioner: Noah Salcedo MD  Date of Initial Visit: Type: THERAPY  Noted: 3/21/2023  Today's Date: 2023  Patient seen for 20 sessions       Visit Diagnoses:    ICD-10-CM ICD-9-CM   1. Acute pain of left knee  M25.562 719.46   2. Status post total knee replacement, left  Z96.652 V43.65   3. Decreased functional activity tolerance  R68.89 780.99   4. Decreased strength of lower extremity  R29.898 729.89       Barry Cam reports:     Subjective   Pt reports \"the knee is fine today, but I woke up and my lower back was bothering me\"  Objective   See Exercise, Manual, and Modality Logs for complete treatment.       Assessment/Plan  Pt tolerated supine straight leg raise with external rotation, using LLE, better than previous sessions. No weight added, but pt able to perform without cueing and good exercise performance. Pt subjectively reports improvements descending the stairs, but continues to have the most limitations with sit<>stand transfers when height of seat decreased. Continue to work towards improving this activity. Progress as tolerated.     Timed:         Manual Therapy:    0     mins  93242;     Therapeutic Exercise:    30     mins  85926;     Neuromuscular Elizabeth:    0    mins  20730;    Therapeutic Activity:     10     mins  89295;     Gait Trainin     mins  03635;     Ultrasound:     0     mins  45401;    Ionto                               0    mins   60801  Self Care                       0     mins   16260  Canalith Repos    0     mins 25747      Un-Timed:  Electrical Stimulation:    0     mins  73716 ( );  Dry Needling     0     mins self-pay  Traction     0     mins 87620      Timed Treatment:   40   mins   Total Treatment:     40   mins    Nancy Fierro PT  [place] License: " [id number]

## 2023-05-31 ENCOUNTER — OFFICE VISIT (OUTPATIENT)
Dept: ORTHOPEDIC SURGERY | Facility: CLINIC | Age: 73
End: 2023-05-31

## 2023-05-31 VITALS — WEIGHT: 315 LBS | BODY MASS INDEX: 41.75 KG/M2 | HEIGHT: 73 IN | TEMPERATURE: 97.5 F

## 2023-05-31 DIAGNOSIS — Z96.652 TOTAL KNEE REPLACEMENT STATUS, LEFT: Primary | ICD-10-CM

## 2023-05-31 NOTE — PROGRESS NOTES
Barry Cam : 1950 MRN: [id number] DATE: 2023     DIAGNOSIS: 3 month follow up left TKA      SUBJECTIVE:  Patient returns today for 3 month follow up of left total knee replacement.  Patient reports doing well with no unusual complaints.     Vitals:    23 0920   Temp: 97.5 °F (36.4 °C)       OBJECTIVE:     Exam:  The incision is well healed. No sign of infection. Range of motion is measured at 0 to 120. The calf is soft and nontender with a negative Homans sign.  Gait is reciprocal heel-to-toe and nonantalgic.    DIAGNOSTIC STUDIES    Xrays: AP, lateral and merchant's views of the left knee are ordered and reviewed for evaluation of recent knee replacement. They demonstrate a well positioned, well aligned knee replacement without complicating factors noted.  In comparison with previous films there has been no change.    ASSESSMENT: 3 months status post left knee replacement.    PLAN: 1) Continue with PT exercises as prescribed   2) Follow up in 6 months   3) Antibiotic prophylaxis discussed    Noah Salcedo MD    2023

## 2023-06-06 ENCOUNTER — TREATMENT (OUTPATIENT)
Dept: PHYSICAL THERAPY | Facility: CLINIC | Age: 73
End: 2023-06-06
Payer: MEDICARE

## 2023-06-06 ENCOUNTER — TELEPHONE (OUTPATIENT)
Dept: FAMILY MEDICINE CLINIC | Facility: CLINIC | Age: 73
End: 2023-06-06

## 2023-06-06 DIAGNOSIS — M25.562 ACUTE PAIN OF LEFT KNEE: Primary | ICD-10-CM

## 2023-06-06 DIAGNOSIS — R29.898 DECREASED STRENGTH OF LOWER EXTREMITY: ICD-10-CM

## 2023-06-06 DIAGNOSIS — R68.89 DECREASED FUNCTIONAL ACTIVITY TOLERANCE: ICD-10-CM

## 2023-06-06 DIAGNOSIS — Z96.652 STATUS POST TOTAL KNEE REPLACEMENT, LEFT: ICD-10-CM

## 2023-06-06 NOTE — PROGRESS NOTES
"Physical Therapy Daily Treatment Note  [address] [facility] [address]   [facility] [address]  P: [phone]  F: [phone]    Patient: Barry Cam   : 1950  Referring practitioner: Noah Salcedo MD  Date of Initial Visit: Type: THERAPY  Noted: 3/21/2023  Today's Date: 2023  Patient seen for 21 sessions       Visit Diagnoses:    ICD-10-CM ICD-9-CM   1. Acute pain of left knee  M25.562 719.46   2. Status post total knee replacement, left  Z96.652 V43.65   3. Decreased functional activity tolerance  R68.89 780.99   4. Decreased strength of lower extremity  R29.898 729.89       Barry Cam reports:    Subjective   Pt states \"I am starting to notice sensations on the top of my knee, but it's getting there\".  Objective   See Exercise, Manual, and Modality Logs for complete treatment.       Assessment/Plan  Pt di da ice job ambulating throughout the clinic on level surfaces with no losses of balance and good step length and height. He also ascended/descended clinic stairs with alternating lower extremities with good form. Pt is progressing very well towards goals and benefits from continued strengthening in order to improve functional mobility and stability with ADLs/IADLs. Continue POC.    Timed:         Manual Therapy:    0     mins  13858;     Therapeutic Exercise:    30     mins  99975;     Neuromuscular Elizabeth:    0    mins  54627;    Therapeutic Activity:     9     mins  17664;     Gait Trainin     mins  01307;     Ultrasound:     0     mins  87683;    Ionto                               0    mins   42152  Self Care                       0     mins   44606  Canalith Repos    0     mins 64743      Un-Timed:  Electrical Stimulation:    0     mins  87164 ( );  Dry Needling     0     mins self-pay  Traction     0     mins 85767      Timed Treatment:   39   mins   Total Treatment:     39   mins    Nancy Fierro PT  [place] License: [id number]                  "

## 2023-06-06 NOTE — TELEPHONE ENCOUNTER
Hub staff attempted to follow warm transfer process and was unsuccessful     Caller: [facility]    Relationship to patient: Other    Best call back number: [phone]    Patient is needing: [facility] WOULD LIKE TO KNOW IF THE OFFICE RECEIVED THE CARDIAC CLEARANCE FAXED OVER FOR THIS PATIENT. PLEASE ADVISE.

## 2023-06-08 ENCOUNTER — TELEPHONE (OUTPATIENT)
Dept: FAMILY MEDICINE CLINIC | Facility: CLINIC | Age: 73
End: 2023-06-08
Payer: MEDICARE

## 2023-06-08 NOTE — TELEPHONE ENCOUNTER
Hub staff attempted to follow warm transfer process and was unsuccessful     Caller: [facility]    Relationship to patient: Other    Best call back number: [phone]     Patient is needing: RETURNED CALL TO CORIE AND SHE STATED SHE HAD FAXED THE CARDIAC CLEARANCE LETTER AT [phone] .  PLEASE CALL HER WHEN YOU RECEIVE IT PLEASE AND THANK YOU.        THE MESSAGE THAT CORIE PUT DOES NOT HUB TO READ SO WAS UNABLE ABLE TO REACH OFFICE AND NOT ABLE TO READ THE MESSAGE TO HER.

## 2023-06-08 NOTE — TELEPHONE ENCOUNTER
Attempted to contact scheduling and left a VM stating that the clearance form has not been sent over yet.

## 2023-06-13 ENCOUNTER — TREATMENT (OUTPATIENT)
Dept: PHYSICAL THERAPY | Facility: CLINIC | Age: 73
End: 2023-06-13
Payer: MEDICARE

## 2023-06-13 DIAGNOSIS — R29.898 DECREASED STRENGTH OF LOWER EXTREMITY: ICD-10-CM

## 2023-06-13 DIAGNOSIS — Z96.652 STATUS POST TOTAL KNEE REPLACEMENT, LEFT: ICD-10-CM

## 2023-06-13 DIAGNOSIS — R68.89 DECREASED FUNCTIONAL ACTIVITY TOLERANCE: ICD-10-CM

## 2023-06-13 DIAGNOSIS — M25.562 ACUTE PAIN OF LEFT KNEE: Primary | ICD-10-CM

## 2023-06-13 NOTE — PROGRESS NOTES
"Physical Therapy Daily Treatment Note  [address] [facility] [address] [place] [address]  P: [phone]  F: [phone]    Patient: Barry Cam   : 1950  Referring practitioner: Noah Salcedo MD  Date of Initial Visit: Type: THERAPY  Noted: 3/21/2023  Today's Date: 2023  Patient seen for 22 sessions       Visit Diagnoses:    ICD-10-CM ICD-9-CM   1. Acute pain of left knee  M25.562 719.46   2. Status post total knee replacement, left  Z96.652 V43.65   3. Decreased functional activity tolerance  R68.89 780.99   4. Decreased strength of lower extremity  R29.898 729.89       Barry Cam reports:     Subjective   Pt reports that his knee feels \"okay\" he reports getting out of a chair he now puts his hand on his left knee to stand versus using momentum. He states he did a lot of stairs over the weekend.   Objective   See Exercise, Manual, and Modality Logs for complete treatment.       Assessment/Plan  Pt able to perform sit<>stand transfer from low mat table when transitioning between interventions well without momentum swing just as he reported. He has good stability when he stands upright just after transfer. He also is able to push up leading with LLE to a second step without difficulty and using hand rail. He is progressing well towards discharge. Continue to strength L knee for 1-2 more formal PT visits, prepare for discharge.     Timed:         Manual Therapy:    0     mins  32565;     Therapeutic Exercise:    26     mins  69838;     Neuromuscular Elizabeth:    0    mins  64817;    Therapeutic Activity:     12     mins  84805;     Gait Trainin     mins  55278;     Ultrasound:     0     mins  67380;    Ionto                               0    mins   89832  Self Care                       0     mins   05438  Canalith Repos    0     mins 69399      Un-Timed:  Electrical Stimulation:    0     mins  60423 ( );  Dry Needling     0     mins self-pay  Traction     0     " mins 01656      Timed Treatment:   38   mins   Total Treatment:     38   mins    Nancy Fierro, PT  [place] License: [id number]

## 2023-06-14 NOTE — TELEPHONE ENCOUNTER
Called and informed scheduling team that I faxed it over on 6/8/23. They informed me that they did not receive the fax. Faxed the clearance form again today to 2 different fax numbers.

## 2023-06-30 ENCOUNTER — TELEPHONE (OUTPATIENT)
Dept: FAMILY MEDICINE CLINIC | Facility: CLINIC | Age: 73
End: 2023-06-30

## 2023-06-30 NOTE — TELEPHONE ENCOUNTER
Caller: Swapna Cam    Relationship: Emergency Contact    Best call back number: [phone]     What was the call regarding: PATIENT'S WIFE IS CALLING TO CONFIRM IF LAB ORDERS HAVE BEEN RECEIVED BY THE OFFICE. PATIENT STATED THEY WERE SENT OVER YESTERDAY BY TAMMY, AND IT CAME FROM A HOSPITAL IN [place] BUT SHE DOES NOT KNOW THE NAME OF THE HOSPITAL. PLEASE ADVISE.

## 2023-07-07 NOTE — TELEPHONE ENCOUNTER
Called the number in chart ending in [phone], no answer. Tried the [facility] sent just in case, no answer.    No orders are scanned in pt chart so have not received.

## 2023-07-24 DIAGNOSIS — E11.40 DIABETIC NEUROPATHY, PAINFUL: ICD-10-CM

## 2023-07-25 RX ORDER — GABAPENTIN 300 MG/1
CAPSULE ORAL
Qty: 360 CAPSULE | Refills: 0 | Status: SHIPPED | OUTPATIENT
Start: 2023-07-25

## 2023-08-03 RX ORDER — AMLODIPINE BESYLATE 10 MG/1
TABLET ORAL
Qty: 90 TABLET | Refills: 0 | Status: SHIPPED | OUTPATIENT
Start: 2023-08-03

## 2023-09-11 RX ORDER — LOSARTAN POTASSIUM 100 MG/1
TABLET ORAL
Qty: 90 TABLET | Refills: 3 | Status: SHIPPED | OUTPATIENT
Start: 2023-09-11

## 2023-09-26 RX ORDER — APIXABAN 5 MG/1
TABLET, FILM COATED ORAL
Qty: 60 TABLET | Refills: 5 | Status: SHIPPED | OUTPATIENT
Start: 2023-09-26

## 2023-10-03 RX ORDER — DEXTROAMPHETAMINE SACCHARATE, AMPHETAMINE ASPARTATE, DEXTROAMPHETAMINE SULFATE AND AMPHETAMINE SULFATE 7.5; 7.5; 7.5; 7.5 MG/1; MG/1; MG/1; MG/1
30 TABLET ORAL 2 TIMES DAILY
Qty: 60 TABLET | Refills: 0 | Status: SHIPPED | OUTPATIENT
Start: 2023-10-03

## 2023-10-27 DIAGNOSIS — E11.40 DIABETIC NEUROPATHY, PAINFUL: ICD-10-CM

## 2023-10-30 RX ORDER — AMLODIPINE BESYLATE 10 MG/1
TABLET ORAL
Qty: 90 TABLET | Refills: 0 | OUTPATIENT
Start: 2023-10-30

## 2023-10-30 RX ORDER — GABAPENTIN 300 MG/1
CAPSULE ORAL
Qty: 360 CAPSULE | Refills: 0 | Status: SHIPPED | OUTPATIENT
Start: 2023-10-30

## 2023-10-30 RX ORDER — AMLODIPINE BESYLATE 10 MG/1
10 TABLET ORAL DAILY
Qty: 90 TABLET | Refills: 0 | Status: SHIPPED | OUTPATIENT
Start: 2023-10-30

## 2023-11-28 RX ORDER — DEXTROAMPHETAMINE SACCHARATE, AMPHETAMINE ASPARTATE, DEXTROAMPHETAMINE SULFATE AND AMPHETAMINE SULFATE 7.5; 7.5; 7.5; 7.5 MG/1; MG/1; MG/1; MG/1
30 TABLET ORAL 2 TIMES DAILY
Qty: 60 TABLET | Refills: 0 | Status: SHIPPED | OUTPATIENT
Start: 2023-11-28

## 2023-12-04 ENCOUNTER — OFFICE VISIT (OUTPATIENT)
Dept: ORTHOPEDIC SURGERY | Facility: CLINIC | Age: 73
End: 2023-12-04
Payer: MEDICARE

## 2023-12-04 VITALS — HEIGHT: 73 IN | BODY MASS INDEX: 41.75 KG/M2 | WEIGHT: 315 LBS | TEMPERATURE: 98.6 F

## 2023-12-04 DIAGNOSIS — Z96.652 TOTAL KNEE REPLACEMENT STATUS, LEFT: Primary | ICD-10-CM

## 2023-12-04 NOTE — PROGRESS NOTES
"Barry Cam     : 1950     MRN: [id number]    DATE: 2023    DIAGNOSIS:  9 month follow up left total knee arthroplasty    SUBJECTIVE:  Ms. Cam returns today for follow up of a left total knee replacement.  He reports a funny sensation along the lateral aspect of his knee.  It bothers him and he says it makes him feel reluctant to put full weight on the leg.  From a pain standpoint, the surgery was a success.  Overall, he feels like the knee is better than before the surgery.  He does not like the funny sensation along the lateral aspect though.  Of note, he has also been recently battling sciatica.  He reports pain shooting from his back into his hip and occasional numbness and tingling going down the leg.  He has a history of sciatica in the past that resolved with PT.  He just recently resumed doing his home therapy and he is optimistic it is going to help.    OBJECTIVE:  Temp 98.6 °F (37 °C)   Ht 185.4 cm (73\")   Wt (!) 150 kg (330 lb 4.8 oz)   BMI 43.58 kg/m²   Family History   Problem Relation Age of Onset    Depression Mother     Diabetes Mother     Coronary artery disease Father     Diabetes Father     Stroke Father     Hypertension Father     Heart disease Father     Diabetes Brother     Diabetes Maternal Grandmother     Malig Hyperthermia Neg Hx      Past Medical History:   Diagnosis Date    Allergic     Allergic rhinitis     Anxiety     Arthritis     Serious    Asthma 2018    PE Blood Clots    Cataract     Removed    CHF (congestive heart failure) 2021    Pending    Clotting disorder 2018    Eliquis Related    Deep vein thrombosis 2018    Diagnosed    Depression     Depression     Diabetes mellitus     Diverticulosis     Mild    DUGGAN (dyspnea on exertion)     Fibromyalgia, primary     ?????    GERD (gastroesophageal reflux disease)     History of hiatal hernia     History of kidney stones     HL (hearing loss)     Mild    Hx of blood clots     Hyperlipidemia  "    Hypertension     Hypothyroidism     Irritable bowel syndrome     Mild    Kidney stone     Ongoing    Low back pain     Never Ending    Narcolepsy     Obesity     Can't Win    Pulmonary embolism     Rotator cuff syndrome     Sciatica     Sleep apnea     CPAP USED    Umbilical hernia     Visual impairment     Diabetes Related     Past Surgical History:   Procedure Laterality Date    CYSTOSCOPY W/ LASER LITHOTRIPSY  07/20/2023    EYE SURGERY      Cataract Removal    FRACTURE SURGERY      Left Ankle/Foot/Leg    HERNIA REPAIR      HERNIA REPAIR      INGUINAL HERNIA REPAIR      JOINT REPLACEMENT  03/02/2023    TONSILLECTOMY      TOTAL KNEE ARTHROPLASTY Left 03/02/2023    Procedure: TOTAL KNEE ARTHROPLASTY;  Surgeon: Noah Salcedo MD;  [facility];  Service: Orthopedics;  Laterality: Left;     Social History     Socioeconomic History    Marital status:    Tobacco Use    Smoking status: Never     Passive exposure: Never    Smokeless tobacco: Never    Tobacco comments:     caffeine use   Vaping Use    Vaping Use: Never used   Substance and Sexual Activity    Alcohol use: No    Drug use: No    Sexual activity: Not Currently     Partners: Female     Birth control/protection: Other       Review of Systems:   A 14 point review of systems is reviewed with the patient.  Pertinent positives are listed above.  All others negative.    Exam:  The incision is well healed.  No tenderness.  He does have what feels like a fluid collection anterior to the distal quad tendon.  This is nontender and there is no increased warmth or overlying erythema.  No effusion in the knee itself as best I can tell.  He has numbness along the lateral aspect of the knee adjacent to the incision in the area of the infrapatellar branch of the saphenous nerve.  Range of motion: 0 to 120.  Alignment is neutral.  Good quad strength. There is no evidence of varus/valgus or flexion instability. No effusion.  Intact sensation to light  touch.  Palpable pedal pulses    DIAGNOSTIC STUDIES    Xrays: AP, merchant and lateral views of the left knee were ordered and reviewed for evaluation of recent knee replacement.  The x-rays demonstrate a well positioned, well aligned knee replacement without complicating factors noted.  In comparison with previous films there has been no change.    ASSESSMENT:  1.  9 month follow up left knee replacement.  2.  Left knee seroma 3.  Sciatica 4.  Right rotator cuff tear 5.  Left rotator cuff tear arthropathy    PLAN: First of all, I assured him that numbness adjacent to the incision is normal after the surgery.  I told him that this can sometimes get better with time but at this point, I expect that the numbness is probably permanent.  I told him that this is not typically a functional issue for patients.  I expect that with time his brain will gradually learn to tune that out.    Appropriate activity modifications and restrictions for his knee were discussed.  We also discussed antibiotic prophylaxis recommendations for dental procedures.  He has a cleaning coming up in April so I told him to call us sometime in March and we will be happy to call in the medicine for him.  I would like to see him back in another 6 months or so for reevaluation.    I do think he probably has a seroma over the distal quad tendon.  This does not seem to be limiting him and I recommend we manage that expectantly.  I told him we could consider an aspiration if it really starts to bother him.  At this point I really do not think that any of his symptoms are related to the seroma.    I did offer to refer him back to PT for the sciatica.  He says that he would prefer to do the home program for now.  I told him that if the symptoms persist I am happy to workup the sciatica with an eye toward possible epidurals.  I told just to call me if that is the case.    Last, I have seen him for both shoulders in the past.  He had an MRI of the right  shoulder that confirmed a full-thickness rotator cuff tear.  He also has previous x-rays of the left that show cuff tear arthropathy.  We talked about surgical options for both shoulders.  He feels like that is probably in his future but he wants to put off any surgery for now.  I told him to call me if he changes his mind about anything.    Noah Salcedo MD

## 2023-12-05 ENCOUNTER — LAB (OUTPATIENT)
Dept: FAMILY MEDICINE CLINIC | Facility: CLINIC | Age: 73
End: 2023-12-05
Payer: MEDICARE

## 2023-12-05 DIAGNOSIS — E78.2 MIXED HYPERLIPIDEMIA: ICD-10-CM

## 2023-12-05 DIAGNOSIS — I10 HYPERTENSION, UNSPECIFIED TYPE: Primary | ICD-10-CM

## 2023-12-05 DIAGNOSIS — E11.65 TYPE 2 DIABETES MELLITUS WITH HYPERGLYCEMIA, WITHOUT LONG-TERM CURRENT USE OF INSULIN: ICD-10-CM

## 2023-12-05 DIAGNOSIS — E07.9 THYROID DISEASE: ICD-10-CM

## 2023-12-06 LAB
ALBUMIN SERPL-MCNC: 4.4 G/DL (ref 3.5–5.2)
ALBUMIN/CREAT UR: 6 MG/G CREAT (ref 0–29)
ALBUMIN/GLOB SERPL: 1.8 G/DL
ALP SERPL-CCNC: 70 U/L (ref 39–117)
ALT SERPL-CCNC: 20 U/L (ref 1–41)
AST SERPL-CCNC: 23 U/L (ref 1–40)
BASOPHILS # BLD AUTO: 0.04 10*3/MM3 (ref 0–0.2)
BASOPHILS NFR BLD AUTO: 0.6 % (ref 0–1.5)
BILIRUB SERPL-MCNC: 0.4 MG/DL (ref 0–1.2)
BUN SERPL-MCNC: 14 MG/DL (ref 8–23)
BUN/CREAT SERPL: 11.6 (ref 7–25)
CALCIUM SERPL-MCNC: 9.5 MG/DL (ref 8.6–10.5)
CHLORIDE SERPL-SCNC: 102 MMOL/L (ref 98–107)
CHOLEST SERPL-MCNC: 172 MG/DL (ref 0–200)
CO2 SERPL-SCNC: 23.5 MMOL/L (ref 22–29)
CREAT SERPL-MCNC: 1.21 MG/DL (ref 0.76–1.27)
CREAT UR-MCNC: 127.8 MG/DL
EGFRCR SERPLBLD CKD-EPI 2021: 64 ML/MIN/1.73
EOSINOPHIL # BLD AUTO: 0.31 10*3/MM3 (ref 0–0.4)
EOSINOPHIL NFR BLD AUTO: 4.5 % (ref 0.3–6.2)
ERYTHROCYTE [DISTWIDTH] IN BLOOD BY AUTOMATED COUNT: 13.3 % (ref 12.3–15.4)
GLOBULIN SER CALC-MCNC: 2.5 GM/DL
GLUCOSE SERPL-MCNC: 162 MG/DL (ref 65–99)
HBA1C MFR BLD: 7.3 % (ref 4.8–5.6)
HCT VFR BLD AUTO: 34.3 % (ref 37.5–51)
HDLC SERPL-MCNC: 41 MG/DL (ref 40–60)
HGB BLD-MCNC: 11.8 G/DL (ref 13–17.7)
IMM GRANULOCYTES # BLD AUTO: 0.02 10*3/MM3 (ref 0–0.05)
IMM GRANULOCYTES NFR BLD AUTO: 0.3 % (ref 0–0.5)
LDLC SERPL CALC-MCNC: 79 MG/DL (ref 0–100)
LDLC/HDLC SERPL: 1.61 {RATIO}
LYMPHOCYTES # BLD AUTO: 1.6 10*3/MM3 (ref 0.7–3.1)
LYMPHOCYTES NFR BLD AUTO: 23.1 % (ref 19.6–45.3)
MCH RBC QN AUTO: 30.6 PG (ref 26.6–33)
MCHC RBC AUTO-ENTMCNC: 34.4 G/DL (ref 31.5–35.7)
MCV RBC AUTO: 89.1 FL (ref 79–97)
MICROALBUMIN UR-MCNC: 7.2 UG/ML
MONOCYTES # BLD AUTO: 0.58 10*3/MM3 (ref 0.1–0.9)
MONOCYTES NFR BLD AUTO: 8.4 % (ref 5–12)
NEUTROPHILS # BLD AUTO: 4.39 10*3/MM3 (ref 1.7–7)
NEUTROPHILS NFR BLD AUTO: 63.1 % (ref 42.7–76)
NRBC BLD AUTO-RTO: 0 /100 WBC (ref 0–0.2)
PLATELET # BLD AUTO: 215 10*3/MM3 (ref 140–450)
POTASSIUM SERPL-SCNC: 3.9 MMOL/L (ref 3.5–5.2)
PROT SERPL-MCNC: 6.9 G/DL (ref 6–8.5)
RBC # BLD AUTO: 3.85 10*6/MM3 (ref 4.14–5.8)
SODIUM SERPL-SCNC: 142 MMOL/L (ref 136–145)
T4 FREE SERPL-MCNC: 1.07 NG/DL (ref 0.93–1.7)
TRIGL SERPL-MCNC: 324 MG/DL (ref 0–150)
TSH SERPL DL<=0.005 MIU/L-ACNC: 2.68 UIU/ML (ref 0.27–4.2)
VLDLC SERPL CALC-MCNC: 52 MG/DL (ref 5–40)
WBC # BLD AUTO: 6.94 10*3/MM3 (ref 3.4–10.8)

## 2023-12-07 ENCOUNTER — OFFICE VISIT (OUTPATIENT)
Dept: FAMILY MEDICINE CLINIC | Facility: CLINIC | Age: 73
End: 2023-12-07
Payer: MEDICARE

## 2023-12-07 VITALS
WEIGHT: 315 LBS | DIASTOLIC BLOOD PRESSURE: 58 MMHG | HEART RATE: 95 BPM | OXYGEN SATURATION: 96 % | SYSTOLIC BLOOD PRESSURE: 138 MMHG | TEMPERATURE: 97.8 F | HEIGHT: 73 IN | BODY MASS INDEX: 41.75 KG/M2 | RESPIRATION RATE: 18 BRPM

## 2023-12-07 DIAGNOSIS — Z86.711 HISTORY OF PULMONARY EMBOLISM: ICD-10-CM

## 2023-12-07 DIAGNOSIS — I10 HYPERTENSION, UNSPECIFIED TYPE: Primary | ICD-10-CM

## 2023-12-07 DIAGNOSIS — E11.40 DIABETIC NEUROPATHY, PAINFUL: ICD-10-CM

## 2023-12-07 DIAGNOSIS — E66.01 CLASS 3 SEVERE OBESITY DUE TO EXCESS CALORIES WITH SERIOUS COMORBIDITY AND BODY MASS INDEX (BMI) OF 40.0 TO 44.9 IN ADULT: ICD-10-CM

## 2023-12-07 DIAGNOSIS — E78.2 MIXED HYPERLIPIDEMIA: ICD-10-CM

## 2023-12-07 DIAGNOSIS — J30.2 SEASONAL ALLERGIC RHINITIS, UNSPECIFIED TRIGGER: ICD-10-CM

## 2023-12-07 DIAGNOSIS — E07.9 THYROID DISEASE: ICD-10-CM

## 2023-12-07 DIAGNOSIS — K21.9 GASTROESOPHAGEAL REFLUX DISEASE, UNSPECIFIED WHETHER ESOPHAGITIS PRESENT: ICD-10-CM

## 2023-12-07 DIAGNOSIS — E11.65 TYPE 2 DIABETES MELLITUS WITH HYPERGLYCEMIA, WITHOUT LONG-TERM CURRENT USE OF INSULIN: ICD-10-CM

## 2023-12-07 PROCEDURE — 3078F DIAST BP <80 MM HG: CPT | Performed by: INTERNAL MEDICINE

## 2023-12-07 PROCEDURE — 3051F HG A1C>EQUAL 7.0%<8.0%: CPT | Performed by: INTERNAL MEDICINE

## 2023-12-07 PROCEDURE — 99214 OFFICE O/P EST MOD 30 MIN: CPT | Performed by: INTERNAL MEDICINE

## 2023-12-07 PROCEDURE — 3075F SYST BP GE 130 - 139MM HG: CPT | Performed by: INTERNAL MEDICINE

## 2023-12-07 RX ORDER — AMLODIPINE BESYLATE 10 MG/1
10 TABLET ORAL DAILY
Qty: 90 TABLET | Refills: 3 | Status: SHIPPED | OUTPATIENT
Start: 2023-12-07

## 2023-12-07 RX ORDER — LEVOTHYROXINE SODIUM 0.03 MG/1
25 TABLET ORAL DAILY
Qty: 90 TABLET | Refills: 3 | Status: SHIPPED | OUTPATIENT
Start: 2023-12-07

## 2023-12-07 RX ORDER — GLIMEPIRIDE 4 MG/1
4 TABLET ORAL
Qty: 90 TABLET | Refills: 3 | Status: SHIPPED | OUTPATIENT
Start: 2023-12-07

## 2023-12-07 RX ORDER — HYDROCHLOROTHIAZIDE 12.5 MG/1
12.5 TABLET ORAL DAILY
Qty: 90 TABLET | Refills: 3 | Status: SHIPPED | OUTPATIENT
Start: 2023-12-07

## 2023-12-07 RX ORDER — PIOGLITAZONEHYDROCHLORIDE 45 MG/1
45 TABLET ORAL DAILY
Qty: 90 TABLET | Refills: 3 | Status: SHIPPED | OUTPATIENT
Start: 2023-12-07

## 2023-12-07 RX ORDER — GABAPENTIN 300 MG/1
600 CAPSULE ORAL 2 TIMES DAILY
Qty: 360 CAPSULE | Refills: 5 | Status: SHIPPED | OUTPATIENT
Start: 2023-12-07

## 2023-12-07 RX ORDER — PRAVASTATIN SODIUM 40 MG
40 TABLET ORAL DAILY
Qty: 90 TABLET | Refills: 3 | Status: SHIPPED | OUTPATIENT
Start: 2023-12-07

## 2023-12-07 RX ORDER — ALBUTEROL SULFATE 90 UG/1
2 AEROSOL, METERED RESPIRATORY (INHALATION) EVERY 4 HOURS PRN
Qty: 18 G | Refills: 5 | Status: SHIPPED | OUTPATIENT
Start: 2023-12-07

## 2023-12-07 NOTE — PROGRESS NOTES
Subjective   Barry Cam is a 73 y.o. male. Patient is here today for follow-up on his allergies, hypertension, hyperlipidemia, history of PE and DVT, diabetes mellitus type 2, severe obesity and hypothyroidism.  Patient also has GERD controlled on omeprazole.  He is generally been stable and has no acute complaints or significant changes.  Chief Complaint   Patient presents with    Hyperlipidemia          Vitals:    12/07/23 0749   BP: 138/58   Pulse: 95   Resp: 18   Temp: 97.8 °F (36.6 °C)   SpO2: 96%     Body mass index is 43.68 kg/m².  The following portions of the patient's history were reviewed and updated as appropriate: allergies, current medications, past family history, past medical history, past social history, past surgical history and problem list.    Past Medical History:   Diagnosis Date    Allergic     Allergic rhinitis     Anxiety     Arthritis     Serious    Asthma 06-    PE Blood Clots    Cataract     Removed    CHF (congestive heart failure) 4-1-2021    Pending    Clotting disorder 06-    Eliquis Related    Deep vein thrombosis 06-    Diagnosed    Depression     Depression     Diabetes mellitus     Diverticulosis     Mild    DUGGAN (dyspnea on exertion)     Fibromyalgia, primary     ?????    GERD (gastroesophageal reflux disease)     History of hiatal hernia     History of kidney stones     HL (hearing loss)     Mild    Hx of blood clots     Hyperlipidemia     Hypertension     Hypothyroidism     Irritable bowel syndrome     Mild    Kidney stone     Ongoing    Low back pain     Never Ending    Narcolepsy     Obesity     Can't Win    Pulmonary embolism     Rotator cuff syndrome     Sciatica     Sleep apnea     CPAP USED    Umbilical hernia     Visual impairment     Diabetes Related      Allergies   Allergen Reactions    Other Cough     Hay fever      Social History     Socioeconomic History    Marital status:    Tobacco Use    Smoking status: Never     Passive  exposure: Never    Smokeless tobacco: Never    Tobacco comments:     caffeine use   Vaping Use    Vaping Use: Never used   Substance and Sexual Activity    Alcohol use: No    Drug use: No    Sexual activity: Not Currently     Partners: Female     Birth control/protection: Other        Current Outpatient Medications:     acetaminophen (TYLENOL) 500 MG tablet, Take 1 tablet by mouth Every 6 (Six) Hours As Needed for Mild Pain., Disp: , Rfl:     albuterol sulfate  (90 Base) MCG/ACT inhaler, Inhale 2 puffs Every 4 (Four) Hours As Needed for Wheezing or Shortness of Air. for wheezing, Disp: 18 g, Rfl: 5    amLODIPine (NORVASC) 10 MG tablet, Take 1 tablet by mouth Daily., Disp: 90 tablet, Rfl: 3    amphetamine-dextroamphetamine (Adderall) 30 MG tablet, Take 1 tablet by mouth 2 (Two) Times a Day., Disp: 60 tablet, Rfl: 0    apixaban (Eliquis) 5 MG tablet tablet, Take 1 tablet by mouth Every 12 (Twelve) Hours., Disp: 60 tablet, Rfl: 5    caffeine 200 MG tablet, Take 1 tablet by mouth Every 4 (Four) Hours As Needed for Headache., Disp: , Rfl:     gabapentin (NEURONTIN) 300 MG capsule, Take 2 capsules by mouth 2 (Two) Times a Day., Disp: 360 capsule, Rfl: 5    glimepiride (AMARYL) 4 MG tablet, Take 1 tablet by mouth Every Morning Before Breakfast., Disp: 90 tablet, Rfl: 3    Glucosamine-Chondroit-Vit C-Mn (GLUCOSAMINE CHONDR 500 COMPLEX) capsule, Take 2 capsules by mouth daily., Disp: , Rfl:     hydroCHLOROthiazide (HYDRODIURIL) 12.5 MG tablet, Take 1 tablet by mouth Daily., Disp: 90 tablet, Rfl: 3    levothyroxine (SYNTHROID, LEVOTHROID) 25 MCG tablet, Take 1 tablet by mouth Daily., Disp: 90 tablet, Rfl: 3    loratadine (CLARITIN) 10 MG tablet, Take 1 tablet by mouth Daily., Disp: , Rfl:     losartan (COZAAR) 100 MG tablet, TAKE 1 TABLET BY MOUTH EVERY DAY, Disp: 90 tablet, Rfl: 3    LUTEIN PO, Take 5 mg by mouth Daily. PT TO STOP PER MD INSTRUCTION, Disp: , Rfl:     Melatonin 10 MG tablet, Daily As Needed., Disp: ,  Rfl:     metFORMIN (GLUCOPHAGE) 1000 MG tablet, Take 1 tablet by mouth 2 (Two) Times a Day., Disp: 180 tablet, Rfl: 3    MULTIPLE VITAMINS ESSENTIAL PO, Take  by mouth Daily. PT TO STOP PER RN INSTRUCTION, Disp: , Rfl:     omeprazole (priLOSEC) 20 MG capsule, TAKE 1 CAPSULE BY MOUTH EVERY DAY, Disp: 90 capsule, Rfl: 2    OneTouch Delica Lancets 33G misc, USE TO TEST BLOOD SUGAR 3 TIMES DAILY DIABETES TYPE 2 E11.9, Disp: 100 each, Rfl: 5    OneTouch Ultra test strip, USE TO TEST BLOOD SUGAR 3 TIMES DAILY, Disp: 1 each, Rfl: 5    pioglitazone (ACTOS) 45 MG tablet, Take 1 tablet by mouth Daily., Disp: 90 tablet, Rfl: 3    pravastatin (PRAVACHOL) 40 MG tablet, Take 1 tablet by mouth Daily., Disp: 90 tablet, Rfl: 3    SITagliptin (Januvia) 100 MG tablet, Take 1 tablet by mouth Daily., Disp: 90 tablet, Rfl: 3    Triamcinolone Acetonide (NASACORT) 55 MCG/ACT nasal inhaler, 2 sprays into the nostril(s) as directed by provider Daily As Needed., Disp: , Rfl:     vitamin C (ASCORBIC ACID) 250 MG tablet, Take 1 tablet by mouth Daily. PT TO STOP PER RN INSTRUCTION, Disp: , Rfl:     vitamin E 200 UNIT capsule, Take 1 capsule by mouth Daily. PT TO STOP PER MD INSTRUCTION, Disp: , Rfl:     Zeaxanthin powder, Use Daily. PT TO STOP PER MD INSTRUCTION, Disp: , Rfl:     Zinc Sulfate (ZINC 15 PO), Take  by mouth Daily. PT TO STOP PER RN INSTRUCTION, Disp: , Rfl:      Objective     History of Present Illness     Review of Systems    Physical Exam  Vitals and nursing note reviewed.   Constitutional:       General: He is not in acute distress.     Appearance: Normal appearance. He is obese. He is not ill-appearing.   HENT:      Head: Normocephalic and atraumatic.   Cardiovascular:      Rate and Rhythm: Normal rate and regular rhythm.      Heart sounds: Normal heart sounds.   Pulmonary:      Effort: Pulmonary effort is normal. No respiratory distress.      Breath sounds: Normal breath sounds. No wheezing or rales.   Skin:     General: Skin  is warm and dry.   Neurological:      General: No focal deficit present.      Mental Status: He is alert and oriented to person, place, and time.   Psychiatric:         Mood and Affect: Mood normal.         Behavior: Behavior normal.         Assessment    ASSESSMENT CBC is stable with just a minimally low RBC count of 3.85, hemoglobin 11.8 and hematocrit 34.3.  CMP had a sugar of 162 and was otherwise normal and hemoglobin A1c was stable at 7.3.  Lipid panel is controlled with total cholesterol 172, HDL 41 and LDL 79.  TSH and free T4 were both normal on supplement and urine microalbumin is low and stable  #1-diabetes mellitus type 2 with slightly high hemoglobin A1c  #2-hypertension controlled on medication  #3-hyperlipidemia controlled on medication  #4-severe obesity, unchanged  #5-hypothyroidism, euthyroid on replacement  #6-GERD, stable on omeprazole  #7-peripheral neuropathy, on gabapentin  #8-history of DVT and PE, asymptomatic    Problems Addressed this Visit          Allergies and Adverse Reactions    Seasonal allergic rhinitis       Cardiac and Vasculature    Hyperlipidemia    Relevant Medications    pravastatin (PRAVACHOL) 40 MG tablet    Hypertension - Primary    Relevant Medications    hydroCHLOROthiazide (HYDRODIURIL) 12.5 MG tablet    amLODIPine (NORVASC) 10 MG tablet       Coag and Thromboembolic    History of pulmonary embolism       Endocrine and Metabolic    Type 2 diabetes mellitus with hyperglycemia, without long-term current use of insulin    Relevant Medications    pioglitazone (ACTOS) 45 MG tablet    metFORMIN (GLUCOPHAGE) 1000 MG tablet    SITagliptin (Januvia) 100 MG tablet    glimepiride (AMARYL) 4 MG tablet    Class 3 severe obesity due to excess calories with serious comorbidity and body mass index (BMI) of 40.0 to 44.9 in adult    Thyroid disease    Relevant Medications    levothyroxine (SYNTHROID, LEVOTHROID) 25 MCG tablet       Gastrointestinal Abdominal     Gastroesophageal reflux  disease       Neuro    Diabetic neuropathy, painful    Relevant Medications    pioglitazone (ACTOS) 45 MG tablet    metFORMIN (GLUCOPHAGE) 1000 MG tablet    SITagliptin (Januvia) 100 MG tablet    glimepiride (AMARYL) 4 MG tablet    gabapentin (NEURONTIN) 300 MG capsule     Diagnoses         Codes Comments    Hypertension, unspecified type    -  Primary ICD-10-CM: I10  ICD-9-CM: 401.9     Mixed hyperlipidemia     ICD-10-CM: E78.2  ICD-9-CM: 272.2     History of pulmonary embolism     ICD-10-CM: Z86.711  ICD-9-CM: V12.55     Seasonal allergic rhinitis, unspecified trigger     ICD-10-CM: J30.2  ICD-9-CM: 477.9     Type 2 diabetes mellitus with hyperglycemia, without long-term current use of insulin     ICD-10-CM: E11.65  ICD-9-CM: 250.00, 790.29     Thyroid disease     ICD-10-CM: E07.9  ICD-9-CM: 246.9     Class 3 severe obesity due to excess calories with serious comorbidity and body mass index (BMI) of 40.0 to 44.9 in adult     ICD-10-CM: E66.01, Z68.41  ICD-9-CM: 278.01, V85.41     Gastroesophageal reflux disease, unspecified whether esophagitis present     ICD-10-CM: K21.9  ICD-9-CM: 530.81     Diabetic neuropathy, painful     ICD-10-CM: E11.40  ICD-9-CM: 250.60, 357.2             PLAN I am increasing the patient's pioglitazone to 45 mg daily and he will continue other medicines as now.  I encouraged him to try and increase his exercise, watch dietary carbs and sweets and overall calories and try and lose some weight.  He can be rechecked in 6 months with a CBC, CMP, lipid panel, hemoglobin A1c, TSH and free T4, serum iron and TIBC, vitamin B12 and folic acid levels and urine microalbumin    There are no Patient Instructions on file for this visit.  Return in about 6 months (around 6/7/2024) for with labs.

## 2023-12-11 LAB
Lab: NORMAL
Lab: NORMAL

## 2023-12-12 LAB
FOLATE SERPL-MCNC: 19.1 NG/ML (ref 4.78–24.2)
IRON SATN MFR SERPL: 15 % (ref 20–50)
IRON SERPL-MCNC: 63 MCG/DL (ref 59–158)
TIBC SERPL-MCNC: 432 MCG/DL
UIBC SERPL-MCNC: 369 MCG/DL (ref 112–346)
VIT B12 SERPL-MCNC: 479 PG/ML (ref 211–946)
WRITTEN AUTHORIZATION: NORMAL

## 2023-12-13 DIAGNOSIS — Z12.11 ENCOUNTER FOR SCREENING FOR MALIGNANT NEOPLASM OF COLON: Primary | ICD-10-CM

## 2023-12-14 ENCOUNTER — TELEPHONE (OUTPATIENT)
Dept: FAMILY MEDICINE CLINIC | Facility: CLINIC | Age: 73
End: 2023-12-14
Payer: MEDICARE

## 2023-12-14 NOTE — TELEPHONE ENCOUNTER
----- Message from Cristi Gerber MD sent at 12/13/2023  7:48 AM EST -----  His iron level was low.  I would like him to start taking a ferrous sulfate 325 mg once a day.  I also put in an order for a Cologuard test that he needs to get done  ----- Message -----  From: Mo Sethi Results In  Sent: 12/6/2023  12:11 PM EST  To: Cristi Gerber MD

## 2024-01-23 DIAGNOSIS — R19.5 POSITIVE COLORECTAL CANCER SCREENING USING COLOGUARD TEST: Primary | ICD-10-CM

## 2024-01-26 DIAGNOSIS — R53.82 CHRONIC FATIGUE: Primary | ICD-10-CM

## 2024-01-26 RX ORDER — DEXTROAMPHETAMINE SACCHARATE, AMPHETAMINE ASPARTATE, DEXTROAMPHETAMINE SULFATE AND AMPHETAMINE SULFATE 7.5; 7.5; 7.5; 7.5 MG/1; MG/1; MG/1; MG/1
30 TABLET ORAL 2 TIMES DAILY
Qty: 60 TABLET | Refills: 0 | Status: SHIPPED | OUTPATIENT
Start: 2024-01-26

## 2024-02-23 RX ORDER — OMEPRAZOLE 20 MG/1
20 CAPSULE, DELAYED RELEASE ORAL DAILY
Qty: 90 CAPSULE | Refills: 2 | Status: SHIPPED | OUTPATIENT
Start: 2024-02-23

## 2024-03-12 DIAGNOSIS — Z96.652 TOTAL KNEE REPLACEMENT STATUS, LEFT: Primary | ICD-10-CM

## 2024-03-12 RX ORDER — CEPHALEXIN 500 MG/1
CAPSULE ORAL
Qty: 4 CAPSULE | Refills: 0 | Status: SHIPPED | OUTPATIENT
Start: 2024-03-12

## 2024-03-12 NOTE — TELEPHONE ENCOUNTER
----- Message from Barry Cam sent at 3/11/2024  9:16 PM EDT -----  Regarding: Dental Appointment  Contact: [phone]  Maddy Salcedo.  Finally time to visit the Dentist next Monday (3/18) at 4:00 PM.  If I remember correctly, I am supposed to take a antibiotic prior to this appointment.  Can you have the pharmacy ([facility]) set this up for me to  before then? Thanks, Mann Cam.

## 2024-03-12 NOTE — TELEPHONE ENCOUNTER
Upcoming dental appt, requested via [facility] message.  LT TKA: 3/2/23  [facility]  Pharmacy confirmed: [facility]

## 2024-04-02 ENCOUNTER — OFFICE VISIT (OUTPATIENT)
Dept: FAMILY MEDICINE CLINIC | Facility: CLINIC | Age: 74
End: 2024-04-02
Payer: MEDICARE

## 2024-04-02 VITALS
SYSTOLIC BLOOD PRESSURE: 136 MMHG | WEIGHT: 315 LBS | HEIGHT: 73 IN | BODY MASS INDEX: 41.75 KG/M2 | OXYGEN SATURATION: 96 % | TEMPERATURE: 97.5 F | HEART RATE: 94 BPM | RESPIRATION RATE: 18 BRPM | DIASTOLIC BLOOD PRESSURE: 70 MMHG

## 2024-04-02 DIAGNOSIS — Z79.899 HIGH RISK MEDICATION USE: ICD-10-CM

## 2024-04-02 DIAGNOSIS — R06.09 DOE (DYSPNEA ON EXERTION): ICD-10-CM

## 2024-04-02 DIAGNOSIS — R19.5 POSITIVE COLORECTAL CANCER SCREENING USING COLOGUARD TEST: ICD-10-CM

## 2024-04-02 DIAGNOSIS — I10 HYPERTENSION, UNSPECIFIED TYPE: Primary | ICD-10-CM

## 2024-04-02 DIAGNOSIS — E11.65 TYPE 2 DIABETES MELLITUS WITH HYPERGLYCEMIA, WITHOUT LONG-TERM CURRENT USE OF INSULIN: ICD-10-CM

## 2024-04-02 PROCEDURE — 3078F DIAST BP <80 MM HG: CPT | Performed by: INTERNAL MEDICINE

## 2024-04-02 PROCEDURE — 99214 OFFICE O/P EST MOD 30 MIN: CPT | Performed by: INTERNAL MEDICINE

## 2024-04-02 PROCEDURE — 1159F MED LIST DOCD IN RCRD: CPT | Performed by: INTERNAL MEDICINE

## 2024-04-02 PROCEDURE — 1160F RVW MEDS BY RX/DR IN RCRD: CPT | Performed by: INTERNAL MEDICINE

## 2024-04-02 PROCEDURE — 3075F SYST BP GE 130 - 139MM HG: CPT | Performed by: INTERNAL MEDICINE

## 2024-04-02 NOTE — ASSESSMENT & PLAN NOTE
Hypertension is stable and controlled  Continue current treatment regimen.  Dietary sodium restriction.  Weight loss.  Regular aerobic exercise.  Ambulatory blood pressure monitoring.  Blood pressure will be reassessed  at the next scheduled appointment .

## 2024-04-02 NOTE — PROGRESS NOTES
Chief Complaint   Patient presents with    Establish Care     Wants ears checked     Hypertension    Diabetes    Hyperlipidemia   History of Present Illness:  Patient presented to the clinic today to establish care with a new PCP.  He has history of HTN, HLD, type II DM, morbid obesity, GRICEL, narcolepsy, GERD and OA.  He reports having dizziness for about 4 to 5 days few weeks ago but has resolved and would like to have his ear checked.  He reports his blood glucose used to be all over the place so he stopped checking his blood glucose.  Last A1c was 7.3.  He reports to be compliant on his medication and denies any medication side effects..  He believes it is time for another cardiac checkup as he was last seen by cardiology 5 years ago for dyspnea on exertion and will like to follow-up.  No cardiac symptoms at the moment.    Lab review indicate positive Cologuard.  To be referred to GI for colonoscopy.    PMH:   Outpatient Medications Prior to Visit   Medication Sig Dispense Refill    acetaminophen (TYLENOL) 500 MG tablet Take 1 tablet by mouth Every 6 (Six) Hours As Needed for Mild Pain.      albuterol sulfate  (90 Base) MCG/ACT inhaler Inhale 2 puffs Every 4 (Four) Hours As Needed for Wheezing or Shortness of Air. for wheezing 18 g 5    amLODIPine (NORVASC) 10 MG tablet Take 1 tablet by mouth Daily. 90 tablet 3    amphetamine-dextroamphetamine (Adderall) 30 MG tablet Take 1 tablet by mouth 2 (Two) Times a Day. 60 tablet 0    apixaban (Eliquis) 5 MG tablet tablet Take 1 tablet by mouth Every 12 (Twelve) Hours. 60 tablet 5    caffeine 200 MG tablet Take 1 tablet by mouth Every 4 (Four) Hours As Needed for Headache.      cephalexin (KEFLEX) 500 MG capsule TAKE ALL FOUR CAPSULES BY MOUTH ONE HOUR PRIOR TO DENTAL APPT 4 capsule 0    gabapentin (NEURONTIN) 300 MG capsule Take 2 capsules by mouth 2 (Two) Times a Day. 360 capsule 5    glimepiride (AMARYL) 4 MG tablet Take 1 tablet by mouth Every Morning Before  Breakfast. 90 tablet 3    Glucosamine-Chondroit-Vit C-Mn (GLUCOSAMINE CHONDR 500 COMPLEX) capsule Take 2 capsules by mouth daily.      hydroCHLOROthiazide (HYDRODIURIL) 12.5 MG tablet Take 1 tablet by mouth Daily. 90 tablet 3    levothyroxine (SYNTHROID, LEVOTHROID) 25 MCG tablet Take 1 tablet by mouth Daily. 90 tablet 3    loratadine (CLARITIN) 10 MG tablet Take 1 tablet by mouth Daily.      losartan (COZAAR) 100 MG tablet TAKE 1 TABLET BY MOUTH EVERY DAY 90 tablet 3    LUTEIN PO Take 5 mg by mouth Daily. PT TO STOP PER MD INSTRUCTION      Melatonin 10 MG tablet Daily As Needed.      metFORMIN (GLUCOPHAGE) 1000 MG tablet Take 1 tablet by mouth 2 (Two) Times a Day. 180 tablet 3    MULTIPLE VITAMINS ESSENTIAL PO Take  by mouth Daily. PT TO STOP PER RN INSTRUCTION      omeprazole (priLOSEC) 20 MG capsule Take 1 capsule by mouth Daily. 90 capsule 2    OneTouch Delica Lancets 33G misc USE TO TEST BLOOD SUGAR 3 TIMES DAILY DIABETES TYPE 2 E11.9 100 each 5    OneTouch Ultra test strip USE TO TEST BLOOD SUGAR 3 TIMES DAILY 1 each 5    pioglitazone (ACTOS) 45 MG tablet Take 1 tablet by mouth Daily. 90 tablet 3    pravastatin (PRAVACHOL) 40 MG tablet Take 1 tablet by mouth Daily. 90 tablet 3    SITagliptin (Januvia) 100 MG tablet Take 1 tablet by mouth Daily. 90 tablet 3    Triamcinolone Acetonide (NASACORT) 55 MCG/ACT nasal inhaler 2 sprays into the nostril(s) as directed by provider Daily As Needed.      vitamin C (ASCORBIC ACID) 250 MG tablet Take 1 tablet by mouth Daily. PT TO STOP PER RN INSTRUCTION      vitamin E 200 UNIT capsule Take 1 capsule by mouth Daily. PT TO STOP PER MD INSTRUCTION      Zeaxanthin powder Use Daily. PT TO STOP PER MD INSTRUCTION      Zinc Sulfate (ZINC 15 PO) Take  by mouth Daily. PT TO STOP PER RN INSTRUCTION       No facility-administered medications prior to visit.      Allergies   Allergen Reactions    Other Cough     Hay fever     Past Surgical History:   Procedure Laterality Date     "CYSTOSCOPY W/ LASER LITHOTRIPSY  07/20/2023    EYE SURGERY      Cataract Removal    FRACTURE SURGERY      Left Ankle/Foot/Leg    HERNIA REPAIR      HERNIA REPAIR      INGUINAL HERNIA REPAIR      JOINT REPLACEMENT  03/02/2023    TONSILLECTOMY      TOTAL KNEE ARTHROPLASTY Left 03/02/2023    Procedure: TOTAL KNEE ARTHROPLASTY;  Surgeon: Noah Salcedo MD;  [place] [facility] [place] [facility] [place]: [place]opedics;  Laterality: Left;     family history includes Coronary artery disease in his father; Depression in his mother; Diabetes in his brother, father, maternal grandmother, and mother; Heart disease in his father; Hypertension in his father; Stroke in his father.   reports that he has never smoked. He has never been exposed to tobacco smoke. He has never used smokeless tobacco. He reports that he does not drink alcohol and does not use drugs.     /70 (BP Location: Right arm, Patient Position: Sitting, Cuff Size: Adult)   Pulse 94   Temp 97.5 °F (36.4 °C) (Oral)   Resp 18   Ht 185.4 cm (72.99\")   Wt (!) 151 kg (333 lb 4.8 oz)   SpO2 96%   BMI 43.98 kg/m²   Physical Exam  Constitutional:       Appearance: Normal appearance.   HENT:      Head: Normocephalic and atraumatic.      Right Ear: Tympanic membrane and external ear normal.      Left Ear: Tympanic membrane and external ear normal.      Ears:      Comments: Wax in the ear bilaterally but not impacted.  Cardiovascular:      Heart sounds: Normal heart sounds.   Pulmonary:      Breath sounds: Normal breath sounds.   Neurological:      Mental Status: He is alert and oriented to person, place, and time.   Psychiatric:         Mood and Affect: Mood normal.          The following data was reviewed by: Oralia Boggs MD on 04/02/2024:  Common labs          5/9/2023    08:31 12/5/2023    08:10   Common Labs   Glucose 145  162    BUN 13  14    Creatinine 1.06  1.21    Sodium 140  142    Potassium 4.1  3.9    Chloride 102  102    Calcium 10.0  9.5    Total Protein " 7.0  6.9    Albumin 4.4  4.4    Total Bilirubin 0.4  0.4    Alkaline Phosphatase 64  70    AST (SGOT) 23  23    ALT (SGPT) 18  20    WBC 7.76  6.94    Hemoglobin 12.7  11.8    Hematocrit 38.0  34.3    Platelets 219  215    Total Cholesterol 198  172    Triglycerides 327  324    HDL Cholesterol 51  41    LDL Cholesterol  93  79    Hemoglobin A1C 7.30  7.30    Microalbumin, Urine  7.2           Diagnoses and all orders for this visit:    1. Hypertension, unspecified type (Primary)  Assessment & Plan:  Hypertension is stable and controlled  Continue current treatment regimen.  Dietary sodium restriction.  Weight loss.  Regular aerobic exercise.  Ambulatory blood pressure monitoring.  Blood pressure will be reassessed  at the next scheduled appointment .      2. Type 2 diabetes mellitus with hyperglycemia, without long-term current use of insulin  Assessment & Plan:  Diabetes is  unchanged .  A1c stable at 7.3.  Still above target of less than 7  Advised to monitor blood glucose daily and take medication as prescribed  Encouraged diabetic diet and regular exercise  Continue current treatment regimen.  Reminded to bring in blood sugar diary at next visit.  Recommended an ADA diet.  Regular aerobic exercise.  Diabetes will be reassessed  at the next scheduled appointment      3. High risk medication use  -     Cancel: Urine Drug Screen - Urine, Clean Catch  -     Urine Drug Screen - Urine, Clean Catch    4. Positive colorectal cancer screening using Cologuard test  -     Ambulatory Referral to Gastroenterology    5. DUGGAN (dyspnea on exertion)  -     Ambulatory Referral to Cardiology    Other orders  -     Cancel: Ambulatory Referral For Screening Colonoscopy             Return for Next scheduled follow up with fasting labs.

## 2024-04-02 NOTE — ASSESSMENT & PLAN NOTE
Diabetes is  unchanged .  A1c stable at 7.3.  Still above target of less than 7  Advised to monitor blood glucose daily and take medication as prescribed  Encouraged diabetic diet and regular exercise  Continue current treatment regimen.  Reminded to bring in blood sugar diary at next visit.  Recommended an ADA diet.  Regular aerobic exercise.  Diabetes will be reassessed  at the next scheduled appointment

## 2024-04-03 LAB
AMPHETAMINES UR QL SCN: POSITIVE NG/ML
BARBITURATES UR QL SCN: NEGATIVE NG/ML
BENZODIAZ UR QL SCN: NEGATIVE NG/ML
BZE UR QL SCN: NEGATIVE NG/ML
CANNABINOIDS UR QL SCN: NEGATIVE NG/ML
CREAT UR-MCNC: 167.1 MG/DL (ref 20–300)
LABORATORY COMMENT REPORT: ABNORMAL
METHADONE UR QL SCN: NEGATIVE NG/ML
OPIATES UR QL SCN: NEGATIVE NG/ML
OXYCODONE+OXYMORPHONE UR QL SCN: NEGATIVE NG/ML
PCP UR QL: NEGATIVE NG/ML
PH UR: 5.2 [PH] (ref 4.5–8.9)
PROPOXYPH UR QL SCN: NEGATIVE NG/ML

## 2024-04-23 ENCOUNTER — TELEPHONE (OUTPATIENT)
Dept: GASTROENTEROLOGY | Facility: CLINIC | Age: 74
End: 2024-04-23
Payer: MEDICARE

## 2024-04-24 ENCOUNTER — PATIENT MESSAGE (OUTPATIENT)
Dept: FAMILY MEDICINE CLINIC | Facility: CLINIC | Age: 74
End: 2024-04-24
Payer: MEDICARE

## 2024-04-24 DIAGNOSIS — E11.9 TYPE 2 DIABETES MELLITUS WITHOUT COMPLICATION, WITHOUT LONG-TERM CURRENT USE OF INSULIN: ICD-10-CM

## 2024-04-24 RX ORDER — BLOOD SUGAR DIAGNOSTIC
STRIP MISCELLANEOUS
Qty: 1 EACH | Refills: 5 | Status: SHIPPED | OUTPATIENT
Start: 2024-04-24

## 2024-04-24 RX ORDER — LANCETS 33 GAUGE
EACH MISCELLANEOUS
Qty: 100 EACH | Refills: 5 | Status: SHIPPED | OUTPATIENT
Start: 2024-04-24

## 2024-05-29 ENCOUNTER — APPOINTMENT (OUTPATIENT)
Dept: GENERAL RADIOLOGY | Facility: HOSPITAL | Age: 74
End: 2024-05-29
Payer: MEDICARE

## 2024-05-29 ENCOUNTER — APPOINTMENT (OUTPATIENT)
Dept: CT IMAGING | Facility: HOSPITAL | Age: 74
End: 2024-05-29
Payer: MEDICARE

## 2024-05-29 ENCOUNTER — HOSPITAL ENCOUNTER (EMERGENCY)
Facility: HOSPITAL | Age: 74
Discharge: HOME | End: 2024-05-29
Attending: EMERGENCY MEDICINE
Payer: MEDICARE

## 2024-05-29 VITALS
OXYGEN SATURATION: 95 % | RESPIRATION RATE: 16 BRPM | SYSTOLIC BLOOD PRESSURE: 148 MMHG | HEIGHT: 73 IN | HEART RATE: 71 BPM | TEMPERATURE: 97.2 F | BODY MASS INDEX: 41.75 KG/M2 | WEIGHT: 315 LBS | DIASTOLIC BLOOD PRESSURE: 70 MMHG

## 2024-05-29 DIAGNOSIS — S42.201A CLOSED FRACTURE OF PROXIMAL END OF RIGHT HUMERUS, UNSPECIFIED FRACTURE MORPHOLOGY, INITIAL ENCOUNTER: ICD-10-CM

## 2024-05-29 DIAGNOSIS — S01.81XA CHIN LACERATION, INITIAL ENCOUNTER: ICD-10-CM

## 2024-05-29 DIAGNOSIS — S42.291A OTHER CLOSED DISPLACED FRACTURE OF PROXIMAL END OF RIGHT HUMERUS, INITIAL ENCOUNTER: Primary | ICD-10-CM

## 2024-05-29 PROCEDURE — 25010000002 HYDROMORPHONE 1 MG/ML SOLUTION: Performed by: EMERGENCY MEDICINE

## 2024-05-29 PROCEDURE — 99284 EMERGENCY DEPT VISIT MOD MDM: CPT

## 2024-05-29 PROCEDURE — 96372 THER/PROPH/DIAG INJ SC/IM: CPT

## 2024-05-29 PROCEDURE — 70486 CT MAXILLOFACIAL W/O DYE: CPT

## 2024-05-29 PROCEDURE — 73030 X-RAY EXAM OF SHOULDER: CPT

## 2024-05-29 PROCEDURE — 12013 RPR F/E/E/N/L/M 2.6-5.0 CM: CPT | Performed by: EMERGENCY MEDICINE

## 2024-05-29 PROCEDURE — 99283 EMERGENCY DEPT VISIT LOW MDM: CPT | Performed by: EMERGENCY MEDICINE

## 2024-05-29 PROCEDURE — 72125 CT NECK SPINE W/O DYE: CPT

## 2024-05-29 PROCEDURE — 63710000001 ONDANSETRON ODT 4 MG TABLET DISPERSIBLE: Performed by: EMERGENCY MEDICINE

## 2024-05-29 PROCEDURE — 70450 CT HEAD/BRAIN W/O DYE: CPT

## 2024-05-29 PROCEDURE — 25010000002 BUPIVACAINE (PF) 0.5 % SOLUTION: Performed by: EMERGENCY MEDICINE

## 2024-05-29 RX ORDER — ONDANSETRON 4 MG/1
4 TABLET, ORALLY DISINTEGRATING ORAL EVERY 6 HOURS PRN
Qty: 20 TABLET | Refills: 0 | Status: SHIPPED | OUTPATIENT
Start: 2024-05-29

## 2024-05-29 RX ORDER — ONDANSETRON 4 MG/1
8 TABLET, ORALLY DISINTEGRATING ORAL ONCE
Status: COMPLETED | OUTPATIENT
Start: 2024-05-29 | End: 2024-05-29

## 2024-05-29 RX ORDER — OXYCODONE AND ACETAMINOPHEN 7.5; 325 MG/1; MG/1
TABLET ORAL
Qty: 16 TABLET | Refills: 0 | Status: SHIPPED | OUTPATIENT
Start: 2024-05-29

## 2024-05-29 RX ORDER — LIDOCAINE HYDROCHLORIDE AND EPINEPHRINE 10; 10 MG/ML; UG/ML
10 INJECTION, SOLUTION INFILTRATION; PERINEURAL ONCE
Status: COMPLETED | OUTPATIENT
Start: 2024-05-29 | End: 2024-05-29

## 2024-05-29 RX ORDER — BUPIVACAINE HYDROCHLORIDE 5 MG/ML
10 INJECTION, SOLUTION EPIDURAL; INTRACAUDAL ONCE
Status: COMPLETED | OUTPATIENT
Start: 2024-05-29 | End: 2024-05-29

## 2024-05-29 RX ORDER — CEPHALEXIN 500 MG/1
500 CAPSULE ORAL 3 TIMES DAILY
Qty: 15 CAPSULE | Refills: 0 | Status: SHIPPED | OUTPATIENT
Start: 2024-05-29 | End: 2024-06-03

## 2024-05-29 RX ADMIN — HYDROMORPHONE HYDROCHLORIDE 1 MG: 1 INJECTION, SOLUTION INTRAMUSCULAR; INTRAVENOUS; SUBCUTANEOUS at 15:36

## 2024-05-29 RX ADMIN — BUPIVACAINE HYDROCHLORIDE 10 ML: 5 INJECTION, SOLUTION EPIDURAL; INTRACAUDAL; PERINEURAL at 18:13

## 2024-05-29 RX ADMIN — LIDOCAINE HYDROCHLORIDE,EPINEPHRINE BITARTRATE 10 ML: 10; .01 INJECTION, SOLUTION INFILTRATION; PERINEURAL at 18:13

## 2024-05-29 RX ADMIN — ONDANSETRON 8 MG: 4 TABLET, ORALLY DISINTEGRATING ORAL at 15:36

## 2024-05-29 NOTE — FSED PROVIDER NOTE
Subjective   History of Present Illness  Patient is a 74-year-old white male.  He presents after falling today.  He struck his chin on the steps.  No loss of consciousness.  No headache or neck pain.  He sustained a deep laceration to the chin as well as severe pain to the right shoulder.  He is anticoagulated.  Last tetanus is up-to-date.  No change in level consciousness, no nausea vomiting, no chest pain or abdominal pain.  He is right-hand dominant      Review of Systems    Past Medical History:   Diagnosis Date    Allergic     Allergic rhinitis     Anxiety     Arthritis     Serious    Asthma 06-    PE Blood Clots    Cataract     Removed    CHF (congestive heart failure) 4-1-2021    Pending    Clotting disorder 06-    Eliquis Related    Deep vein thrombosis 06-    Diagnosed    Depression     Depression     Diabetes mellitus     Diverticulosis     Mild    DUGGAN (dyspnea on exertion)     Fibromyalgia, primary     ?????    GERD (gastroesophageal reflux disease)     History of hiatal hernia     History of kidney stones     HL (hearing loss)     Mild    Hx of blood clots     Hyperlipidemia     Hypertension     Hypothyroidism     Irritable bowel syndrome     Mild    Kidney stone     Ongoing    Low back pain     Never Ending    Narcolepsy     Obesity     Can't Win    Pulmonary embolism     Rotator cuff syndrome     Sciatica     Sleep apnea     CPAP USED    Umbilical hernia     Visual impairment     Diabetes Related       Allergies   Allergen Reactions    Other Cough     Hay fever       Past Surgical History:   Procedure Laterality Date    CYSTOSCOPY W/ LASER LITHOTRIPSY  07/20/2023    EYE SURGERY      Cataract Removal    FRACTURE SURGERY      Left Ankle/Foot/Leg    HERNIA REPAIR      HERNIA REPAIR      INGUINAL HERNIA REPAIR      JOINT REPLACEMENT  03/02/2023    TONSILLECTOMY      TOTAL KNEE ARTHROPLASTY Left 03/02/2023    Procedure: TOTAL KNEE ARTHROPLASTY;  Surgeon: Noah Salcedo MD;  Location:   [facility];  Service: Orthopedics;  Laterality: Left;       Family History   Problem Relation Age of Onset    Depression Mother     Diabetes Mother     Coronary artery disease Father     Diabetes Father     Stroke Father     Hypertension Father     Heart disease Father     Diabetes Brother     Diabetes Maternal Grandmother     Malig Hyperthermia Neg Hx        Social History     Socioeconomic History    Marital status:    Tobacco Use    Smoking status: Never     Passive exposure: Never    Smokeless tobacco: Never    Tobacco comments:     caffeine use   Vaping Use    Vaping status: Never Used   Substance and Sexual Activity    Alcohol use: No    Drug use: No    Sexual activity: Not Currently     Partners: Female     Birth control/protection: Other           Objective   Physical Exam    Procedures           ED Course      Laceration repair: The 3 cm deep chin laceration was prepped with Hibiclens.  It was anesthetized with 10 mL of 0.5% bupivacaine without epinephrine.  This was mixed with 10 mL of 1% lidocaine with epinephrine.  The laceration was thereafter recleansed with Hibiclens and irrigated copiously with normal saline.  No foreign bodies noted.  I placed 4 buried subcutaneous 4-0 Vicryl sutures.  This was followed by 7 simple interrupted 5 oh simple gut sutures.  Very good skin approximation, complete hemostasis obtained.  Patient tolerated procedure very well                               A sling was placed on the right upper extremity.  After application the right upper extremity is noted to be neurovasc intact      MADHURI reviewed by Erik Ferreira MD       Medical Decision Making  Problems Addressed:  Chin laceration, initial encounter: complicated acute illness or injury  Closed fracture of proximal end of right humerus, unspecified fracture morphology, initial encounter: complicated acute illness or injury  Other closed displaced fracture of proximal end of right humerus, initial  encounter: complicated acute illness or injury    Amount and/or Complexity of Data Reviewed  Radiology: ordered.    Risk  Prescription drug management.        Final diagnoses:   Other closed displaced fracture of proximal end of right humerus, initial encounter   Closed fracture of proximal end of right humerus, unspecified fracture morphology, initial encounter   Chin laceration, initial encounter       ED Disposition  ED Disposition       ED Disposition   Discharge    Condition   Stable    Comment   --               Oralia Boggs MD  [id number] [facility] [address] [address]  [place] [id number] [phone]    In 1 week      Noah Salcedo MD  [address] [facility]  [place] [address]  [phone]    In 1 week           Medication List        New Prescriptions      ondansetron ODT 4 MG disintegrating tablet  Commonly known as: ZOFRAN-ODT  Place 1 tablet on the tongue Every 6 (Six) Hours As Needed for Vomiting or Nausea.     oxyCODONE-acetaminophen 7.5-325 MG per tablet  Commonly known as: PERCOCET  1 po q4-6h prn severe pain            Changed      * cephalexin 500 MG capsule  Commonly known as: KEFLEX  TAKE ALL FOUR CAPSULES BY MOUTH ONE HOUR PRIOR TO DENTAL APPT  What changed: Another medication with the same name was added. Make sure you understand how and when to take each.     * cephalexin 500 MG capsule  Commonly known as: KEFLEX  Take 1 capsule by mouth 3 (Three) Times a Day for 5 days.  What changed: You were already taking a medication with the same name, and this prescription was added. Make sure you understand how and when to take each.           * This list has 2 medication(s) that are the same as other medications prescribed for you. Read the directions carefully, and ask your doctor or other care provider to review them with you.                   Where to Get Your Medications        These medications were sent to [facility]/pharmacy #[id number] - [place], [place] - [address] AT [facility]  [address] - [phone] [facility] [phone] FX  [address] [facility] [address] [place] [address]      Phone: [phone]   cephalexin 500 MG capsule  ondansetron ODT 4 MG disintegrating tablet  oxyCODONE-acetaminophen 7.5-325 MG per tablet

## 2024-05-29 NOTE — DISCHARGE INSTRUCTIONS
Today you have been found to have a right proximal humerus fracture at the shoulder.  Please utilize the sling as directed.  When you call Dr. Salcedo's office tomorrow with them know that you have a proximal humerus fracture at the shoulder and arm in a sling.    For the 3 cm chin laceration, I placed 2 layers of absorbable sutures.  Gently cleanse twice daily with soap and water and apply a thin layer of Polysporin or Neosporin across the chin.  I did send in antibiotics.  Take as directed.  Return anytime should you develop increasing pain redness or other difficulties    I did send your medicines into your pharmacy.  Take as directed    Please read all of the instructions in this handout.  If you receive prescriptions please fill them and take them as directed.  Please call your primary care physician for follow-up appointment in the next 5 to 7 days.  If you do not have a physician you may call the [facility] referral line at [phone].    You may return to the emergency department at any time for any concerns such as worsening symptoms.  If you received a work or school note it will be printed at the back of this packet.

## 2024-05-31 ENCOUNTER — PATIENT OUTREACH (OUTPATIENT)
Dept: CASE MANAGEMENT | Facility: OTHER | Age: 74
End: 2024-05-31
Payer: MEDICARE

## 2024-05-31 NOTE — OUTREACH NOTE
"AMBULATORY CASE MANAGEMENT NOTE    Names and Relationships of Patient/Support Persons: Caller: Barry Cam Ray \"Mann\"; Relationship: Self  Caller: Swapna Cam; Relationship: Emergency Contact -     Patient Outreach  RN-ACM outreach with patient's wife. Discussed 5/29/24 ED visit regarding  closed displaced fracture of right humerus and chin laceration. Wife states patient compliant with ED recommendations ; taking medications as ED directed and has 6/5/24 ortho appointment. Wife states patient is compliant with use of sling to right arm;states to be able to move fingers and as had episodes of tingling sensation. Wife states to have sutures to chin laceration. Wife states patient has no difficulty with fever; chest pain; SOB; appetite but has has had difficulty sleeping. Wife states patient is compliant with medications and medical appointments. Reviewed with spouse ED AVS recommendations; education; role of RN-ACM and HRCM case management services. Wife states to appreciate outreach and requests assistance regarding supplies One Touch glucometer for patient and to call back in the afternoon.   Care Coordination  Care coordination with Larisa/ [facility] pharmacy [phone] and patient's wife in 3 way phone call regarding glucometer. Larisa states that PCP can send electronically prescription for Accu Guide meter, test strips and lancets with diagnosis code  to be covered by insurance stating One Touch is no longer covered by Medicare. Patient's wife verbalized understanding and voiced intent to follow up with PCP. Wife states to appreciate RN-ACM assistance. Wife states to have appreciated outreach and declines needs for further outreach at this time. No further questions voiced at this time.   Adult Patient Profile  Questions/Answers      Flowsheet Row Most Recent Value   Symptoms/Conditions Managed at Home musculoskeletal, diabetes, type 2   Diabetes Management Strategies medication therapy, other (see comments)  " [Physician follow up]   Musculoskeletal Symptoms/Conditions joint pain, mobility limited, unsteady gait, fracture, other (see comments)  [Fracture of right humerus and chin laceration]   Musculoskeletal Management Strategies medication therapy, other (see comments), medical device  [Use of sling and physician follow up]   Barriers to Taking Medication as Prescribed none   Primary Source of Support/Comfort spouse   People in Home spouse        Social Work Assessment  Questions/Answers      Flowsheet Row Most Recent Value   People in Home spouse   Functional Status Comments Wife states patient independent with ADL's but currently with fracture patient receiving assistance from wife as needed.        Send Education  Questions/Answers      Flowsheet Row Most Recent Value   Other Patient Education/Resources  24/7 [facility] Nurse Call Line, Advanced Care Planning, MyChart   Advanced Directives: Patient Has        SDOH updated and reviewed with the patient during this program:  --     Food Insecurity: No Food Insecurity (5/31/2024)    Hunger Vital Sign     Worried About Running Out of Food in the Last Year: Never true     Ran Out of Food in the Last Year: Never true      --     Transportation Needs: No Transportation Needs (5/31/2024)    PRAPARE - Transportation     Lack of Transportation (Medical): No     Lack of Transportation (Non-Medical): No       Education Documentation  Unresolved/Worsening Symptoms, taught by Sobeida Matthews, RN at 5/31/2024  1:42 PM.  Learner: Family  Readiness: Acceptance  Method: Explanation  Response: Verbalizes Understanding    Provider Follow-Up, taught by Sobeida Matthews, RN at 5/31/2024  1:42 PM.  Learner: Family  Readiness: Acceptance  Method: Explanation  Response: Verbalizes Understanding    Home Safety, taught by Sobeida Matthews, RN at 5/31/2024  1:42 PM.  Learner: Family  Readiness: Acceptance  Method: Explanation  Response: Verbalizes Understanding    Fluid/Food Intake,  taught by Sobeida Matthews RN at 5/31/2024  1:42 PM.  Learner: Family  Readiness: Acceptance  Method: Explanation  Response: Verbalizes Understanding    Activity, taught by Sobeida Matthews RN at 5/31/2024  1:42 PM.  Learner: Family  Readiness: Acceptance  Method: Explanation  Response: Verbalizes Understanding    Signs/Symptoms, taught by Sobeida Matthews RN at 5/31/2024  1:42 PM.  Learner: Family  Readiness: Acceptance  Method: Explanation  Response: Verbalizes Understanding    Unresolved/Worsening Symptoms, taught by Sobeida Matthews RN at 5/31/2024  1:42 PM.  Learner: Family  Readiness: Acceptance  Method: Explanation  Response: Verbalizes Understanding    Site Care, taught by Sobeida Matthews RN at 5/31/2024  1:42 PM.  Learner: Family  Readiness: Acceptance  Method: Explanation  Response: Verbalizes Understanding    Provider Follow-Up, taught by Sobeida Matthews RN at 5/31/2024  1:42 PM.  Learner: Family  Readiness: Acceptance  Method: Explanation  Response: Verbalizes Understanding    Blood Glucose Monitoring, taught by Sobeida Matthews RN at 5/31/2024  1:42 PM.  Learner: Family  Readiness: Acceptance  Method: Explanation  Response: Verbalizes Understanding    Unresolved/Worsening Symptoms, taught by Sobeida Matthews RN at 5/31/2024  1:42 PM.  Learner: Family  Readiness: Acceptance  Method: Explanation  Response: Verbalizes Understanding    Medication Management, taught by Sobeida Matthews RN at 5/31/2024  1:42 PM.  Learner: Family  Readiness: Acceptance  Method: Explanation  Response: Verbalizes Understanding    Energy Conservation, taught by Sobeida Matthews RN at 5/31/2024  1:42 PM.  Learner: Family  Readiness: Acceptance  Method: Explanation  Response: Verbalizes Understanding          Sobeida BRIZUELA  Ambulatory Case Management    5/31/2024, 13:42 EDT

## 2024-06-05 ENCOUNTER — OFFICE VISIT (OUTPATIENT)
Dept: ORTHOPEDIC SURGERY | Facility: CLINIC | Age: 74
End: 2024-06-05
Payer: MEDICARE

## 2024-06-05 VITALS — WEIGHT: 315 LBS | BODY MASS INDEX: 41.75 KG/M2 | HEIGHT: 73 IN | TEMPERATURE: 98.3 F

## 2024-06-05 DIAGNOSIS — M25.511 RIGHT SHOULDER PAIN, UNSPECIFIED CHRONICITY: Primary | ICD-10-CM

## 2024-06-05 DIAGNOSIS — S42.291A CLOSED FRACTURE OF HEAD OF RIGHT HUMERUS, INITIAL ENCOUNTER: ICD-10-CM

## 2024-06-05 NOTE — PROGRESS NOTES
History & Physical     Patient: Barry Cam    YOB: 1950    Medical Record Number: [id number]    Chief Complaints: Right shoulder injury    History of Present Illness: 74 y.o. male presents for evaluation of the right shoulder.  I have seen him for this issue in the past.  He has a known rotator cuff tear which we had discussed fixing.  We have been managing him nonsurgically.  He said the shoulder has continued to bother him.  Unfortunately, he tripped over some lawn furniture about a week ago and fractured his proximal humerus.  He describes the current right pain as moderate, constant, and aching.  The pain is worse with movement.  The pain is better with rest, pain medicine and use of the sling.  Denies any other injuries or complaints.  He is right hand dominant.    Allergies:   Allergies   Allergen Reactions    Other Cough     Hay fever       Home Medications:      Current Outpatient Medications:     acetaminophen (TYLENOL) 500 MG tablet, Take 1 tablet by mouth Every 6 (Six) Hours As Needed for Mild Pain., Disp: , Rfl:     albuterol sulfate  (90 Base) MCG/ACT inhaler, Inhale 2 puffs Every 4 (Four) Hours As Needed for Wheezing or Shortness of Air. for wheezing, Disp: 18 g, Rfl: 5    amLODIPine (NORVASC) 10 MG tablet, Take 1 tablet by mouth Daily., Disp: 90 tablet, Rfl: 3    amphetamine-dextroamphetamine (Adderall) 30 MG tablet, Take 1 tablet by mouth 2 (Two) Times a Day., Disp: 60 tablet, Rfl: 0    apixaban (Eliquis) 5 MG tablet tablet, Take 1 tablet by mouth Every 12 (Twelve) Hours., Disp: 60 tablet, Rfl: 5    caffeine 200 MG tablet, Take 1 tablet by mouth Every 4 (Four) Hours As Needed for Headache., Disp: , Rfl:     cephalexin (KEFLEX) 500 MG capsule, TAKE ALL FOUR CAPSULES BY MOUTH ONE HOUR PRIOR TO DENTAL APPT, Disp: 4 capsule, Rfl: 0    gabapentin (NEURONTIN) 300 MG capsule, Take 2 capsules by mouth 2 (Two) Times a Day., Disp: 360 capsule, Rfl: 5    glimepiride (AMARYL) 4  MG tablet, Take 1 tablet by mouth Every Morning Before Breakfast., Disp: 90 tablet, Rfl: 3    Glucosamine-Chondroit-Vit C-Mn (GLUCOSAMINE CHONDR 500 COMPLEX) capsule, Take 2 capsules by mouth daily., Disp: , Rfl:     glucose blood (OneTouch Ultra) test strip, Use as instructed, Disp: 1 each, Rfl: 5    hydroCHLOROthiazide (HYDRODIURIL) 12.5 MG tablet, Take 1 tablet by mouth Daily., Disp: 90 tablet, Rfl: 3    levothyroxine (SYNTHROID, LEVOTHROID) 25 MCG tablet, Take 1 tablet by mouth Daily., Disp: 90 tablet, Rfl: 3    loratadine (CLARITIN) 10 MG tablet, Take 1 tablet by mouth Daily., Disp: , Rfl:     losartan (COZAAR) 100 MG tablet, TAKE 1 TABLET BY MOUTH EVERY DAY, Disp: 90 tablet, Rfl: 3    LUTEIN PO, Take 5 mg by mouth Daily. PT TO STOP PER MD INSTRUCTION, Disp: , Rfl:     Melatonin 10 MG tablet, Daily As Needed., Disp: , Rfl:     metFORMIN (GLUCOPHAGE) 1000 MG tablet, Take 1 tablet by mouth 2 (Two) Times a Day., Disp: 180 tablet, Rfl: 3    MULTIPLE VITAMINS ESSENTIAL PO, Take  by mouth Daily. PT TO STOP PER RN INSTRUCTION, Disp: , Rfl:     omeprazole (priLOSEC) 20 MG capsule, Take 1 capsule by mouth Daily., Disp: 90 capsule, Rfl: 2    ondansetron ODT (ZOFRAN-ODT) 4 MG disintegrating tablet, Place 1 tablet on the tongue Every 6 (Six) Hours As Needed for Vomiting or Nausea., Disp: 20 tablet, Rfl: 0    OneTouch Delica Lancets 33G misc, USE TO TEST BLOOD SUGAR 3 TIMES DAILY DIABETES TYPE 2 E11.9, Disp: 100 each, Rfl: 5    oxyCODONE-acetaminophen (PERCOCET) 7.5-325 MG per tablet, 1 po q4-6h prn severe pain, Disp: 16 tablet, Rfl: 0    pioglitazone (ACTOS) 45 MG tablet, Take 1 tablet by mouth Daily., Disp: 90 tablet, Rfl: 3    pravastatin (PRAVACHOL) 40 MG tablet, Take 1 tablet by mouth Daily., Disp: 90 tablet, Rfl: 3    SITagliptin (Januvia) 100 MG tablet, Take 1 tablet by mouth Daily., Disp: 90 tablet, Rfl: 3    Triamcinolone Acetonide (NASACORT) 55 MCG/ACT nasal inhaler, 2 sprays into the nostril(s) as directed by  provider Daily As Needed., Disp: , Rfl:     vitamin C (ASCORBIC ACID) 250 MG tablet, Take 1 tablet by mouth Daily. PT TO STOP PER RN INSTRUCTION, Disp: , Rfl:     vitamin E 200 UNIT capsule, Take 1 capsule by mouth Daily. PT TO STOP PER MD INSTRUCTION, Disp: , Rfl:     Zeaxanthin powder, Use Daily. PT TO STOP PER MD INSTRUCTION, Disp: , Rfl:     Zinc Sulfate (ZINC 15 PO), Take  by mouth Daily. PT TO STOP PER RN INSTRUCTION, Disp: , Rfl:     Past Medical History:   Diagnosis Date    Allergic     Allergic rhinitis     Anxiety     Arthritis     Serious    Arthritis of back     Asthma 06-    PE Blood Clots    Cataract     Removed    CHF (congestive heart failure) 4-1-2021    Pending    Clotting disorder 06-    Eliquis Related    CTS (carpal tunnel syndrome)     Deep vein thrombosis 06-    Diagnosed    Depression     Depression     Diabetes mellitus     Diverticulosis     Mild    DUGGAN (dyspnea on exertion)     Fibromyalgia, primary     ?????    Fracture of ankle     Fracture, fibula     Right    Fracture, humerus 5/29/2024    Fall    Fracture, tibia and fibula     GERD (gastroesophageal reflux disease)     History of hiatal hernia     History of kidney stones     HL (hearing loss)     Mild    Hx of blood clots     Hyperlipidemia     Hypertension     Hypothyroidism     Irritable bowel syndrome     Mild    Kidney stone     Ongoing    Low back pain     Never Ending    Low back strain     Narcolepsy     Obesity     Can't Win    Pulmonary embolism     Rotator cuff syndrome     Sciatica     Sleep apnea     CPAP USED    Tear of meniscus of knee     Left    Umbilical hernia     Visual impairment     Diabetes Related          Past Surgical History:   Procedure Laterality Date    ANKLE OPEN REDUCTION INTERNAL FIXATION      CYSTOSCOPY W/ LASER LITHOTRIPSY  07/20/2023    EYE SURGERY      Cataract Removal    FRACTURE SURGERY      Left Ankle/Foot/Leg    HERNIA REPAIR      HERNIA REPAIR      INGUINAL HERNIA REPAIR       JOINT REPLACEMENT  03/02/2023    TONSILLECTOMY      TOTAL KNEE ARTHROPLASTY Left 03/02/2023    Procedure: TOTAL KNEE ARTHROPLASTY;  Surgeon: Noah Salcedo MD;  [place] [facility] [place] [facility] [place]: [place]opedics;  Laterality: Left;          Social History     Occupational History    Not on file   Tobacco Use    Smoking status: Never     Passive exposure: Never    Smokeless tobacco: Never    Tobacco comments:     caffeine use   Vaping Use    Vaping status: Never Used   Substance and Sexual Activity    Alcohol use: No    Drug use: No    Sexual activity: Not Currently     Partners: Female     Birth control/protection: Other      Social History     Social History Narrative    Not on file          Family History   Problem Relation Age of Onset    Depression Mother     Diabetes Mother     Coronary artery disease Father     Diabetes Father     Stroke Father     Hypertension Father     Heart disease Father     Diabetes Brother     Diabetes Maternal Grandmother     Malig Hyperthermia Neg Hx        Review of Systems:      Constitutional: Denies fever, shaking or chills   Eyes: Denies change in visual acuity   HEENT: Denies nasal congestion or sore throat   Respiratory: Denies cough or shortness of breath   Cardiovascular: Denies chest pain or edema  Endocrine: Denies tremors, palpitations, intolerance of heat or cold, polyuria, polydipsia.  GI: Denies abdominal pain, nausea, vomiting, bloody stools or diarrhea  : Denies frequency, urgency, incontinence, retention, or nocturia.  Musculoskeletal: Denies numbness tingling or loss of motor function except as above  Integument: Denies rash, lesion or ulceration   Neurologic: Denies headache or focal weakness, deficits  Heme: Denies epistaxis, spontaneous or excessive bleeding, epistaxis, hematuria, melena, fatigue, enlarged or tender lymph nodes.      All other pertinent positives and negatives as noted above in HPI.    Physical Exam: 74 y.o. male    Vitals:    06/05/24 0958  "  Temp: 98.3 °F (36.8 °C)   Weight: (!) 152 kg (335 lb 12.8 oz)   Height: 185.4 cm (73\")       General:  Patient is awake and alert.  Appears in no acute distress or discomfort.    Psych:  Affect and demeanor are appropriate.    Eyes:  Conjunctivae and sclerae appear grossly normal.  Eyes track well and EOM seem to be intact.    Dentition:  No gross abnormalities noted.    Ears:  No gross abnormalities.  Hearing adequate for the exam.    Cardiovascular:  Regular rate and rhythm.    Lungs:  Good chest expansion.  Breathing unlabored.    Lymph:  No palpable masses or adenopathy in the affected extremity    Right upper extremity:  Sling was in place and removed.  Skin appears benign.  No gross malalignment, lacerations or abrasions.  Focal tenderness noted diffusely about the shoulder.  No palpable masses or adenopathy.  Compartments soft.  Painful, limited ROM of the shoulder.  Could not assess stability.  No tenderness at the elbow, forearm, wrist or hand.  Good strength in the wrist with flexion and extension as well as , pinch, finger and thumb abduction strength.  Intact sensation.  Brisk cap refill.  Palpable radial pulse.      Diagnostic Tests:  Lab Results   Component Value Date    GLUCOSE 162 (H) 12/05/2023    CALCIUM 9.5 12/05/2023     12/05/2023    K 3.9 12/05/2023    CO2 23.5 12/05/2023     12/05/2023    BUN 14 12/05/2023    CREATININE 1.21 12/05/2023    EGFRIFAFRI 78 01/06/2022    EGFRIFNONA 67 01/06/2022    BCR 11.6 12/05/2023    ANIONGAP 15.0 02/14/2023     Lab Results   Component Value Date    WBC 6.94 12/05/2023    HGB 11.8 (L) 12/05/2023    HCT 34.3 (L) 12/05/2023    MCV 89.1 12/05/2023     12/05/2023     Lab Results   Component Value Date    INR 1.2 06/12/2018    PROTIME 13.8 (H) 06/12/2018     Imaging:  Outside AP and orthogonal views of the right shoulder are reviewed.  No comparison films are available.  The x-rays show a 3 part proximal humerus fracture through the greater " tuberosity and surgical neck.  His prior MRI right shoulder from 2022 is reviewed as well.      Assessment:  Right proximal humerus fracture with pre-existing rotator cuff tear    Plan: We had a thorough discussion regarding the risks of non-surgical management versus surgery.  I explained that there is good evidence that these fractures can heal with conservative treatment.  With closed treatment, there is the risk of further displacement, malunion, nonunion or persistent pain or loss of motion/function that could require further intervention in the future.  I told him my recommendation would be that we manage this nonsurgically and let it heal.  We can always address his rotator cuff issues down the road with further injections or possibly surgery if needed    He asked about going ahead and pursuing surgical options at this time.  I told him that to fix both issues, he would need a reverse replacement.  That is reasonable to consider but it is certainly not necessary.  The risk, benefits and alternatives of that procedure were thoroughly discussed.  He and his wife had a number of questions that answered in detail.  For now they are in agreement with nonsurgical treatment but he says he wants to take some time and think about it.  He will let me know if he changes his mind.  Tentative plan at this point is for him to follow-up with me in about 2 weeks for repeat x-rays.    Date: 6/5/2024    Noah Salcedo MD      CC to Oralia Boggs MD   Answers submitted by the patient for this visit:  Primary Reason for Visit (Submitted on 6/4/2024)  What is the primary reason for your visit?: Other  Other (Submitted on 6/4/2024)  Please describe your symptoms.: Broken right arm  Have you had these symptoms before?: No  How long have you been having these symptoms?: 5-7 days  Please list any medications you are currently taking for this condition.: None  Please describe any probable cause for these symptoms. : Fall

## 2024-06-05 NOTE — H&P (VIEW-ONLY)
History & Physical     Patient: Barry Cam    YOB: 1950    Medical Record Number: [id number]    Chief Complaints: Right shoulder injury    History of Present Illness: 74 y.o. male presents for evaluation of the right shoulder.  I have seen him for this issue in the past.  He has a known rotator cuff tear which we had discussed fixing.  We have been managing him nonsurgically.  He said the shoulder has continued to bother him.  Unfortunately, he tripped over some lawn furniture about a week ago and fractured his proximal humerus.  He describes the current right pain as moderate, constant, and aching.  The pain is worse with movement.  The pain is better with rest, pain medicine and use of the sling.  Denies any other injuries or complaints.  He is right hand dominant.    Allergies:   Allergies   Allergen Reactions    Other Cough     Hay fever       Home Medications:      Current Outpatient Medications:     acetaminophen (TYLENOL) 500 MG tablet, Take 1 tablet by mouth Every 6 (Six) Hours As Needed for Mild Pain., Disp: , Rfl:     albuterol sulfate  (90 Base) MCG/ACT inhaler, Inhale 2 puffs Every 4 (Four) Hours As Needed for Wheezing or Shortness of Air. for wheezing, Disp: 18 g, Rfl: 5    amLODIPine (NORVASC) 10 MG tablet, Take 1 tablet by mouth Daily., Disp: 90 tablet, Rfl: 3    amphetamine-dextroamphetamine (Adderall) 30 MG tablet, Take 1 tablet by mouth 2 (Two) Times a Day., Disp: 60 tablet, Rfl: 0    apixaban (Eliquis) 5 MG tablet tablet, Take 1 tablet by mouth Every 12 (Twelve) Hours., Disp: 60 tablet, Rfl: 5    caffeine 200 MG tablet, Take 1 tablet by mouth Every 4 (Four) Hours As Needed for Headache., Disp: , Rfl:     cephalexin (KEFLEX) 500 MG capsule, TAKE ALL FOUR CAPSULES BY MOUTH ONE HOUR PRIOR TO DENTAL APPT, Disp: 4 capsule, Rfl: 0    gabapentin (NEURONTIN) 300 MG capsule, Take 2 capsules by mouth 2 (Two) Times a Day., Disp: 360 capsule, Rfl: 5    glimepiride (AMARYL) 4  MG tablet, Take 1 tablet by mouth Every Morning Before Breakfast., Disp: 90 tablet, Rfl: 3    Glucosamine-Chondroit-Vit C-Mn (GLUCOSAMINE CHONDR 500 COMPLEX) capsule, Take 2 capsules by mouth daily., Disp: , Rfl:     glucose blood (OneTouch Ultra) test strip, Use as instructed, Disp: 1 each, Rfl: 5    hydroCHLOROthiazide (HYDRODIURIL) 12.5 MG tablet, Take 1 tablet by mouth Daily., Disp: 90 tablet, Rfl: 3    levothyroxine (SYNTHROID, LEVOTHROID) 25 MCG tablet, Take 1 tablet by mouth Daily., Disp: 90 tablet, Rfl: 3    loratadine (CLARITIN) 10 MG tablet, Take 1 tablet by mouth Daily., Disp: , Rfl:     losartan (COZAAR) 100 MG tablet, TAKE 1 TABLET BY MOUTH EVERY DAY, Disp: 90 tablet, Rfl: 3    LUTEIN PO, Take 5 mg by mouth Daily. PT TO STOP PER MD INSTRUCTION, Disp: , Rfl:     Melatonin 10 MG tablet, Daily As Needed., Disp: , Rfl:     metFORMIN (GLUCOPHAGE) 1000 MG tablet, Take 1 tablet by mouth 2 (Two) Times a Day., Disp: 180 tablet, Rfl: 3    MULTIPLE VITAMINS ESSENTIAL PO, Take  by mouth Daily. PT TO STOP PER RN INSTRUCTION, Disp: , Rfl:     omeprazole (priLOSEC) 20 MG capsule, Take 1 capsule by mouth Daily., Disp: 90 capsule, Rfl: 2    ondansetron ODT (ZOFRAN-ODT) 4 MG disintegrating tablet, Place 1 tablet on the tongue Every 6 (Six) Hours As Needed for Vomiting or Nausea., Disp: 20 tablet, Rfl: 0    OneTouch Delica Lancets 33G misc, USE TO TEST BLOOD SUGAR 3 TIMES DAILY DIABETES TYPE 2 E11.9, Disp: 100 each, Rfl: 5    oxyCODONE-acetaminophen (PERCOCET) 7.5-325 MG per tablet, 1 po q4-6h prn severe pain, Disp: 16 tablet, Rfl: 0    pioglitazone (ACTOS) 45 MG tablet, Take 1 tablet by mouth Daily., Disp: 90 tablet, Rfl: 3    pravastatin (PRAVACHOL) 40 MG tablet, Take 1 tablet by mouth Daily., Disp: 90 tablet, Rfl: 3    SITagliptin (Januvia) 100 MG tablet, Take 1 tablet by mouth Daily., Disp: 90 tablet, Rfl: 3    Triamcinolone Acetonide (NASACORT) 55 MCG/ACT nasal inhaler, 2 sprays into the nostril(s) as directed by  provider Daily As Needed., Disp: , Rfl:     vitamin C (ASCORBIC ACID) 250 MG tablet, Take 1 tablet by mouth Daily. PT TO STOP PER RN INSTRUCTION, Disp: , Rfl:     vitamin E 200 UNIT capsule, Take 1 capsule by mouth Daily. PT TO STOP PER MD INSTRUCTION, Disp: , Rfl:     Zeaxanthin powder, Use Daily. PT TO STOP PER MD INSTRUCTION, Disp: , Rfl:     Zinc Sulfate (ZINC 15 PO), Take  by mouth Daily. PT TO STOP PER RN INSTRUCTION, Disp: , Rfl:     Past Medical History:   Diagnosis Date    Allergic     Allergic rhinitis     Anxiety     Arthritis     Serious    Arthritis of back     Asthma 06-    PE Blood Clots    Cataract     Removed    CHF (congestive heart failure) 4-1-2021    Pending    Clotting disorder 06-    Eliquis Related    CTS (carpal tunnel syndrome)     Deep vein thrombosis 06-    Diagnosed    Depression     Depression     Diabetes mellitus     Diverticulosis     Mild    DUGGAN (dyspnea on exertion)     Fibromyalgia, primary     ?????    Fracture of ankle     Fracture, fibula     Right    Fracture, humerus 5/29/2024    Fall    Fracture, tibia and fibula     GERD (gastroesophageal reflux disease)     History of hiatal hernia     History of kidney stones     HL (hearing loss)     Mild    Hx of blood clots     Hyperlipidemia     Hypertension     Hypothyroidism     Irritable bowel syndrome     Mild    Kidney stone     Ongoing    Low back pain     Never Ending    Low back strain     Narcolepsy     Obesity     Can't Win    Pulmonary embolism     Rotator cuff syndrome     Sciatica     Sleep apnea     CPAP USED    Tear of meniscus of knee     Left    Umbilical hernia     Visual impairment     Diabetes Related          Past Surgical History:   Procedure Laterality Date    ANKLE OPEN REDUCTION INTERNAL FIXATION      CYSTOSCOPY W/ LASER LITHOTRIPSY  07/20/2023    EYE SURGERY      Cataract Removal    FRACTURE SURGERY      Left Ankle/Foot/Leg    HERNIA REPAIR      HERNIA REPAIR      INGUINAL HERNIA REPAIR       JOINT REPLACEMENT  03/02/2023    TONSILLECTOMY      TOTAL KNEE ARTHROPLASTY Left 03/02/2023    Procedure: TOTAL KNEE ARTHROPLASTY;  Surgeon: Noah Salcedo MD;  Location: [facility] [place] [facility] [place];  Service: Orthopedics;  Laterality: Left;          Social History     Occupational History    Not on file   Tobacco Use    Smoking status: Never     Passive exposure: Never    Smokeless tobacco: Never    Tobacco comments:     caffeine use   Vaping Use    Vaping status: Never Used   Substance and Sexual Activity    Alcohol use: No    Drug use: No    Sexual activity: Not Currently     Partners: Female     Birth control/protection: Other      Social History     Social History Narrative    Not on file          Family History   Problem Relation Age of Onset    Depression Mother     Diabetes Mother     Coronary artery disease Father     Diabetes Father     Stroke Father     Hypertension Father     Heart disease Father     Diabetes Brother     Diabetes Maternal Grandmother     Malig Hyperthermia Neg Hx        Review of Systems:      Constitutional: Denies fever, shaking or chills   Eyes: Denies change in visual acuity   HEENT: Denies nasal congestion or sore throat   Respiratory: Denies cough or shortness of breath   Cardiovascular: Denies chest pain or edema  Endocrine: Denies tremors, palpitations, intolerance of heat or cold, polyuria, polydipsia.  GI: Denies abdominal pain, nausea, vomiting, bloody stools or diarrhea  : Denies frequency, urgency, incontinence, retention, or nocturia.  Musculoskeletal: Denies numbness tingling or loss of motor function except as above  Integument: Denies rash, lesion or ulceration   Neurologic: Denies headache or focal weakness, deficits  Heme: Denies epistaxis, spontaneous or excessive bleeding, epistaxis, hematuria, melena, fatigue, enlarged or tender lymph nodes.      All other pertinent positives and negatives as noted above in HPI.    Physical Exam: 74 y.o. male    Vitals:    06/05/24 0958  "  Temp: 98.3 °F (36.8 °C)   Weight: (!) 152 kg (335 lb 12.8 oz)   Height: 185.4 cm (73\")       General:  Patient is awake and alert.  Appears in no acute distress or discomfort.    Psych:  Affect and demeanor are appropriate.    Eyes:  Conjunctivae and sclerae appear grossly normal.  Eyes track well and EOM seem to be intact.    Dentition:  No gross abnormalities noted.    Ears:  No gross abnormalities.  Hearing adequate for the exam.    Cardiovascular:  Regular rate and rhythm.    Lungs:  Good chest expansion.  Breathing unlabored.    Lymph:  No palpable masses or adenopathy in the affected extremity    Right upper extremity:  Sling was in place and removed.  Skin appears benign.  No gross malalignment, lacerations or abrasions.  Focal tenderness noted diffusely about the shoulder.  No palpable masses or adenopathy.  Compartments soft.  Painful, limited ROM of the shoulder.  Could not assess stability.  No tenderness at the elbow, forearm, wrist or hand.  Good strength in the wrist with flexion and extension as well as , pinch, finger and thumb abduction strength.  Intact sensation.  Brisk cap refill.  Palpable radial pulse.      Diagnostic Tests:  Lab Results   Component Value Date    GLUCOSE 162 (H) 12/05/2023    CALCIUM 9.5 12/05/2023     12/05/2023    K 3.9 12/05/2023    CO2 23.5 12/05/2023     12/05/2023    BUN 14 12/05/2023    CREATININE 1.21 12/05/2023    EGFRIFAFRI 78 01/06/2022    EGFRIFNONA 67 01/06/2022    BCR 11.6 12/05/2023    ANIONGAP 15.0 02/14/2023     Lab Results   Component Value Date    WBC 6.94 12/05/2023    HGB 11.8 (L) 12/05/2023    HCT 34.3 (L) 12/05/2023    MCV 89.1 12/05/2023     12/05/2023     Lab Results   Component Value Date    INR 1.2 06/12/2018    PROTIME 13.8 (H) 06/12/2018     Imaging:  Outside AP and orthogonal views of the right shoulder are reviewed.  No comparison films are available.  The x-rays show a 3 part proximal humerus fracture through the greater " tuberosity and surgical neck.  His prior MRI right shoulder from 2022 is reviewed as well.      Assessment:  Right proximal humerus fracture with pre-existing rotator cuff tear    Plan: We had a thorough discussion regarding the risks of non-surgical management versus surgery.  I explained that there is good evidence that these fractures can heal with conservative treatment.  With closed treatment, there is the risk of further displacement, malunion, nonunion or persistent pain or loss of motion/function that could require further intervention in the future.  I told him my recommendation would be that we manage this nonsurgically and let it heal.  We can always address his rotator cuff issues down the road with further injections or possibly surgery if needed    He asked about going ahead and pursuing surgical options at this time.  I told him that to fix both issues, he would need a reverse replacement.  That is reasonable to consider but it is certainly not necessary.  The risk, benefits and alternatives of that procedure were thoroughly discussed.  He and his wife had a number of questions that answered in detail.  For now they are in agreement with nonsurgical treatment but he says he wants to take some time and think about it.  He will let me know if he changes his mind.  Tentative plan at this point is for him to follow-up with me in about 2 weeks for repeat x-rays.    Date: 6/5/2024    Noah Salcedo MD      CC to Oralia Boggs MD   Answers submitted by the patient for this visit:  Primary Reason for Visit (Submitted on 6/4/2024)  What is the primary reason for your visit?: Other  Other (Submitted on 6/4/2024)  Please describe your symptoms.: Broken right arm  Have you had these symptoms before?: No  How long have you been having these symptoms?: 5-7 days  Please list any medications you are currently taking for this condition.: None  Please describe any probable cause for these symptoms. : Fall

## 2024-06-06 ENCOUNTER — PREP FOR SURGERY (OUTPATIENT)
Dept: OTHER | Facility: HOSPITAL | Age: 74
End: 2024-06-06
Payer: MEDICARE

## 2024-06-06 ENCOUNTER — TELEPHONE (OUTPATIENT)
Dept: ORTHOPEDIC SURGERY | Facility: CLINIC | Age: 74
End: 2024-06-06
Payer: MEDICARE

## 2024-06-06 DIAGNOSIS — S42.291A: Primary | ICD-10-CM

## 2024-06-06 PROBLEM — S42.209A PROXIMAL HUMERUS FRACTURE: Status: ACTIVE | Noted: 2024-06-06

## 2024-06-06 RX ORDER — MELOXICAM 15 MG/1
15 TABLET ORAL ONCE
OUTPATIENT
Start: 2024-06-11 | End: 2024-06-06

## 2024-06-06 RX ORDER — ACETAMINOPHEN 500 MG
1000 TABLET ORAL ONCE
OUTPATIENT
Start: 2024-06-11 | End: 2024-06-06

## 2024-06-06 RX ORDER — PREGABALIN 75 MG/1
150 CAPSULE ORAL ONCE
OUTPATIENT
Start: 2024-06-11 | End: 2024-06-06

## 2024-06-06 NOTE — TELEPHONE ENCOUNTER
"----- Message from Noah Salcedo sent at 6/6/2024  8:09 AM EDT -----  Regarding: FW: Fall  Contact: [phone]  I just entered a case request for this patient.  Can you see if we can also add him on for next week? Do not forget, we also need to add on Mr. Hernandez with the acromioclavicular separation.  The rep for that case will be Mauricio Hamilton.  ----- Message -----  From: Chikis Van MA  Sent: 6/6/2024   7:45 AM EDT  To: Noah Salcedo MD  Subject: FW: Fall                                           ----- Message -----  From: Barry Cam \"Mann\"  Sent: 6/6/2024   6:26 AM EDT  To: [facility]  Subject: Fall                                             PS. Yesterday’s visit was to evaluate my left knee replacement (15 month checkup). Still having some issue with lateral movement. Some share pain when the joint shifts (sharp spike) and occasional swelling. All in all, probably as good as it gets. Mann Cam.  "

## 2024-06-06 NOTE — TELEPHONE ENCOUNTER
Patient surgery has been scheduled for June 11, 2024.  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  Have advised him that we recommend he hold his Eliquis 48 hours prior to surgery however I would need to get approval from his primary care.  Message sent to PCP

## 2024-06-06 NOTE — TELEPHONE ENCOUNTER
"Hub staff attempted to follow warm transfer process and was unsuccessful     Caller: Barry Cam \"Mann\"    Relationship to patient: Self    Best call back number: [phone]    Patient is needing: PT CALLING TO RETURN NAIMA'S CALL REGARDING PT'S SURGERY. PLEASE CONTACT PT TO DISCUSS.   "

## 2024-06-07 ENCOUNTER — PRE-ADMISSION TESTING (OUTPATIENT)
Dept: PREADMISSION TESTING | Facility: HOSPITAL | Age: 74
End: 2024-06-07
Payer: MEDICARE

## 2024-06-07 ENCOUNTER — TELEPHONE (OUTPATIENT)
Dept: ORTHOPEDIC SURGERY | Facility: HOSPITAL | Age: 74
End: 2024-06-07
Payer: MEDICARE

## 2024-06-07 ENCOUNTER — TELEPHONE (OUTPATIENT)
Dept: ORTHOPEDIC SURGERY | Facility: CLINIC | Age: 74
End: 2024-06-07
Payer: MEDICARE

## 2024-06-07 VITALS
SYSTOLIC BLOOD PRESSURE: 152 MMHG | HEIGHT: 73 IN | HEART RATE: 82 BPM | WEIGHT: 315 LBS | TEMPERATURE: 98.2 F | DIASTOLIC BLOOD PRESSURE: 75 MMHG | RESPIRATION RATE: 18 BRPM | BODY MASS INDEX: 41.75 KG/M2 | OXYGEN SATURATION: 96 %

## 2024-06-07 LAB
ANION GAP SERPL CALCULATED.3IONS-SCNC: 11 MMOL/L (ref 5–15)
BUN SERPL-MCNC: 14 MG/DL (ref 8–23)
BUN/CREAT SERPL: 13 (ref 7–25)
CALCIUM SPEC-SCNC: 9.3 MG/DL (ref 8.6–10.5)
CHLORIDE SERPL-SCNC: 107 MMOL/L (ref 98–107)
CO2 SERPL-SCNC: 24 MMOL/L (ref 22–29)
CREAT SERPL-MCNC: 1.08 MG/DL (ref 0.76–1.27)
DEPRECATED RDW RBC AUTO: 44.9 FL (ref 37–54)
EGFRCR SERPLBLD CKD-EPI 2021: 72.9 ML/MIN/1.73
ERYTHROCYTE [DISTWIDTH] IN BLOOD BY AUTOMATED COUNT: 13.3 % (ref 12.3–15.4)
GLUCOSE SERPL-MCNC: 140 MG/DL (ref 65–99)
HCT VFR BLD AUTO: 32.7 % (ref 37.5–51)
HGB BLD-MCNC: 11 G/DL (ref 13–17.7)
INR PPP: 1.32 (ref 0.9–1.1)
MCH RBC QN AUTO: 30.8 PG (ref 26.6–33)
MCHC RBC AUTO-ENTMCNC: 33.6 G/DL (ref 31.5–35.7)
MCV RBC AUTO: 91.6 FL (ref 79–97)
PLATELET # BLD AUTO: 213 10*3/MM3 (ref 140–450)
PMV BLD AUTO: 9.7 FL (ref 6–12)
POTASSIUM SERPL-SCNC: 3.6 MMOL/L (ref 3.5–5.2)
PROTHROMBIN TIME: 16.6 SECONDS (ref 11.7–14.2)
RBC # BLD AUTO: 3.57 10*6/MM3 (ref 4.14–5.8)
SODIUM SERPL-SCNC: 142 MMOL/L (ref 136–145)
WBC NRBC COR # BLD AUTO: 8.35 10*3/MM3 (ref 3.4–10.8)

## 2024-06-07 PROCEDURE — 36415 COLL VENOUS BLD VENIPUNCTURE: CPT

## 2024-06-07 PROCEDURE — 85610 PROTHROMBIN TIME: CPT

## 2024-06-07 PROCEDURE — 80048 BASIC METABOLIC PNL TOTAL CA: CPT

## 2024-06-07 PROCEDURE — 93010 ELECTROCARDIOGRAM REPORT: CPT | Performed by: INTERNAL MEDICINE

## 2024-06-07 PROCEDURE — 93005 ELECTROCARDIOGRAM TRACING: CPT

## 2024-06-07 PROCEDURE — 85027 COMPLETE CBC AUTOMATED: CPT

## 2024-06-07 RX ORDER — MULTIPLE VITAMINS W/ MINERALS TAB 9MG-400MCG
1 TAB ORAL 2 TIMES DAILY
COMMUNITY

## 2024-06-07 RX ORDER — DIPHENHYDRAMINE HCL 25 MG
25 CAPSULE ORAL EVERY 6 HOURS PRN
COMMUNITY

## 2024-06-07 RX ORDER — FERROUS SULFATE 325(65) MG
325 TABLET ORAL
COMMUNITY

## 2024-06-07 NOTE — TELEPHONE ENCOUNTER
----- Message from Oralia Boggs sent at 6/7/2024  8:19 AM EDT -----  Its ok to hold Eliquis before the surgery  ----- Message -----  From: Faina Amaya RN  Sent: 6/6/2024   3:15 PM EDT  To: Oralia Boggs MD    Patient has Humerus fracture and is requiring surgery.  He is scheduled 6/11/24.  Is patient OK to hold his Eliquis 48 hours prior to surgery.  ThanksFaina

## 2024-06-07 NOTE — TELEPHONE ENCOUNTER
Risk Factor yes no   Age >75  X   BMI <20 >40 X    Patient History     Chronic Pain (2 or more meds/Pain Management) X    ETOH (more than 3 drinks Daily)  X   Uncontrolled Depression/Bipolar/Schizoaffective Disorder  X   Arrhythmias--  X   Stent placement/MI  X   DVT/PE X    Pacemaker  X   HTN (uncontrolled or requiring more than 2 medications)  X   CHF/Retained fluids/Edema X    Stroke with Residual   X   COPD/Asthma  X   GRICEL--Non-compliant with CPAP  X   Diabetes (on insulin or more than 2 meds)         A1C: X    BPH/Urinary retention (on medication)  X   CKD  X   Home environment and support     Current ambulation status (use of cane, walker, W/C, Multiple falls/weakness)  X   Stairs to enter and throughout home X    Lives Alone  X   Doesn't have support at home  X   Outpatient Screening Assessment    Home needs: (Walker/BSC):  Total Shoulder   ? Steps 6; 12…Does have elevator and lift   Caregiver 24-48hrs post-discharge:  Wife    Discharge Plan:   Total Shoulder     Prescriptions: Meds to bed    Home medications:   ? Blood thinner/anti-coag therapy--Eliquis    ? BPH or diuretic--HCTZ,   ? BP meds-- Losartan, Norvasc,   ? Pain/Anti-inflammatories--Gabapentin, Percocet,   DM--Glimepiride, Metformin, Actos, Januvia,   Pre-op Education:  Educate patient on spinal anesthesia/pain control:  ? patient verbalize understanding    Educate patient on hospital course/timeline:  ?  patient verbalize understanding    Joint Care Class:  []  yes ? no  Notes:   Would like to be able to go home, had to stay with knee surgery due to low o2 sats. Was told surgery time is 2:00. May want to stay overnight if surgery is later in the afternoon. Leaving was not discussed with MD, and no H&P visit is scheduled due to date of surgery.

## 2024-06-07 NOTE — DISCHARGE INSTRUCTIONS
Take the following medications the morning of surgery:  BRING INHALER. TAKE YOUR AMLODIPINE, OMEPRAZOLE, GABAPENTIN AND LEVOTHYROXINE      If you are on prescription narcotic pain medication to control your pain you may also take that medication the morning of surgery.    General Instructions:  Do not eat solid food after midnight the night before surgery.  You may drink clear liquids day of surgery but must stop at least one hour before your hospital arrival time.  It is beneficial for you to have a clear drink that contains carbohydrates the day of surgery.  We suggest a 12 to 20 ounce bottle of Gatorade or Powerade for non-diabetic patients or a 12 to 20 ounce bottle of G2 or Powerade Zero for diabetic patients. (Pediatric patients, are not advised to drink a 12 to 20 ounce carbohydrate drink)    Clear liquids are liquids you can see through.  Nothing red in color.     Plain water                               Sports drinks  Sodas                                   Gelatin (Jell-O)  Fruit juices without pulp such as white grape juice and apple juice  Popsicles that contain no fruit or yogurt  Tea or coffee (no cream or milk added)  Gatorade / Powerade  G2 / Powerade Zero    Infants may have breast milk up to four hours before surgery.  Infants drinking formula may drink formula up to six hours before surgery.   Patients who avoid smoking, chewing tobacco and alcohol for 4 weeks prior to surgery have a reduced risk of post-operative complications.  Quit smoking as many days before surgery as you can.  Do not smoke, use chewing tobacco or drink alcohol the day of surgery.   If applicable bring your C-PAP/ BI-PAP machine in with you to preop day of surgery.  Bring any papers given to you in the doctor’s office.  Wear clean comfortable clothes.  Do not wear contact lenses, false eyelashes or make-up.  Bring a case for your glasses.   Bring crutches or walker if applicable.  Remove all piercings.  Leave jewelry and any  other valuables at home.  Hair extensions with metal clips must be removed prior to surgery.  The Pre-Admission Testing nurse will instruct you to bring medications if unable to obtain an accurate list in Pre-Admission Testing.        If you were given a blood bank ID arm band remember to bring it with you the day of surgery.    Day of surgery:  Your arrival time is approximately two hours before your scheduled surgery time.  Upon arrival, a Pre-op nurse and Anesthesiologist will review your health history, obtain vital signs, and answer questions you may have.  The only belongings needed at this time will be a list of your home medications and if applicable your C-PAP/BI-PAP machine.  A Pre-op nurse will start an IV and you may receive medication in preparation for surgery, including something to help you relax.     Please be aware that surgery does come with discomfort.  We want to make every effort to control your discomfort so please discuss any uncontrolled symptoms with your nurse.   Your doctor will most likely have prescribed pain medications.      If you are going home after surgery you will receive individualized written care instructions before being discharged.  A responsible adult must drive you to and from the hospital on the day of your surgery and ideally stay with you through the night.  Discharge prescriptions can be filled by the hospital pharmacy during regular pharmacy hours.  If you are having surgery late in the day/evening your prescription may be e-prescribed to your pharmacy.  Please verify your pharmacy hours or chose a 24 hour pharmacy to avoid not having access to your prescription because your pharmacy has closed for the day.    If you are staying overnight following surgery, you will be transported to your hospital room following the recovery period.  [facility] has all private rooms.    If you have any questions please call Pre-Admission Testing at  [phone].  Deductibles and co-payments are collected on the day of service. Please be prepared to pay the required co-pay, deductible or deposit on the day of service as defined by your plan.    Call your surgeon immediately if you experience any of the following symptoms:  Sore Throat  Shortness of Breath or difficulty breathing  Cough  Chills  Body soreness or muscle pain  Headache  Fever  New loss of taste or smell  Do not arrive for your surgery ill.  Your procedure will need to be rescheduled to another time.  You will need to call your physician before the day of surgery to avoid any unnecessary exposure to hospital staff as well as other patients.        PREVENTING INFECTION IN SHOULDER SURGICAL SITES     C. acnes is a bacteria that lives deep within follicles and pores of the skin. It is found in large numbers on the skin of the face, axilla (armpit), chest and back and is the primary bacteria to cause a surgical site infection after shoulder surgery.      Use of a Benzoyl Peroxide solution prior to shoulder surgery decreases C. acnes and reduces post-op infections.   Your surgeon has ordered 5% Benzoyl Peroxide wash to be used three times prior to your surgery.     Please read the following instructions carefully and bring this form with you the day of surgery.     General bathing instructions starting two days before your surgery:    Shower using a fresh bar of anti-bacterial soap (such as Dial) and clean washcloth.  Pay special attention to the neck, shoulder and armpit area.   Wash your hair as usual with your regular shampoo.   Rinse hair and body thoroughly with warm water (not hot water) to remove shampoo and residue.   Dry with a clean towel.              Sleep in a clean bed with clean clothing.  Do not allow pets to sleep with you.     For 2 days before surgery, avoid shaving with a razor because the razor can irritate skin and make it easier to develop an infection.    Any areas of open skin  can increase the risk of a post-operative wound infection by allowing bacteria to enter and travel throughout the body.  Notify your surgeon if you have any skin wounds / rashes even if it is not near the expected surgical site.  The area will need assessed to determine if surgery should be delayed until it is healed.      First application of 5% Benzoyl Peroxide Wash two nights before surgery:                                                                Wash neck, shoulder (front, back, side) and armpit   with warm water, rinse and dry - see picture.  Gently wash the same areas with the Benzoyl Peroxide   cleanser going away from the neck for 10-20 seconds.   Work into a full lather and leave on the skin for   2 minutes for greatest effect.  Rinse thoroughly with warm water, not hot water.                     Pat dry with a clean towel.                                                            Wash your hands thoroughly.  Do not apply lotion, powder, perfume or deodorant.   Put on clean clothes.    Second application the night before surgery:  Repeat the above steps.        Third application morning of surgery:  Repeat the above steps.    Due to shoulder pain or decreased range of motion of your shoulder and arm, you may need assistance washing under the arm or the back portion of your shoulder.     Avoid further washing the areas of the skin treated with Benzoyl Peroxide for at least 1 hour.    For your convenience, you may purchase Benzoyl Peroxide at [facility] retail pharmacy.     Warning:  Let your physician know if you are allergic to Benzoyl Peroxide or have very sensitive skin and cannot use it.   Stop using and contact your surgeon if you experience any excessive scaling, itching, swelling, skin irritation or other signs of a reaction.  Keep out of eyes, ears, nose and mouth.  Do not apply to sunburned, irritated or broken area on the skin.  Avoid unwanted problems with bleaching effect by following  these tips:  Wash hands after each use.  Avoid contact with hair, clothing, furnishings or carpeting.  Wear clean, old t-shirt or clothing to bed.   Use clean, old white pillow cases and sheets to avoid discoloring your bed linens.                                                                                                                                                                                                                                                                                   Please complete the checklist below, bring it with you to the hospital                               the day of surgery and give to the Pre-op Nurse     Preoperative Skin Prep Checklist        Patient Name Label             Enter dates and ?  boxes to indicate completed    Surgery Date: _______________ Regular Shower  Benzoyl Peroxide Wash   First Application:  2 days before_______________  (Stop shaving all body parts)           Morning or Evening                        Evening    Second Application:  1 day  before________________        Morning or Evening                          Evening   Third Application:  Day of Surgery______________                           Morning                   Morning

## 2024-06-07 NOTE — TELEPHONE ENCOUNTER
I have left a message for the patient that we did receive okay from his primary care physician for him to hold his Eliquis 48 hours prior to surgery.  Have asked him to call me if he has any questions

## 2024-06-08 LAB
QT INTERVAL: 394 MS
QTC INTERVAL: 452 MS

## 2024-06-10 ENCOUNTER — DOCUMENTATION (OUTPATIENT)
Dept: FAMILY MEDICINE CLINIC | Facility: CLINIC | Age: 74
End: 2024-06-10
Payer: MEDICARE

## 2024-06-11 ENCOUNTER — ANESTHESIA EVENT (OUTPATIENT)
Dept: PERIOP | Facility: HOSPITAL | Age: 74
End: 2024-06-11
Payer: MEDICARE

## 2024-06-11 ENCOUNTER — ANESTHESIA (OUTPATIENT)
Dept: PERIOP | Facility: HOSPITAL | Age: 74
End: 2024-06-11
Payer: MEDICARE

## 2024-06-11 ENCOUNTER — APPOINTMENT (OUTPATIENT)
Dept: GENERAL RADIOLOGY | Facility: HOSPITAL | Age: 74
End: 2024-06-11
Payer: MEDICARE

## 2024-06-11 ENCOUNTER — HOSPITAL ENCOUNTER (OUTPATIENT)
Facility: HOSPITAL | Age: 74
Discharge: HOME | End: 2024-06-12
Attending: ORTHOPAEDIC SURGERY | Admitting: ORTHOPAEDIC SURGERY
Payer: MEDICARE

## 2024-06-11 DIAGNOSIS — Z96.611 S/P REVERSE TOTAL SHOULDER ARTHROPLASTY, RIGHT: Primary | ICD-10-CM

## 2024-06-11 DIAGNOSIS — S42.291A: ICD-10-CM

## 2024-06-11 PROBLEM — Z41.9 SURGERY, ELECTIVE: Status: ACTIVE | Noted: 2024-06-11

## 2024-06-11 LAB
GLUCOSE BLDC GLUCOMTR-MCNC: 127 MG/DL (ref 70–130)
HCT VFR BLD AUTO: 34.6 % (ref 37.5–51)
HGB BLD-MCNC: 11.6 G/DL (ref 13–17.7)

## 2024-06-11 PROCEDURE — 25810000003 LACTATED RINGERS PER 1000 ML: Performed by: ANESTHESIOLOGY

## 2024-06-11 PROCEDURE — 82948 REAGENT STRIP/BLOOD GLUCOSE: CPT

## 2024-06-11 PROCEDURE — L3670 SO ACRO/CLAV CAN WEB PRE OTS: HCPCS | Performed by: ORTHOPAEDIC SURGERY

## 2024-06-11 PROCEDURE — 25010000002 CEFAZOLIN 3 G RECONSTITUTED SOLUTION 1 EACH VIAL: Performed by: ORTHOPAEDIC SURGERY

## 2024-06-11 PROCEDURE — 25010000002 ONDANSETRON PER 1 MG: Performed by: STUDENT IN AN ORGANIZED HEALTH CARE EDUCATION/TRAINING PROGRAM

## 2024-06-11 PROCEDURE — 85018 HEMOGLOBIN: CPT | Performed by: ORTHOPAEDIC SURGERY

## 2024-06-11 PROCEDURE — 73020 X-RAY EXAM OF SHOULDER: CPT

## 2024-06-11 PROCEDURE — 25010000002 DEXAMETHASONE SODIUM PHOSPHATE 20 MG/5ML SOLUTION: Performed by: STUDENT IN AN ORGANIZED HEALTH CARE EDUCATION/TRAINING PROGRAM

## 2024-06-11 PROCEDURE — 23472 RECONSTRUCT SHOULDER JOINT: CPT | Performed by: ORTHOPAEDIC SURGERY

## 2024-06-11 PROCEDURE — C1776 JOINT DEVICE (IMPLANTABLE): HCPCS | Performed by: ORTHOPAEDIC SURGERY

## 2024-06-11 PROCEDURE — 25010000002 VANCOMYCIN 10 G RECONSTITUTED SOLUTION: Performed by: ORTHOPAEDIC SURGERY

## 2024-06-11 PROCEDURE — 25010000002 BUPIVACAINE (PF) 0.25 % SOLUTION: Performed by: ANESTHESIOLOGY

## 2024-06-11 PROCEDURE — 97165 OT EVAL LOW COMPLEX 30 MIN: CPT

## 2024-06-11 PROCEDURE — 25010000002 MIDAZOLAM PER 1 MG: Performed by: ANESTHESIOLOGY

## 2024-06-11 PROCEDURE — 25010000002 SUGAMMADEX 200 MG/2ML SOLUTION: Performed by: STUDENT IN AN ORGANIZED HEALTH CARE EDUCATION/TRAINING PROGRAM

## 2024-06-11 PROCEDURE — 25010000002 FENTANYL CITRATE (PF) 50 MCG/ML SOLUTION: Performed by: STUDENT IN AN ORGANIZED HEALTH CARE EDUCATION/TRAINING PROGRAM

## 2024-06-11 PROCEDURE — 25810000003 SODIUM CHLORIDE 0.9 % SOLUTION: Performed by: ORTHOPAEDIC SURGERY

## 2024-06-11 PROCEDURE — 0 BUPIVACAINE LIPOSOME 1.3 % SUSPENSION: Performed by: ANESTHESIOLOGY

## 2024-06-11 PROCEDURE — 25010000002 PROPOFOL 200 MG/20ML EMULSION: Performed by: STUDENT IN AN ORGANIZED HEALTH CARE EDUCATION/TRAINING PROGRAM

## 2024-06-11 PROCEDURE — 25010000002 HYDROMORPHONE PER 4 MG: Performed by: STUDENT IN AN ORGANIZED HEALTH CARE EDUCATION/TRAINING PROGRAM

## 2024-06-11 PROCEDURE — 25010000002 FENTANYL CITRATE (PF) 50 MCG/ML SOLUTION: Performed by: ANESTHESIOLOGY

## 2024-06-11 PROCEDURE — C9290 INJ, BUPIVACAINE LIPOSOME: HCPCS | Performed by: ANESTHESIOLOGY

## 2024-06-11 PROCEDURE — 85014 HEMATOCRIT: CPT | Performed by: ORTHOPAEDIC SURGERY

## 2024-06-11 DEVICE — IMPLANTABLE DEVICE
Type: IMPLANTABLE DEVICE | Site: SHOULDER | Status: FUNCTIONAL
Brand: COMPREHENSIVE® REVERSE SHOULDER

## 2024-06-11 DEVICE — DEV CONTRL TISS STRATAFIX SPIRAL MNCRYL UD 3/0 PLS 30CM: Type: IMPLANTABLE DEVICE | Site: SHOULDER | Status: FUNCTIONAL

## 2024-06-11 DEVICE — COMP GLEN VERSA/DIAL PLS3 40MM: Type: IMPLANTABLE DEVICE | Site: SHOULDER | Status: FUNCTIONAL

## 2024-06-11 DEVICE — TOTL SHLDER REV: Type: IMPLANTABLE DEVICE | Status: FUNCTIONAL

## 2024-06-11 DEVICE — BEAR HUM/SHLDR COMPRNSV MINI VIT/E PLS3 40MM: Type: IMPLANTABLE DEVICE | Site: SHOULDER | Status: FUNCTIONAL

## 2024-06-11 DEVICE — BLUE CO-BRAID POLYETHYLENE SIZE 2 38" C-7 NEEDLE BIOMET
Type: IMPLANTABLE DEVICE | Site: SHOULDER | Status: FUNCTIONAL
Brand: TELEFLEX

## 2024-06-11 DEVICE — IMPLANTABLE DEVICE
Type: IMPLANTABLE DEVICE | Site: SHOULDER | Status: FUNCTIONAL
Brand: COMPREHENSIVE SHOULDER SYSTEM

## 2024-06-11 DEVICE — TRY HUM/SHLDR COMPREHENSIVE/REVERSE MINI COCR STD PLS3MM 40M: Type: IMPLANTABLE DEVICE | Site: SHOULDER | Status: FUNCTIONAL

## 2024-06-11 DEVICE — BLUE CO-BRAID POLYETHYLENE SIZE 5 38" K-60 NEEDLE BIOMET
Type: IMPLANTABLE DEVICE | Site: SHOULDER | Status: FUNCTIONAL
Brand: TELEFLEX

## 2024-06-11 RX ORDER — HYDROCODONE BITARTRATE AND ACETAMINOPHEN 5; 325 MG/1; MG/1
1 TABLET ORAL ONCE AS NEEDED
Status: DISCONTINUED | OUTPATIENT
Start: 2024-06-11 | End: 2024-06-11 | Stop reason: HOSPADM

## 2024-06-11 RX ORDER — MAGNESIUM HYDROXIDE 1200 MG/15ML
LIQUID ORAL AS NEEDED
Status: DISCONTINUED | OUTPATIENT
Start: 2024-06-11 | End: 2024-06-11 | Stop reason: HOSPADM

## 2024-06-11 RX ORDER — PROMETHAZINE HYDROCHLORIDE 25 MG/1
25 TABLET ORAL ONCE AS NEEDED
Status: DISCONTINUED | OUTPATIENT
Start: 2024-06-11 | End: 2024-06-11 | Stop reason: HOSPADM

## 2024-06-11 RX ORDER — TRANEXAMIC ACID 100 MG/ML
INJECTION, SOLUTION INTRAVENOUS AS NEEDED
Status: DISCONTINUED | OUTPATIENT
Start: 2024-06-11 | End: 2024-06-11 | Stop reason: SURG

## 2024-06-11 RX ORDER — FENTANYL CITRATE 50 UG/ML
50 INJECTION, SOLUTION INTRAMUSCULAR; INTRAVENOUS ONCE AS NEEDED
Status: DISCONTINUED | OUTPATIENT
Start: 2024-06-11 | End: 2024-06-11 | Stop reason: HOSPADM

## 2024-06-11 RX ORDER — IPRATROPIUM BROMIDE AND ALBUTEROL SULFATE 2.5; .5 MG/3ML; MG/3ML
3 SOLUTION RESPIRATORY (INHALATION) ONCE AS NEEDED
Status: DISCONTINUED | OUTPATIENT
Start: 2024-06-11 | End: 2024-06-11 | Stop reason: HOSPADM

## 2024-06-11 RX ORDER — FLUMAZENIL 0.1 MG/ML
0.2 INJECTION INTRAVENOUS AS NEEDED
Status: DISCONTINUED | OUTPATIENT
Start: 2024-06-11 | End: 2024-06-11 | Stop reason: HOSPADM

## 2024-06-11 RX ORDER — DOCUSATE SODIUM 100 MG/1
100 CAPSULE, LIQUID FILLED ORAL 2 TIMES DAILY
Qty: 60 CAPSULE | Refills: 0 | Status: SHIPPED | OUTPATIENT
Start: 2024-06-11

## 2024-06-11 RX ORDER — SODIUM CHLORIDE, SODIUM LACTATE, POTASSIUM CHLORIDE, CALCIUM CHLORIDE 600; 310; 30; 20 MG/100ML; MG/100ML; MG/100ML; MG/100ML
9 INJECTION, SOLUTION INTRAVENOUS CONTINUOUS
Status: DISCONTINUED | OUTPATIENT
Start: 2024-06-11 | End: 2024-06-12 | Stop reason: HOSPADM

## 2024-06-11 RX ORDER — DROPERIDOL 2.5 MG/ML
0.62 INJECTION, SOLUTION INTRAMUSCULAR; INTRAVENOUS
Status: DISCONTINUED | OUTPATIENT
Start: 2024-06-11 | End: 2024-06-11 | Stop reason: HOSPADM

## 2024-06-11 RX ORDER — MIDAZOLAM HYDROCHLORIDE 1 MG/ML
0.5 INJECTION INTRAMUSCULAR; INTRAVENOUS
Status: DISCONTINUED | OUTPATIENT
Start: 2024-06-11 | End: 2024-06-11 | Stop reason: HOSPADM

## 2024-06-11 RX ORDER — FENTANYL CITRATE 50 UG/ML
50 INJECTION, SOLUTION INTRAMUSCULAR; INTRAVENOUS
Status: DISCONTINUED | OUTPATIENT
Start: 2024-06-11 | End: 2024-06-11 | Stop reason: HOSPADM

## 2024-06-11 RX ORDER — ONDANSETRON 2 MG/ML
4 INJECTION INTRAMUSCULAR; INTRAVENOUS ONCE AS NEEDED
Status: DISCONTINUED | OUTPATIENT
Start: 2024-06-11 | End: 2024-06-11 | Stop reason: HOSPADM

## 2024-06-11 RX ORDER — EPHEDRINE SULFATE 50 MG/ML
5 INJECTION, SOLUTION INTRAVENOUS ONCE AS NEEDED
Status: DISCONTINUED | OUTPATIENT
Start: 2024-06-11 | End: 2024-06-11 | Stop reason: HOSPADM

## 2024-06-11 RX ORDER — OXYCODONE AND ACETAMINOPHEN 7.5; 325 MG/1; MG/1
1 TABLET ORAL EVERY 4 HOURS PRN
Status: DISCONTINUED | OUTPATIENT
Start: 2024-06-11 | End: 2024-06-12 | Stop reason: HOSPADM

## 2024-06-11 RX ORDER — HYDRALAZINE HYDROCHLORIDE 20 MG/ML
5 INJECTION INTRAMUSCULAR; INTRAVENOUS
Status: DISCONTINUED | OUTPATIENT
Start: 2024-06-11 | End: 2024-06-11 | Stop reason: HOSPADM

## 2024-06-11 RX ORDER — BUPIVACAINE HYDROCHLORIDE 2.5 MG/ML
INJECTION, SOLUTION EPIDURAL; INFILTRATION; INTRACAUDAL
Status: COMPLETED | OUTPATIENT
Start: 2024-06-11 | End: 2024-06-11

## 2024-06-11 RX ORDER — MIDAZOLAM HYDROCHLORIDE 1 MG/ML
INJECTION INTRAMUSCULAR; INTRAVENOUS
Status: COMPLETED | OUTPATIENT
Start: 2024-06-11 | End: 2024-06-11

## 2024-06-11 RX ORDER — ONDANSETRON 4 MG/1
4 TABLET, FILM COATED ORAL EVERY 8 HOURS PRN
Qty: 12 TABLET | Refills: 0 | Status: SHIPPED | OUTPATIENT
Start: 2024-06-11

## 2024-06-11 RX ORDER — LIDOCAINE HYDROCHLORIDE 20 MG/ML
INJECTION, SOLUTION INFILTRATION; PERINEURAL AS NEEDED
Status: DISCONTINUED | OUTPATIENT
Start: 2024-06-11 | End: 2024-06-11 | Stop reason: SURG

## 2024-06-11 RX ORDER — ACETAMINOPHEN 325 MG/1
650 TABLET ORAL 2 TIMES DAILY PRN
Qty: 60 TABLET | Refills: 0 | Status: SHIPPED | OUTPATIENT
Start: 2024-06-11

## 2024-06-11 RX ORDER — SODIUM CHLORIDE 0.9 % (FLUSH) 0.9 %
3 SYRINGE (ML) INJECTION EVERY 12 HOURS SCHEDULED
Status: DISCONTINUED | OUTPATIENT
Start: 2024-06-11 | End: 2024-06-11 | Stop reason: HOSPADM

## 2024-06-11 RX ORDER — LABETALOL HYDROCHLORIDE 5 MG/ML
5 INJECTION, SOLUTION INTRAVENOUS
Status: DISCONTINUED | OUTPATIENT
Start: 2024-06-11 | End: 2024-06-11 | Stop reason: HOSPADM

## 2024-06-11 RX ORDER — ONDANSETRON 4 MG/1
4 TABLET, ORALLY DISINTEGRATING ORAL ONCE AS NEEDED
Status: DISCONTINUED | OUTPATIENT
Start: 2024-06-11 | End: 2024-06-12 | Stop reason: HOSPADM

## 2024-06-11 RX ORDER — MELOXICAM 15 MG/1
15 TABLET ORAL ONCE
Status: COMPLETED | OUTPATIENT
Start: 2024-06-11 | End: 2024-06-11

## 2024-06-11 RX ORDER — OXYCODONE AND ACETAMINOPHEN 7.5; 325 MG/1; MG/1
1 TABLET ORAL EVERY 6 HOURS PRN
Qty: 42 TABLET | Refills: 0 | Status: SHIPPED | OUTPATIENT
Start: 2024-06-11

## 2024-06-11 RX ORDER — OXYCODONE AND ACETAMINOPHEN 7.5; 325 MG/1; MG/1
1 TABLET ORAL EVERY 4 HOURS PRN
Status: DISCONTINUED | OUTPATIENT
Start: 2024-06-11 | End: 2024-06-11 | Stop reason: HOSPADM

## 2024-06-11 RX ORDER — FENTANYL CITRATE 50 UG/ML
INJECTION, SOLUTION INTRAMUSCULAR; INTRAVENOUS AS NEEDED
Status: DISCONTINUED | OUTPATIENT
Start: 2024-06-11 | End: 2024-06-11 | Stop reason: SURG

## 2024-06-11 RX ORDER — DIPHENHYDRAMINE HYDROCHLORIDE 50 MG/ML
12.5 INJECTION INTRAMUSCULAR; INTRAVENOUS
Status: DISCONTINUED | OUTPATIENT
Start: 2024-06-11 | End: 2024-06-11 | Stop reason: HOSPADM

## 2024-06-11 RX ORDER — PROMETHAZINE HYDROCHLORIDE 25 MG/1
25 SUPPOSITORY RECTAL ONCE AS NEEDED
Status: DISCONTINUED | OUTPATIENT
Start: 2024-06-11 | End: 2024-06-11 | Stop reason: HOSPADM

## 2024-06-11 RX ORDER — FAMOTIDINE 10 MG/ML
20 INJECTION, SOLUTION INTRAVENOUS ONCE
Status: COMPLETED | OUTPATIENT
Start: 2024-06-11 | End: 2024-06-11

## 2024-06-11 RX ORDER — PREGABALIN 75 MG/1
150 CAPSULE ORAL ONCE
Status: COMPLETED | OUTPATIENT
Start: 2024-06-11 | End: 2024-06-11

## 2024-06-11 RX ORDER — DEXAMETHASONE SODIUM PHOSPHATE 4 MG/ML
INJECTION, SOLUTION INTRA-ARTICULAR; INTRALESIONAL; INTRAMUSCULAR; INTRAVENOUS; SOFT TISSUE AS NEEDED
Status: DISCONTINUED | OUTPATIENT
Start: 2024-06-11 | End: 2024-06-11 | Stop reason: SURG

## 2024-06-11 RX ORDER — NALOXONE HCL 0.4 MG/ML
0.2 VIAL (ML) INJECTION AS NEEDED
Status: DISCONTINUED | OUTPATIENT
Start: 2024-06-11 | End: 2024-06-11 | Stop reason: HOSPADM

## 2024-06-11 RX ORDER — ONDANSETRON 2 MG/ML
INJECTION INTRAMUSCULAR; INTRAVENOUS AS NEEDED
Status: DISCONTINUED | OUTPATIENT
Start: 2024-06-11 | End: 2024-06-11 | Stop reason: SURG

## 2024-06-11 RX ORDER — FENTANYL CITRATE 50 UG/ML
INJECTION, SOLUTION INTRAMUSCULAR; INTRAVENOUS
Status: COMPLETED | OUTPATIENT
Start: 2024-06-11 | End: 2024-06-11

## 2024-06-11 RX ORDER — PROPOFOL 10 MG/ML
INJECTION, EMULSION INTRAVENOUS AS NEEDED
Status: DISCONTINUED | OUTPATIENT
Start: 2024-06-11 | End: 2024-06-11 | Stop reason: SURG

## 2024-06-11 RX ORDER — HYDROMORPHONE HYDROCHLORIDE 1 MG/ML
0.5 INJECTION, SOLUTION INTRAMUSCULAR; INTRAVENOUS; SUBCUTANEOUS
Status: DISCONTINUED | OUTPATIENT
Start: 2024-06-11 | End: 2024-06-11 | Stop reason: HOSPADM

## 2024-06-11 RX ORDER — ROCURONIUM BROMIDE 10 MG/ML
INJECTION, SOLUTION INTRAVENOUS AS NEEDED
Status: DISCONTINUED | OUTPATIENT
Start: 2024-06-11 | End: 2024-06-11 | Stop reason: SURG

## 2024-06-11 RX ORDER — LIDOCAINE HYDROCHLORIDE 10 MG/ML
0.5 INJECTION, SOLUTION INFILTRATION; PERINEURAL ONCE AS NEEDED
Status: DISCONTINUED | OUTPATIENT
Start: 2024-06-11 | End: 2024-06-11 | Stop reason: HOSPADM

## 2024-06-11 RX ORDER — SODIUM CHLORIDE 0.9 % (FLUSH) 0.9 %
3-10 SYRINGE (ML) INJECTION AS NEEDED
Status: DISCONTINUED | OUTPATIENT
Start: 2024-06-11 | End: 2024-06-11 | Stop reason: HOSPADM

## 2024-06-11 RX ORDER — BUPIVACAINE HYDROCHLORIDE 2.5 MG/ML
INJECTION, SOLUTION EPIDURAL; INFILTRATION; INTRACAUDAL
Status: COMPLETED
Start: 2024-06-11 | End: 2024-06-11

## 2024-06-11 RX ORDER — OXYCODONE AND ACETAMINOPHEN 7.5; 325 MG/1; MG/1
1 TABLET ORAL ONCE AS NEEDED
Status: DISCONTINUED | OUTPATIENT
Start: 2024-06-11 | End: 2024-06-12 | Stop reason: HOSPADM

## 2024-06-11 RX ORDER — ACETAMINOPHEN 500 MG
1000 TABLET ORAL ONCE
Status: COMPLETED | OUTPATIENT
Start: 2024-06-11 | End: 2024-06-11

## 2024-06-11 RX ORDER — ONDANSETRON 2 MG/ML
4 INJECTION INTRAMUSCULAR; INTRAVENOUS ONCE AS NEEDED
Status: DISCONTINUED | OUTPATIENT
Start: 2024-06-11 | End: 2024-06-12 | Stop reason: HOSPADM

## 2024-06-11 RX ADMIN — ONDANSETRON 4 MG: 2 INJECTION INTRAMUSCULAR; INTRAVENOUS at 17:41

## 2024-06-11 RX ADMIN — LIDOCAINE HYDROCHLORIDE 100 MG: 20 INJECTION, SOLUTION INFILTRATION; PERINEURAL at 16:09

## 2024-06-11 RX ADMIN — BUPIVACAINE 10 ML: 13.3 INJECTION, SUSPENSION, LIPOSOMAL INFILTRATION at 15:50

## 2024-06-11 RX ADMIN — FENTANYL CITRATE 25 MCG: 50 INJECTION, SOLUTION INTRAMUSCULAR; INTRAVENOUS at 16:58

## 2024-06-11 RX ADMIN — SUGAMMADEX 200 MG: 100 INJECTION, SOLUTION INTRAVENOUS at 17:40

## 2024-06-11 RX ADMIN — MIDAZOLAM 1 MG: 1 INJECTION INTRAMUSCULAR; INTRAVENOUS at 15:35

## 2024-06-11 RX ADMIN — DEXAMETHASONE SODIUM PHOSPHATE 8 MG: 4 INJECTION, SOLUTION INTRAMUSCULAR; INTRAVENOUS at 16:40

## 2024-06-11 RX ADMIN — BUPIVACAINE HYDROCHLORIDE 7 ML: 2.5 INJECTION, SOLUTION EPIDURAL; INFILTRATION; INTRACAUDAL; PERINEURAL at 15:50

## 2024-06-11 RX ADMIN — MIDAZOLAM 1 MG: 1 INJECTION INTRAMUSCULAR; INTRAVENOUS at 15:45

## 2024-06-11 RX ADMIN — FENTANYL CITRATE 25 MCG: 50 INJECTION, SOLUTION INTRAMUSCULAR; INTRAVENOUS at 17:36

## 2024-06-11 RX ADMIN — ROCURONIUM BROMIDE 20 MG: 10 INJECTION, SOLUTION INTRAVENOUS at 16:39

## 2024-06-11 RX ADMIN — PREGABALIN 150 MG: 75 CAPSULE ORAL at 15:27

## 2024-06-11 RX ADMIN — LIDOCAINE HYDROCHLORIDE 100 MG: 20 INJECTION, SOLUTION INFILTRATION; PERINEURAL at 17:36

## 2024-06-11 RX ADMIN — SODIUM CHLORIDE 3000 MG: 900 INJECTION INTRAVENOUS at 15:50

## 2024-06-11 RX ADMIN — ROCURONIUM BROMIDE 50 MG: 10 INJECTION, SOLUTION INTRAVENOUS at 16:10

## 2024-06-11 RX ADMIN — OXYCODONE AND ACETAMINOPHEN 1 TABLET: 7.5; 325 TABLET ORAL at 19:30

## 2024-06-11 RX ADMIN — PROPOFOL 180 MG: 10 INJECTION, EMULSION INTRAVENOUS at 16:09

## 2024-06-11 RX ADMIN — ACETAMINOPHEN 1000 MG: 500 TABLET ORAL at 15:27

## 2024-06-11 RX ADMIN — FENTANYL CITRATE 50 MCG: 50 INJECTION, SOLUTION INTRAMUSCULAR; INTRAVENOUS at 18:07

## 2024-06-11 RX ADMIN — HYDROMORPHONE HYDROCHLORIDE 0.5 MG: 1 INJECTION, SOLUTION INTRAMUSCULAR; INTRAVENOUS; SUBCUTANEOUS at 18:10

## 2024-06-11 RX ADMIN — HYDROMORPHONE HYDROCHLORIDE 0.5 MG: 1 INJECTION, SOLUTION INTRAMUSCULAR; INTRAVENOUS; SUBCUTANEOUS at 18:30

## 2024-06-11 RX ADMIN — FAMOTIDINE 20 MG: 10 INJECTION INTRAVENOUS at 15:35

## 2024-06-11 RX ADMIN — FENTANYL CITRATE 50 MCG: 50 INJECTION, SOLUTION INTRAMUSCULAR; INTRAVENOUS at 15:35

## 2024-06-11 RX ADMIN — TRANEXAMIC ACID 1000 MG: 100 INJECTION, SOLUTION INTRAVENOUS at 16:41

## 2024-06-11 RX ADMIN — SODIUM CHLORIDE 3 G: 900 INJECTION INTRAVENOUS at 23:55

## 2024-06-11 RX ADMIN — VANCOMYCIN HYDROCHLORIDE 2250 MG: 10 INJECTION, POWDER, LYOPHILIZED, FOR SOLUTION INTRAVENOUS at 15:50

## 2024-06-11 RX ADMIN — OXYCODONE AND ACETAMINOPHEN 1 TABLET: 7.5; 325 TABLET ORAL at 23:55

## 2024-06-11 RX ADMIN — SODIUM CHLORIDE, POTASSIUM CHLORIDE, SODIUM LACTATE AND CALCIUM CHLORIDE 9 ML/HR: 600; 310; 30; 20 INJECTION, SOLUTION INTRAVENOUS at 15:35

## 2024-06-11 RX ADMIN — MELOXICAM 15 MG: 15 TABLET ORAL at 15:26

## 2024-06-11 NOTE — THERAPY EVALUATION
Patient Name: Barry Cam  : 1950    MRN: [id number]                              Today's Date: 2024       Admit Date: 2024    Visit Dx:     ICD-10-CM ICD-9-CM   1. Fracture of head of right humerus  S42.291A 812.09     Patient Active Problem List   Diagnosis    Seasonal allergic rhinitis    Type 2 diabetes mellitus with hyperglycemia, without long-term current use of insulin    DUGGAN (dyspnea on exertion)    Hypersomnia due to medical condition    Fatigue    Hyperlipidemia    Obstructive sleep apnea syndrome treated with auto CPAP    Gastroesophageal reflux disease    Calculus of kidney    Diabetic neuropathy, painful    Acute deep vein thrombosis (DVT) of distal vein of right lower extremity    History of pulmonary embolism    Chronic deep vein thrombosis (DVT) of distal vein of right lower extremity    Class 3 severe obesity due to excess calories with serious comorbidity and body mass index (BMI) of 40.0 to 44.9 in adult    Thyroid disease    Hypertension    Vertigo    Arthritis of knee    Total knee replacement status, left    Proximal humerus fracture    Fracture of head of right humerus     Past Medical History:   Diagnosis Date    Allergic rhinitis     Anxiety     Arthritis     Serious    Arthritis of back     Asthma 2018    PE Blood Clots    CHF (congestive heart failure) 2021    Clotting disorder 2018    Eliquis Related    CTS (carpal tunnel syndrome)     Deep vein thrombosis 2018    CHRONIC    Depression     Diabetes mellitus     Diverticulosis     Mild    Dyspnea on exertion     Fibromyalgia, primary     ?????    Fracture, fibula     Right    Fracture, humerus 2024    Fall    Fracture, tibia and fibula     GERD (gastroesophageal reflux disease)     History of hiatal hernia     History of kidney stones     HL (hearing loss)     Mild    Hx of blood clots     Hyperlipidemia     Hypertension     Hypothyroidism     Irritable bowel syndrome     Mild    Kidney  stone     Ongoing    Low back pain     Never Ending    Low back strain     Narcolepsy     Neuropathy     HANDS AND FEET    Obesity     Can't Win    Pulmonary embolism     Right shoulder pain     Rotator cuff syndrome     Sciatica     Sleep apnea     CPAP USED    Tear of meniscus of knee     Left    Umbilical hernia     Visual impairment     Diabetes Related     Past Surgical History:   Procedure Laterality Date    ANKLE OPEN REDUCTION INTERNAL FIXATION      CYSTOSCOPY W/ LASER LITHOTRIPSY  07/20/2023    EYE SURGERY      Cataract Removal    HERNIA REPAIR      HERNIA REPAIR      INGUINAL HERNIA REPAIR      JOINT REPLACEMENT Left 03/02/2023    TONSILLECTOMY      TOTAL KNEE ARTHROPLASTY Left 03/02/2023    Procedure: TOTAL KNEE ARTHROPLASTY;  Surgeon: Noah Salcedo MD;  Location: [facility] OR [facility] [place];  Service: Orthopedics;  Laterality: Left;      General Information       Row Name 06/11/24 1608          OT Time and Intention    Document Type evaluation  -RB     Mode of Treatment individual therapy;occupational therapy  -RB       Row Name 06/11/24 1608          General Information    Patient Profile Reviewed yes  -RB     Prior Level of Function independent:  -RB     Existing Precautions/Restrictions non-weight bearing;right;shoulder  -RB     Barriers to Rehab previous functional deficit  -RB       Row Name 06/11/24 1608          Living Environment    People in Home spouse  -RB       Row Name 06/11/24 1608          Home Main Entrance    Number of Stairs, Main Entrance two  -RB       Row Name 06/11/24 1608          Cognition    Orientation Status (Cognition) oriented x 3  -RB       Row Name 06/11/24 1608          Safety Issues, Functional Mobility    Impairments Affecting Function (Mobility) range of motion (ROM);sensation/sensory awareness;strength  -RB               User Key  (r) = Recorded By, (t) = Taken By, (c) = Cosigned By      Initials Name Provider Type    RB Alma Mcdaniels OT Occupational Therapist                      Mobility/ADL's       Row Name 06/11/24 1609          Mobility    Extremity Weight-bearing Status right upper extremity  -RB     Right Upper Extremity (Weight-bearing Status) non weight-bearing (NWB)  -RB               User Key  (r) = Recorded By, (t) = Taken By, (c) = Cosigned By      Initials Name Provider Type    Alma Rangel OT Occupational Therapist                   Obj/Interventions       Row Name 06/11/24 1611          Sensory Assessment (Somatosensory)    Sensory Assessment the pt had just received his nerve block  -RB       Row Name 06/11/24 1611          Vision Assessment/Intervention    Visual Impairment/Limitations WFL  -RB       Row Name 06/11/24 1611          Range of Motion Comprehensive    Comment, General Range of Motion Nerve block intact  -RB       Row Name 06/11/24 1611          Strength Comprehensive (MMT)    Comment, General Manual Muscle Testing (MMT) Assessment Generalized weakness pre op, nerve block limiting ROM  -RB       Row Name 06/11/24 1611          Motor Skills    Therapeutic Exercise --  HEP education provided to the pt and his spouse at bedside  -RB       Row Name 06/11/24 1611          Balance    Comment, Balance Unable to test as the pt was supine in prep for surgery  -RB               User Key  (r) = Recorded By, (t) = Taken By, (c) = Cosigned By      Initials Name Provider Type    Alma Rangel OT Occupational Therapist                   Goals/Plan       Row Name 06/11/24 1610          Bed Mobility Goal 1 (OT)    Activity/Assistive Device (Bed Mobility Goal 1, OT) bed mobility activities, all  -RB     [place] Level/Cues Needed (Bed Mobility Goal 1, OT) modified independence  -RB     Time Frame (Bed Mobility Goal 1, OT) short term goal (STG);2 weeks  -RB     Progress/Outcomes (Bed Mobility Goal 1, OT) new goal  -RB       Row Name 06/11/24 1610          Transfer Goal 1 (OT)    Activity/Assistive Device (Transfer Goal 1, OT) transfers, all  -RB      [place] Level/Cues Needed (Transfer Goal 1, OT) modified independence  -RB     Time Frame (Transfer Goal 1, OT) short term goal (STG);2 weeks  -RB     Progress/Outcome (Transfer Goal 1, OT) new goal  -RB       Row Name 06/11/24 1610          Bathing Goal 1 (OT)    Activity/Device (Bathing Goal 1, OT) bathing skills, all  -RB     [place] Level/Cues Needed (Bathing Goal 1, OT) modified independence  -RB     Time Frame (Bathing Goal 1, OT) short term goal (STG);2 weeks  -RB     Progress/Outcomes (Bathing Goal 1, OT) new goal  -RB       Row Name 06/11/24 1610          Dressing Goal 1 (OT)    Activity/Device (Dressing Goal 1, OT) dressing skills, all  -RB     [place]/Cues Needed (Dressing Goal 1, OT) modified independence  -RB     Time Frame (Dressing Goal 1, OT) short term goal (STG);2 weeks  -RB     Progress/Outcome (Dressing Goal 1, OT) new goal  -RB       Row Name 06/11/24 1610          Toileting Goal 1 (OT)    Activity/Device (Toileting Goal 1, OT) toileting skills, all  -RB     [place] Level/Cues Needed (Toileting Goal 1, OT) modified independence  -RB     Time Frame (Toileting Goal 1, OT) short term goal (STG);2 weeks  -RB     Progress/Outcome (Toileting Goal 1, OT) new goal  -RB       Row Name 06/11/24 1610          Grooming Goal 1 (OT)    Activity/Device (Grooming Goal 1, OT) grooming skills, all  -RB     [place] (Grooming Goal 1, OT) modified independence  -RB     Time Frame (Grooming Goal 1, OT) short term goal (STG);2 weeks  -RB     Progress/Outcome (Grooming Goal 1, OT) new goal  -RB       Row Name 06/11/24 1610          Therapy Assessment/Plan (OT)    Planned Therapy Interventions (OT) transfer/mobility retraining;strengthening exercise;patient/caregiver education/training;BADL retraining;functional balance retraining;occupation/activity based interventions  -RB               User Key  (r) = Recorded By, (t) = Taken By, (c) = Cosigned By      Initials Name Provider Type    RB  Alma Mcdaniels OT Occupational Therapist                   Clinical Impression       Row Name 06/11/24 1612 06/11/24 1611       Pain Assessment    Pretreatment Pain Rating 0/10 - no pain  -RB 0/10 - no pain  -RB    Posttreatment Pain Rating 0/10 - no pain  -RB 0/10 - no pain  -RB      Row Name 06/11/24 1611          Plan of Care Review    Plan of Care Reviewed With patient  -RB     Progress no change  -RB     Outcome Evaluation The pt was evaluated in pre-op prior to his OP reverse total shoulder arthroplasty. He was blocked and ready for his procedure. Thorough education provided to the pt on his HEP, sling management and precautions. He does not use any AD and his wife will assist him with ADL's as needed. Will see tomorrow if the pt is admitted.  -RB       Row Name 06/11/24 1611          Therapy Assessment/Plan (OT)    Rehab Potential (OT) good, to achieve stated therapy goals  -RB     Criteria for Skilled Therapeutic Interventions Met (OT) yes;skilled treatment is necessary  -RB     Therapy Frequency (OT) 5 times/wk  -RB       Row Name 06/11/24 1611          Therapy Plan Review/Discharge Plan (OT)    Anticipated Discharge Disposition (OT) home with assist;home with outpatient therapy services  -RB       Row Name 06/11/24 1611          Positioning and Restraints    Pre-Treatment Position in bed  -RB     Post Treatment Position bed  -RB     In Bed notified nsg;supine;encouraged to call for assist;patient within staff view;with family/caregiver;fowlers  -RB               User Key  (r) = Recorded By, (t) = Taken By, (c) = Cosigned By      Initials Name Provider Type    RB Alma Mcdaniels OT Occupational Therapist                   Outcome Measures    No documentation.                   Occupational Therapy Education       Title: PT OT SLP Therapies (Done)       Topic: Occupational Therapy (Done)       Point: ADL training (Done)       Description:   Instruct learner(s) on proper safety adaptation and  remediation techniques during self care or transfers.   Instruct in proper use of assistive devices.                  Learning Progress Summary             Patient Acceptance, E,TB,D, DU,VU by RB at 6/11/2024 1613    Comment: Education provided to the pt and his spouse at bedside on his HEP, precautions, sling mangement and ADL participation                         Point: Home exercise program (Done)       Description:   Instruct learner(s) on appropriate technique for monitoring, assisting and/or progressing therapeutic exercises/activities.                  Learning Progress Summary             Patient Acceptance, E,TB,D, DU,VU by RB at 6/11/2024 1613    Comment: Education provided to the pt and his spouse at bedside on his HEP, precautions, sling mangement and ADL participation                         Point: Precautions (Done)       Description:   Instruct learner(s) on prescribed precautions during self-care and functional transfers.                  Learning Progress Summary             Patient Acceptance, E,TB,D, DU,VU by RB at 6/11/2024 1613    Comment: Education provided to the pt and his spouse at bedside on his HEP, precautions, sling mangement and ADL participation                         Point: Body mechanics (Done)       Description:   Instruct learner(s) on proper positioning and spine alignment during self-care, functional mobility activities and/or exercises.                  Learning Progress Summary             Patient Acceptance, E,TB,D, DU,VU by RB at 6/11/2024 1613    Comment: Education provided to the pt and his spouse at bedside on his HEP, precautions, sling mangement and ADL participation                                         User Key       Initials Effective Dates Name Provider Type Discipline    DEBORAH 06/16/21 -  Alma Mcdaniels OT Occupational Therapist OT                  OT Recommendation and Plan  Planned Therapy Interventions (OT): transfer/mobility retraining, strengthening  exercise, patient/caregiver education/training, BADL retraining, functional balance retraining, occupation/activity based interventions  Therapy Frequency (OT): 5 times/wk  Plan of Care Review  Plan of Care Reviewed With: patient  Progress: no change  Outcome Evaluation: The pt was evaluated in pre-op prior to his OP reverse total shoulder arthroplasty. He was blocked and ready for his procedure. Thorough education provided to the pt on his HEP, sling management and precautions. He does not use any AD and his wife will assist him with ADL's as needed. Will see tomorrow if the pt is admitted.     Time Calculation:         Time Calculation- OT       Row Name 06/11/24 1607             Time Calculation- OT    OT Start Time 1529  -RB      OT Stop Time 1540  -RB      OT Time Calculation (min) 11 min  -RB      OT Received On 06/11/24  -RB      OT - Next Appointment 06/12/24  -RB      OT Goal Re-Cert Due Date 06/25/24  -RB         Untimed Charges    OT Eval/Re-eval Minutes 11  -RB         Total Minutes    Untimed Charges Total Minutes 11  -RB       Total Minutes 11  -RB                User Key  (r) = Recorded By, (t) = Taken By, (c) = Cosigned By      Initials Name Provider Type    RB Alma Mcdaniels OT Occupational Therapist                  Therapy Charges for Today       Code Description Service Date Service Provider Modifiers Qty    [id number] HC OT EVAL LOW COMPLEXITY 3 6/11/2024 Alma Mcdaniels OT GO 1                 Alma Mcdaniels OT  6/11/2024

## 2024-06-11 NOTE — BRIEF OP NOTE
TOTAL SHOULDER REVERSE ARTHROPLASTY  Progress Note    Barry Cam  6/11/2024    Pre-op Diagnosis:   Fracture of head of right humerus [S42.291A]       Post-Op Diagnosis Codes:     * Fracture of head of right humerus [S42.291A]    Procedure/CPT® Codes:        Procedure(s):  Right Reverse Total Shoulder Arthroplasty        SURGICAL APPROACH: Deltopectoral    SURGICAL TECHNIQUE: n/a      Surgeon(s):  Noah Salcedo MD    Anesthesia: General with Block    Staff:   Circulator: Tammy Fox RN  Scrub Person: Luiz Fox         Estimated Blood Loss: 100ml    Urine Voided: * No values recorded between 6/11/2024  3:56 PM and 6/11/2024  4:26 PM *    Specimens:                None          Drains: * No LDAs found *    Findings: See dictation        Complications: None          Noah Salcedo MD     Date: 6/11/2024  Time: 1726

## 2024-06-11 NOTE — INTERVAL H&P NOTE
H&P reviewed. The patient was examined and there are no changes to the H&P.  He ultimately decided against conservative treatment and that he felt more comfortable moving forward with the shoulder replacement surgery.  We had a thorough discussion regarding the risks, benefits and alternatives to a reverse total shoulder arthroplasty.  I explained that surgical risks include infection, hematoma, hardware related complications including failure of fixation, loosening, fracture, component related problems including failure of the implants or metal allergy, persistent pain and/or loss of motion, iatrogenic nerve and/or blood vessel injury resulting in permanent weakness, numbness or dysfunction, DVT, PE, positioning related neuropraxia, and anesthesia related complications resulting in death.  We discussed the indication for a reverse as opposed to a standard total shoulder and the risks inherent to the reverse including notching, glenoid loosening, instability, and traction related neuropraxia, any of which could result in persistent pain or problems requiring further surgery.  Last, we discussed the rehab and all that will be expected of the patient post operatively to ensure an optimal outcome.  He voiced understanding of the risks, benefits, and alternative forms of treatment that were discussed and consents to proceed.  No guarantees were given regarding the outcome of surgery.

## 2024-06-11 NOTE — OP NOTE
Orthopaedic Operative Note    Facility: [facility]    Patient: Barry Cam    Medical Record Number: [id number]    YOB: 1950    Dictating Surgeon: Noah Salcedo M.D.*    Primary Care Physician: Oralia Boggs MD    Date of Operation: 6/11/2024    Pre-Operative Diagnosis: 3 part right proximal humerus fracture with pre-existing associated rotator cuff tear    Post-Operative Diagnosis: 4-part proximal humerus fracture with pre-existing associated rotator cuff tear    Procedure Performed:    1.  Right reverse total shoulder arthroplasty    2.  Tenodesis of long head of biceps tendon    Surgeon: Noah Salcedo MD     Assistant: Trina Patel whose assistance was critical for help with patient positioning, suctioning and irrigation, retraction, manipulation of the extremity for insertion of the implants, wound closure and application of the bandages.  Her assistance was critical to the success of this case.      Anesthesia: Regional followed by Gen.    Complications: None.     Estimated Blood Loss: Less than 150 mL.     Implants:      Biomet comprehensive fracture stem, size 12.    +3 offset, standard thickness humeral tray with 40 mm standard thickness E1 polyethylene insert  Size 40 mm +3 offset glenosphere  Small augmented mini glenoid baseplate with 1 6.5 mm central compression screw and 4 peripheral 4.75 mm locking screws    Specimens: * No orders in the log *    Brief Operative Indication:  Mr. Cam sustained a three-part right proximal humerus fracture in a fall.  I had previously been treating him for an associated rotator cuff tear in the same shoulder.  We discussed surgical and nonsurgical treatment of the fracture.  He ultimately decided to move forward with the arthroplasty in the hope of addressing both issues with 1 surgery.  The risks, benefits, and alternatives to a reverse total shoulder arthroplasty were discussed with the patient in detail.  He  acknowledged understanding of this information and consented to proceed with the arthroplasty.    Description of the procedure in detail:  The patient and operative site were identified in the preoperative holding area.  The surgical site was marked.  Adequate regional anesthesia of the right upper extremity was administered.  The patient was then taken to the operating room. He was placed in the supine position.  Adequate general anesthesia was administered. He was then repositioned on the operating table in the modified beach chair position.  The head and neck were placed in neutral alignment.  All bony prominences were carefully padded and protected.  Once we had the patient appropriately positioned, a timeout was taken and preoperative antibiotics administered.  A gram of trans-examic acid was administered at this time as well.  The right upper extremity was then prepped and draped in the standard, sterile fashion.  The extremity was cleaned with an alcohol solution.  A Hibiclens scrub was performed and then the extremity was prepped with 2 ChloraPrep preps.  I allowed those to dry for 3 minutes before the draping procedure was carried out.    I began the procedure itself by fashioning a standard 8 cm incision over the anterior aspect of the shoulder.  Full-thickness medial and lateral skin flaps were developed.  I then carefully developed the interval between the deltoid and pectoralis.  The cephalic vein was dissected out and identified.  This structure was kept protected throughout the duration of the case.  The underlying clavipectoral fascia was divided and the strap muscles retracted medially.  There was a large fracture hematoma which was evacuated.  This was a 4-part fracture and both the greater and lesser tuberosities were separate fragments.  The head segment was removed.  This was a high fracture through the head and I used the oscillating saw to freshen the cut.  The tuberosities were tagged with #5  max braid suture    I then directed my attention to the glenoid.  The axillary nerve was identified and protected.  Anterior, posterior and inferior glenoid retractors were carefully positioned.  The long head of the biceps was released from its attachment to the supraglenoid tubercle.  The frayed intra-articular portion of biceps was removed and the remaining biceps was tenodesed to the pectoralis tendon using multiple #2 max braid sutures.  Having completed the tenodesis, the glenoid sided preparations were carried out.      The centering guide for the baseplate was inserted.  The center pin was drilled, taking care to maintain appropriate inferior tilt.  I elected to use a small augmented baseplate to allow inferior tilt with a minimum of reaming.  Once the reaming preparations of the glenoid was completed, the small augmented baseplate was impacted.  It seated well.  This was secured with the central compression screw which got excellent purchase followed by all 4 locking screws which were confirmed to lock into the plate.  I trialed with a 40+3 offset glenosphere.  It fit well but I did have to adjust the offset to allow for adequate inferior overhang to hopefully minimize the risk of notching.  The trial component was removed and the final component impacted.  I made sure that the Luo taper was fully engaged by passing a right angle clamp circumferentially around the component and pulling up on that.  When doing so, the entire scapula seemed to move.  I was satisfied that this was well fixed and well positioned.  I then directed my attention to the humerus.    The humerus was reamed and broached up to an 12 fracture broach.  I elected to go with a 12 fracture stem.  There was enough metaphyseal bone remaining that cementation was deemed unnecessary and I was able to get a good press-fit.  The implant was impacted, taking care to maintain appropriate retroversion.  The implant seated well and got good  fixation.  Next, the tuberosities were repaired to the implant using the previously placed #5 max braid sutures.  Finally I directed my attention to the trialing process.    Multiple trial implants were inserted.  The +3 offset, standard thickness trial tray with a +3 thickness liner trial fit best.  It restored excellent motion and stability without impingement.  The trial component was removed.  The final component was impacted.  It seated well.  I made sure that the Luo taper was fully engaged.  The shoulder was reduced and carried through range of motion.  Again, the shoulder demonstrated excellent motion without any impingement or instability.  There was good tension in the periarticular soft tissue structures and the shoulder demonstrated full fluid motion.      The wound was irrigated out with 500 cc of a Betadine-containing saline solution followed by 3 L of sterile saline via pulsatile lavage.  I made sure that we had good hemostasis.  The wound was then sequentially closed in a layered fashion using #2 Vicryl for the subcutaneous tissues, a running subcuticular Monocryl stitch for the skin followed by Steri-Strips.  Sterile dressings were applied.  The drapes were withdrawn.  The arm was placed in a shoulder immobilizer.  Mr. Cam was awakened and taken to the recovery room in good condition. He tolerated the procedure well.    Noah Salcedo MD  06/11/24

## 2024-06-11 NOTE — ANESTHESIA PREPROCEDURE EVALUATION
Anesthesia Evaluation     NPO Solid Status: > 8 hours             Airway   Mallampati: III  TM distance: >3 FB  Neck ROM: full  No difficulty expected  Dental - normal exam     Pulmonary    (+) pulmonary embolism, asthma,shortness of breath, sleep apnea  Cardiovascular     PT is on anticoagulation therapy    (+) hypertension, CHF , DUGGAN, DVT, hyperlipidemia      Neuro/Psych  (+) dizziness/light headedness, numbness, psychiatric history Anxiety and Depression  GI/Hepatic/Renal/Endo    (+) obesity, morbid obesity, GERD, renal disease-, diabetes mellitus type 2, thyroid problem     Musculoskeletal     (+) myalgias  Abdominal    Substance History      OB/GYN          Other   arthritis,                 Anesthesia Plan    ASA 3     general with block     intravenous induction     Anesthetic plan, risks, benefits, and alternatives have been provided, discussed and informed consent has been obtained with: patient and spouse/significant other.    CODE STATUS:

## 2024-06-11 NOTE — ANESTHESIA POSTPROCEDURE EVALUATION
Patient: Barry Cam    Procedure Summary       Date: 06/11/24 Room / Location: [facility] OR  / [facility] MAIN OR    Anesthesia Start: 1556 Anesthesia Stop: 1756    Procedure: Right Reverse Total Shoulder Arthroplasty (Right: Shoulder) Diagnosis:       Fracture of head of right humerus      (Fracture of head of right humerus [S42.291A])    Surgeons: Noah Salcedo MD Provider: Ac Duran MD    Anesthesia Type: general with block ASA Status: 3            Anesthesia Type: general with block    Vitals  Vitals Value Taken Time   /69 06/11/24 1830   Temp 36.4 °C (97.6 °F) 06/11/24 1752   Pulse 70 06/11/24 1845   Resp 14 06/11/24 1830   SpO2 90 % 06/11/24 1845   Vitals shown include unfiled device data.        Post Anesthesia Care and Evaluation    Patient location during evaluation: PACU  Patient participation: complete - patient participated  Level of consciousness: awake and alert  Pain management: adequate    Airway patency: patent  Anesthetic complications: No anesthetic complications  PONV Status: controlled  Cardiovascular status: acceptable and hemodynamically stable  Respiratory status: acceptable  Hydration status: acceptable    Comments: /69 (BP Location: Left arm, Patient Position: Lying)   Pulse 72   Temp 36.4 °C (97.6 °F) (Oral)   Resp 14   SpO2 (!) 89%

## 2024-06-11 NOTE — ANESTHESIA PROCEDURE NOTES
Peripheral Block      Patient reassessed immediately prior to procedure    Patient location during procedure: holding area  Stop time: 6/11/2024 3:50 PM  Reason for block: at surgeon's request and post-op pain management  Performed by  Anesthesiologist: Diana Beebe MD  Preanesthetic Checklist  Completed: patient identified, IV checked, site marked, risks and benefits discussed, surgical consent, monitors and equipment checked, pre-op evaluation and timeout performed  Prep:  Pt Position: sitting  Sterile barriers:gloves  Prep: ChloraPrep  Patient monitoring: continuous pulse oximetry, blood pressure monitoring and EKG  Procedure    Sedation: yes  Performed under: PNB  Guidance:ultrasound guided    ULTRASOUND INTERPRETATION.  Using ultrasound guidance a 20 G gauge needle was placed in close proximity to the brachial plexus nerve, at which point, under ultrasound guidance anesthetic was injected in the area of the nerve and spread of the anesthesia was seen on ultrasound in close proximity thereto.  There were no abnormalities seen on ultrasound; a digital image was taken; and the patient tolerated the procedure with no complications. Images:still images obtained, printed/placed on chart    Laterality:right  Block Type:interscalene  Injection Technique:single-shot  Needle Type:echogenic  Needle Gauge:20 G  Resistance on Injection: none  Catheter Size:20 G    Medications Used: bupivacaine liposome (EXPAREL) 1.3 % injection - Infiltration   10 mL - 6/11/2024 3:50:00 PM  bupivacaine PF (MARCAINE) 0.25 % injection - Injection   7 mL - 6/11/2024 3:50:00 PM      Medications  Comment:Ultrasound guidance was used to view and verify medication disbursement.  Ultrasound guidance was used for needle placement.    Post Assessment  Injection Assessment: negative aspiration for heme, no paresthesia on injection and incremental injection  Patient Tolerance:comfortable throughout block  Complications:no  Additional  Notes  Ultrasound guidance was used to guide needle placement, as well as to view and verify medication disbursement.   Performed by: Diana Beebe MD

## 2024-06-11 NOTE — PLAN OF CARE
The pt was evaluated in pre-op prior to his OP reverse total shoulder arthroplasty. He was blocked and ready for his procedure. Thorough education provided to the pt on his HEP, sling management and precautions. He does not use any AD and his wife will assist him with ADL's as needed. Will see tomorrow if the pt is admitted.

## 2024-06-11 NOTE — ANESTHESIA PROCEDURE NOTES
Airway  Urgency: elective    Date/Time: 6/11/2024 4:13 PM  Airway not difficult    General Information and Staff    Patient location during procedure: OR    Indications and Patient Condition  Indications for airway management: airway protection    Preoxygenated: yes  MILS not maintained throughout  Mask difficulty assessment: 3 - difficult mask (inadequate, unstable or two providers) +/- NMBA    Final Airway Details  Final airway type: endotracheal airway      Successful airway: ETT  Cuffed: yes   Successful intubation technique: direct laryngoscopy  Endotracheal tube insertion site: oral  Blade: Matias  Blade size: 4  ETT size (mm): 7.5  Cormack-Lehane Classification: grade IIb - view of arytenoids or posterior of glottis only  Placement verified by: capnometry   Cuff volume (mL): 10  Measured from: lips  ETT/EBT  to lips (cm): 23  Number of attempts at approach: 1  Assessment: lips, teeth, and gum same as pre-op and atraumatic intubation

## 2024-06-12 VITALS
WEIGHT: 315 LBS | TEMPERATURE: 97.9 F | OXYGEN SATURATION: 98 % | HEART RATE: 68 BPM | HEIGHT: 73 IN | DIASTOLIC BLOOD PRESSURE: 63 MMHG | BODY MASS INDEX: 41.75 KG/M2 | RESPIRATION RATE: 17 BRPM | SYSTOLIC BLOOD PRESSURE: 133 MMHG

## 2024-06-12 PROCEDURE — 97535 SELF CARE MNGMENT TRAINING: CPT

## 2024-06-12 PROCEDURE — 25010000002 CEFAZOLIN 3 G RECONSTITUTED SOLUTION 1 EACH VIAL: Performed by: ORTHOPAEDIC SURGERY

## 2024-06-12 PROCEDURE — 97110 THERAPEUTIC EXERCISES: CPT

## 2024-06-12 RX ORDER — DIPHENOXYLATE HYDROCHLORIDE AND ATROPINE SULFATE 2.5; .025 MG/1; MG/1
1 TABLET ORAL DAILY
Status: DISCONTINUED | OUTPATIENT
Start: 2024-06-12 | End: 2024-06-12 | Stop reason: HOSPADM

## 2024-06-12 RX ORDER — GABAPENTIN 300 MG/1
600 CAPSULE ORAL 4 TIMES DAILY
Status: DISCONTINUED | OUTPATIENT
Start: 2024-06-12 | End: 2024-06-12 | Stop reason: HOSPADM

## 2024-06-12 RX ORDER — PANTOPRAZOLE SODIUM 40 MG/1
40 TABLET, DELAYED RELEASE ORAL
Status: DISCONTINUED | OUTPATIENT
Start: 2024-06-12 | End: 2024-06-12 | Stop reason: HOSPADM

## 2024-06-12 RX ORDER — FERROUS SULFATE 325(65) MG
325 TABLET ORAL
Status: DISCONTINUED | OUTPATIENT
Start: 2024-06-12 | End: 2024-06-12 | Stop reason: HOSPADM

## 2024-06-12 RX ORDER — LANOLIN ALCOHOL/MO/W.PET/CERES
50 CREAM (GRAM) TOPICAL DAILY
Status: DISCONTINUED | OUTPATIENT
Start: 2024-06-12 | End: 2024-06-12 | Stop reason: HOSPADM

## 2024-06-12 RX ORDER — GLIPIZIDE 10 MG/1
10 TABLET ORAL
Status: DISCONTINUED | OUTPATIENT
Start: 2024-06-12 | End: 2024-06-12 | Stop reason: HOSPADM

## 2024-06-12 RX ORDER — CETIRIZINE HYDROCHLORIDE 10 MG/1
10 TABLET ORAL DAILY
Status: DISCONTINUED | OUTPATIENT
Start: 2024-06-12 | End: 2024-06-12 | Stop reason: HOSPADM

## 2024-06-12 RX ORDER — CEPHALEXIN 500 MG/1
500 CAPSULE ORAL ONCE
Status: DISCONTINUED | OUTPATIENT
Start: 2024-06-12 | End: 2024-06-12 | Stop reason: HOSPADM

## 2024-06-12 RX ORDER — MULTIPLE VITAMINS W/ MINERALS TAB 9MG-400MCG
1 TAB ORAL 2 TIMES DAILY
Status: DISCONTINUED | OUTPATIENT
Start: 2024-06-12 | End: 2024-06-12 | Stop reason: SDUPTHER

## 2024-06-12 RX ORDER — LEVOTHYROXINE SODIUM 0.03 MG/1
25 TABLET ORAL DAILY
Status: DISCONTINUED | OUTPATIENT
Start: 2024-06-12 | End: 2024-06-12 | Stop reason: HOSPADM

## 2024-06-12 RX ORDER — DIPHENHYDRAMINE HCL 25 MG
25 CAPSULE ORAL EVERY 6 HOURS PRN
Status: DISCONTINUED | OUTPATIENT
Start: 2024-06-12 | End: 2024-06-12 | Stop reason: HOSPADM

## 2024-06-12 RX ORDER — LOSARTAN POTASSIUM 100 MG/1
100 TABLET ORAL DAILY
Status: DISCONTINUED | OUTPATIENT
Start: 2024-06-12 | End: 2024-06-12 | Stop reason: HOSPADM

## 2024-06-12 RX ORDER — HYDROCHLOROTHIAZIDE 12.5 MG/1
12.5 TABLET ORAL DAILY
Status: DISCONTINUED | OUTPATIENT
Start: 2024-06-12 | End: 2024-06-12 | Stop reason: HOSPADM

## 2024-06-12 RX ORDER — ALBUTEROL SULFATE 2.5 MG/3ML
2.5 SOLUTION RESPIRATORY (INHALATION) EVERY 6 HOURS PRN
Status: DISCONTINUED | OUTPATIENT
Start: 2024-06-12 | End: 2024-06-12 | Stop reason: HOSPADM

## 2024-06-12 RX ORDER — PRAVASTATIN SODIUM 40 MG
40 TABLET ORAL DAILY
Status: DISCONTINUED | OUTPATIENT
Start: 2024-06-12 | End: 2024-06-12 | Stop reason: HOSPADM

## 2024-06-12 RX ORDER — AMLODIPINE BESYLATE 10 MG/1
10 TABLET ORAL NIGHTLY
Status: DISCONTINUED | OUTPATIENT
Start: 2024-06-12 | End: 2024-06-12 | Stop reason: HOSPADM

## 2024-06-12 RX ORDER — UREA 10 %
10 LOTION (ML) TOPICAL NIGHTLY
Status: DISCONTINUED | OUTPATIENT
Start: 2024-06-12 | End: 2024-06-12 | Stop reason: HOSPADM

## 2024-06-12 RX ADMIN — OXYCODONE AND ACETAMINOPHEN 1 TABLET: 7.5; 325 TABLET ORAL at 10:02

## 2024-06-12 RX ADMIN — SODIUM CHLORIDE 3 G: 900 INJECTION INTRAVENOUS at 08:36

## 2024-06-12 RX ADMIN — OXYCODONE AND ACETAMINOPHEN 1 TABLET: 7.5; 325 TABLET ORAL at 05:45

## 2024-06-12 NOTE — PLAN OF CARE
The pt was admitted after his total shoulder arthroplasty last night. He was pleasant and agreeable to OT this morning. Min A provided for total body dressing and sling management. He was able to participate in his post op total shoulder HEP without difficulty. Mild pain reported. He was d/c home post session.     Anticipated Discharge Disposition (OT): home with assist, home with outpatient therapy services

## 2024-06-12 NOTE — PLAN OF CARE
Problem: Adult Inpatient Plan of Care  Goal: Readiness for Transition of Care  Intervention: Mutually Develop Transition Plan  Recent Flowsheet Documentation  Taken 6/11/2024 2229 by Yovana Bonilla, RN  Transportation Anticipated: family or friend will provide  Patient/Family Anticipated Services at Transition: none  Patient/Family Anticipates Transition to: home with family  Taken 6/11/2024 2223 by Yovana Bonilla, RN  Equipment Currently Used at Home:   cpap   glucometer   Goal Outcome Evaluation:

## 2024-06-12 NOTE — THERAPY TREATMENT NOTE
Patient Name: Barry Cam  : 1950    MRN: [id number]                              Today's Date: 2024       Admit Date: 2024    Visit Dx:     ICD-10-CM ICD-9-CM   1. S/P reverse total shoulder arthroplasty, right  Z96.611 V43.61   2. Fracture of head of right humerus  S42.291A 812.09     Patient Active Problem List   Diagnosis    Seasonal allergic rhinitis    Type 2 diabetes mellitus with hyperglycemia, without long-term current use of insulin    DUGGAN (dyspnea on exertion)    Hypersomnia due to medical condition    Fatigue    Hyperlipidemia    Obstructive sleep apnea syndrome treated with auto CPAP    Gastroesophageal reflux disease    Calculus of kidney    Diabetic neuropathy, painful    Acute deep vein thrombosis (DVT) of distal vein of right lower extremity    History of pulmonary embolism    Chronic deep vein thrombosis (DVT) of distal vein of right lower extremity    Class 3 severe obesity due to excess calories with serious comorbidity and body mass index (BMI) of 40.0 to 44.9 in adult    Thyroid disease    Hypertension    Vertigo    Arthritis of knee    Total knee replacement status, left    Proximal humerus fracture    Fracture of head of right humerus    Surgery, elective     Past Medical History:   Diagnosis Date    Allergic rhinitis     Anxiety     Arthritis     Serious    Arthritis of back     Asthma 2018    PE Blood Clots    CHF (congestive heart failure) 2021    Clotting disorder 2018    Eliquis Related    CTS (carpal tunnel syndrome)     Deep vein thrombosis 2018    CHRONIC    Depression     Diabetes mellitus     Diverticulosis     Mild    Dyspnea on exertion     Fibromyalgia, primary     ?????    Fracture, fibula     Right    Fracture, humerus 2024    Fall    Fracture, tibia and fibula     GERD (gastroesophageal reflux disease)     History of hiatal hernia     History of kidney stones     HL (hearing loss)     Mild    Hx of blood clots      Hyperlipidemia     Hypertension     Hypothyroidism     Irritable bowel syndrome     Mild    Kidney stone     Ongoing    Low back pain     Never Ending    Low back strain     Narcolepsy     Neuropathy     HANDS AND FEET    Obesity     Can't Win    Pulmonary embolism     Right shoulder pain     Rotator cuff syndrome     Sciatica     Sleep apnea     CPAP USED    Tear of meniscus of knee     Left    Umbilical hernia     Visual impairment     Diabetes Related     Past Surgical History:   Procedure Laterality Date    ANKLE OPEN REDUCTION INTERNAL FIXATION      CYSTOSCOPY W/ LASER LITHOTRIPSY  07/20/2023    EYE SURGERY      Cataract Removal    HERNIA REPAIR      HERNIA REPAIR      INGUINAL HERNIA REPAIR      JOINT REPLACEMENT Left 03/02/2023    TONSILLECTOMY      TOTAL KNEE ARTHROPLASTY Left 03/02/2023    Procedure: TOTAL KNEE ARTHROPLASTY;  Surgeon: Noah Salcedo MD;  Location: [facility];  Service: Orthopedics;  Laterality: Left;    TOTAL SHOULDER ARTHROPLASTY W/ DISTAL CLAVICLE EXCISION Right 6/11/2024    Procedure: Right Reverse Total Shoulder Arthroplasty;  Surgeon: Noah Salcedo MD;  Location: [facility];  Service: Orthopedics;  Laterality: Right;      General Information       Row Name 06/12/24 1442          OT Time and Intention    Document Type therapy note (daily note)  -RB     Mode of Treatment individual therapy;occupational therapy  -RB       Row Name 06/12/24 1442          Cognition    Orientation Status (Cognition) oriented x 3  -RB               User Key  (r) = Recorded By, (t) = Taken By, (c) = Cosigned By      Initials Name Provider Type    RB Alma Mcdaniels OT Occupational Therapist                     Mobility/ADL's       Row Name 06/12/24 1442          Bed Mobility    Bed Mobility supine-sit  -RB     Supine-Sit [place] (Bed Mobility) modified independence  -RB       Row Name 06/12/24 1442          Transfers    Transfers sit-stand transfer;stand-sit transfer;toilet transfer  -RB        Row Name 06/12/24 1442          Sit-Stand Transfer    Sit-Stand [place] (Transfers) modified independence  -RB       Row Name 06/12/24 1442          Stand-Sit Transfer    Stand-Sit [place] (Transfers) modified independence  -RB       Row Name 06/12/24 1442          Toilet Transfer    [place] Level (Toilet Transfer) modified independence  -RB       Row Name 06/12/24 1442          Functional Mobility    Functional Mobility- Ind. Level independent  -RB       Row Name 06/12/24 1442          Activities of Daily Living    BADL Assessment/Intervention lower body dressing;upper body dressing  -RB       Row Name 06/12/24 1442          Mobility    Extremity Weight-bearing Status right upper extremity  -RB     Right Upper Extremity (Weight-bearing Status) non weight-bearing (NWB)  -RB       Row Name 06/12/24 1442          Lower Body Dressing Assessment/Training    [place] Level (Lower Body Dressing) lower body dressing skills;minimum assist (75% patient effort)  -RB     Position (Lower Body Dressing) supported sitting  -RB       Row Name 06/12/24 1442          Upper Body Dressing Assessment/Training    [place] Level (Upper Body Dressing) upper body dressing skills;minimum assist (75% patient effort);verbal cues  -RB     Position (Upper Body Dressing) supported sitting  -RB               User Key  (r) = Recorded By, (t) = Taken By, (c) = Cosigned By      Initials Name Provider Type    RB Alma Mcdaniels OT Occupational Therapist                   Obj/Interventions       Row Name 06/12/24 1441          Sensory Assessment (Somatosensory)    Sensory Assessment (Somatosensory) sensation intact  -RB       Row Name 06/12/24 1441          Shoulder (Therapeutic Exercise)    Shoulder (Therapeutic Exercise) pendulum exercises  -RB     Shoulder Pendulum Exercises (Therapeutic Exercise) right;standing;30 seconds duration  -RB       [facility] Name 06/12/24 1441          Elbow/Forearm (Therapeutic Exercise)     Elbow/Forearm (Therapeutic Exercise) AROM (active range of motion)  -RB     Elbow/Forearm AROM (Therapeutic Exercise) right;flexion;extension;supination;pronation;sitting;10 repetitions  -RB       Row Name 06/12/24 1441          Wrist (Therapeutic Exercise)    Wrist (Therapeutic Exercise) AROM (active range of motion)  -RB     Wrist AROM (Therapeutic Exercise) right;flexion;extension;10 repetitions  -RB       Row Name 06/12/24 1441          Hand (Therapeutic Exercise)    Hand (Therapeutic Exercise) AROM (active range of motion)  -RB     Hand AROM/AAROM (Therapeutic Exercise) right;AROM (active range of motion);finger flexion;finger extension;10 repetitions  -RB       Row Name 06/12/24 1441          Motor Skills    Therapeutic Exercise shoulder;elbow/forearm;wrist;hand  -RB       Row Name 06/12/24 1441          Balance    Comment, Balance Independent up in his room. no balance deficits  -RB               User Key  (r) = Recorded By, (t) = Taken By, (c) = Cosigned By      Initials Name Provider Type    RB Alma Mcdaniels, ODIN Occupational Therapist                   Goals/Plan    No documentation.                  Clinical Impression       Row Name 06/12/24 1440          Pain Assessment    Pretreatment Pain Rating 0/10 - no pain  -RB     Posttreatment Pain Rating 0/10 - no pain  -RB       Row Name 06/12/24 1440          Plan of Care Review    Plan of Care Reviewed With patient  -RB     Progress improving  -RB     Outcome Evaluation The pt was admitted after his total shoulder arthroplasty last night. He was pleasant and agreeable to OT this morning. Min A provided for total body dressing and sling management. He was able to participate in his post op total shoulder HEP without difficulty. Mild pain reported. He was d/c home post session.     Anticipated Discharge Disposition (OT): home with assist, home with outpatient therapy services  -RB       Row Name 06/12/24 1443          Therapy Assessment/Plan (OT)    Rehab  Potential (OT) good, to achieve stated therapy goals  -RB     Criteria for Skilled Therapeutic Interventions Met (OT) yes;skilled treatment is necessary  -RB     Therapy Frequency (OT) 5 times/wk  -RB       Row Name 06/12/24 1440          Therapy Plan Review/Discharge Plan (OT)    Anticipated Discharge Disposition (OT) home with assist;home with outpatient therapy services  -RB       Row Name 06/12/24 1440          Positioning and Restraints    Pre-Treatment Position in bed  -RB     Post Treatment Position chair  -RB     In Chair notified nsg;sitting;call light within reach;encouraged to call for assist;with nsg  -RB               User Key  (r) = Recorded By, (t) = Taken By, (c) = Cosigned By      Initials Name Provider Type    Alma Rangel OT Occupational Therapist                   Outcome Measures       Row Name 06/12/24 0836          How much help from another person do you currently need...    Turning from your back to your side while in flat bed without using bedrails? 3  -DAVID     Moving from lying on back to sitting on the side of a flat bed without bedrails? 3  -DAVID     Moving to and from a bed to a chair (including a wheelchair)? 3  -DAVID     Standing up from a chair using your arms (e.g., wheelchair, bedside chair)? 3  -DAVID     Climbing 3-5 steps with a railing? 4  -DAVID     To walk in hospital room? 4  -DAVID     AM-PAC 6 Clicks Score (PT) 20  -DAVID     Highest Level of Mobility Goal 6 --> Walk 10 steps or more  -DAVID               User Key  (r) = Recorded By, (t) = Taken By, (c) = Cosigned By      Initials Name Provider Type    Gabriel Reaves RN Registered Nurse                    Occupational Therapy Education       Title: PT OT SLP Therapies (Resolved)       Topic: Occupational Therapy (Resolved)       Point: ADL training (Resolved)       Description:   Instruct learner(s) on proper safety adaptation and remediation techniques during self care or transfers.   Instruct in proper use of assistive devices.                   Learning Progress Summary             Patient Acceptance, E,TB,D, DU,VU by RB at 6/11/2024 1613    Comment: Education provided to the pt and his spouse at bedside on his HEP, precautions, sling mangement and ADL participation                         Point: Home exercise program (Resolved)       Description:   Instruct learner(s) on appropriate technique for monitoring, assisting and/or progressing therapeutic exercises/activities.                  Learning Progress Summary             Patient Acceptance, E,TB,D, DU,VU by RB at 6/11/2024 1613    Comment: Education provided to the pt and his spouse at bedside on his HEP, precautions, sling mangement and ADL participation                         Point: Precautions (Resolved)       Description:   Instruct learner(s) on prescribed precautions during self-care and functional transfers.                  Learning Progress Summary             Patient Acceptance, E,TB,D, DU,VU by RB at 6/11/2024 1613    Comment: Education provided to the pt and his spouse at bedside on his HEP, precautions, sling mangement and ADL participation                         Point: Body mechanics (Resolved)       Description:   Instruct learner(s) on proper positioning and spine alignment during self-care, functional mobility activities and/or exercises.                  Learning Progress Summary             Patient Acceptance, E,TB,D, DU,VU by RB at 6/11/2024 1613    Comment: Education provided to the pt and his spouse at bedside on his HEP, precautions, sling mangement and ADL participation                                         User Key       Initials Effective Dates Name Provider Type Discipline    DEBROAH 06/16/21 -  Alma Mcdaniels OT Occupational Therapist OT                  OT Recommendation and Plan  Planned Therapy Interventions (OT): transfer/mobility retraining, strengthening exercise, patient/caregiver education/training, BADL retraining, functional balance retraining,  occupation/activity based interventions  Therapy Frequency (OT): 5 times/wk  Plan of Care Review  Plan of Care Reviewed With: patient  Progress: improving  Outcome Evaluation: The pt was admitted after his total shoulder arthroplasty last night. He was pleasant and agreeable to OT this morning. Min A provided for total body dressing and sling management. He was able to participate in his post op total shoulder HEP without difficulty. Mild pain reported. He was d/c home post session.     Anticipated Discharge Disposition (OT): home with assist, home with outpatient therapy services     Time Calculation:         Time Calculation- OT       Row Name 06/12/24 1438             Time Calculation- OT    OT Start Time 0843  -RB      OT Stop Time 0906  -RB      OT Time Calculation (min) 23 min  -RB      Total Timed Code Minutes- OT 23 minute(s)  -RB      OT Received On 06/12/24  -RB      OT - Next Appointment 06/13/24  -RB         Timed Charges    94381 - OT Therapeutic Exercise Minutes 10  -RB      94582 - OT Self Care/Mgmt Minutes 13  -RB         Total Minutes    Timed Charges Total Minutes 23  -RB       Total Minutes 23  -RB                User Key  (r) = Recorded By, (t) = Taken By, (c) = Cosigned By      Initials Name Provider Type    RB Alma Mcdaniels OT Occupational Therapist                  Therapy Charges for Today       Code Description Service Date Service Provider Modifiers Qty    49124597533 HC OT EVAL LOW COMPLEXITY 3 6/11/2024 Alma Mcdaniels OT GO 1    13181545041  OT THER PROC EA 15 MIN 6/12/2024 Alma Mcdaniels OT GO 1    13176895248 HC OT SELF CARE/MGMT/TRAIN EA 15 MIN 6/12/2024 Alma Mcdaniels OT GO 1                 Alma Mcdaniels OT  6/12/2024

## 2024-06-12 NOTE — PROGRESS NOTES
Orthopedic Progress Note      Patient: Barry Cam    YOB: 1950    Medical Record Number: [id number]    Attending Physician: Noah Salcedo MD    Date of Admission: 6/11/2024  2:07 PM    Admitting Dx:  Fracture of head of right humerus [S42.291A]  Proximal humerus fracture [S42.209A]  Surgery, elective [Z41.9]    Status Post: ME ARTHROPLASTY GLENOHUMERAL JOINT TOTAL SHOULDER [97621] (Right Reverse Total Shoulder Arthroplasty)    Post Operative Day Number: 1    Current Problem List:   Fracture of head of right humerus    Proximal humerus fracture    Surgery, elective      Past Medical History:   Diagnosis Date    Allergic rhinitis     Anxiety     Arthritis     Serious    Arthritis of back     Asthma 06-    PE Blood Clots    CHF (congestive heart failure) 4-1-2021    Clotting disorder 06-    Eliquis Related    CTS (carpal tunnel syndrome)     Deep vein thrombosis 06-    CHRONIC    Depression     Diabetes mellitus     Diverticulosis     Mild    Dyspnea on exertion     Fibromyalgia, primary     ?????    Fracture, fibula     Right    Fracture, humerus 5/29/2024    Fall    Fracture, tibia and fibula     GERD (gastroesophageal reflux disease)     History of hiatal hernia     History of kidney stones     HL (hearing loss)     Mild    Hx of blood clots     Hyperlipidemia     Hypertension     Hypothyroidism     Irritable bowel syndrome     Mild    Kidney stone     Ongoing    Low back pain     Never Ending    Low back strain     Narcolepsy     Neuropathy     HANDS AND FEET    Obesity     Can't Win    Pulmonary embolism     Right shoulder pain     Rotator cuff syndrome     Sciatica     Sleep apnea     CPAP USED    Tear of meniscus of knee     Left    Umbilical hernia     Visual impairment     Diabetes Related       Current Medications:  Scheduled Meds:ethyl alcohol, 2 Swab, Nasal, Q12H      PRN Meds:.  ondansetron    ondansetron ODT    oxyCODONE-acetaminophen     "oxyCODONE-acetaminophen    SUBJECTIVE: 74 y.o.  male alert.  No complaints    OBJECTIVE:   Vitals:    06/11/24 2100 06/11/24 2205 06/12/24 0240 06/12/24 0511   BP: 146/70 170/79 136/66 133/63   BP Location: Left arm Left arm Left arm Left arm   Patient Position: Lying Lying Lying Lying   Pulse: 66 83 66 68   Resp: 16 20 17 17   Temp:  97.3 °F (36.3 °C) 98.4 °F (36.9 °C) 97.9 °F (36.6 °C)   TempSrc:  Oral Oral Oral   SpO2: 91% 93% 98% 98%   Weight:  (!) 155 kg (341 lb 14.9 oz)     Height:  185.4 cm (73\")       I/O last 3 completed shifts:  In: 840 [P.O.:240; I.V.:600]  Out: -     Diagnostic Tests:  Lab Results (last 72 hours)       Procedure Component Value Units Date/Time    Hemoglobin & Hematocrit, Blood [[id number]]  (Abnormal) Collected: 06/11/24 2306    Specimen: Blood Updated: 06/11/24 2321     Hemoglobin 11.6 g/dL      Hematocrit 34.6 %     POC Glucose Once [[id number]]  (Normal) Collected: 06/11/24 1444    Specimen: Blood Updated: 06/11/24 1445     Glucose 127 mg/dL              PHYSICAL EXAM: Right anterior shoulder dressing remains dry and intact.  Does have moderate amount of old bruising noted to his upper arm.  Has good motion sensation to his hands and fingers.  Radial and axillary nerve are intact.  Minimal pain.  Vital signs remained stable.  Hemoglobin is 11.6.  Patient is complaining of some mild numbness or tingling in his left fifth finger.  States that he has had it over a year.  He had discussed with Dr. Salcedo regarding the numbness in his left fifth finger in preop yesterday and states that he would evaluate it further at the time of his next office visit     ASSESSMENT & PLAN:  IN home.  Patient to start outpatient PT next week.  Return to the office in 2 weeks to see Dr. Salcedo        Date: 6/12/2024    Faina Amaya RN              "

## 2024-06-13 ENCOUNTER — TELEPHONE (OUTPATIENT)
Dept: ORTHOPEDIC SURGERY | Facility: HOSPITAL | Age: 74
End: 2024-06-13
Payer: MEDICARE

## 2024-06-13 NOTE — TELEPHONE ENCOUNTER
Attempted to reach Mr. Cam to see how he is doing as he is POD 2 RTSRA. Message left at this time.

## 2024-06-13 NOTE — CASE MANAGEMENT/SOCIAL WORK
Case Management Discharge Note      Final Note: dc home         Selected Continued Care - Discharged on 6/12/2024 Admission date: 6/11/2024 - Discharge disposition: Home or Self Care      Destination    No services have been selected for the patient.                Durable Medical Equipment    No services have been selected for the patient.                Dialysis/Infusion    No services have been selected for the patient.                Home Medical Care    No services have been selected for the patient.                Therapy    No services have been selected for the patient.                Community Resources    No services have been selected for the patient.                Community & DME    No services have been selected for the patient.                    Selected Continued Care - Episodes Includes continued care and service providers with selected services from the active episodes listed below      High Risk Care Management Episode start date: 5/30/2024   There are no active outsourced providers for this episode.                 Transportation Services  Private: Car    Final Discharge Disposition Code: 01 - home or self-care

## 2024-06-20 DIAGNOSIS — M17.10 ARTHRITIS OF KNEE: ICD-10-CM

## 2024-06-20 DIAGNOSIS — E78.2 MIXED HYPERLIPIDEMIA: ICD-10-CM

## 2024-06-20 DIAGNOSIS — E07.9 THYROID DISEASE: ICD-10-CM

## 2024-06-20 DIAGNOSIS — E11.9 TYPE 2 DIABETES MELLITUS WITHOUT COMPLICATION, WITHOUT LONG-TERM CURRENT USE OF INSULIN: Primary | ICD-10-CM

## 2024-06-20 DIAGNOSIS — I10 HYPERTENSION, UNSPECIFIED TYPE: Primary | ICD-10-CM

## 2024-06-20 DIAGNOSIS — E11.9 TYPE 2 DIABETES MELLITUS WITHOUT COMPLICATION, WITHOUT LONG-TERM CURRENT USE OF INSULIN: ICD-10-CM

## 2024-06-20 DIAGNOSIS — I10 HYPERTENSION, UNSPECIFIED TYPE: ICD-10-CM

## 2024-06-20 DIAGNOSIS — E66.01 CLASS 3 SEVERE OBESITY DUE TO EXCESS CALORIES WITH SERIOUS COMORBIDITY AND BODY MASS INDEX (BMI) OF 40.0 TO 44.9 IN ADULT: ICD-10-CM

## 2024-06-21 LAB
ALBUMIN SERPL-MCNC: 4.2 G/DL (ref 3.8–4.8)
ALP SERPL-CCNC: 78 IU/L (ref 44–121)
ALT SERPL-CCNC: 10 IU/L (ref 0–44)
APPEARANCE UR: CLEAR
AST SERPL-CCNC: 16 IU/L (ref 0–40)
BACTERIA #/AREA URNS HPF: NORMAL /[HPF]
BASOPHILS # BLD AUTO: 0 X10E3/UL (ref 0–0.2)
BASOPHILS NFR BLD AUTO: 0 %
BILIRUB SERPL-MCNC: 0.5 MG/DL (ref 0–1.2)
BILIRUB UR QL STRIP: NEGATIVE
BUN SERPL-MCNC: 15 MG/DL (ref 8–27)
BUN/CREAT SERPL: 15 (ref 10–24)
CALCIUM SERPL-MCNC: 9.5 MG/DL (ref 8.6–10.2)
CASTS URNS QL MICRO: NORMAL /LPF
CHLORIDE SERPL-SCNC: 102 MMOL/L (ref 96–106)
CHOLEST SERPL-MCNC: 163 MG/DL (ref 100–199)
CO2 SERPL-SCNC: 23 MMOL/L (ref 20–29)
COLOR UR: YELLOW
CREAT SERPL-MCNC: 1.01 MG/DL (ref 0.76–1.27)
EGFRCR SERPLBLD CKD-EPI 2021: 79 ML/MIN/1.73
EOSINOPHIL # BLD AUTO: 0.2 X10E3/UL (ref 0–0.4)
EOSINOPHIL NFR BLD AUTO: 3 %
EPI CELLS #/AREA URNS HPF: NORMAL /HPF (ref 0–10)
ERYTHROCYTE [DISTWIDTH] IN BLOOD BY AUTOMATED COUNT: 13.1 % (ref 11.6–15.4)
GLOBULIN SER CALC-MCNC: 2.5 G/DL (ref 1.5–4.5)
GLUCOSE SERPL-MCNC: 149 MG/DL (ref 70–99)
GLUCOSE UR QL STRIP: NEGATIVE
HBA1C MFR BLD: 6.7 % (ref 4.8–5.6)
HCT VFR BLD AUTO: 34 % (ref 37.5–51)
HDLC SERPL-MCNC: 48 MG/DL
HGB BLD-MCNC: 11 G/DL (ref 13–17.7)
HGB UR QL STRIP: NEGATIVE
IMM GRANULOCYTES # BLD AUTO: 0 X10E3/UL (ref 0–0.1)
IMM GRANULOCYTES NFR BLD AUTO: 0 %
KETONES UR QL STRIP: NEGATIVE
LDLC SERPL CALC-MCNC: 75 MG/DL (ref 0–99)
LDLC/HDLC SERPL: 1.6 RATIO (ref 0–3.6)
LEUKOCYTE ESTERASE UR QL STRIP: NEGATIVE
LYMPHOCYTES # BLD AUTO: 1.3 X10E3/UL (ref 0.7–3.1)
LYMPHOCYTES NFR BLD AUTO: 17 %
MCH RBC QN AUTO: 30.6 PG (ref 26.6–33)
MCHC RBC AUTO-ENTMCNC: 32.4 G/DL (ref 31.5–35.7)
MCV RBC AUTO: 94 FL (ref 79–97)
MICRO URNS: NORMAL
MICRO URNS: NORMAL
MONOCYTES # BLD AUTO: 0.5 X10E3/UL (ref 0.1–0.9)
MONOCYTES NFR BLD AUTO: 7 %
NEUTROPHILS # BLD AUTO: 5.2 X10E3/UL (ref 1.4–7)
NEUTROPHILS NFR BLD AUTO: 73 %
NITRITE UR QL STRIP: NEGATIVE
PH UR STRIP: 5.5 [PH] (ref 5–7.5)
PLATELET # BLD AUTO: 216 X10E3/UL (ref 150–450)
POTASSIUM SERPL-SCNC: 3.7 MMOL/L (ref 3.5–5.2)
PROT SERPL-MCNC: 6.7 G/DL (ref 6–8.5)
PROT UR QL STRIP: NEGATIVE
RBC # BLD AUTO: 3.6 X10E6/UL (ref 4.14–5.8)
RBC #/AREA URNS HPF: NORMAL /HPF (ref 0–2)
SODIUM SERPL-SCNC: 141 MMOL/L (ref 134–144)
SP GR UR STRIP: 1.02 (ref 1–1.03)
TRIGL SERPL-MCNC: 243 MG/DL (ref 0–149)
TSH SERPL DL<=0.005 MIU/L-ACNC: 2.47 UIU/ML (ref 0.45–4.5)
UROBILINOGEN UR STRIP-MCNC: 0.2 MG/DL (ref 0.2–1)
VLDLC SERPL CALC-MCNC: 40 MG/DL (ref 5–40)
WBC # BLD AUTO: 7.3 X10E3/UL (ref 3.4–10.8)
WBC #/AREA URNS HPF: NORMAL /HPF (ref 0–5)

## 2024-06-26 ENCOUNTER — OFFICE VISIT (OUTPATIENT)
Dept: FAMILY MEDICINE CLINIC | Facility: CLINIC | Age: 74
End: 2024-06-26
Payer: MEDICARE

## 2024-06-26 VITALS
RESPIRATION RATE: 16 BRPM | DIASTOLIC BLOOD PRESSURE: 58 MMHG | HEIGHT: 73 IN | WEIGHT: 315 LBS | SYSTOLIC BLOOD PRESSURE: 122 MMHG | HEART RATE: 81 BPM | BODY MASS INDEX: 41.75 KG/M2 | OXYGEN SATURATION: 96 %

## 2024-06-26 DIAGNOSIS — D50.9 IRON DEFICIENCY ANEMIA, UNSPECIFIED IRON DEFICIENCY ANEMIA TYPE: ICD-10-CM

## 2024-06-26 DIAGNOSIS — Z12.5 SCREENING FOR PROSTATE CANCER: ICD-10-CM

## 2024-06-26 DIAGNOSIS — M54.16 LUMBAR RADICULOPATHY: ICD-10-CM

## 2024-06-26 DIAGNOSIS — E11.65 TYPE 2 DIABETES MELLITUS WITH HYPERGLYCEMIA, WITHOUT LONG-TERM CURRENT USE OF INSULIN: ICD-10-CM

## 2024-06-26 DIAGNOSIS — E78.1 PURE HYPERTRIGLYCERIDEMIA: ICD-10-CM

## 2024-06-26 DIAGNOSIS — I10 PRIMARY HYPERTENSION: Primary | ICD-10-CM

## 2024-06-26 DIAGNOSIS — E07.9 THYROID DISEASE: ICD-10-CM

## 2024-06-26 PROCEDURE — 3078F DIAST BP <80 MM HG: CPT | Performed by: INTERNAL MEDICINE

## 2024-06-26 PROCEDURE — 3074F SYST BP LT 130 MM HG: CPT | Performed by: INTERNAL MEDICINE

## 2024-06-26 PROCEDURE — 1160F RVW MEDS BY RX/DR IN RCRD: CPT | Performed by: INTERNAL MEDICINE

## 2024-06-26 PROCEDURE — 1159F MED LIST DOCD IN RCRD: CPT | Performed by: INTERNAL MEDICINE

## 2024-06-26 PROCEDURE — 1125F AMNT PAIN NOTED PAIN PRSNT: CPT | Performed by: INTERNAL MEDICINE

## 2024-06-26 PROCEDURE — 3044F HG A1C LEVEL LT 7.0%: CPT | Performed by: INTERNAL MEDICINE

## 2024-06-26 PROCEDURE — 99214 OFFICE O/P EST MOD 30 MIN: CPT | Performed by: INTERNAL MEDICINE

## 2024-06-26 NOTE — PROGRESS NOTES
Chief Complaint   Patient presents with    Follow-up    Hypertension    Sciatica   History of Present Illness:  Patient is here today for follow up of HTN, HLD, type 2 DM, thyroid disease & SHELBY. He reports lower back pain that started 5 days ago. Pain radiating to the left leg, associated with tingling but now weakness or numbness. He has h/o sciatica in the past that comes and goes. Reports h/o trauma in May when he fell and broke his shoulder. He underwent shoulder surgery 2 weeks ago.     Reports BP is well controlled on medication, averaging at 130/80 mmHg.     Lab review indicate A1c improved to 6.7 (at target goal of <7), lipid abnormalities improving, chronic normocytic anemia, normal TSH, normal kidney & liver function    PMH:   Outpatient Medications Prior to Visit   Medication Sig Dispense Refill    acetaminophen (TYLENOL) 325 MG tablet Take 2 tablets by mouth 2 (Two) Times a Day As Needed for Mild Pain. 60 tablet 0    albuterol sulfate  (90 Base) MCG/ACT inhaler Inhale 2 puffs Every 4 (Four) Hours As Needed for Wheezing or Shortness of Air. for wheezing 18 g 5    amLODIPine (NORVASC) 10 MG tablet Take 1 tablet by mouth Daily. (Patient taking differently: Take 1 tablet by mouth Every Night.) 90 tablet 3    amphetamine-dextroamphetamine (Adderall) 30 MG tablet Take 1 tablet by mouth 2 (Two) Times a Day. (Patient taking differently: Take 1 tablet by mouth 2 (Two) Times a Day As Needed. HOLDING FOR 48 HOURS PRIOR TO SURGERY) 60 tablet 0    apixaban (Eliquis) 5 MG tablet tablet Take 1 tablet by mouth Every 12 (Twelve) Hours. (Patient taking differently: Take 1 tablet by mouth Every 12 (Twelve) Hours. HOLDING FOR 2 DAYS PRIOR TO SURGERY PER MD GUIDELINES) 60 tablet 5    caffeine 200 MG tablet Take 1 tablet by mouth Every 4 (Four) Hours As Needed for Headache. HOLDING FOR DOS      cephalexin (KEFLEX) 500 MG capsule TAKE ALL FOUR CAPSULES BY MOUTH ONE HOUR PRIOR TO DENTAL APPT (Patient taking  differently: Take 1 capsule by mouth. TAKE ALL FOUR CAPSULES BY MOUTH ONE HOUR PRIOR TO DENTAL APPT) 4 capsule 0    diphenhydrAMINE (BENADRYL) 25 mg capsule Take 1 capsule by mouth Every 6 (Six) Hours As Needed for Itching.      docusate sodium (COLACE) 100 MG capsule Take 1 capsule by mouth 2 (Two) Times a Day. 60 capsule 0    ferrous sulfate 325 (65 FE) MG tablet Take 1 tablet by mouth Daily With Breakfast.      gabapentin (NEURONTIN) 300 MG capsule Take 2 capsules by mouth 2 (Two) Times a Day. (Patient taking differently: Take 2 capsules by mouth 4 (Four) Times a Day.) 360 capsule 5    glimepiride (AMARYL) 4 MG tablet Take 1 tablet by mouth Every Morning Before Breakfast. 90 tablet 3    Glucosamine-Chondroit-Vit C-Mn (GLUCOSAMINE CHONDR 500 COMPLEX) capsule Take 2 capsules by mouth Daily. HOLDING FOR DOS      hydroCHLOROthiazide (HYDRODIURIL) 12.5 MG tablet Take 1 tablet by mouth Daily. 90 tablet 3    loratadine (CLARITIN) 10 MG tablet Take 1 tablet by mouth As Needed.      losartan (COZAAR) 100 MG tablet TAKE 1 TABLET BY MOUTH EVERY DAY (Patient taking differently: Take 1 tablet by mouth Daily.) 90 tablet 3    Melatonin 10 MG tablet Take  by mouth Daily As Needed.      metFORMIN (GLUCOPHAGE) 1000 MG tablet Take 1 tablet by mouth 2 (Two) Times a Day. 180 tablet 3    MULTIPLE VITAMINS ESSENTIAL PO Take 1 tablet by mouth Daily. HOLDING FOR DOS/CENTRUM SILVER      multivitamin with minerals (PRESERVISION AREDS PO) Take 1 tablet by mouth 2 (Two) Times a Day. HOLDING  FOR DOS      omeprazole (priLOSEC) 20 MG capsule Take 1 capsule by mouth Daily. 90 capsule 2    ondansetron (Zofran) 4 MG tablet Take 1 tablet by mouth Every 8 (Eight) Hours As Needed for Nausea or Vomiting. 12 tablet 0    ondansetron ODT (ZOFRAN-ODT) 4 MG disintegrating tablet Place 1 tablet on the tongue Every 6 (Six) Hours As Needed for Vomiting or Nausea. 20 tablet 0    oxyCODONE-acetaminophen (PERCOCET) 7.5-325 MG per tablet 1 po q4-6h prn severe  pain (Patient taking differently: Take 1 tablet by mouth Every 4 (Four) Hours As Needed. 1 po q4-6h prn severe pain) 16 tablet 0    oxyCODONE-acetaminophen (PERCOCET) 7.5-325 MG per tablet Take 1 tablet by mouth Every 6 (Six) Hours As Needed for Moderate Pain. 42 tablet 0    pioglitazone (ACTOS) 45 MG tablet Take 1 tablet by mouth Daily. (Patient taking differently: Take 30 mg by mouth Daily.) 90 tablet 3    pravastatin (PRAVACHOL) 40 MG tablet Take 1 tablet by mouth Daily. 90 tablet 3    Pyridoxine HCl (VITAMIN B-6 PO) Take  by mouth Daily. HOLDING FOR DOS      SITagliptin (Januvia) 100 MG tablet Take 1 tablet by mouth Daily. 90 tablet 3    Triamcinolone Acetonide (NASACORT) 55 MCG/ACT nasal inhaler 2 sprays into the nostril(s) as directed by provider Daily As Needed.      levothyroxine (SYNTHROID, LEVOTHROID) 25 MCG tablet Take 1 tablet by mouth Daily. 90 tablet 3     No facility-administered medications prior to visit.      No Known Allergies  Past Surgical History:   Procedure Laterality Date    ANKLE OPEN REDUCTION INTERNAL FIXATION      CYSTOSCOPY W/ LASER LITHOTRIPSY  07/20/2023    EYE SURGERY      Cataract Removal    FRACTURE SURGERY      Right Ankle/Foot/Leg  Right Shoulder    HERNIA REPAIR      HERNIA REPAIR      INGUINAL HERNIA REPAIR      JOINT REPLACEMENT Left 03/02/2023    TONSILLECTOMY      TOTAL KNEE ARTHROPLASTY Left 03/02/2023    Procedure: TOTAL KNEE ARTHROPLASTY;  Surgeon: Noah Salcedo MD;  Location: [facility];  Service: Orthopedics;  Laterality: Left;    TOTAL SHOULDER ARTHROPLASTY W/ DISTAL CLAVICLE EXCISION Right 06/11/2024    Procedure: Right Reverse Total Shoulder Arthroplasty;  Surgeon: Noah Salcedo MD;  Location: [facility] OR;  Service: Orthopedics;  Laterality: Right;    UMBILICAL HERNIA REPAIR  Recent     family history includes Coronary artery disease in his father; Depression in his mother; Diabetes in his brother, father, maternal grandmother, and mother; Heart disease  "in his father; Hypertension in his father; Stroke in his father.   reports that he has never smoked. He has never been exposed to tobacco smoke. He has never used smokeless tobacco. He reports that he does not drink alcohol and does not use drugs.     /58   Pulse 81   Resp 16   Ht 185.4 cm (72.99\")   Wt (!) 149 kg (328 lb)   SpO2 96%   BMI 43.28 kg/m²   Physical Exam  Constitutional:       Appearance: Normal appearance.   HENT:      Head: Normocephalic and atraumatic.   Neurological:      Mental Status: He is alert and oriented to person, place, and time.   Psychiatric:         Mood and Affect: Mood normal.                   Diagnoses and all orders for this visit:    1. Primary hypertension (Primary)  Assessment & Plan:  Hypertension is stable and controlled  Continue current treatment regimen.  Dietary sodium restriction.  Weight loss.  Regular aerobic exercise.  Ambulatory blood pressure monitoring.  Blood pressure will be reassessed in 6 months.      2. Type 2 diabetes mellitus with hyperglycemia, without long-term current use of insulin    3. Thyroid disease  Assessment & Plan:  Thyroid disease is controlled and stable. Patient has remained euthyroid & asymptomatic.  Lab review indicate normal TSH for the past several years  Will d/c synthroid today and monitor annually      4. Pure hypertriglyceridemia  Assessment & Plan:   Lipid abnormalities are improving with treatment    Plan:  Continue same medication/s without change.      Counseled patient on lifestyle modifications to help control hyperlipidemia.   Advised patient to exercise for 150 minutes weekly. (30 minute brisk walk, 5 days a week for example)  Weight Loss encouraged    Patient Treatment Goals:   LDL goal is less than 70  Triglyceride to be less than 150    Followup in 6 months.      5. Iron deficiency anemia, unspecified iron deficiency anemia type  Assessment & Plan:  Lab review 6 months ago indicate low iron saturation and high " TIBC  Patient has been on iron supplement for the past 6 months  Will repeat iron profile today    Orders:  -     Iron and TIBC  -     Ferritin    6. Screening for prostate cancer  -     PSA SCREENING    7. Lumbar radiculopathy  Assessment & Plan:  Low back pain secondary to sciatica  Advised patient to avoid provocative activities   Encourage him to do back stretching exercise therapy  To continue with antiinflammatory agents/ relievers as needed  To follow up with orthopedic                Return in about 6 months (around 12/26/2024) for Recheck.

## 2024-06-26 NOTE — ASSESSMENT & PLAN NOTE
Lab review 6 months ago indicate low iron saturation and high TIBC  Patient has been on iron supplement for the past 6 months  Will repeat iron profile today

## 2024-06-26 NOTE — ASSESSMENT & PLAN NOTE
Lipid abnormalities are improving with treatment    Plan:  Continue same medication/s without change.      Counseled patient on lifestyle modifications to help control hyperlipidemia.   Advised patient to exercise for 150 minutes weekly. (30 minute brisk walk, 5 days a week for example)  Weight Loss encouraged    Patient Treatment Goals:   LDL goal is less than 70  Triglyceride to be less than 150    Followup in 6 months.

## 2024-06-26 NOTE — ASSESSMENT & PLAN NOTE
Low back pain secondary to sciatica  Advised patient to avoid provocative activities   Encourage him to do back stretching exercise therapy  To continue with antiinflammatory agents/ relievers as needed  To follow up with orthopedic

## 2024-06-26 NOTE — ASSESSMENT & PLAN NOTE
Thyroid disease is controlled and stable. Patient has remained euthyroid & asymptomatic.  Lab review indicate normal TSH for the past several years  Will d/c synthroid today and monitor annually

## 2024-06-27 LAB
FERRITIN SERPL-MCNC: 72.6 NG/ML (ref 30–400)
IRON SATN MFR SERPL: 16 % (ref 20–50)
IRON SERPL-MCNC: 66 MCG/DL (ref 59–158)
PSA SERPL-MCNC: 0.69 NG/ML (ref 0–4)
TIBC SERPL-MCNC: 407 MCG/DL
UIBC SERPL-MCNC: 341 MCG/DL (ref 112–346)

## 2024-06-28 ENCOUNTER — TREATMENT (OUTPATIENT)
Dept: PHYSICAL THERAPY | Facility: CLINIC | Age: 74
End: 2024-06-28
Payer: MEDICARE

## 2024-06-28 DIAGNOSIS — Z74.09 IMPAIRED FUNCTIONAL MOBILITY AND ACTIVITY TOLERANCE: ICD-10-CM

## 2024-06-28 DIAGNOSIS — Z96.611 STATUS POST REVERSE TOTAL ARTHROPLASTY OF RIGHT SHOULDER: Primary | ICD-10-CM

## 2024-06-28 NOTE — PROGRESS NOTES
Physical Therapy Initial Evaluation and Plan of Care  Clinic Location [address], [place] [id number]    Patient: Barry Cam   : 1950  Diagnosis/ICD-10 Code:  Status post reverse total arthroplasty of right shoulder [Z96.611]  Referring practitioner: Noah Salcedo MD  Date of Initial Visit: 2024  Today's Date: 2024  Patient seen for 1 session         Visit Diagnoses:    ICD-10-CM ICD-9-CM   1. Status post reverse total arthroplasty of right shoulder  Z96.611 V43.61   2. Impaired functional mobility and activity tolerance  Z74.09 V49.89         Subjective Questionnaire: QuickDASH: 63.64      Subjective Evaluation    History of Present Illness  Mechanism of injury: Tripped on glider 24. Fx R humerus. Reverse TSA 24. Wearing sling most of day. Removing sling for bathing, watching TV, and eating. Sleeping on L & R sides. Denies pain sleeping on R. Wearing sling loose for comfort. Not wearing waist strap. Declines tightening sling to proper fit. Not using ice.    L sciatic pain x5-6 days. Rx percoset and resting in bed. Pain subsided Wednesday. Currently rates leg pain 4/10.    Also taking tylenol prn and Rx gabapentin.    L 5th digit numb since Mar 2023, worsened in the last 3 weeks.      Patient Occupation: Retired Pain  Current pain ratin  Progression: improved    Social Support  Lives with: spouse    Hand dominance: right    Treatments  Previous treatment: immobilization  Current treatment: immobilization and physical therapy  Patient Goals  Patient goals for therapy: decreased pain, increased motion, increased strength and independence with ADLs/IADLs             Objective          Passive Range of Motion     Right Shoulder   Flexion: 145 degrees   Abduction: 82 degrees   External rotation 45°: 52 degrees   Internal rotation 45°: Right shoulder passive internal rotation at 45 degrees: to stomach.           Assessment & Plan        Assessment  Impairments: abnormal or restricted ROM, activity intolerance, impaired physical strength, lacks appropriate home exercise program and pain with function   Functional limitations: carrying objects, lifting, pulling, pushing, reaching behind back, reaching overhead and unable to perform repetitive tasks   Assessment details: Pt presents s/p R RTSA 6/11/24 w/ pain, limited PROM, and activity restrictions. Will benefit from skilled PT services in order to address listed impairments and increase tolerance to normal daily activities including ADLs and recreational activities.    Barriers to therapy: Acute on chronic L sciatic pain  Prognosis: fair  Prognosis details: Pt sleeping on R side in sling despite recommendations. Declines proper fitting of sling.    Goals  Plan Goals: Short Term Goals: 6 weeks. Patient will:  1. Be independent with initial HEP  2. Be instructed in posture and body mechanics.  3. Demonstrate R shoulder PROM WNL to allow for progressing of therapy exercises.    Long Term Goals: 6-12 weeks. Pt will:  1. Exhibit (R) shoulder AROM to WFL to allow for reaching overhead and out (ABD) without pain limiting function.  2. Demonstrate improved R UE MMT of >/= 4+/5 to allow for performance of ADL's/household management/recreational activities.  3. Pt able to reach overhead and lift 10# to allow for return to doing home/yard/recreational activities with min to no pain.  4. Report perceived disability </=10% based on QuickDASH    Plan  Therapy options: will be seen for skilled therapy services  Planned modality interventions: cryotherapy and electrical stimulation/Russian stimulation  Planned therapy interventions: abdominal trunk stabilization, ADL retraining, balance/weight-bearing training, body mechanics training, flexibility, functional ROM exercises, home exercise program, joint mobilization, manual therapy, neuromuscular re-education, postural training, soft tissue mobilization,  spinal/joint mobilization, strengthening, stretching and therapeutic activities  Frequency: 3x week  Duration in weeks: 12  Treatment plan discussed with: patient        History # of Personal Factors and/or Comorbidities: MODERATE (1-2)  Examination of Body System(s): # of elements: LOW (1-2)  Clinical Presentation: STABLE   Clinical Decision Making: LOW       Timed:         Manual Therapy:    5     mins  59727;     Therapeutic Exercise:    25     mins  95086;     Neuromuscular Elizabeth:    0    mins  66835;    Therapeutic Activity:     8     mins  91764;     Gait Trainin     mins  47511;     Ultrasound:     0     mins  98789;    Ionto                               0    mins   33175  Self Care                       0     mins   57774  Canalith Repos    0     mins 93926      Un-Timed:  Electrical Stimulation:    0     mins  72553 ( );  Dry Needling     0     mins self-pay  Traction     0     mins 35185  Low Eval     20     Mins  74811  Mod Eval     0     Mins  53329  High Eval                       0     Mins  71652        Timed Treatment:   38   mins   Total Treatment:     58   mins          PT: Patria Wynn PT     License Number: [id number]  Electronically signed by Patria Wynn PT, 24, 9:12 AM EDT    Certification Period: 2024 thru 2024  I certify that the therapy services are furnished while this patient is under my care.  The services outlined above are required by this patient, and will be reviewed every 90 days.         Physician Signature:__________________________________________________    PHYSICIAN: Noah Salcedo MD  NPI: [id number]                                      DATE:      Please sign and return via fax to [email or web address] . Thank you, [facility].

## 2024-06-28 NOTE — PATIENT INSTRUCTIONS
Access Code: [email or web address]  URL: [email or web address]  Date: 06/28/2024  Prepared by: Patria Wynn    Exercises  - Flexion-Extension Shoulder Pendulum with Table Support  - 2 x daily - 7 x weekly - 1 sets - 1 reps - 60s hold  - Horizontal Shoulder Pendulum with Table Support  - 2 x daily - 7 x weekly - 1 sets - 1 reps - 60s hold  - Standing 'L' Stretch at Counter  - 2 x daily - 7 x weekly - 1 sets - 10 reps - 10s hold  - Seated Shoulder External Rotation AAROM with Cane and Hand in Neutral  - 2 x daily - 7 x weekly - 1 sets - 10 reps - 5s hold  - Seated Scapular Retraction  - 2 x daily - 7 x weekly - 1 sets - 10 reps - 5s hold  - Shoulder Rolls in Sitting  - 2 x daily - 7 x weekly - 2 sets - 10 reps

## 2024-07-01 ENCOUNTER — TREATMENT (OUTPATIENT)
Dept: PHYSICAL THERAPY | Facility: CLINIC | Age: 74
End: 2024-07-01
Payer: MEDICARE

## 2024-07-01 ENCOUNTER — OFFICE VISIT (OUTPATIENT)
Dept: ORTHOPEDIC SURGERY | Facility: CLINIC | Age: 74
End: 2024-07-01
Payer: MEDICARE

## 2024-07-01 VITALS — WEIGHT: 315 LBS | TEMPERATURE: 98.6 F | BODY MASS INDEX: 41.75 KG/M2 | HEIGHT: 73 IN

## 2024-07-01 DIAGNOSIS — Z96.611 S/P REVERSE TOTAL SHOULDER ARTHROPLASTY, RIGHT: Primary | ICD-10-CM

## 2024-07-01 DIAGNOSIS — Z96.611 STATUS POST REVERSE TOTAL ARTHROPLASTY OF RIGHT SHOULDER: Primary | ICD-10-CM

## 2024-07-01 DIAGNOSIS — Z74.09 IMPAIRED FUNCTIONAL MOBILITY AND ACTIVITY TOLERANCE: ICD-10-CM

## 2024-07-01 PROCEDURE — 99024 POSTOP FOLLOW-UP VISIT: CPT | Performed by: NURSE PRACTITIONER

## 2024-07-01 PROCEDURE — 1159F MED LIST DOCD IN RCRD: CPT | Performed by: NURSE PRACTITIONER

## 2024-07-01 PROCEDURE — 1160F RVW MEDS BY RX/DR IN RCRD: CPT | Performed by: NURSE PRACTITIONER

## 2024-07-01 PROCEDURE — 73030 X-RAY EXAM OF SHOULDER: CPT | Performed by: NURSE PRACTITIONER

## 2024-07-01 NOTE — PROGRESS NOTES
Physical Therapy Daily Treatment Note      Patient: Barry Cam   : 1950  Referring practitioner: Noah Salcedo MD  Date of Initial Visit: Type: THERAPY  Noted: 2024  Today's Date: 2024  Patient seen for 2 sessions       Visit Diagnoses:    ICD-10-CM ICD-9-CM   1. Status post reverse total arthroplasty of right shoulder  Z96.611 V43.61   2. Impaired functional mobility and activity tolerance  Z74.09 V49.89       Subjective   C/o L sciatic pain. R shoulder is doing well.    Objective   See Exercise, Manual, and Modality Logs for complete treatment.       Assessment/Plan  Toelrated progression in R shoulder PROM flexion well w/ min pain. Updated HEP w/ exercises to address L LE pain. Progress per POC.      Timed:         Manual Therapy:    8     mins  14806;     Therapeutic Exercise:    25     mins  88893;     Neuromuscular Elizabeth:    5    mins  86075;    Therapeutic Activity:     00     mins  82237;     Gait Trainin     mins  67999;     Ultrasound:     0     mins  40583;    Ionto                               0    mins   87562  Self Care                       0     mins   78154      Un-Timed:  Electrical Stimulation:    0     mins  04650 ( );  Dry Needling     0     mins self-pay  Traction     0     mins 47736  Canalith Repos    0     mins 69832    Timed Treatment:   38   mins   Total Treatment:     38   mins    ANGELIQUE Valles License: [id number]

## 2024-07-01 NOTE — PROGRESS NOTES
"Barry Cam : 1950 MRN: [id number] DATE: 2024    DIAGNOSIS:  2 week follow up right shoulder arthroplasty      SUBJECTIVE:  Patient returns today for 2 week follow up of right shoulder replacement. Patient reports doing well with no unusual complaints.      OBJECTIVE:    Temp 98.6 °F (37 °C)   Ht 185.4 cm (73\")   Wt (!) 152 kg (335 lb 12.8 oz)   BMI 44.30 kg/m²     Exam:  The incision is well approximated.  No erythema or drainage. Shoulder moves fluidly with pendulums.  The arm is soft and nontender.  Intact motor and sensory function in the lower arm and hand.  Palpable radial pulse.    DIAGNOSTIC STUDIES    Xrays: AP and scapular Y views of the right shoulder are ordered and reviewed for evaluation of the recent shoulder replacement.  The x-rays demonstrate a well positioned, well aligned replacement without complicating factors noted.  In comparison with previous films, there has been no change.    ASSESSMENT:  2 week follow up right reverse total shoulder replacement for fracture    PLAN:   1.  Continue PT per protocol  2.  Continue sling until next visit.  3.  Counseled patient about appropriate activity modifications and restrictions, including no driving at this point.  4.  We discussed the need for prophylactic antibiotics prior to dental appointments for 2 years following replacement surgery.  5.  Follow up in 4 weeks.       LISA Helton    2024    Answers submitted by the patient for this visit:  Primary Reason for Visit (Submitted on 2024)  What is the primary reason for your visit?: Other  Other (Submitted on 2024)  Please describe your symptoms.: none  Have you had these symptoms before?: No  How long have you been having these symptoms?: Greater than 2 weeks  Please list any medications you are currently taking for this condition.: percoet  Please describe any probable cause for these symptoms. : shoulder replacement    "

## 2024-07-05 ENCOUNTER — TREATMENT (OUTPATIENT)
Dept: PHYSICAL THERAPY | Facility: CLINIC | Age: 74
End: 2024-07-05
Payer: MEDICARE

## 2024-07-05 DIAGNOSIS — Z96.611 STATUS POST REVERSE TOTAL ARTHROPLASTY OF RIGHT SHOULDER: Primary | ICD-10-CM

## 2024-07-05 DIAGNOSIS — Z74.09 IMPAIRED FUNCTIONAL MOBILITY AND ACTIVITY TOLERANCE: ICD-10-CM

## 2024-07-05 NOTE — PROGRESS NOTES
Physical Therapy Daily Treatment Note      Patient: Barry Cam   : 1950  Referring practitioner: Noah Salcedo MD  Date of Initial Visit: Type: THERAPY  Noted: 2024  Today's Date: 2024  Patient seen for 3 sessions       Visit Diagnoses:    ICD-10-CM ICD-9-CM   1. Status post reverse total arthroplasty of right shoulder  Z96.611 V43.61   2. Impaired functional mobility and activity tolerance  Z74.09 V49.89       Subjective   No new changes.    Objective   See Exercise, Manual, and Modality Logs for complete treatment.       Assessment/Plan  Tolerated progressions in R shoulder PROM well w/o lasting increase in pain. Updated HEP w/ PROM cane abd. Progress per POC.    Timed:         Manual Therapy:    10     mins  87786;     Therapeutic Exercise:    25     mins  16397;     Neuromuscular Elizabeth:    0    mins  22705;    Therapeutic Activity:     0     mins  21146;     Gait Trainin     mins  77807;     Ultrasound:     0     mins  37525;    Ionto                               0    mins   95952  Self Care                       0     mins   09390      Un-Timed:  Electrical Stimulation:    0     mins  13577 ( );  Dry Needling     0     mins self-pay  Traction     0     mins 01707  Canalith Repos    0     mins 53127    Timed Treatment:   35   mins   Total Treatment:     35   mins    Patria Wynn PT  [place] License: [id number]

## 2024-07-08 ENCOUNTER — TREATMENT (OUTPATIENT)
Dept: PHYSICAL THERAPY | Facility: CLINIC | Age: 74
End: 2024-07-08
Payer: MEDICARE

## 2024-07-08 ENCOUNTER — OFFICE VISIT (OUTPATIENT)
Dept: ORTHOPEDIC SURGERY | Facility: CLINIC | Age: 74
End: 2024-07-08
Payer: MEDICARE

## 2024-07-08 VITALS — BODY MASS INDEX: 41.75 KG/M2 | WEIGHT: 315 LBS | TEMPERATURE: 98.3 F | HEIGHT: 73 IN

## 2024-07-08 DIAGNOSIS — R20.0 NUMBNESS OF LEFT HAND: Primary | ICD-10-CM

## 2024-07-08 DIAGNOSIS — M79.642 LEFT HAND PAIN: ICD-10-CM

## 2024-07-08 DIAGNOSIS — Z96.611 STATUS POST REVERSE TOTAL ARTHROPLASTY OF RIGHT SHOULDER: Primary | ICD-10-CM

## 2024-07-08 DIAGNOSIS — Z74.09 IMPAIRED FUNCTIONAL MOBILITY AND ACTIVITY TOLERANCE: ICD-10-CM

## 2024-07-08 PROCEDURE — 1160F RVW MEDS BY RX/DR IN RCRD: CPT | Performed by: NURSE PRACTITIONER

## 2024-07-08 PROCEDURE — 1159F MED LIST DOCD IN RCRD: CPT | Performed by: NURSE PRACTITIONER

## 2024-07-08 PROCEDURE — 73130 X-RAY EXAM OF HAND: CPT | Performed by: NURSE PRACTITIONER

## 2024-07-08 PROCEDURE — 99213 OFFICE O/P EST LOW 20 MIN: CPT | Performed by: NURSE PRACTITIONER

## 2024-07-08 NOTE — PROGRESS NOTES
Physical Therapy Daily Treatment Note      Patient: Barry Cam   : 1950  Referring practitioner: Noah Salcedo MD  Date of Initial Visit: Type: THERAPY  Noted: 2024  Today's Date: 2024  Patient seen for 4 sessions       Visit Diagnoses:    ICD-10-CM ICD-9-CM   1. Status post reverse total arthroplasty of right shoulder  Z96.611 V43.61   2. Impaired functional mobility and activity tolerance  Z74.09 V49.89       Subjective   Did not do home exercises over the weekend.    Objective   See Exercise, Manual, and Modality Logs for complete treatment.       Assessment/Plan  Encouraged daily compliance with HEP. Lacking R shoulder PROM overhead. Minimal pain w/ passive stretching and new AAROM. Updated HEP. Progress per POC.    Timed:         Manual Therapy:    10     mins  06223;     Therapeutic Exercise:    20     mins  54194;     Neuromuscular Elizabeth:    10    mins  50101;    Therapeutic Activity:     0     mins  71729;     Gait Trainin     mins  92994;     Ultrasound:     0     mins  08625;    Ionto                               0    mins   23300  Self Care                       0     mins   09939      Un-Timed:  Electrical Stimulation:    0     mins  11229 ( );  Dry Needling     0     mins self-pay  Traction     0     mins 91685  Canalith Repos    0     mins 63457    Timed Treatment:   40   mins   Total Treatment:     40   mins    ANGELIQUE Valles License: [id number]

## 2024-07-08 NOTE — PROGRESS NOTES
"  Patient: Barry Cam    YOB: 1950    Medical Record Number: [id number]    Chief Complaints:  Left hand numbness and pain     History of Present Illness:     74 y.o. male patient who presents for evaluation of a new complaint of left hand numbness and pain.  Localizes the majority of his symptoms to the small finger.  Reports constant numbness began approximately 1 year ago after he felt a \"pop\" in the ulnar side of the wrist.  Approximately 6 weeks ago, he began to have pain as well.  Pain is moderate, intermittent, and burning.  He says the finger is very sensitive to the touch.  Even the lightest touch causes significant burning sensation through the finger.  He says he does not recall experiencing any injury to the hand with his recent fall, but admits it is possible.   Denies any weakness.  Denies any alleviating factors.    Allergies: No Known Allergies    Home Medications:      Current Outpatient Medications:     acetaminophen (TYLENOL) 325 MG tablet, Take 2 tablets by mouth 2 (Two) Times a Day As Needed for Mild Pain., Disp: 60 tablet, Rfl: 0    amLODIPine (NORVASC) 10 MG tablet, Take 1 tablet by mouth Daily. (Patient taking differently: Take 1 tablet by mouth Every Night.), Disp: 90 tablet, Rfl: 3    amphetamine-dextroamphetamine (Adderall) 30 MG tablet, Take 1 tablet by mouth 2 (Two) Times a Day. (Patient taking differently: Take 1 tablet by mouth 2 (Two) Times a Day As Needed. HOLDING FOR 48 HOURS PRIOR TO SURGERY), Disp: 60 tablet, Rfl: 0    apixaban (Eliquis) 5 MG tablet tablet, Take 1 tablet by mouth Every 12 (Twelve) Hours. (Patient taking differently: Take 1 tablet by mouth Every 12 (Twelve) Hours. HOLDING FOR 2 DAYS PRIOR TO SURGERY PER MD GUIDELINES), Disp: 60 tablet, Rfl: 5    caffeine 200 MG tablet, Take 1 tablet by mouth Every 4 (Four) Hours As Needed for Headache. HOLDING FOR DOS, Disp: , Rfl:     docusate sodium (COLACE) 100 MG capsule, Take 1 capsule by mouth 2 (Two) " Times a Day., Disp: 60 capsule, Rfl: 0    ferrous sulfate 325 (65 FE) MG tablet, Take 1 tablet by mouth Daily With Breakfast., Disp: , Rfl:     gabapentin (NEURONTIN) 300 MG capsule, Take 2 capsules by mouth 2 (Two) Times a Day. (Patient taking differently: Take 2 capsules by mouth 4 (Four) Times a Day.), Disp: 360 capsule, Rfl: 5    glimepiride (AMARYL) 4 MG tablet, Take 1 tablet by mouth Every Morning Before Breakfast., Disp: 90 tablet, Rfl: 3    Glucosamine-Chondroit-Vit C-Mn (GLUCOSAMINE CHONDR 500 COMPLEX) capsule, Take 2 capsules by mouth Daily. HOLDING FOR DOS, Disp: , Rfl:     hydroCHLOROthiazide (HYDRODIURIL) 12.5 MG tablet, Take 1 tablet by mouth Daily., Disp: 90 tablet, Rfl: 3    loratadine (CLARITIN) 10 MG tablet, Take 1 tablet by mouth As Needed., Disp: , Rfl:     losartan (COZAAR) 100 MG tablet, TAKE 1 TABLET BY MOUTH EVERY DAY (Patient taking differently: Take 1 tablet by mouth Daily.), Disp: 90 tablet, Rfl: 3    Melatonin 10 MG tablet, Take  by mouth Daily As Needed., Disp: , Rfl:     metFORMIN (GLUCOPHAGE) 1000 MG tablet, Take 1 tablet by mouth 2 (Two) Times a Day., Disp: 180 tablet, Rfl: 3    MULTIPLE VITAMINS ESSENTIAL PO, Take 1 tablet by mouth Daily. HOLDING FOR DOS/CENTRUM SILVER, Disp: , Rfl:     multivitamin with minerals (PRESERVISION AREDS PO), Take 1 tablet by mouth 2 (Two) Times a Day. HOLDING  FOR DOS, Disp: , Rfl:     omeprazole (priLOSEC) 20 MG capsule, Take 1 capsule by mouth Daily., Disp: 90 capsule, Rfl: 2    pioglitazone (ACTOS) 45 MG tablet, Take 1 tablet by mouth Daily. (Patient taking differently: Take 30 mg by mouth Daily.), Disp: 90 tablet, Rfl: 3    pravastatin (PRAVACHOL) 40 MG tablet, Take 1 tablet by mouth Daily., Disp: 90 tablet, Rfl: 3    SITagliptin (Januvia) 100 MG tablet, Take 1 tablet by mouth Daily., Disp: 90 tablet, Rfl: 3    albuterol sulfate  (90 Base) MCG/ACT inhaler, Inhale 2 puffs Every 4 (Four) Hours As Needed for Wheezing or Shortness of Air. for  wheezing (Patient not taking: Reported on 7/1/2024), Disp: 18 g, Rfl: 5    cephalexin (KEFLEX) 500 MG capsule, TAKE ALL FOUR CAPSULES BY MOUTH ONE HOUR PRIOR TO DENTAL APPT (Patient not taking: Reported on 7/1/2024), Disp: 4 capsule, Rfl: 0    ondansetron (Zofran) 4 MG tablet, Take 1 tablet by mouth Every 8 (Eight) Hours As Needed for Nausea or Vomiting. (Patient not taking: Reported on 7/1/2024), Disp: 12 tablet, Rfl: 0    ondansetron ODT (ZOFRAN-ODT) 4 MG disintegrating tablet, Place 1 tablet on the tongue Every 6 (Six) Hours As Needed for Vomiting or Nausea. (Patient not taking: Reported on 7/1/2024), Disp: 20 tablet, Rfl: 0    oxyCODONE-acetaminophen (PERCOCET) 7.5-325 MG per tablet, 1 po q4-6h prn severe pain (Patient not taking: Reported on 7/1/2024), Disp: 16 tablet, Rfl: 0    oxyCODONE-acetaminophen (PERCOCET) 7.5-325 MG per tablet, Take 1 tablet by mouth Every 6 (Six) Hours As Needed for Moderate Pain. (Patient not taking: Reported on 7/1/2024), Disp: 42 tablet, Rfl: 0    Triamcinolone Acetonide (NASACORT) 55 MCG/ACT nasal inhaler, 2 sprays into the nostril(s) as directed by provider Daily As Needed. (Patient not taking: Reported on 7/1/2024), Disp: , Rfl:     Past Medical History:   Diagnosis Date    Allergic 1950    Hay Fever    Allergic rhinitis     Anemia 12/07/2023    Anxiety     Arthritis     Serious    Arthritis of back     Asthma 06-    PE Blood Clots    Cataract ??    Removed    CHF (congestive heart failure) 4-1-2021    Clotting disorder 06-    Eliquis Related    CTS (carpal tunnel syndrome)     Deep vein thrombosis 06-    CHRONIC    Depression     Diabetes mellitus     Diverticulosis     Mild    Dyspnea on exertion     Fibromyalgia, primary     ?????    Fracture, fibula     Right    Fracture, humerus 5/29/2024    Fall    Fracture, tibia and fibula     GERD (gastroesophageal reflux disease)     History of hiatal hernia     History of kidney stones     HL (hearing loss)      Mild    Hx of blood clots     Hyperlipidemia     Hypertension     Hypothyroidism     Irritable bowel syndrome     Mild    Kidney stone     Ongoing    Low back pain     Never Ending    Low back strain     Narcolepsy     Neuropathy     HANDS AND FEET    Obesity     Can't Win    Peptic ulceration     Pulmonary embolism     Right shoulder pain     Rotator cuff syndrome     Sciatica     Sleep apnea     CPAP USED    Tear of meniscus of knee     Left    Umbilical hernia     Visual impairment     Diabetes Related       Past Surgical History:   Procedure Laterality Date    ANKLE OPEN REDUCTION INTERNAL FIXATION      CYSTOSCOPY W/ LASER LITHOTRIPSY  07/20/2023    EYE SURGERY      Cataract Removal    FRACTURE SURGERY      Right Ankle/Foot/Leg  Right Shoulder    HERNIA REPAIR      HERNIA REPAIR      INGUINAL HERNIA REPAIR      JOINT REPLACEMENT Left 03/02/2023    SHOULDER SURGERY      TONSILLECTOMY      TOTAL KNEE ARTHROPLASTY Left 03/02/2023    Procedure: TOTAL KNEE ARTHROPLASTY;  Surgeon: Noah Salcedo MD;  Location: [facility];  Service: Orthopedics;  Laterality: Left;    TOTAL SHOULDER ARTHROPLASTY W/ DISTAL CLAVICLE EXCISION Right 06/11/2024    Procedure: Right Reverse Total Shoulder Arthroplasty;  Surgeon: Noah Salcedo MD;  Location: [facility];  Service: Orthopedics;  Laterality: Right;    UMBILICAL HERNIA REPAIR  Recent       Social History     Occupational History    Not on file   Tobacco Use    Smoking status: Never     Passive exposure: Never    Smokeless tobacco: Never    Tobacco comments:     caffeine use   Vaping Use    Vaping status: Never Used   Substance and Sexual Activity    Alcohol use: No    Drug use: No    Sexual activity: Not Currently     Partners: Female     Birth control/protection: Other      Social History     Social History Narrative    Not on file       Family History   Problem Relation Age of Onset    Depression Mother     Diabetes Mother     Coronary artery disease Father      "Diabetes Father     Stroke Father     Hypertension Father     Heart disease Father     Diabetes Brother     Diabetes Maternal Grandmother     Malig Hyperthermia Neg Hx        Review of Systems:      Constitutional: Denies fever, shaking or chills   Eyes: Denies change in visual acuity   HEENT: Denies nasal congestion or sore throat   Respiratory: Denies cough or shortness of breath   Cardiovascular: Denies chest pain or edema  Endocrine: Denies tremors, palpitations, intolerance of heat or cold, polyuria, polydipsia.  GI: Denies abdominal pain, nausea, vomiting, bloody stools or diarrhea  : Denies frequency, urgency, incontinence, retention, or nocturia.  Musculoskeletal: Denies numbness tingling or loss of motor function except as above  Integument: Denies rash, lesion or ulceration   Neurologic: Denies headache or focal weakness, deficits  Heme: Denies epistaxis, spontaneous or excessive bleeding, epistaxis, hematuria, melena, fatigue, enlarged or tender lymph nodes.      All other pertinent positives and negatives as noted above in HPI.    Physical Exam: 74 y.o. male  Vitals:    07/08/24 1102   Temp: 98.3 °F (36.8 °C)   TempSrc: Temporal   Weight: (!) 152 kg (335 lb 4.8 oz)   Height: 185.4 cm (73\")       General:  Patient is awake and alert.  Appears in no acute distress or discomfort.    Psych:  Affect and demeanor are appropriate.    Eyes:  Conjunctiva and sclera appear grossly normal.  Eyes track well and EOM seem to be intact.    Ears:  No gross abnormalities.  Hearing adequate for the exam.    Cardiovascular:  Regular rate and rhythm.    Lungs:  Good chest expansion.  Breathing unlabored.    Lymph:  No palpable masses or adenopathy in left upper extremity    Left upper extremity:   Skin is benign.  No obvious swellings, masses or atrophy.  No palpable masses or adenopathy.  Moderate tenderness to palpation along the small finger.   Full elbow and wrist range of motion.  No evident instability.  Negative " Tinel's over cubital tunnel.  Good , pinch, finger and thumb abduction strength in the hand.  Intact sensation throughout the forearm and hand.  Good radial pulse.  Brisk cap refill         Radiology:   AP, oblique, and lateral views of the left hand are ordered by myself and reviewed to evaluate the patient's complaint.  No comparison films are immediately available.  The x-rays show no obvious acute abnormalities, lesions, masses, significant degenerative changes, or other concerning findings.    Assessment:  Left hand numbness and pain, unknown etiology, possible radiculopathy    Plan:  He had a cervical spine CT scan after his recent fall.  It shows some degenerative changes.  I explained I will consult with Dr. Salcedo to determine the best route to take for further evaluation.  He verbalized understanding and agreed with this plan.      Addendum:  07/08/2024  5:40 pm I spoke to Ms. Cam by telephone. I explained Dr. Salcedo recommends we proceed with getting nerve studies for further evaluation.  Additionally, I recommended he try a topical pain/anti-inflammatory cream.  She reports they have a similar cream at home they would like to try first.  I think this is reasonable.  Going forward, I will call them with the nerve study results and come up with a plan at that time.  She verbalized understanding and appreciated the call.    LISA Helton    07/08/2024    CC to Oralia Boggs MD    Much of this encounter note is an electronic transcription/translation of spoken language to printed text. The electronic translation of spoken language may permit erroneous, or at times, nonsensical words or phrases to be inadvertently transcribed.  Although I have reviewed the note for such errors, some may still exist.    Answers submitted by the patient for this visit:  Primary Reason for Visit (Submitted on 7/2/2024)  What is the primary reason for your visit?: Extremity Pain  Lower Extremity Injury  Questionnaire (Submitted on 7/2/2024)  Chief Complaint: Extremity pain  Injury: No  Pain location: left fingers  Pain quality: other  Pain - numeric: 10/10  Progression since onset: unchanged  tingling: Yes  numbness: Yes  difficulty holding things: Yes  Additional information: Left Pinky Finger Total Numbness

## 2024-07-09 ENCOUNTER — TELEPHONE (OUTPATIENT)
Dept: ORTHOPEDIC SURGERY | Facility: HOSPITAL | Age: 74
End: 2024-07-09
Payer: MEDICARE

## 2024-07-09 NOTE — TELEPHONE ENCOUNTER
Attempted to reach Mr. Cam to see how he has been doing since his RTSRA 6/11. Message left at this time.

## 2024-07-10 ENCOUNTER — TREATMENT (OUTPATIENT)
Dept: PHYSICAL THERAPY | Facility: CLINIC | Age: 74
End: 2024-07-10
Payer: MEDICARE

## 2024-07-10 DIAGNOSIS — Z96.611 STATUS POST REVERSE TOTAL ARTHROPLASTY OF RIGHT SHOULDER: Primary | ICD-10-CM

## 2024-07-10 DIAGNOSIS — Z74.09 IMPAIRED FUNCTIONAL MOBILITY AND ACTIVITY TOLERANCE: ICD-10-CM

## 2024-07-10 NOTE — PROGRESS NOTES
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 Did not get up out of bed all day and slept most of the day. Not feeling too well this morning. Did not do any exercises since I was here last.     Subjective     Objective   See Exercise, Manual, and Modality Logs for complete treatment.       Assessment/Plan  Subjectively, pt reports no increase of pain or discomfort with interventions performed today. Pt became dizzy and needed to sit during TRX walkouts today. After a few minutes of rest, dizziness decreased and pt was able to continue with seated or supine activities.  Did not progress activities due to pt's pain level and dizziness.  Continues to benefit from verbal/tactile cues to ensure proper form and technique for exercise performance.     Progress per Plan of Care           Manual Therapy:    8     mins  08507;  Therapeutic Exercise:    20     mins  85309;     Neuromuscular Elizabeth:        mins  79376;    Therapeutic Activity:     10     mins  87220;     Gait Training:           mins  55446;     Ultrasound:          mins  46396;    Electrical Stimulation:         mins  16509;  Traction          mins 64150    Timed Treatment:   38   mins   Total Treatment:     38   mins    Priti Benavides PTA  Physical Therapist Assistant [phone]

## 2024-07-15 ENCOUNTER — TREATMENT (OUTPATIENT)
Dept: PHYSICAL THERAPY | Facility: CLINIC | Age: 74
End: 2024-07-15
Payer: MEDICARE

## 2024-07-15 DIAGNOSIS — Z96.611 STATUS POST REVERSE TOTAL ARTHROPLASTY OF RIGHT SHOULDER: Primary | ICD-10-CM

## 2024-07-15 DIAGNOSIS — Z74.09 IMPAIRED FUNCTIONAL MOBILITY AND ACTIVITY TOLERANCE: ICD-10-CM

## 2024-07-15 NOTE — PROGRESS NOTES
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 R shoulder doing well.     Subjective     Objective   See Exercise, Manual, and Modality Logs for complete treatment.       Assessment/Plan  Subjectively, pt reports no increase of pain or discomfort with interventions performed today. Performed well with continued R shoulder mobility interventions. Added deltoid isometrics to HEP. Continues to benefit from verbal/tactile cues to ensure proper form and technique for exercise performance.     Progress per Plan of Care           Manual Therapy:    8     mins  87786;  Therapeutic Exercise:    15     mins  33390;     Neuromuscular Elizabeth:        mins  97024;    Therapeutic Activity:     15     mins  87048;     Gait Training:           mins  20520;     Ultrasound:          mins  70907;    Electrical Stimulation:         mins  06145;  Traction          mins 70917    Timed Treatment:   38   mins   Total Treatment:     48   mins    Priti Benavides PTA  Physical Therapist Assistant [phone]

## 2024-07-18 ENCOUNTER — OFFICE VISIT (OUTPATIENT)
Dept: CARDIOLOGY | Facility: CLINIC | Age: 74
End: 2024-07-18
Payer: MEDICARE

## 2024-07-18 VITALS
DIASTOLIC BLOOD PRESSURE: 62 MMHG | BODY MASS INDEX: 41.75 KG/M2 | SYSTOLIC BLOOD PRESSURE: 140 MMHG | HEIGHT: 73 IN | WEIGHT: 315 LBS | HEART RATE: 85 BPM

## 2024-07-18 DIAGNOSIS — Z86.711 HISTORY OF PULMONARY EMBOLISM: ICD-10-CM

## 2024-07-18 DIAGNOSIS — I10 PRIMARY HYPERTENSION: ICD-10-CM

## 2024-07-18 DIAGNOSIS — R06.09 EXERTIONAL DYSPNEA: Primary | ICD-10-CM

## 2024-07-18 DIAGNOSIS — E78.2 MIXED HYPERLIPIDEMIA: ICD-10-CM

## 2024-07-18 PROCEDURE — 3078F DIAST BP <80 MM HG: CPT | Performed by: INTERNAL MEDICINE

## 2024-07-18 PROCEDURE — 1160F RVW MEDS BY RX/DR IN RCRD: CPT | Performed by: INTERNAL MEDICINE

## 2024-07-18 PROCEDURE — 1159F MED LIST DOCD IN RCRD: CPT | Performed by: INTERNAL MEDICINE

## 2024-07-18 PROCEDURE — 99204 OFFICE O/P NEW MOD 45 MIN: CPT | Performed by: INTERNAL MEDICINE

## 2024-07-18 PROCEDURE — 3077F SYST BP >= 140 MM HG: CPT | Performed by: INTERNAL MEDICINE

## 2024-07-18 PROCEDURE — 93000 ELECTROCARDIOGRAM COMPLETE: CPT | Performed by: INTERNAL MEDICINE

## 2024-07-18 NOTE — PROGRESS NOTES
[facility]      Patient Name: Barry Cam  :1950  Age: 74 y.o.  Encounter Provider:  Ej Huffman Jr, MD      Chief Complaint: Follow-up history of DVT/PE with chronic stable dyspnea on exertion and and morbid obesity      HPI  Barry Cam is a 74 y.o. male past medical history of DVT/PE on chronic anticoagulation, sleep apnea on CPAP, diabetes, hypertension and dyslipidemia who presents for initial evaluation with me.  Previously followed by Dr. Donahue and I have reviewed all part in electronic health records.  Patient with recent total reverse shoulder on the right.  Between musculoskeletal issues and chronic lower back pain he has had decreased activity over the past 6 to 12 months.  He has mild chronic stable dyspnea on exertion which was worked up in 2019 with negative stress study and echocardiogram showing normal EF with grade 1 diastolic dysfunction.  Patient has no bleeding complications on Eliquis.  He denies orthopnea, PND.  Chronic stable lower extremity edema.  No angina.  No palpitations, dizziness or syncope.  Social manage was reviewed and is not pertinent to this clinic visit.      The following portions of the patient's history were reviewed and updated as appropriate: allergies, current medications, past family history, past medical history, past social history, past surgical history and problem list.      Review of Systems   Constitutional: Negative for chills and fever.   HENT:  Negative for hoarse voice and sore throat.    Eyes:  Negative for double vision and photophobia.   Cardiovascular:  Negative for chest pain, leg swelling, near-syncope, orthopnea, palpitations, paroxysmal nocturnal dyspnea and syncope.   Respiratory:  Negative for cough and wheezing.    Skin:  Negative for poor wound healing and rash.   Musculoskeletal:  Negative for arthritis and joint swelling.   Gastrointestinal:  Negative for bloating, abdominal pain, hematemesis and  "hematochezia.   Neurological:  Negative for dizziness and focal weakness.   Psychiatric/Behavioral:  Negative for depression and suicidal ideas.      OBJECTIVE:   Vital Signs  Vitals:    07/18/24 0939   BP: 140/62   Pulse: 85     Estimated body mass index is 43.38 kg/m² as calculated from the following:    Height as of this encounter: 185.4 cm (73\").    Weight as of this encounter: 149 kg (328 lb 12.8 oz).    Vitals reviewed.   Constitutional:       Appearance: Not in distress.   Neck:      Vascular: No JVR. JVD normal.   Pulmonary:      Effort: Pulmonary effort is normal.      Breath sounds: No wheezing. No rhonchi. No rales.   Chest:      Chest wall: Not tender to palpatation.   Cardiovascular:      PMI at left midclavicular line. Normal rate. Regular rhythm. Normal S1. Normal S2.       Murmurs: There is no murmur.      No gallop.  No click. No rub.   Pulses:     Intact distal pulses.   Edema:     Thigh: bilateral trace edema of the thigh.     Pretibial: bilateral trace edema of the pretibial area.     Ankle: bilateral trace edema of the ankle.     Feet: bilateral trace edema of the feet.  Abdominal:      General: Bowel sounds are normal.      Palpations: Abdomen is soft.      Tenderness: There is no abdominal tenderness.   Musculoskeletal: Normal range of motion.         General: No tenderness. Skin:     General: Skin is warm and dry.   Neurological:      General: No focal deficit present.      Mental Status: Alert and oriented to person, place and time.       ECG 12 Lead    Date/Time: 7/18/2024 9:45 AM  Performed by: Ej Huffman Jr., MD    Authorized by: Ej Huffman Jr., MD  Comparison: compared with previous ECG from 6/8/2024  Similar to previous ECG  Rhythm: sinus rhythm  Ectopy: unifocal PVCs  Other findings: non-specific ST-T wave changes    Clinical impression: non-specific ECG      Lipid Panel          12/5/2023    08:10 6/20/2024    08:27   Lipid Panel   Total Cholesterol 172  163    Triglycerides " 324  243    HDL Cholesterol 41  48    VLDL Cholesterol 52  40    LDL Cholesterol  79  75    LDL/HDL Ratio 1.61  1.6         BUN   Date Value Ref Range Status   06/20/2024 15 8 - 27 mg/dL Final   06/07/2024 14 8 - 23 mg/dL Final   06/12/2018 19 7 - 20 mg/dL Final     Creatinine   Date Value Ref Range Status   06/20/2024 1.01 0.76 - 1.27 mg/dL Final   06/07/2024 1.08 0.76 - 1.27 mg/dL Final   08/01/2019 1.00 0.60 - 1.30 mg/dL Final     Comment:     Serial Number: [id number]:  [id number]   06/12/2018 0.9 0.7 - 1.5 mg/dL Final     Potassium   Date Value Ref Range Status   06/20/2024 3.7 3.5 - 5.2 mmol/L Final   06/07/2024 3.6 3.5 - 5.2 mmol/L Final   06/12/2018 4.7 3.5 - 5.1 mmol/L Final     ALT (SGPT)   Date Value Ref Range Status   06/20/2024 10 0 - 44 IU/L Final   06/12/2018 48 13 - 69 U/L Final     AST (SGOT)   Date Value Ref Range Status   06/20/2024 16 0 - 40 IU/L Final   06/12/2018 46 15 - 46 U/L Final           ASSESSMENT:     74-year-old male presents for follow-up of dyspnea on exertion      PLAN OF CARE:     Exertional dyspnea -chronic stable issue that is multifactorial in nature.  He knows he needs to lose weight, improve nutrition and increase activity.  He will start working on these things.  Fairly comprehensive workup for symptoms in the past with normal cardiac workup other than grade 1 diastolic dysfunction.  Blood pressure is borderline elevated.  Twice daily blood pressure log to be sent in after 1 week.  Essential hypertension -as above  Mixed hyperlipidemia  Morbid obesity  Diabetes    Return to clinic 12 months             Discharge Medications            Accurate as of July 18, 2024  9:45 AM. If you have any questions, ask your nurse or doctor.                Changes to Medications        Instructions Start Date   amLODIPine 10 MG tablet  Commonly known as: NORVASC  What changed: when to take this   10 mg, Oral, Daily      amphetamine-dextroamphetamine 30 MG tablet  Commonly known as:  Adderall  What changed:   when to take this  reasons to take this  additional instructions   30 mg, Oral, 2 Times Daily      apixaban 5 MG tablet tablet  Commonly known as: Eliquis  What changed: additional instructions   5 mg, Oral, Every 12 Hours      gabapentin 300 MG capsule  Commonly known as: NEURONTIN  What changed: when to take this   600 mg, Oral, 2 Times Daily      pioglitazone 45 MG tablet  Commonly known as: ACTOS  What changed: how much to take   45 mg, Oral, Daily             Continue These Medications        Instructions Start Date   acetaminophen 325 MG tablet  Commonly known as: TYLENOL   650 mg, Oral, 2 Times Daily PRN      albuterol sulfate  (90 Base) MCG/ACT inhaler  Commonly known as: PROVENTIL HFA;VENTOLIN HFA;PROAIR HFA   2 puffs, Inhalation, Every 4 Hours PRN, for wheezing      caffeine 200 MG tablet   200 mg, Oral, Every 4 Hours PRN, HOLDING FOR DOS      cephalexin 500 MG capsule  Commonly known as: KEFLEX   TAKE ALL FOUR CAPSULES BY MOUTH ONE HOUR PRIOR TO DENTAL APPT      docusate sodium 100 MG capsule  Commonly known as: COLACE   100 mg, Oral, 2 Times Daily      ferrous sulfate 325 (65 FE) MG tablet   325 mg, Oral, Daily With Breakfast      glimepiride 4 MG tablet  Commonly known as: AMARYL   4 mg, Oral, Every Morning Before Breakfast      Glucosamine Chondr 500 Complex capsule   2 capsules, Oral, Daily, HOLDING FOR DOS      hydroCHLOROthiazide 12.5 MG tablet   12.5 mg, Oral, Daily      loratadine 10 MG tablet  Commonly known as: CLARITIN   10 mg, Oral, As Needed      losartan 100 MG tablet  Commonly known as: COZAAR   TAKE 1 TABLET BY MOUTH EVERY DAY      Melatonin 10 MG tablet   Oral, Daily PRN      metFORMIN 1000 MG tablet  Commonly known as: GLUCOPHAGE   1,000 mg, Oral, 2 Times Daily      multivitamin tablet tablet  Commonly known as: THERAGRAN   1 tablet, Oral, Daily, HOLDING FOR DOS/CENTRUM SILVER      multivitamin with minerals tablet tablet   1 tablet, Oral, 2 Times Daily,  HOLDING  FOR DOS      omeprazole 20 MG capsule  Commonly known as: priLOSEC   20 mg, Oral, Daily      ondansetron 4 MG tablet  Commonly known as: Zofran   4 mg, Oral, Every 8 Hours PRN      ondansetron ODT 4 MG disintegrating tablet  Commonly known as: ZOFRAN-ODT   4 mg, Translingual, Every 6 Hours PRN      oxyCODONE-acetaminophen 7.5-325 MG per tablet  Commonly known as: PERCOCET   1 po q4-6h prn severe pain      oxyCODONE-acetaminophen 7.5-325 MG per tablet  Commonly known as: PERCOCET   1 tablet, Oral, Every 6 Hours PRN      pravastatin 40 MG tablet  Commonly known as: PRAVACHOL   40 mg, Oral, Daily      SITagliptin 100 MG tablet  Commonly known as: Januvia   100 mg, Oral, Daily      Triamcinolone Acetonide 55 MCG/ACT nasal inhaler  Commonly known as: NASACORT   2 sprays, Nasal, Daily PRN      VITAMIN C PO   Oral               Thank you for allowing me to participate in the care of your patient,      Sincerely,   Ej Huffman MD  [facility]  07/18/24  09:45 EDT

## 2024-07-19 ENCOUNTER — TREATMENT (OUTPATIENT)
Dept: PHYSICAL THERAPY | Facility: CLINIC | Age: 74
End: 2024-07-19
Payer: MEDICARE

## 2024-07-19 DIAGNOSIS — Z74.09 IMPAIRED FUNCTIONAL MOBILITY AND ACTIVITY TOLERANCE: ICD-10-CM

## 2024-07-19 DIAGNOSIS — Z96.611 STATUS POST REVERSE TOTAL ARTHROPLASTY OF RIGHT SHOULDER: Primary | ICD-10-CM

## 2024-07-19 PROCEDURE — 97530 THERAPEUTIC ACTIVITIES: CPT | Performed by: PHYSICAL THERAPIST

## 2024-07-19 PROCEDURE — 97110 THERAPEUTIC EXERCISES: CPT | Performed by: PHYSICAL THERAPIST

## 2024-07-19 PROCEDURE — 97140 MANUAL THERAPY 1/> REGIONS: CPT | Performed by: PHYSICAL THERAPIST

## 2024-07-22 ENCOUNTER — OFFICE VISIT (OUTPATIENT)
Dept: ORTHOPEDIC SURGERY | Facility: CLINIC | Age: 74
End: 2024-07-22
Payer: MEDICARE

## 2024-07-22 ENCOUNTER — TREATMENT (OUTPATIENT)
Dept: PHYSICAL THERAPY | Facility: CLINIC | Age: 74
End: 2024-07-22
Payer: MEDICARE

## 2024-07-22 VITALS — BODY MASS INDEX: 41.75 KG/M2 | TEMPERATURE: 98.6 F | WEIGHT: 315 LBS | HEIGHT: 73 IN

## 2024-07-22 DIAGNOSIS — Z74.09 IMPAIRED FUNCTIONAL MOBILITY AND ACTIVITY TOLERANCE: ICD-10-CM

## 2024-07-22 DIAGNOSIS — Z96.652 TOTAL KNEE REPLACEMENT STATUS, LEFT: Primary | ICD-10-CM

## 2024-07-22 DIAGNOSIS — Z96.611 STATUS POST REVERSE TOTAL ARTHROPLASTY OF RIGHT SHOULDER: Primary | ICD-10-CM

## 2024-07-22 DIAGNOSIS — Z09 SURGERY FOLLOW-UP: ICD-10-CM

## 2024-07-22 PROCEDURE — 73562 X-RAY EXAM OF KNEE 3: CPT | Performed by: ORTHOPAEDIC SURGERY

## 2024-07-22 PROCEDURE — 99212 OFFICE O/P EST SF 10 MIN: CPT | Performed by: ORTHOPAEDIC SURGERY

## 2024-07-22 PROCEDURE — 97110 THERAPEUTIC EXERCISES: CPT | Performed by: PHYSICAL THERAPIST

## 2024-07-22 PROCEDURE — 97140 MANUAL THERAPY 1/> REGIONS: CPT | Performed by: PHYSICAL THERAPIST

## 2024-07-22 PROCEDURE — 1160F RVW MEDS BY RX/DR IN RCRD: CPT | Performed by: ORTHOPAEDIC SURGERY

## 2024-07-22 PROCEDURE — 1159F MED LIST DOCD IN RCRD: CPT | Performed by: ORTHOPAEDIC SURGERY

## 2024-07-22 NOTE — PROGRESS NOTES
Physical Therapy Daily Treatment Note      Patient: Barry Cam   : 1950  Referring practitioner: Noah Salcedo MD  Date of Initial Visit: Type: THERAPY  Noted: 2024  Today's Date: 2024  Patient seen for 8 sessions       Visit Diagnoses:    ICD-10-CM ICD-9-CM   1. Status post reverse total arthroplasty of right shoulder  Z96.611 V43.61   2. Impaired functional mobility and activity tolerance  Z74.09 V49.89       Subjective   Per pt, Salcedo no longer requiring sling and PT can determine when pt is able to drive.    Objective   See Exercise, Manual, and Modality Logs for complete treatment.       Assessment/Plan  Demonstrated ability to turn wheel in clinic. Recommend driving in parking lot prior to roadway. AAROM and PROM continue to improve. No c/o pain. Progress per POC.    Timed:         Manual Therapy:    8     mins  05159;     Therapeutic Exercise:    30     mins  16199;     Neuromuscular Elizabeth:    0    mins  26128;    Therapeutic Activity:     0     mins  57445;     Gait Trainin     mins  20772;     Ultrasound:     0     mins  00872;    Ionto                               0    mins   42070  Self Care                       0     mins   74581      Un-Timed:  Electrical Stimulation:    0     mins  56701 ( );  Dry Needling     0     mins self-pay  Traction     0     mins 84520  Canalith Repos    0     mins 38449    Timed Treatment:   38   mins   Total Treatment:     38   mins    Patria Wynn PT  [place] License: [id number]

## 2024-07-22 NOTE — PROGRESS NOTES
Physical Therapy Daily Treatment Note  [facility]   [address], [address]  [place] [address]  P: [phone]       F: [phone]    Patient: Barry Cam   : 1950  Diagnosis/ICD-10 Code:  Status post reverse total arthroplasty of right shoulder [Z96.611]  Referring practitioner: Noah Salcedo MD  Date of Initial Visit: Type: THERAPY  Noted: 2024  Today's Date: 2024  Patient seen for 7 sessions       Barry Cam reports: Not sore today. Doing more at home.     Subjective     Objective   See Exercise, Manual, and Modality Logs for complete treatment.       Assessment/Plan  Subjectively, pt reports no increase of pain or discomfort with interventions performed today. Performed well with continued shoulder mobility and strengthening interventions. Continues to demonstrate fatigue with flexion wall slides, added to HEP. Continues to benefit from verbal/tactile cues to ensure proper form and technique for exercise performance.     Progress per Plan of Care           Manual Therapy:    8     mins  68195;  Therapeutic Exercise:    15     mins  30978;     Neuromuscular Elizabeth:        mins  78078;    Therapeutic Activity:     15     mins  83910;     Gait Training:           mins  82082;     Ultrasound:          mins  65524;    Electrical Stimulation:         mins  59310;  Traction          mins 24039    Timed Treatment:   38   mins   Total Treatment:     48   mins    Priti Benavides PTA  Physical Therapist Assistant [id number]

## 2024-07-22 NOTE — PROGRESS NOTES
"Barry Cam     : 1950     MRN: [id number]    DATE: 2024    DIAGNOSIS:  Annual follow up left total knee arthroplasty    SUBJECTIVE:  Patient returns today for annual follow up of a left total knee replacement. Patient reports doing well with no unusual complaints. Denies any limitations due to the knee.  He has been dealing with a flareup of sciatica which PT has been helping    OBJECTIVE:  Temp 98.6 °F (37 °C)   Ht 185.4 cm (73\")   Wt (!) 152 kg (335 lb 12.8 oz)   BMI 44.30 kg/m²   Family History   Problem Relation Age of Onset    Depression Mother     Diabetes Mother     Coronary artery disease Father     Diabetes Father     Stroke Father     Hypertension Father     Heart disease Father     Diabetes Brother     Diabetes Maternal Grandmother     Malig Hyperthermia Neg Hx      Past Medical History:   Diagnosis Date    Allergic 1950    Hay Fever    Allergic rhinitis     Anemia 2023    Anxiety     Arthritis     Serious    Arthritis of back     Asthma 2018    PE Blood Clots    Cataract ??    Removed    CHF (congestive heart failure) 2021    Clotting disorder 2018    Eliquis Related    CTS (carpal tunnel syndrome)     Deep vein thrombosis 2018    CHRONIC    Depression     Diabetes mellitus     Diverticulosis     Mild    Dyspnea on exertion     Fibromyalgia, primary     ?????    Fracture, fibula     Right    Fracture, humerus 2024    Fall    Fracture, tibia and fibula     GERD (gastroesophageal reflux disease)     History of hiatal hernia     History of kidney stones     HL (hearing loss)     Mild    Hx of blood clots     Hyperlipidemia     Hypertension     Hypothyroidism     Irritable bowel syndrome     Mild    Kidney stone     Ongoing    Low back pain     Never Ending    Low back strain     Narcolepsy     Neuropathy     HANDS AND FEET    Obesity     Can't Win    Peptic ulceration     Pulmonary embolism     Right shoulder pain     Rotator cuff syndrome     " Sciatica     Sleep apnea     CPAP USED    Tear of meniscus of knee     Left    Umbilical hernia     Visual impairment     Diabetes Related     Past Surgical History:   Procedure Laterality Date    ANKLE OPEN REDUCTION INTERNAL FIXATION      CYSTOSCOPY W/ LASER LITHOTRIPSY  07/20/2023    EYE SURGERY      Cataract Removal    FRACTURE SURGERY      Right Ankle/Foot/Leg  Right Shoulder    HERNIA REPAIR      HERNIA REPAIR      INGUINAL HERNIA REPAIR      JOINT REPLACEMENT Left 03/02/2023    SHOULDER SURGERY      TONSILLECTOMY      TOTAL KNEE ARTHROPLASTY Left 03/02/2023    Procedure: TOTAL KNEE ARTHROPLASTY;  Surgeon: Noah Salcedo MD;  Location: [facility];  Service: Orthopedics;  Laterality: Left;    TOTAL SHOULDER ARTHROPLASTY W/ DISTAL CLAVICLE EXCISION Right 06/11/2024    Procedure: Right Reverse Total Shoulder Arthroplasty;  Surgeon: Noah Salcedo MD;  Location: [facility];  Service: Orthopedics;  Laterality: Right;    UMBILICAL HERNIA REPAIR  Recent     Social History     Socioeconomic History    Marital status:    Tobacco Use    Smoking status: Never     Passive exposure: Never    Smokeless tobacco: Never    Tobacco comments:     caffeine use   Vaping Use    Vaping status: Never Used   Substance and Sexual Activity    Alcohol use: No    Drug use: No    Sexual activity: Not Currently     Partners: Female     Birth control/protection: Other     Review of Systems:   A 14 point review of systems is reviewed with the patient.  Pertinent positives are listed above.  All others negative.    Exam:  The incision is well healed.  Range of motion:0 to 120.  The calf is soft and nontender with a negative Homans sign.  Alignment is neutral.  Good quad strength. There is no evidence of varus/valgus or flexion instability. No effusion.  Intact sensation to light touch.  Palpable pedal pulses    DIAGNOSTIC STUDIES    Xrays: AP, merchant and lateral views of the left knee were ordered and reviewed for  evaluation of recent knee replacement.  The x-rays demonstrate a well positioned, well aligned knee replacement without complicating factors noted.  In comparison with previous films there has been no change.    ASSESSMENT:  Annual follow up left knee replacement.    PLAN: Appropriate activity modifications and restrictions discussed.  Antibiotic prophylaxis recommendations discussed.  Follow-up annually.    Noah Salcedo MD

## 2024-07-24 ENCOUNTER — OFFICE VISIT (OUTPATIENT)
Dept: ORTHOPEDIC SURGERY | Facility: CLINIC | Age: 74
End: 2024-07-24
Payer: MEDICARE

## 2024-07-24 ENCOUNTER — TREATMENT (OUTPATIENT)
Dept: PHYSICAL THERAPY | Facility: CLINIC | Age: 74
End: 2024-07-24
Payer: MEDICARE

## 2024-07-24 VITALS — TEMPERATURE: 98.6 F | WEIGHT: 315 LBS | HEIGHT: 73 IN | BODY MASS INDEX: 41.75 KG/M2

## 2024-07-24 DIAGNOSIS — Z74.09 IMPAIRED FUNCTIONAL MOBILITY AND ACTIVITY TOLERANCE: ICD-10-CM

## 2024-07-24 DIAGNOSIS — Z96.611 S/P REVERSE TOTAL SHOULDER ARTHROPLASTY, RIGHT: Primary | ICD-10-CM

## 2024-07-24 DIAGNOSIS — Z96.611 STATUS POST REVERSE TOTAL ARTHROPLASTY OF RIGHT SHOULDER: Primary | ICD-10-CM

## 2024-07-24 DIAGNOSIS — Z09 SURGERY FOLLOW-UP: ICD-10-CM

## 2024-07-24 DIAGNOSIS — G56.22 CUBITAL TUNNEL SYNDROME ON LEFT: ICD-10-CM

## 2024-07-24 PROCEDURE — 1159F MED LIST DOCD IN RCRD: CPT | Performed by: ORTHOPAEDIC SURGERY

## 2024-07-24 PROCEDURE — 73030 X-RAY EXAM OF SHOULDER: CPT | Performed by: ORTHOPAEDIC SURGERY

## 2024-07-24 PROCEDURE — 1160F RVW MEDS BY RX/DR IN RCRD: CPT | Performed by: ORTHOPAEDIC SURGERY

## 2024-07-24 PROCEDURE — 99213 OFFICE O/P EST LOW 20 MIN: CPT | Performed by: ORTHOPAEDIC SURGERY

## 2024-07-24 NOTE — PROGRESS NOTES
Physical Therapy Daily Treatment Note  [facility] [place]   [address], [address]  [place] [address]  P: [phone]       F: [phone]    Patient: Barry Cam   : 1950  Diagnosis/ICD-10 Code:  Status post reverse total arthroplasty of right shoulder [Z96.611]  Referring practitioner: Noah Salcedo MD  Date of Initial Visit: Type: THERAPY  Noted: 2024  Today's Date: 2024  Patient seen for 9 sessions       Barry Cam reports: R shoulder doing well. Drove here today.     Subjective     Objective   See Exercise, Manual, and Modality Logs for complete treatment.       Assessment/Plan  Subjectively, pt reports no increase of pain or discomfort with interventions performed today. Performed well with continued R shoulder AAROM activities. Continues to demonstrate fatigue with exercise in clinic, especially with wall slides.  Continues to benefit from verbal/tactile cues to ensure proper form and technique for exercise performance.     Progress per Plan of Care           Manual Therapy:    8     mins  75572;  Therapeutic Exercise:    15     mins  85040;     Neuromuscular Elizabeth:        mins  15261;    Therapeutic Activity:     15     mins  98061;     Gait Training:           mins  59040;     Ultrasound:          mins  21454;    Electrical Stimulation:         mins  95400;  Traction          mins 11471    Timed Treatment:   38   mins   Total Treatment:     48   mins    Priti Benavides PTA  Physical Therapist Assistant [phone]

## 2024-07-24 NOTE — PROGRESS NOTES
"Barry Cam : 1950 MRN: [id number] DATE: 2024    DIAGNOSIS: 6 week follow up right shoulder arthroplasty for fracture, left hand numbness and tingling    SUBJECTIVE:  Patient returns today for 6 week follow up of right shoulder replacement.  He reports the shoulder is doing great.  His primary complaint today is actually his left hand.  He reports worsening numbness and tingling for the last year.  He previously saw Madeleine for this issue.  He is at the point where the numbness and tingling are constant.  He says the fingers are wanting to \"draw up\".  He has a history of cervical spine problems and he wonders if this could be coming from his neck.  Denies any weakness.  Denies any right upper or any lower extremity symptoms.  He has been referred for a nerve conduction velocity and EMG.  That study is scheduled for September.    OBJECTIVE:    Temp 98.6 °F (37 °C)   Ht 185.4 cm (73\")   Wt (!) 152 kg (335 lb 12.8 oz)   BMI 44.30 kg/m²     Exam: Right shoulder is examined.  The incision is well healed. No erythema or drainage. Shoulder moves fluidly with pendulums.  Motion is on track per protocol.  The arm is soft and nontender.  Good motor and sensory function.  Palpable radial pulse.    Left arm is also examined.  No atrophy, swelling or masses.  Skin is benign.  Negative Tinel's over the cubital tunnel.  He has weakness with  and finger abduction.  He has diminished sensation in the ulnar nerve distribution.    DIAGNOSTIC STUDIES    Xrays: AP and scapular Y views of the right shoulder are ordered and reviewed for evaluation of shoulder replacement.  They demonstrate a well positioned, well aligned replacement without complicating factors noted.  In comparison with previous films there has been no change.    ASSESSMENT: 1.  6 week follow up right shoulder replacement    PLAN:   His shoulder seems to be doing fine.  I recommend he continue therapy per protocol.  Appropriate activity " modifications and restrictions were discussed.  Antibiotic prophylaxis recommendations were discussed as well.  I would like to see him back in another 6 weeks.    I think this is more likely cubital tunnel syndrome but I cannot exclude the possibility of a cervical etiology.  The nerve test would be helpful in that regard.  I hate for him to wait another 6 weeks for the nerve test.  I will see if there is anything we can do to expedite getting those scheduled for him.  In the meantime, I suggested that a cubital tunnel splint might help.  He was fitted for that today.  Once he gets the nerve test, I told him I will call him.  For now, I want him to go ahead and schedule the shoulder follow-up with me.    Noah Salcedo MD    07/24/2024    Answers submitted by the patient for this visit:  Primary Reason for Visit (Submitted on 7/21/2024)  What is the primary reason for your visit?: Other  Other (Submitted on 7/21/2024)  Please describe your symptoms.: Post Op  Have you had these symptoms before?: No  How long have you been having these symptoms?: Greater than 2 weeks  Please list any medications you are currently taking for this condition.: None  Please describe any probable cause for these symptoms. : Fall     Unable to offer, due to 's clinical indication

## 2024-07-29 ENCOUNTER — TREATMENT (OUTPATIENT)
Dept: PHYSICAL THERAPY | Facility: CLINIC | Age: 74
End: 2024-07-29
Payer: MEDICARE

## 2024-07-29 DIAGNOSIS — Z96.611 STATUS POST REVERSE TOTAL ARTHROPLASTY OF RIGHT SHOULDER: Primary | ICD-10-CM

## 2024-07-29 DIAGNOSIS — Z74.09 IMPAIRED FUNCTIONAL MOBILITY AND ACTIVITY TOLERANCE: ICD-10-CM

## 2024-07-29 PROCEDURE — 97110 THERAPEUTIC EXERCISES: CPT | Performed by: PHYSICAL THERAPIST

## 2024-07-29 PROCEDURE — 97112 NEUROMUSCULAR REEDUCATION: CPT | Performed by: PHYSICAL THERAPIST

## 2024-07-29 PROCEDURE — 97140 MANUAL THERAPY 1/> REGIONS: CPT | Performed by: PHYSICAL THERAPIST

## 2024-07-29 NOTE — PROGRESS NOTES
Physical Therapy Re Certification Of Plan of Care  Patient: Barry Cam   : 1950  Diagnosis/ICD-10 Code:  Status post reverse total arthroplasty of right shoulder [Z96.611]  Referring practitioner: Noah Salcedo MD  Date of Initial Visit: Type: THERAPY  Noted: 2024  Today's Date: 2024  Patient seen for 10 sessions         Visit Diagnoses:    ICD-10-CM ICD-9-CM   1. Status post reverse total arthroplasty of right shoulder  Z96.611 V43.61   2. Impaired functional mobility and activity tolerance  Z74.09 V49.89         Barry Cam reports: R shoulder doing well. L 5th digit tingling. My body is revolting. I haven't left bed since Wednesday afternoon. Sleeping 18 hrs/day. Denies illness. Considering decreasing frequency of PT visits to 2x/week to allow for more rest.    Subjective Questionnaire: QuickDASH: 34  Clinical Progress: improved  Home Program Compliance: No  Treatment has included: therapeutic exercise, neuromuscular re-education, and manual therapy      Subjective       Objective          Active Range of Motion     Additional Active Range of Motion Details  AAROM flex 138  AAROM abd 115    Passive Range of Motion     Right Shoulder   Flexion: 155 degrees   Abduction: 128 degrees   External rotation 90°: 92 degrees   Internal rotation 90°: 65 degrees       Short Term Goals: 6 weeks. Patient will:  1. Be independent with initial HEP MET  2. Be instructed in posture and body mechanics. MET  3. Demonstrate R shoulder PROM WNL to allow for progressing of therapy exercises. NOT MET     Long Term Goals: 6-12 weeks. Pt will:  1. Exhibit (R) shoulder AROM to WFL to allow for reaching overhead and out (ABD) without pain limiting function.  2. Demonstrate improved R UE MMT of >/= 4+/5 to allow for performance of ADL's/household management/recreational activities.  3. Pt able to reach overhead and lift 10# to allow for return to doing home/yard/recreational activities with min to no  pain.  4. Report perceived disability </=10% based on QuickDASH  ALL NOT MET    Assessment/Plan  Recommend maintaining 3x/week schedule as pt is not compliant with HEP and has limited PROM overhead. Tolerates exercise well w/o c/o pain. Issued updated HEP. Progress per POC.     Recommendations: Continue as planned  Timeframe: 2 months  Prognosis to achieve goals: good    Timed:         Manual Therapy:    8     mins  53383;     Therapeutic Exercise:    30     mins  28978;     Neuromuscular Elizabeth:    10    mins  47017;    Therapeutic Activity:     0     mins  13166;     Gait Trainin     mins  16964;     Ultrasound:     0     mins  04798;    Ionto                               0    mins   89337  Self Care                       0     mins   80896    Un-Timed:  Electrical Stimulation:    0     mins  56379 ( );  Dry Needling     0     mins self-pay  Traction     0     mins 18154  Re-Eval                           0    mins  98690  Canalith Repos    0     mins 22195    Timed Treatment:   48   mins   Total Treatment:     48   mins          PT: Patria Wynn PT     [place] License:  [id number]  Electronically signed by Patria Wynn PT, 24, 9:02 AM EDT    Certification Period: 2024 thru 10/26/2024  I certify that the therapy services are furnished while this patient is under my care.  The services outlined above are required by this patient, and will be reviewed every 90 days.         Physician Signature:__________________________________________________    PHYSICIAN: Noah Salcedo MD  NPI: [id number]                                      DATE:  :     Please sign and return via fax to [email or web address] . Thank you, [facility] Physical Therapy

## 2024-07-31 ENCOUNTER — TREATMENT (OUTPATIENT)
Dept: PHYSICAL THERAPY | Facility: CLINIC | Age: 74
End: 2024-07-31
Payer: MEDICARE

## 2024-07-31 DIAGNOSIS — Z96.611 STATUS POST REVERSE TOTAL ARTHROPLASTY OF RIGHT SHOULDER: Primary | ICD-10-CM

## 2024-07-31 DIAGNOSIS — Z74.09 IMPAIRED FUNCTIONAL MOBILITY AND ACTIVITY TOLERANCE: ICD-10-CM

## 2024-07-31 NOTE — PROGRESS NOTES
Physical Therapy Daily Treatment Note  [facility] [place]   [address], [address]  [place] [address]  P: [phone]       F: [phone]    Patient: Barry Cam   : 1950  Diagnosis/ICD-10 Code:  Status post reverse total arthroplasty of right shoulder [Z96.611]  Referring practitioner: Noah Salcedo MD  Date of Initial Visit: Type: THERAPY  Noted: 2024  Today's Date: 2024  Patient seen for 11 sessions       Barry Cam reports: I have trouble doing my exercises at home because my R shoulder is so stiff and I can't warm it up. I can maybe only get half of the reps for my exercises done.     Subjective     Objective   See Exercise, Manual, and Modality Logs for complete treatment.       Assessment/Plan  Subjectively, pt reports no increase of pain or discomfort with interventions performed today. Performed well with continued R shoulder mobility and beginning strengthening interventions. Fatigues with exercise in clinic. Continues to benefit from verbal/tactile cues to ensure proper form and technique for exercise performance.     Progress per Plan of Care           Manual Therapy:    8     mins  24166;  Therapeutic Exercise:    15     mins  25920;     Neuromuscular Elizabeth:        mins  05180;    Therapeutic Activity:     15     mins  50777;     Gait Training:           mins  76113;     Ultrasound:          mins  87629;    Electrical Stimulation:         mins  79774;  Traction          mins 50770    Timed Treatment:   38   mins   Total Treatment:     48   mins    Priti Benavides PTA  Physical Therapist Assistant [id number]

## 2024-08-02 ENCOUNTER — TREATMENT (OUTPATIENT)
Dept: PHYSICAL THERAPY | Facility: CLINIC | Age: 74
End: 2024-08-02
Payer: MEDICARE

## 2024-08-02 DIAGNOSIS — Z96.611 STATUS POST REVERSE TOTAL ARTHROPLASTY OF RIGHT SHOULDER: Primary | ICD-10-CM

## 2024-08-02 DIAGNOSIS — Z74.09 IMPAIRED FUNCTIONAL MOBILITY AND ACTIVITY TOLERANCE: ICD-10-CM

## 2024-08-02 NOTE — PROGRESS NOTES
Physical Therapy Daily Treatment Note      Patient: Barry Cam   : 1950  Referring practitioner: Noah Salcedo MD  Date of Initial Visit: Type: THERAPY  Noted: 2024  Today's Date: 2024  Patient seen for 12 sessions       Visit Diagnoses:    ICD-10-CM ICD-9-CM   1. Status post reverse total arthroplasty of right shoulder  Z96.611 V43.61   2. Impaired functional mobility and activity tolerance  Z74.09 V49.89       Subjective   Shoulder is improving. I can wash and comb hair.    Objective   See Exercise, Manual, and Modality Logs for complete treatment.       Assessment/Plan  R shoulder PROM/AAROM improving. Continues to have tightness beyond 90deg flex and abd. Recommend pulley for home ROM. Progress per POC.    Timed:         Manual Therapy:    8     mins  62937;     Therapeutic Exercise:    20     mins  82199;     Neuromuscular Elizabeth:    10    mins  62057;    Therapeutic Activity:     0     mins  47826;     Gait Trainin     mins  43086;     Ultrasound:     0     mins  11630;    Ionto                               0    mins   90343  Self Care                       0     mins   91027      Un-Timed:  Electrical Stimulation:    0     mins  64341 ( );  Dry Needling     0     mins self-pay  Traction     0     mins 13416  Canalith Repos    0     mins 75096    Timed Treatment:   38   mins   Total Treatment:     38   mins    ANGELIQUE Valles License: [id number]

## 2024-08-05 ENCOUNTER — TREATMENT (OUTPATIENT)
Dept: PHYSICAL THERAPY | Facility: CLINIC | Age: 74
End: 2024-08-05
Payer: MEDICARE

## 2024-08-05 DIAGNOSIS — Z74.09 IMPAIRED FUNCTIONAL MOBILITY AND ACTIVITY TOLERANCE: ICD-10-CM

## 2024-08-05 DIAGNOSIS — Z96.611 STATUS POST REVERSE TOTAL ARTHROPLASTY OF RIGHT SHOULDER: Primary | ICD-10-CM

## 2024-08-05 PROCEDURE — 97110 THERAPEUTIC EXERCISES: CPT | Performed by: PHYSICAL THERAPIST

## 2024-08-05 PROCEDURE — 97140 MANUAL THERAPY 1/> REGIONS: CPT | Performed by: PHYSICAL THERAPIST

## 2024-08-07 ENCOUNTER — TREATMENT (OUTPATIENT)
Dept: PHYSICAL THERAPY | Facility: CLINIC | Age: 74
End: 2024-08-07
Payer: MEDICARE

## 2024-08-07 DIAGNOSIS — Z96.611 STATUS POST REVERSE TOTAL ARTHROPLASTY OF RIGHT SHOULDER: Primary | ICD-10-CM

## 2024-08-07 DIAGNOSIS — E11.40 DIABETIC NEUROPATHY, PAINFUL: ICD-10-CM

## 2024-08-07 DIAGNOSIS — Z74.09 IMPAIRED FUNCTIONAL MOBILITY AND ACTIVITY TOLERANCE: ICD-10-CM

## 2024-08-07 RX ORDER — GABAPENTIN 300 MG/1
CAPSULE ORAL
Qty: 360 CAPSULE | Refills: 1 | OUTPATIENT
Start: 2024-08-07

## 2024-08-07 NOTE — PROGRESS NOTES
Physical Therapy Daily Treatment Note  [facility] Physical Therapy [place]   [address], [address]  [place] [address]  P: [phone]       F: [phone]    Patient: Barry Cam   : 1950  Diagnosis/ICD-10 Code:  Status post reverse total arthroplasty of right shoulder [Z96.611]  Referring practitioner: Noah Salcedo MD  Date of Initial Visit: Type: THERAPY  Noted: 2024  Today's Date: 2024  Patient seen for 14 sessions       Barry Cam reports: woke up with R shoulder feeling achy. Rest of body felt achy too. Used pulleys at home this weekend.     Subjective     Objective   See Exercise, Manual, and Modality Logs for complete treatment.       Assessment/Plan  Subjectively, pt reports no increase of pain or discomfort with interventions performed today. Performed well with continued beginning strengthening activities, most difficulty with abduction wall slides. Continues to demonstrate increased stiffness at end range of all planes of R shoulder PROM. Continues to benefit from verbal/tactile cues to ensure proper form and technique for exercise performance.     Progress per Plan of Care           Manual Therapy:    8     mins  61705;  Therapeutic Exercise:    20     mins  46714;     Neuromuscular Elizabeth:        mins  25261;    Therapeutic Activity:     15     mins  00731;     Gait Training:           mins  29283;     Ultrasound:          mins  05215;    Electrical Stimulation:         mins  89961;  Traction          mins 15466    Timed Treatment:   43   mins   Total Treatment:     53   mins    Priti Benavides PTA  Physical Therapist Assistant [id number]

## 2024-08-08 ENCOUNTER — OFFICE VISIT (OUTPATIENT)
Dept: SLEEP MEDICINE | Facility: HOSPITAL | Age: 74
End: 2024-08-08
Payer: MEDICARE

## 2024-08-08 VITALS — BODY MASS INDEX: 41.75 KG/M2 | HEART RATE: 82 BPM | OXYGEN SATURATION: 97 % | WEIGHT: 315 LBS | HEIGHT: 73 IN

## 2024-08-08 DIAGNOSIS — E66.01 CLASS 3 SEVERE OBESITY DUE TO EXCESS CALORIES WITH SERIOUS COMORBIDITY AND BODY MASS INDEX (BMI) OF 40.0 TO 44.9 IN ADULT: ICD-10-CM

## 2024-08-08 DIAGNOSIS — E11.40 DIABETIC NEUROPATHY, PAINFUL: ICD-10-CM

## 2024-08-08 DIAGNOSIS — G47.33 OBSTRUCTIVE SLEEP APNEA SYNDROME: Primary | ICD-10-CM

## 2024-08-08 DIAGNOSIS — G47.14 HYPERSOMNIA DUE TO MEDICAL CONDITION: ICD-10-CM

## 2024-08-08 PROCEDURE — 1159F MED LIST DOCD IN RCRD: CPT | Performed by: INTERNAL MEDICINE

## 2024-08-08 PROCEDURE — 99213 OFFICE O/P EST LOW 20 MIN: CPT | Performed by: INTERNAL MEDICINE

## 2024-08-08 PROCEDURE — 1160F RVW MEDS BY RX/DR IN RCRD: CPT | Performed by: INTERNAL MEDICINE

## 2024-08-08 PROCEDURE — G0463 HOSPITAL OUTPT CLINIC VISIT: HCPCS

## 2024-08-08 RX ORDER — GABAPENTIN 300 MG/1
600 CAPSULE ORAL 2 TIMES DAILY
Qty: 360 CAPSULE | Refills: 5 | Status: SHIPPED | OUTPATIENT
Start: 2024-08-08

## 2024-08-08 NOTE — PROGRESS NOTES
"Follow Up Sleep Disorders Center Note     Chief Complaint:  GRICEL     Primary Care Physician: Oralia Boggs MD    Interval History:   The patient is a 74 y.o. male who I last saw 6/29/2023 and that note was reviewed.  Patient reports he is unchanged.  His bedtime and wake time varies.  He will use the restroom during that time.  The patient continued to take 2 to 4-hour naps most days.    The patient continues to take Adderall 30 mg 1 or 2 daily.  Per Saad, last prescription was in January for #60.    On 6/11/2024 the patient did have shoulder replacement following a fall.    Review of Systems:    A complete review of systems was done and all were negative with the exception of some nasal congestion postnasal drip, shortness of breath with activity, and some COLE.    Social History:    Social History     Socioeconomic History    Marital status:    Tobacco Use    Smoking status: Never     Passive exposure: Never    Smokeless tobacco: Never    Tobacco comments:     caffeine use   Vaping Use    Vaping status: Never Used   Substance and Sexual Activity    Alcohol use: No    Drug use: No    Sexual activity: Not Currently     Partners: Female     Birth control/protection: Other       Allergies:  Patient has no known allergies.     Medication Review: His list was reviewed.      Vital Signs:    Vitals:    08/08/24 1121   Pulse: 82   SpO2: 97%   Weight: (!) 151 kg (332 lb 9.6 oz)   Height: 185.4 cm (73\")     Body mass index is 43.88 kg/m².    Physical Exam:    Constitutional:  Well developed 74 y.o. male that appears in no apparent distress.  Awake & oriented times 3.  Normal mood with normal recent and remote memory and normal judgement.  Eyes:  Conjunctivae normal.  Oropharynx: Previously, moist mucous membranes without exudate and a large tongue and uvula and mild narrowing of the posterior pharyngeal opening and class II-3 Mallampati airway.    Self-administered [place] Sleepiness Scale test results: 16, " previously 11  0-5 Lower normal daytime sleepiness  6-10 Higher normal daytime sleepiness  11-12 Mild, 13-15 Moderate, & 16-24 Severe excessive daytime sleepiness     Downloaded PAP Data Evaluated For Therapeutic Response and Compliance:  DME is [facility] and he uses a fullface mask.  Downloads between 5/9 and 8/6/2024 compliance 98%.  Average usage is 10 hours and 37 minutes.  Average AHI is abnormal at 18.5 but all subsets are normal.  AHI is worse after surgery and they are improving in the last 2 to 3 weeks.  Average auto CPAP pressure is 14.8 and his auto CPAP is set at 10-15    I have reviewed the above results and compared them with the patient's last downloads and reviewed with the patient.    Impression:   Obstructive sleep apnea adequately treated with ResMed auto CPAP. The patient appears to be at goal with good compliance and usage. The patient has complaints of hypersomnolence treated with Adderall 30 mg every morning and another 30 mg 2 or 3 times a week in the afternoon as well as caffeine tablets.     Plan:  Good sleep hygiene measures should be maintained.  Weight loss would be beneficial in this patient who has class III severe obesity by Body mass index is 43.88 kg/m².      After evaluating the patient and assessing results available, the patient is benefiting from the treatment being provided.     The patient will continue ResMed auto CPAP.  Potential side effects of not using PAP therapy reviewed and addressed as needed.  After clinical evaluation and review of downloads, I recommend changes to the patient's pressures.  Based on downloads, auto CPAP will be changed to 10-16.  A new prescription will be sent to the patient's DME.    I answered all of the patient's questions.  The patient will call the Sleep Disorder Center for any problems and will follow up in 1 year.      Barry Gillespie MD  Sleep Medicine  08/08/24  11:40 EDT         Topical Ketoconazole Counseling: Patient counseled that this medication may cause skin irritation or allergic reactions.  In the event of skin irritation, the patient was advised to reduce the amount of the drug applied or use it less frequently.   The patient verbalized understanding of the proper use and possible adverse effects of ketoconazole.  All of the patient's questions and concerns were addressed.

## 2024-08-09 ENCOUNTER — TREATMENT (OUTPATIENT)
Dept: PHYSICAL THERAPY | Facility: CLINIC | Age: 74
End: 2024-08-09
Payer: MEDICARE

## 2024-08-09 DIAGNOSIS — Z96.611 STATUS POST REVERSE TOTAL ARTHROPLASTY OF RIGHT SHOULDER: Primary | ICD-10-CM

## 2024-08-09 DIAGNOSIS — Z74.09 IMPAIRED FUNCTIONAL MOBILITY AND ACTIVITY TOLERANCE: ICD-10-CM

## 2024-08-09 NOTE — PROGRESS NOTES
Physical Therapy Daily Treatment Note      Patient: Barry Cam   : 1950  Referring practitioner: Noah Salcedo MD  Date of Initial Visit: Type: THERAPY  Noted: 2024  Today's Date: 2024  Patient seen for 15 sessions       Visit Diagnoses:    ICD-10-CM ICD-9-CM   1. Status post reverse total arthroplasty of right shoulder  Z96.611 V43.61   2. Impaired functional mobility and activity tolerance  Z74.09 V49.89       Subjective   No new changes.    Objective   See Exercise, Manual, and Modality Logs for complete treatment.     Assessment/Plan  Continues to have mild limitations in R shoulder PROM in all directions. Fatigued very quickly after wall slides (2x5) requiring seated rest break. Plan to progress strengthening as tolerated next week.    Timed:         Manual Therapy:    10     mins  02762;     Therapeutic Exercise:    20     mins  28463;     Neuromuscular Elizabeth:    10    mins  61361;    Therapeutic Activity:     0     mins  34038;     Gait Trainin     mins  86994;     Ultrasound:     0     mins  13295;    Ionto                               0    mins   56997  Self Care                       0     mins   63392      Un-Timed:  Electrical Stimulation:    0     mins  69996 ( );  Dry Needling     0     mins self-pay  Traction     0     mins 47009  Canalith Repos    0     mins 50484    Timed Treatment:   40   mins   Total Treatment:     40   mins    ANGELIQUE Valles License: [id number]

## 2024-08-12 ENCOUNTER — TREATMENT (OUTPATIENT)
Dept: PHYSICAL THERAPY | Facility: CLINIC | Age: 74
End: 2024-08-12
Payer: MEDICARE

## 2024-08-12 DIAGNOSIS — Z96.611 STATUS POST REVERSE TOTAL ARTHROPLASTY OF RIGHT SHOULDER: Primary | ICD-10-CM

## 2024-08-12 DIAGNOSIS — Z74.09 IMPAIRED FUNCTIONAL MOBILITY AND ACTIVITY TOLERANCE: ICD-10-CM

## 2024-08-12 NOTE — PROGRESS NOTES
"Physical Therapy Daily Treatment Note      Patient: Barry Cam   : 1950  Referring practitioner: Noah Salcedo MD  Date of Initial Visit: Type: THERAPY  Noted: 2024  Today's Date: 2024  Patient seen for 16 sessions       Visit Diagnoses:    ICD-10-CM ICD-9-CM   1. Status post reverse total arthroplasty of right shoulder  Z96.611 V43.61   2. Impaired functional mobility and activity tolerance  Z74.09 V49.89       Subjective   Did not stretch over the weekend. \"It ain't happening if it hurts.\"    Objective   See Exercise, Manual, and Modality Logs for complete treatment.     Assessment/Plan  Conitnues to have limited PROM in all directions. R shoulder popping w/ initial PROM flexion improved with reps. Tolerance to exercise limited by pain. Regressed wall slides to incline slides per pt request.  Encouraged daily compliance w/ HEP.    Timed:         Manual Therapy:    8     mins  01029;     Therapeutic Exercise:    20     mins  15921;     Neuromuscular Elizabeth:    10    mins  73452;    Therapeutic Activity:     0     mins  47463;     Gait Trainin     mins  01396;     Ultrasound:     0     mins  45439;    Ionto                               0    mins   26895  Self Care                       0     mins   51180      Un-Timed:  Electrical Stimulation:    0     mins  69137 ( );  Dry Needling     0     mins self-pay  Traction     0     mins 39769  Canalith Repos    0     mins 79634    Timed Treatment:   38   mins   Total Treatment:     48   mins    Patria Wynn, PT  [place] License: [id number]                  "

## 2024-08-13 ENCOUNTER — TELEPHONE (OUTPATIENT)
Dept: SLEEP MEDICINE | Facility: HOSPITAL | Age: 74
End: 2024-08-13
Payer: MEDICARE

## 2024-08-13 NOTE — TELEPHONE ENCOUNTER
Lm on pts vm for pt to cb the 1st week of September for  to review downloads. Changed pressure on machine to [id number] & saved to modem

## 2024-08-14 ENCOUNTER — TREATMENT (OUTPATIENT)
Dept: PHYSICAL THERAPY | Facility: CLINIC | Age: 74
End: 2024-08-14
Payer: MEDICARE

## 2024-08-14 DIAGNOSIS — Z74.09 IMPAIRED FUNCTIONAL MOBILITY AND ACTIVITY TOLERANCE: ICD-10-CM

## 2024-08-14 DIAGNOSIS — Z96.611 STATUS POST REVERSE TOTAL ARTHROPLASTY OF RIGHT SHOULDER: Primary | ICD-10-CM

## 2024-08-14 NOTE — PROGRESS NOTES
"   Physical Therapy Daily Treatment Note  [facility] Therapy [place]   [address], [address]  [place] [address]  P: [phone]       F: [phone]    Patient: Barry Cam   : 1950  Diagnosis/ICD-10 Code:  Status post reverse total arthroplasty of right shoulder [Z96.611]  Referring practitioner: Noah Salcedo MD  Date of Initial Visit: Type: THERAPY  Noted: 2024  Today's Date: 2024  Patient seen for 17 sessions       Barry Cam reports: everything hurts today. New shoulder exercises are fine, \"just don't ask me to slide up the wall\".     Subjective     Objective   See Exercise, Manual, and Modality Logs for complete treatment.       Assessment/Plan  Subjectively, pt reports no increase of pain or discomfort with interventions performed today. Performed well with continued R shoulder mobility and beginning strengthening activities as tolerated. Increased R shoulder stiffness at end range PROM in all planes. Continues to benefit from verbal/tactile cues to ensure proper form and technique for exercise performance.     Progress per Plan of Care           Manual Therapy:   8      mins  30714;  Therapeutic Exercise:    15     mins  89066;     Neuromuscular Elizabeth:        mins  73028;    Therapeutic Activity:     15     mins  05061;     Gait Training:           mins  85762;     Ultrasound:          mins  78814;    Electrical Stimulation:         mins  95202;  Traction          mins 28333    Timed Treatment:   38   mins   Total Treatment:     48   mins    Priti Benavides PTA  Physical Therapist Assistant [id number]  "

## 2024-08-16 ENCOUNTER — TREATMENT (OUTPATIENT)
Dept: PHYSICAL THERAPY | Facility: CLINIC | Age: 74
End: 2024-08-16
Payer: MEDICARE

## 2024-08-16 DIAGNOSIS — Z96.611 STATUS POST REVERSE TOTAL ARTHROPLASTY OF RIGHT SHOULDER: Primary | ICD-10-CM

## 2024-08-16 DIAGNOSIS — Z74.09 IMPAIRED FUNCTIONAL MOBILITY AND ACTIVITY TOLERANCE: ICD-10-CM

## 2024-08-16 NOTE — PROGRESS NOTES
Physical Therapy Daily Treatment Note      Patient: Barry Cam   : 1950  Referring practitioner: Noah Salcedo MD  Date of Initial Visit: Type: THERAPY  Noted: 2024  Today's Date: 2024  Patient seen for 18 sessions       Visit Diagnoses:    ICD-10-CM ICD-9-CM   1. Status post reverse total arthroplasty of right shoulder  Z96.611 V43.61   2. Impaired functional mobility and activity tolerance  Z74.09 V49.89       Subjective   No new changes.    Objective   See Exercise, Manual, and Modality Logs for complete treatment.       Assessment/Plan  Continues to have limited PROM in all directions but improves with passive stretching and exercise. Pt self limits AAROM >90deg flexion due to pain. Improved strength with IR/ER isometrics. Progress per POC.      Timed:         Manual Therapy:    8     mins  60270;     Therapeutic Exercise:    40     mins  70515;     Neuromuscular Elizabeth:    0    mins  20542;    Therapeutic Activity:     0     mins  17205;     Gait Trainin     mins  00808;     Ultrasound:     0     mins  68457;    Ionto                               0    mins   10676  Self Care                       0     mins   60987      Un-Timed:  Electrical Stimulation:    0     mins  80323 ( );  Dry Needling     0     mins self-pay  Traction     0     mins 40047  Canalith Repos    0     mins 09551    Timed Treatment:   48   mins   Total Treatment:     48   mins    Patria Wynn PT  [place] License: [id number]

## 2024-08-19 ENCOUNTER — TREATMENT (OUTPATIENT)
Dept: PHYSICAL THERAPY | Facility: CLINIC | Age: 74
End: 2024-08-19
Payer: MEDICARE

## 2024-08-19 DIAGNOSIS — Z96.611 STATUS POST REVERSE TOTAL ARTHROPLASTY OF RIGHT SHOULDER: Primary | ICD-10-CM

## 2024-08-19 DIAGNOSIS — Z74.09 IMPAIRED FUNCTIONAL MOBILITY AND ACTIVITY TOLERANCE: ICD-10-CM

## 2024-08-19 NOTE — PROGRESS NOTES
Physical Therapy Daily Treatment Note      Patient: Barry Cam   : 1950  Referring practitioner: Noah Salcedo MD  Date of Initial Visit: Type: THERAPY  Noted: 2024  Today's Date: 2024  Patient seen for 19 sessions       Visit Diagnoses:    ICD-10-CM ICD-9-CM   1. Status post reverse total arthroplasty of right shoulder  Z96.611 V43.61   2. Impaired functional mobility and activity tolerance  Z74.09 V49.89       Subjective   R shoulder is doing fine. C/o knee pain. Did not do home program over the weekend, because he slept for 24hrs and did house chores.    Objective   See Exercise, Manual, and Modality Logs for complete treatment.     Assessment/Plan  Continues to have limited PROM overhead and crepitus w/ PROM flexion. Encouraged daily stretching. Slow progress re: strengthening due to poor compliance w/ HEP.    Timed:         Manual Therapy:    8     mins  36313;     Therapeutic Exercise:    23     mins  14976;     Neuromuscular Elizabeth:    0    mins  44801;    Therapeutic Activity:     0     mins  67409;     Gait Trainin     mins  56836;     Ultrasound:     0     mins  25067;    Ionto                               0    mins   33456  Self Care                       0     mins   61579      Un-Timed:  Electrical Stimulation:    0     mins  83060 ( );  Dry Needling     0     mins self-pay  Traction     0     mins 45819  Canalith Repos    0     mins 84326    Timed Treatment:   31   mins   Total Treatment:     50   mins    Patria Wynn, PT  [place] License: [id number]

## 2024-08-21 ENCOUNTER — TREATMENT (OUTPATIENT)
Dept: PHYSICAL THERAPY | Facility: CLINIC | Age: 74
End: 2024-08-21
Payer: MEDICARE

## 2024-08-21 DIAGNOSIS — Z74.09 IMPAIRED FUNCTIONAL MOBILITY AND ACTIVITY TOLERANCE: ICD-10-CM

## 2024-08-21 DIAGNOSIS — Z96.611 STATUS POST REVERSE TOTAL ARTHROPLASTY OF RIGHT SHOULDER: Primary | ICD-10-CM

## 2024-08-21 NOTE — PROGRESS NOTES
Physical Therapy Daily Treatment Note  [facility] Therapy [place]   [address], [address]  [place] [address]  P: [phone]       F: [phone]    Patient: Barry Cam   : 1950  Diagnosis/ICD-10 Code:  Status post reverse total arthroplasty of right shoulder [Z96.611]  Referring practitioner: Noah Salcedo MD  Date of Initial Visit: Type: THERAPY  Noted: 2024  Today's Date: 2024  Patient seen for 20 sessions       Barry Cam reports: no new complaints.     Subjective     Objective   See Exercise, Manual, and Modality Logs for complete treatment.       Assessment/Plan  Subjectively, pt reports no increase of pain or discomfort with interventions performed today. Performed well with continued R shoulder strengthening activities. Crepitus noted with R shoulder flexion and scaption PROM. Continues to demonstrate ability to increase resistance with some strengthening activities.  Continues to benefit from verbal/tactile cues to ensure proper form and technique for exercise performance.     Progress per Plan of Care           Manual Therapy:    8     mins  33112;  Therapeutic Exercise:    20     mins  34202;     Neuromuscular Elizabeth:        mins  39171;    Therapeutic Activity:     10     mins  37090;     Gait Training:           mins  18352;     Ultrasound:          mins  98353;    Electrical Stimulation:         mins  92089;  Traction          mins 83607    Timed Treatment:   38   mins   Total Treatment:     55   mins    Priti Benavides PTA  Physical Therapist Assistant [phone]

## 2024-08-23 ENCOUNTER — TREATMENT (OUTPATIENT)
Dept: PHYSICAL THERAPY | Facility: CLINIC | Age: 74
End: 2024-08-23
Payer: MEDICARE

## 2024-08-23 DIAGNOSIS — Z74.09 IMPAIRED FUNCTIONAL MOBILITY AND ACTIVITY TOLERANCE: ICD-10-CM

## 2024-08-23 DIAGNOSIS — Z96.611 STATUS POST REVERSE TOTAL ARTHROPLASTY OF RIGHT SHOULDER: Primary | ICD-10-CM

## 2024-08-23 NOTE — PROGRESS NOTES
Physical Therapy Daily Treatment Note      Patient: Barry Cam   : 1950  Referring practitioner: Noah Salcedo MD  Date of Initial Visit: Type: THERAPY  Noted: 2024  Today's Date: 2024  Patient seen for 21 sessions       Visit Diagnoses:    ICD-10-CM ICD-9-CM   1. Status post reverse total arthroplasty of right shoulder  Z96.611 V43.61   2. Impaired functional mobility and activity tolerance  Z74.09 V49.89       Subjective   No new changes. He has been doing stretches at home.    Objective   See Exercise, Manual, and Modality Logs for complete treatment.     Assessment/Plan  Continues to have limited PROM overhead, clicking w/ PROM flexion, and compensates w/ scapular elevation/shoulder abduction when raising arm. Tolerated strength and mobility progressions well w/o c/o pain.    Timed:         Manual Therapy:    8     mins  74407;     Therapeutic Exercise:    20     mins  16070;     Neuromuscular Elizabeth:    10    mins  42167;    Therapeutic Activity:     0     mins  84639;     Gait Trainin     mins  30461;     Ultrasound:     0     mins  50399;    Ionto                               0    mins   53170  Self Care                       0     mins   65423      Un-Timed:  Electrical Stimulation:    0     mins  77627 ( );  Dry Needling     0     mins self-pay  Traction     0     mins 37626  Canalith Repos    0     mins 10308    Timed Treatment:   38   mins   Total Treatment:     38   mins    Patria Wynn PT  [place] License: [id number]

## 2024-08-26 ENCOUNTER — TREATMENT (OUTPATIENT)
Dept: PHYSICAL THERAPY | Facility: CLINIC | Age: 74
End: 2024-08-26
Payer: MEDICARE

## 2024-08-26 DIAGNOSIS — Z74.09 IMPAIRED FUNCTIONAL MOBILITY AND ACTIVITY TOLERANCE: ICD-10-CM

## 2024-08-26 DIAGNOSIS — Z96.611 STATUS POST REVERSE TOTAL ARTHROPLASTY OF RIGHT SHOULDER: Primary | ICD-10-CM

## 2024-08-26 NOTE — PROGRESS NOTES
Physical Therapy Daily Treatment Note      Patient: Barry Cam   : 1950  Referring practitioner: Noah Salcedo MD  Date of Initial Visit: Type: THERAPY  Noted: 2024  Today's Date: 2024  Patient seen for 22 sessions       Visit Diagnoses:    ICD-10-CM ICD-9-CM   1. Status post reverse total arthroplasty of right shoulder  Z96.611 V43.61   2. Impaired functional mobility and activity tolerance  Z74.09 V49.89       Subjective   No complaints.    Objective   See Exercise, Manual, and Modality Logs for complete treatment.       Assessment/Plan  Continues to have mild limitation and popping w/ PROM overhead. Improved AAROM and strength w/ exercise progression. No c/o pain. Progress per POC.    Timed:         Manual Therapy:    8     mins  56403;     Therapeutic Exercise:    20     mins  02848;     Neuromuscular Elizabeth:    10    mins  33303;    Therapeutic Activity:     0     mins  41526;     Gait Trainin     mins  58088;     Ultrasound:     0     mins  98665;    Ionto                               0    mins   18976  Self Care                       0     mins   14129      Un-Timed:  Electrical Stimulation:    0     mins  86559 ( );  Dry Needling     0     mins self-pay  Traction     0     mins 96707  Canalith Repos    0     mins 56026    Timed Treatment:   38   mins   Total Treatment:     38   mins    Patria Wynn PT  [place] License: [id number]

## 2024-08-28 ENCOUNTER — TREATMENT (OUTPATIENT)
Dept: PHYSICAL THERAPY | Facility: CLINIC | Age: 74
End: 2024-08-28
Payer: MEDICARE

## 2024-08-28 DIAGNOSIS — Z74.09 IMPAIRED FUNCTIONAL MOBILITY AND ACTIVITY TOLERANCE: ICD-10-CM

## 2024-08-28 DIAGNOSIS — Z96.611 STATUS POST REVERSE TOTAL ARTHROPLASTY OF RIGHT SHOULDER: Primary | ICD-10-CM

## 2024-08-28 DIAGNOSIS — K21.9 GASTROESOPHAGEAL REFLUX DISEASE, UNSPECIFIED WHETHER ESOPHAGITIS PRESENT: Primary | ICD-10-CM

## 2024-08-28 NOTE — PROGRESS NOTES
Physical Therapy Daily Treatment Note  [facility] [place]   [address], [address]  [place] [address]  P: [phone]       F: [phone]    Patient: Barry Cam   : 1950  Diagnosis/ICD-10 Code:  Status post reverse total arthroplasty of right shoulder [Z96.611]  Referring practitioner: Noah Salcedo MD  Date of Initial Visit: Type: THERAPY  Noted: 2024  Today's Date: 2024  Patient seen for 23 sessions       Barry Cam reports: no new complaints.     Subjective     Objective   See Exercise, Manual, and Modality Logs for complete treatment.       Assessment/Plan  Subjectively, pt reports no increase of pain or discomfort with interventions performed today. Performed well with continued shoulder strengthening activities with fatigue. No popping with R shoulder PROM today. Continues to benefit from verbal/tactile cues to ensure proper form and technique for exercise performance.     Progress per Plan of Care           Manual Therapy:    8     mins  10223;  Therapeutic Exercise:    20     mins  27369;     Neuromuscular Elizabeth:        mins  30279;    Therapeutic Activity:     10     mins  41342;     Gait Training:           mins  15570;     Ultrasound:          mins  53007;    Electrical Stimulation:         mins  92828;  Traction          mins 60372    Timed Treatment:   38   mins   Total Treatment:     48   mins    Priti Benavides PTA  Physical Therapist Assistant [id number]

## 2024-08-30 ENCOUNTER — TREATMENT (OUTPATIENT)
Dept: PHYSICAL THERAPY | Facility: CLINIC | Age: 74
End: 2024-08-30
Payer: MEDICARE

## 2024-08-30 NOTE — PROGRESS NOTES
Physical Therapy Re Certification Of Plan of Care  Patient: Barry Cam   : 1950  Diagnosis/ICD-10 Code:  No primary diagnosis found.  Referring practitioner: Noah Salcedo MD  Date of Initial Visit: Type: THERAPY  Noted: 2024  Today's Date: 2024  Patient seen for 24 sessions         Visit Diagnoses:  No diagnosis found.      Barry Cam reports: Shoulder feels fine. I don't have the ROM to comb hair. I have not tried lifting or getting on all fours to garden. Rates pain 1/10.    Subjective Questionnaire: QuickDASH: 29.55  Clinical Progress: improved  Home Program Compliance: Yes  Treatment has included: therapeutic exercise, neuromuscular re-education, manual therapy, therapeutic activity, and cryotherapy      Subjective       Objective          Active Range of Motion     Right Shoulder   Flexion: 105 degrees with pain  Abduction: 95 degrees with pain  External rotation BTH: C7   Internal rotation BTB: Active internal rotation behind the back: Unable.     Passive Range of Motion     Right Shoulder   Flexion: 155 degrees   Abduction: 120 degrees   External rotation 90°: 80 degrees     Strength/Myotome Testing     Right Shoulder     Planes of Motion   Flexion: 4   Abduction: 4-   External rotation at 0°: 3   Internal rotation at 0°: 4     Isolated Muscles   Biceps: 5       Short Term Goals: 6 weeks. Patient will:  1. Be independent with initial HEP MET  2. Be instructed in posture and body mechanics. MET  3. Demonstrate R shoulder PROM WNL to allow for progressing of therapy exercises. NOT MET     Long Term Goals: 6-12 weeks. Pt will:  1. Exhibit (R) shoulder AROM to WFL to allow for reaching overhead and out (ABD) without pain limiting function.  2. Demonstrate improved R UE MMT of >/= 4+/5 to allow for performance of ADL's/household management/recreational activities.  3. Pt able to reach overhead and lift 10# to allow for return to doing home/yard/recreational activities with min  to no pain.  4. Report perceived disability </=10% based on QuickDASH  ALL NOT MET    Assessment/Plan  Continues to have limited PROM abduction, popping w/ PROM/AROM, and weakness. Will continue to benefit from skilled PT to address unmet goals.     Recommendations: Continue with recommendations 3x/week  Timeframe: 1 month  Prognosis to achieve goals: good      Timed:         Manual Therapy:    8     mins  27708;     Therapeutic Exercise:    30     mins  44831;     Neuromuscular Elizabeth:    0    mins  91317;    Therapeutic Activity:     0     mins  09420;     Gait Trainin     mins  83372;     Ultrasound:     0     mins  59483;    Ionto                               0    mins   63331  Self Care                       0     mins   52346    Un-Timed:  Electrical Stimulation:    0     mins  13330 ( );  Dry Needling     0     mins self-pay  Traction     0     mins 59616  Re-Eval                           0    mins  33703  Canalith Repos    0     mins 52697    Timed Treatment:   38   mins   Total Treatment:     48   mins          PT: Patria Wynn PT     [place] License:  [id number]    Electronically signed by Patria Wynn PT, 24, 9:09 AM EDT    Certification Period: 2024 thru 2024  I certify that the therapy services are furnished while this patient is under my care.  The services outlined above are required by this patient, and will be reviewed every 90 days.         Physician Signature:__________________________________________________    PHYSICIAN: Noah Salcedo MD  NPI: [id number]                                      DATE:  :     Please sign and return via fax to [email or web address] . Thank you, [facility] Therapy

## 2024-09-04 ENCOUNTER — TREATMENT (OUTPATIENT)
Dept: PHYSICAL THERAPY | Facility: CLINIC | Age: 74
End: 2024-09-04
Payer: MEDICARE

## 2024-09-04 ENCOUNTER — OFFICE VISIT (OUTPATIENT)
Dept: ORTHOPEDIC SURGERY | Facility: CLINIC | Age: 74
End: 2024-09-04
Payer: MEDICARE

## 2024-09-04 VITALS — TEMPERATURE: 98.6 F | WEIGHT: 315 LBS | HEIGHT: 73 IN | BODY MASS INDEX: 41.75 KG/M2

## 2024-09-04 DIAGNOSIS — Z96.611 S/P REVERSE TOTAL SHOULDER ARTHROPLASTY, RIGHT: Primary | ICD-10-CM

## 2024-09-04 DIAGNOSIS — Z74.09 IMPAIRED FUNCTIONAL MOBILITY AND ACTIVITY TOLERANCE: ICD-10-CM

## 2024-09-04 DIAGNOSIS — Z96.611 STATUS POST REVERSE TOTAL ARTHROPLASTY OF RIGHT SHOULDER: Primary | ICD-10-CM

## 2024-09-04 DIAGNOSIS — Z09 SURGERY FOLLOW-UP: ICD-10-CM

## 2024-09-04 DIAGNOSIS — Z96.652 TOTAL KNEE REPLACEMENT STATUS, LEFT: ICD-10-CM

## 2024-09-04 PROCEDURE — 1159F MED LIST DOCD IN RCRD: CPT | Performed by: ORTHOPAEDIC SURGERY

## 2024-09-04 PROCEDURE — 99024 POSTOP FOLLOW-UP VISIT: CPT | Performed by: ORTHOPAEDIC SURGERY

## 2024-09-04 PROCEDURE — 73030 X-RAY EXAM OF SHOULDER: CPT | Performed by: ORTHOPAEDIC SURGERY

## 2024-09-04 PROCEDURE — 1160F RVW MEDS BY RX/DR IN RCRD: CPT | Performed by: ORTHOPAEDIC SURGERY

## 2024-09-04 NOTE — PROGRESS NOTES
Physical Therapy Daily Treatment Note  [facility] [place]   [address], [address]  [place] [address]  P: [phone]       F: [phone]    Patient: Barry Cam   : 1950  Diagnosis/ICD-10 Code:  Status post reverse total arthroplasty of right shoulder [Z96.611]  Referring practitioner: Noah Salcedo MD  Date of Initial Visit: Type: THERAPY  Noted: 2024  Today's Date: 2024  Patient seen for 25 sessions       Barry Cam reports: saw MD this morning, pleased with progress. No new complaints.     Subjective     Objective   See Exercise, Manual, and Modality Logs for complete treatment.       Assessment/Plan  Subjectively, pt reports no increase of pain or discomfort with interventions performed today. Performed well with continued R shoulder strengthening and end range mobility activities. Continues to demonstrate fatigue with exercise in clinic. Continues to benefit from verbal/tactile cues to ensure proper form and technique for exercise performance.     Progress per Plan of Care           Manual Therapy:    8     mins  93536;  Therapeutic Exercise:    15     mins  63551;     Neuromuscular Elizabeth:        mins  40631;    Therapeutic Activity:     15     mins  70085;     Gait Training:           mins  98700;     Ultrasound:          mins  17571;    Electrical Stimulation:         mins  72342;  Traction          mins 14295    Timed Treatment:   38   mins   Total Treatment:     48   mins    Priti Benavides PTA  Physical Therapist Assistant [phone]

## 2024-09-04 NOTE — PROGRESS NOTES
"Barry Cam : 1950 MRN: [id number] DATE: 2024    DIAGNOSIS: 3 month follow up right shoulder arthroplasty      SUBJECTIVE:  Mr. Cam returns today for 3 month follow up of right shoulder replacement.  Patient reports doing well with no unusual complaints. Still in PT and reports making good progress.    OBJECTIVE:    Temp 98.6 °F (37 °C) (Temporal)   Ht 185.4 cm (73\")   Wt (!) 150 kg (330 lb 12.8 oz)   BMI 43.64 kg/m²     Review of Systems negative except as above    Exam:  The incision is well healed. No effusion.  Range of motion:  .  ER 50.  IR to his back pocket. The arm is soft and nontender.  5-/5 strength with elevation and abduction.  Sensation intact distally.  Brisk cap refill.    DIAGNOSTIC STUDIES    Xrays: AP, scapular Y and axillary views of the right shoulder are ordered and reviewed for evaluation of shoulder replacement. They demonstrate a well positioned, well aligned replacement without complicating factors noted.  In comparison with previous films there has been no change.    ASSESSMENT:  3 month follow up right shoulder replacement    PLAN:   1.  Continue appropriate activity modifications and restrictions as discussed  2.  Continue PT per protocol.  3.  I explained that one can expect continued improvement in motion, function, and any residual discomfort for up to 1 year after surgery.  4.  We discussed the need for prophylactic antibiotics prior to dental appointments for 2 years following replacement surgery.  5.  Follow up at 1 year unless experiencing any new or concerning symptoms.  6.  Of note, he feels like his left knee is weak.  He indicated that he would like to try PT.  I gave him a new order.    Noah Salcedo MD    2024    Answers submitted by the patient for this visit:  Primary Reason for Visit (Submitted on 2024)  What is the primary reason for your visit?: Other  Other (Submitted on 2024)  Please describe your symptoms.: None  Have " you had these symptoms before?: No  How long have you been having these symptoms?: Greater than 2 weeks  Please list any medications you are currently taking for this condition.: None  Please describe any probable cause for these symptoms. : Fall

## 2024-09-06 ENCOUNTER — TREATMENT (OUTPATIENT)
Dept: PHYSICAL THERAPY | Facility: CLINIC | Age: 74
End: 2024-09-06
Payer: MEDICARE

## 2024-09-06 ENCOUNTER — HOSPITAL ENCOUNTER (OUTPATIENT)
Dept: INFUSION THERAPY | Facility: HOSPITAL | Age: 74
Discharge: HOME | End: 2024-09-06
Payer: MEDICARE

## 2024-09-06 DIAGNOSIS — Z74.09 IMPAIRED FUNCTIONAL MOBILITY AND ACTIVITY TOLERANCE: ICD-10-CM

## 2024-09-06 DIAGNOSIS — R20.0 NUMBNESS OF LEFT HAND: ICD-10-CM

## 2024-09-06 DIAGNOSIS — Z96.611 STATUS POST REVERSE TOTAL ARTHROPLASTY OF RIGHT SHOULDER: Primary | ICD-10-CM

## 2024-09-06 DIAGNOSIS — M79.642 LEFT HAND PAIN: ICD-10-CM

## 2024-09-06 PROCEDURE — 95886 MUSC TEST DONE W/N TEST COMP: CPT | Performed by: PSYCHIATRY & NEUROLOGY

## 2024-09-06 PROCEDURE — 95910 NRV CNDJ TEST 7-8 STUDIES: CPT

## 2024-09-06 PROCEDURE — 95909 NRV CNDJ TST 5-6 STUDIES: CPT | Performed by: PSYCHIATRY & NEUROLOGY

## 2024-09-06 PROCEDURE — 95886 MUSC TEST DONE W/N TEST COMP: CPT

## 2024-09-06 NOTE — PROCEDURES
EMG and Nerve Conduction Studies    I.      Instrument used: Neuromax 1002  II.     Please see data sheets for tabular summary of NCS and details on methods, temperatures and lab standards.   III.    EMG muscles tested for upper extremity studies include the deltoid, biceps, triceps, pronator teres, extensor digitorum communis, first dorsal interosseous and abductor pollicis brevis.    IV.   EMG muscles tested for lower extremity studies include the vastus lateralis, tibialis anterior, peroneus longus, medial gastrocnemius and extensor digitorum brevis.    V.    Additional muscles tested as needed.  Paraspinal muscles tested as needed.   VI.   Please see data sheets for tabular summary of EMG findings.   VII. The complete report includes the data sheets.      Indication: Numbness in the left small digit  History: 74-year-old male with numbness in the left small digit for about the last 18 months.  This seemed to develop about 10 days after he had a left knee replacement.  He states he felt a pop in his wrist and then the tingling started.  He denies symptoms on the right.  He has diabetes/prediabetes.  He is on thyroid medication but he states that it is not felt that he actually has hypothyroidism.    The patient is on Eliquis.      Ht: 73 inches  Wt: 330 pounds  HbA1C:   Lab Results   Component Value Date    HGBA1C 6.7 (H) 06/20/2024     TSH:   Lab Results   Component Value Date    TSH 2.470 06/20/2024       Technical summary:  Nerve conduction studies were obtained in the left arm with comparisons on the right where indicated.  Skin temperatures were at least 32 °C measured on the palms.  Needle examination was obtained on selected muscles in the left arm.    Results:  1.  Prolonged left median antidromic sensory distal latency at 4.6 ms and low amplitude of 9 µV.  Prolonged right median antidromic sensory distal latency at 4.2 ms with a low amplitude of 9.3 µV.  2.  Absent left ulnar antidromic sensory  potential.  Prolonged right ulnar antidromic sensory distal latency at 3.8 ms with very low amplitude of 4.7 µV.  3.  Normal left median motor conduction velocity with a prolonged distal latency of 5.5 ms.  Low amplitude of 3.1 mV from wrist stimulation.  4.  Slow ulnar motor conduction velocities below the elbows at 43.2 m/s on the left and 43.9 m/s on the right.  The conduction velocities across the elbows were normal.  The distal latencies were normal.  The amplitude was very low on the left at 1.2 mV but normal on the right at 6.2 mV.  (Technical difficulty stimulating below the elbow)  5.  Needle examination of selected muscles of the left arm showed positive sharp waves in the first dorsal interosseous.  There was a mild increased number of large motor units increased firing rate and reduced interference pattern.  The remaining muscles tested showed normal insertional activities, motor units and recruitment patterns.  Cervical paraspinals were not studied since patient is on Eliquis    Impression:  Abnormal study showing peripheral neuropathy.  The left ulnar nerve has significant axonal loss.  Study results were discussed with the patient.    Michael Lombardo M.D.              Dictated utilizing Dragon dictation.

## 2024-09-06 NOTE — PROGRESS NOTES
Physical Therapy Daily Treatment Note      Patient: Barry Cam   : 1950  Referring practitioner: Noah Salcedo MD  Date of Initial Visit: Type: THERAPY  Noted: 2024  Today's Date: 2024  Patient seen for 26 sessions       Visit Diagnoses:    ICD-10-CM ICD-9-CM   1. Status post reverse total arthroplasty of right shoulder  Z96.611 V43.61   2. Impaired functional mobility and activity tolerance  Z74.09 V49.89       Subjective   R shoulder not as flexible as it has been.     Objective   See Exercise, Manual, and Modality Logs for complete treatment.     Assessment/Plan  Continues to have R shoulder tightness at end range overhead. Tolerates passive stretching well. Reported shortness of breath after exercise, resolved sitting. Progress per POC.      Timed:         Manual Therapy:    8     mins  63604;     Therapeutic Exercise:    20     mins  38023;     Neuromuscular Elizabeth:    0    mins  58388;    Therapeutic Activity:     10     mins  87398;     Gait Trainin     mins  12274;     Ultrasound:     0     mins  88545;    Ionto                               0    mins   89691  Self Care                       0     mins   57495      Un-Timed:  Electrical Stimulation:    0     mins  90076 ( );  Dry Needling     0     mins self-pay  Traction     0     mins 57413  Canalith Repos    0     mins 97715    Timed Treatment:   38   mins   Total Treatment:     48   mins    ANGELIQUE Valles License: [id number]

## 2024-09-11 ENCOUNTER — TELEPHONE (OUTPATIENT)
Dept: ORTHOPEDIC SURGERY | Facility: CLINIC | Age: 74
End: 2024-09-11
Payer: MEDICARE

## 2024-09-11 ENCOUNTER — TREATMENT (OUTPATIENT)
Dept: PHYSICAL THERAPY | Facility: CLINIC | Age: 74
End: 2024-09-11
Payer: MEDICARE

## 2024-09-11 DIAGNOSIS — Z74.09 IMPAIRED FUNCTIONAL MOBILITY AND ACTIVITY TOLERANCE: ICD-10-CM

## 2024-09-11 DIAGNOSIS — Z96.611 STATUS POST REVERSE TOTAL ARTHROPLASTY OF RIGHT SHOULDER: Primary | ICD-10-CM

## 2024-09-11 NOTE — PROGRESS NOTES
Physical Therapy Daily Treatment Note  [facility] [place]   [address], [address]  [place] [address]  P: [phone]       F: [phone]    Patient: Barry Cam   : 1950  Diagnosis/ICD-10 Code:  Status post reverse total arthroplasty of right shoulder [Z96.611]  Referring practitioner: Noah Salcedo MD  Date of Initial Visit: Type: THERAPY  Noted: 2024  Today's Date: 2024  Patient seen for 27 sessions       Barry Cam reports: R shoulder feels fine, the rest of me hurts, especially L knee.     Subjective     Objective   See Exercise, Manual, and Modality Logs for complete treatment.       Assessment/Plan  Subjectively, pt reports no increase of pain or discomfort with interventions performed today. Performed well with progressed R shoulder strengthening interventions with increased fatigue in clinic today. Abduction ABCs were challenging. Continues to benefit from verbal/tactile cues to ensure proper form and technique for exercise performance.     Progress per Plan of Care           Manual Therapy:    8     mins  76564;  Therapeutic Exercise:    15     mins  50031;     Neuromuscular Elizabeth:        mins  11787;    Therapeutic Activity:     15     mins  43334;     Gait Training:           mins  37050;     Ultrasound:          mins  68891;    Electrical Stimulation:         mins  77345;  Traction          mins 96236    Timed Treatment:   38   mins   Total Treatment:     48   mins    Priti Benavides PTA  Physical Therapist Assistant [id number]

## 2024-09-12 ENCOUNTER — PATIENT MESSAGE (OUTPATIENT)
Dept: FAMILY MEDICINE CLINIC | Facility: CLINIC | Age: 74
End: 2024-09-12
Payer: MEDICARE

## 2024-09-12 DIAGNOSIS — R53.82 CHRONIC FATIGUE: ICD-10-CM

## 2024-09-12 RX ORDER — DEXTROAMPHETAMINE SACCHARATE, AMPHETAMINE ASPARTATE, DEXTROAMPHETAMINE SULFATE AND AMPHETAMINE SULFATE 7.5; 7.5; 7.5; 7.5 MG/1; MG/1; MG/1; MG/1
30 TABLET ORAL 2 TIMES DAILY
Qty: 60 TABLET | Refills: 0 | Status: SHIPPED | OUTPATIENT
Start: 2024-09-12

## 2024-09-13 ENCOUNTER — TELEPHONE (OUTPATIENT)
Dept: ORTHOPEDIC SURGERY | Facility: HOSPITAL | Age: 74
End: 2024-09-13
Payer: MEDICARE

## 2024-09-13 ENCOUNTER — TREATMENT (OUTPATIENT)
Dept: PHYSICAL THERAPY | Facility: CLINIC | Age: 74
End: 2024-09-13
Payer: MEDICARE

## 2024-09-13 DIAGNOSIS — Z96.611 STATUS POST REVERSE TOTAL ARTHROPLASTY OF RIGHT SHOULDER: Primary | ICD-10-CM

## 2024-09-13 DIAGNOSIS — Z74.09 IMPAIRED FUNCTIONAL MOBILITY AND ACTIVITY TOLERANCE: ICD-10-CM

## 2024-09-13 NOTE — TELEPHONE ENCOUNTER
From: Barry Cam  To: Oralia Ambrose  Sent: 9/12/2024 12:39 PM EDT  Subject: Refil Generic Adderall     Maddy MONTERO. Can you send a prescription for generic Adderall for me. Finally running low. Thanks, Mann Cam. RADHA, rehab for shoulder replacement is progressing well. Have developed peripheral neuropathy in my last digit of my left hand. [facility] is trying to help me with this.

## 2024-09-13 NOTE — PROGRESS NOTES
Physical Therapy Daily Treatment Note      Patient: Barry Cam   : 1950  Referring practitioner: Noah Salcedo MD  Date of Initial Visit: Type: THERAPY  Noted: 2024  Today's Date: 2024  Patient seen for 28 sessions       Visit Diagnoses:    ICD-10-CM ICD-9-CM   1. Status post reverse total arthroplasty of right shoulder  Z96.611 V43.61   2. Impaired functional mobility and activity tolerance  Z74.09 V49.89       Subjective   No new changes.    Objective   See Exercise, Manual, and Modality Logs for complete treatment.       Assessment/Plan  Continues to have UE weakness limiting AROM overhead. Fatigued with strengthening. Progress per POC.      Timed:         Manual Therapy:    8     mins  40565;     Therapeutic Exercise:    20     mins  95838;     Neuromuscular Elizabeth:    0    mins  24353;    Therapeutic Activity:     10     mins  68572;     Gait Trainin     mins  17666;     Ultrasound:     0     mins  47858;    Ionto                               0    mins   70751  Self Care                       0     mins   38869      Un-Timed:  Electrical Stimulation:    0     mins  97922 ( );  Dry Needling     0     mins self-pay  Traction     0     mins 22721  Canalith Repos    0     mins 66663    Timed Treatment:   38   mins   Total Treatment:     48   mins    Patria Wynn PT  [place] License: [id number]

## 2024-09-13 NOTE — TELEPHONE ENCOUNTER
I attempted to contact him with the nerve test results.  I was unable to reach him.  I left him a brief message.  I explained that we will try again next week.

## 2024-09-16 ENCOUNTER — TREATMENT (OUTPATIENT)
Dept: PHYSICAL THERAPY | Facility: CLINIC | Age: 74
End: 2024-09-16
Payer: MEDICARE

## 2024-09-16 DIAGNOSIS — Z74.09 IMPAIRED FUNCTIONAL MOBILITY AND ACTIVITY TOLERANCE: ICD-10-CM

## 2024-09-16 DIAGNOSIS — Z96.611 STATUS POST REVERSE TOTAL ARTHROPLASTY OF RIGHT SHOULDER: Primary | ICD-10-CM

## 2024-09-16 PROCEDURE — 97140 MANUAL THERAPY 1/> REGIONS: CPT | Performed by: PHYSICAL THERAPIST

## 2024-09-16 PROCEDURE — 97110 THERAPEUTIC EXERCISES: CPT | Performed by: PHYSICAL THERAPIST

## 2024-09-17 ENCOUNTER — DOCUMENTATION (OUTPATIENT)
Dept: FAMILY MEDICINE CLINIC | Facility: CLINIC | Age: 74
End: 2024-09-17
Payer: MEDICARE

## 2024-09-17 ENCOUNTER — TELEPHONE (OUTPATIENT)
Dept: FAMILY MEDICINE CLINIC | Facility: CLINIC | Age: 74
End: 2024-09-17
Payer: MEDICARE

## 2024-09-18 ENCOUNTER — TREATMENT (OUTPATIENT)
Dept: PHYSICAL THERAPY | Facility: CLINIC | Age: 74
End: 2024-09-18
Payer: MEDICARE

## 2024-09-18 DIAGNOSIS — Z96.611 STATUS POST REVERSE TOTAL ARTHROPLASTY OF RIGHT SHOULDER: Primary | ICD-10-CM

## 2024-09-18 DIAGNOSIS — Z74.09 IMPAIRED FUNCTIONAL MOBILITY AND ACTIVITY TOLERANCE: ICD-10-CM

## 2024-09-20 ENCOUNTER — TREATMENT (OUTPATIENT)
Dept: PHYSICAL THERAPY | Facility: CLINIC | Age: 74
End: 2024-09-20
Payer: MEDICARE

## 2024-09-20 DIAGNOSIS — Z96.611 STATUS POST REVERSE TOTAL ARTHROPLASTY OF RIGHT SHOULDER: Primary | ICD-10-CM

## 2024-09-20 DIAGNOSIS — Z74.09 IMPAIRED FUNCTIONAL MOBILITY AND ACTIVITY TOLERANCE: ICD-10-CM

## 2024-09-24 DIAGNOSIS — E11.65 TYPE 2 DIABETES MELLITUS WITH HYPERGLYCEMIA, WITHOUT LONG-TERM CURRENT USE OF INSULIN: Primary | ICD-10-CM

## 2024-09-24 RX ORDER — BLOOD SUGAR DIAGNOSTIC
STRIP MISCELLANEOUS
Qty: 200 EACH | Refills: 12 | Status: SHIPPED | OUTPATIENT
Start: 2024-09-24 | End: 2024-09-30 | Stop reason: SDUPTHER

## 2024-09-24 RX ORDER — BLOOD-GLUCOSE METER
1 EACH MISCELLANEOUS DAILY
Qty: 1 KIT | Refills: 0 | Status: SHIPPED | OUTPATIENT
Start: 2024-09-24

## 2024-09-25 ENCOUNTER — TREATMENT (OUTPATIENT)
Dept: PHYSICAL THERAPY | Facility: CLINIC | Age: 74
End: 2024-09-25
Payer: MEDICARE

## 2024-09-25 DIAGNOSIS — Z74.09 IMPAIRED FUNCTIONAL MOBILITY AND ACTIVITY TOLERANCE: ICD-10-CM

## 2024-09-25 DIAGNOSIS — Z96.611 STATUS POST REVERSE TOTAL ARTHROPLASTY OF RIGHT SHOULDER: Primary | ICD-10-CM

## 2024-09-27 ENCOUNTER — TREATMENT (OUTPATIENT)
Dept: PHYSICAL THERAPY | Facility: CLINIC | Age: 74
End: 2024-09-27
Payer: MEDICARE

## 2024-09-27 DIAGNOSIS — Z74.09 IMPAIRED FUNCTIONAL MOBILITY AND ACTIVITY TOLERANCE: ICD-10-CM

## 2024-09-27 DIAGNOSIS — Z96.611 STATUS POST REVERSE TOTAL ARTHROPLASTY OF RIGHT SHOULDER: Primary | ICD-10-CM

## 2024-09-30 ENCOUNTER — TREATMENT (OUTPATIENT)
Dept: PHYSICAL THERAPY | Facility: CLINIC | Age: 74
End: 2024-09-30
Payer: MEDICARE

## 2024-09-30 DIAGNOSIS — Z96.611 STATUS POST REVERSE TOTAL ARTHROPLASTY OF RIGHT SHOULDER: Primary | ICD-10-CM

## 2024-09-30 DIAGNOSIS — Z74.09 IMPAIRED FUNCTIONAL MOBILITY AND ACTIVITY TOLERANCE: ICD-10-CM

## 2024-09-30 DIAGNOSIS — E11.65 TYPE 2 DIABETES MELLITUS WITH HYPERGLYCEMIA, WITHOUT LONG-TERM CURRENT USE OF INSULIN: ICD-10-CM

## 2024-09-30 PROCEDURE — 97530 THERAPEUTIC ACTIVITIES: CPT | Performed by: PHYSICAL THERAPIST

## 2024-09-30 PROCEDURE — 97140 MANUAL THERAPY 1/> REGIONS: CPT | Performed by: PHYSICAL THERAPIST

## 2024-09-30 PROCEDURE — 97110 THERAPEUTIC EXERCISES: CPT | Performed by: PHYSICAL THERAPIST

## 2024-09-30 RX ORDER — BLOOD SUGAR DIAGNOSTIC
STRIP MISCELLANEOUS
Qty: 200 EACH | Refills: 12 | Status: SHIPPED | OUTPATIENT
Start: 2024-09-30

## 2024-09-30 NOTE — PROGRESS NOTES
90 Day Physical Therapy Re Certification Of Plan of Care  Patient: Barry Cam   : 1950  Diagnosis/ICD-10 Code:  Status post reverse total arthroplasty of right shoulder [Z96.611]  Referring practitioner: Noah Salcedo MD  Date of Initial Visit: Type: THERAPY  Noted: 2024  Today's Date: 2024  Patient seen for 34 sessions         Visit Diagnoses:    ICD-10-CM ICD-9-CM   1. Status post reverse total arthroplasty of right shoulder  Z96.611 V43.61   2. Impaired functional mobility and activity tolerance  Z74.09 V49.89         Barry Cam reports: R shoulder is doing fine. No issues with ADLs. Has not tried heavy lifting. Cannot lay on R side comfortably.    Subjective Questionnaire: QuickDASH: 27.27  Clinical Progress: improved  Home Program Compliance: Yes  Treatment has included: therapeutic exercise, neuromuscular re-education, manual therapy, therapeutic activity, and cryotherapy      Subjective       Objective          Active Range of Motion     Right Shoulder   Flexion: 110 degrees   Abduction: 83 degrees   External rotation BTH: C7   Internal rotation BTB: sacrum     Passive Range of Motion     Right Shoulder   Flexion: 160 degrees   Abduction: 130 degrees   External rotation 90°: 85 degrees   Internal rotation 90°: 80 degrees     Strength/Myotome Testing     Right Shoulder     Planes of Motion   Flexion: 4   Abduction: 4-   External rotation at 0°: 4-   Internal rotation at 0°: 4+     Isolated Muscles   Biceps: 5       Short Term Goals: 6 weeks. Patient will:  1. Be independent with initial HEP MET  2. Be instructed in posture and body mechanics. MET  3. Demonstrate R shoulder PROM WNL to allow for progressing of therapy exercises. NOT MET     Long Term Goals: 6-12 weeks. Pt will:  1. Exhibit (R) shoulder AROM to WFL to allow for reaching overhead and out (ABD) without pain limiting function.  2. Demonstrate improved R UE MMT of >/= 4+/5 to allow for performance of  ADL's/household management/recreational activities.  3. Pt able to reach overhead and lift 10# to allow for return to doing home/yard/recreational activities with min to no pain.  4. Report perceived disability </=10% based on QuickDASH  ALL NOT MET    Assessment/Plan  Continues to have significant R UE weakness limiting AROM. Updated HEP w/ strength progressions.      Recommendations: Continue with recommendations 3x/week  Timeframe: 6 weeks  Prognosis to achieve goals: good      Timed:         Manual Therapy:    8     mins  20393;     Therapeutic Exercise:    20     mins  13470;     Neuromuscular Elizabeth:    0    mins  80434;    Therapeutic Activity:     10     mins  54585;     Gait Trainin     mins  16046;     Ultrasound:     0     mins  80198;    Ionto                               0    mins   76339  Self Care                       0     mins   37332    Un-Timed:  Electrical Stimulation:    0     mins  72225 ( );  Dry Needling     0     mins self-pay  Traction     0     mins 44923  Re-Eval                           0    mins  08605  Canalith Repos    0     mins 85958    Timed Treatment:   38   mins   Total Treatment:     48   mins          PT: Patira Wynn PT     [place] License:  [id number]    Electronically signed by Patria Wynn PT, 24, 9:08 AM EDT    Certification Period: 2024 thru 2024  I certify that the therapy services are furnished while this patient is under my care.  The services outlined above are required by this patient, and will be reviewed every 90 days.         Physician Signature:__________________________________________________    PHYSICIAN: Noah Salcedo MD  NPI: [id number]                                      DATE:  :     Please sign and return via fax to [email or web address] . Thank you, [facility] Physical Therapy

## 2024-10-02 DIAGNOSIS — Z86.711 HISTORY OF PULMONARY EMBOLISM: Primary | ICD-10-CM

## 2024-10-02 DIAGNOSIS — I10 HYPERTENSION, UNSPECIFIED TYPE: ICD-10-CM

## 2024-10-02 RX ORDER — LOSARTAN POTASSIUM 100 MG/1
TABLET ORAL
Qty: 90 TABLET | Refills: 3 | Status: SHIPPED | OUTPATIENT
Start: 2024-10-02 | End: 2024-10-02 | Stop reason: SDUPTHER

## 2024-10-02 RX ORDER — LOSARTAN POTASSIUM 100 MG/1
100 TABLET ORAL DAILY
Qty: 90 TABLET | Refills: 3 | Status: SHIPPED | OUTPATIENT
Start: 2024-10-02

## 2024-10-03 DIAGNOSIS — E11.65 TYPE 2 DIABETES MELLITUS WITH HYPERGLYCEMIA, WITHOUT LONG-TERM CURRENT USE OF INSULIN: Primary | ICD-10-CM

## 2024-10-03 DIAGNOSIS — Z96.652 TOTAL KNEE REPLACEMENT STATUS, LEFT: ICD-10-CM

## 2024-10-03 RX ORDER — LANCETS
EACH MISCELLANEOUS
Qty: 150 EACH | Refills: 3 | Status: SHIPPED | OUTPATIENT
Start: 2024-10-03 | End: 2024-10-03 | Stop reason: SDUPTHER

## 2024-10-03 RX ORDER — LANCING DEVICE/LANCETS
KIT MISCELLANEOUS
COMMUNITY
End: 2024-10-03 | Stop reason: SDUPTHER

## 2024-10-03 RX ORDER — CEPHALEXIN 500 MG/1
CAPSULE ORAL
Qty: 4 CAPSULE | Refills: 0 | Status: SHIPPED | OUTPATIENT
Start: 2024-10-03

## 2024-10-03 RX ORDER — LANCING DEVICE/LANCETS
KIT MISCELLANEOUS
Qty: 1 KIT | Refills: 0 | Status: SHIPPED | OUTPATIENT
Start: 2024-10-03

## 2024-10-03 RX ORDER — LANCETS
EACH MISCELLANEOUS
Qty: 100 EACH | Refills: 3 | Status: SHIPPED | OUTPATIENT
Start: 2024-10-03

## 2024-10-03 RX ORDER — LANCETS
EACH MISCELLANEOUS
COMMUNITY
End: 2024-10-03 | Stop reason: SDUPTHER

## 2024-10-04 ENCOUNTER — TREATMENT (OUTPATIENT)
Dept: PHYSICAL THERAPY | Facility: CLINIC | Age: 74
End: 2024-10-04
Payer: MEDICARE

## 2024-10-04 DIAGNOSIS — M25.562 CHRONIC PAIN OF LEFT KNEE: Primary | ICD-10-CM

## 2024-10-04 DIAGNOSIS — Z96.652 HISTORY OF LEFT KNEE REPLACEMENT: ICD-10-CM

## 2024-10-04 DIAGNOSIS — Z74.09 IMPAIRED FUNCTIONAL MOBILITY, BALANCE, GAIT, AND ENDURANCE: ICD-10-CM

## 2024-10-04 DIAGNOSIS — G89.29 CHRONIC PAIN OF LEFT KNEE: Primary | ICD-10-CM

## 2024-10-04 DIAGNOSIS — Z74.09 IMPAIRED FUNCTIONAL MOBILITY AND ACTIVITY TOLERANCE: ICD-10-CM

## 2024-10-04 DIAGNOSIS — Z96.611 STATUS POST REVERSE TOTAL ARTHROPLASTY OF RIGHT SHOULDER: Primary | ICD-10-CM

## 2024-10-04 NOTE — PROGRESS NOTES
Physical Therapy Initial Evaluation and Plan of Care  Clinic Location [address] [facility] [address], [place] [id number]    Patient: Barry Cam   : 1950  Diagnosis/ICD-10 Code:  Chronic pain of left knee [M25.562, G89.29]  Referring practitioner: Noah Salcedo MD  Date of Initial Visit: 10/4/2024  Today's Date: 10/4/2024  Patient seen for 1 session         Visit Diagnoses:    ICD-10-CM ICD-9-CM   1. Chronic pain of left knee  M25.562 719.46    G89.29 338.29   2. History of left knee replacement  Z96.652 V43.65   3. Impaired functional mobility, balance, gait, and endurance  Z74.09 V49.89         Subjective Questionnaire: LEFS: 40/80      Subjective Evaluation    History of Present Illness  Mechanism of injury: L TKA 3/2023. Worsened since DC from PT. Pain rolling in bed. Feels weak. Needs to use handrail on stair. Inactive, mostly due to lower back pain. Cannot stand for >3-4 mins w/o bending or sitting for relief. Reports neuropathy in B feet (Dx by [place]?, >10 years ago). Wearing knee brace at all times x1 month.    Currently in PT for R TSA 24 after trip and fall.    Taking tylenol and gabapentin daily.    Hx Type 2 diabetes    No lumbar imaging.    Pain  Current pain ratin  At worst pain rating: 10  Location: L knee  Quality: sharp  Relieving factors: medications  Aggravating factors: ambulation, squatting, stairs, sleeping and standing  Progression: worsening    Social Support  Lives in: multiple-level home  Lives with: spouse    Treatments  Previous treatment: physical therapy  Current treatment: physical therapy  Patient Goals  Patient goals for therapy: decreased pain, increased strength and independence with ADLs/IADLs             Objective          Tenderness     Additional Tenderness Details  TTP B greater trochanters    Active Range of Motion   Left Knee   Flexion: 128 degrees   Extension: 0 degrees     Strength/Myotome Testing     Left Hip   Planes of Motion    Abduction: 4 (Knee crepitus)    Right Hip   Planes of Motion   Abduction: 4    Left Knee   Flexion: 5  Extension: 5    Tests       Thoracic   Negative slump.     Lumbar     Left   Negative passive SLR.     Left Knee Flexibility Comments:   Mod herring hamstrings    Right Knee Flexibility Comments:   Min herring hamstrings    Ambulation     Quality of Movement During Gait     Pelvis    Pelvis (Left): Positive Trendelenburg.   Pelvis (Right): Positive Trendelenburg.     Functional Assessment     Single Leg Stance   Left: 2 seconds  Right: 2 seconds          Assessment & Plan       Assessment  Impairments: abnormal gait, activity intolerance, impaired balance, impaired physical strength, lacks appropriate home exercise program and pain with function   Functional limitations: sleeping, walking, uncomfortable because of pain, moving in bed and standing   Assessment details: Pt presents with L knee pain, L knee crepitus w/ sidelying hip abduction, L LE weakness w/ stair navigation, and poor single leg balance B. Will benefit from skilled PT services in order to address listed impairments and increase tolerance to normal daily activities including ADLs and recreational activities.    Prognosis: good    Goals  Plan Goals: Plan Goals: Short Term Goals: 6 weeks. Patient will:  1. Be independent with initial HEP  2. Perform s/l hip abduction w/o knee crepitus or pain (L) allowing for bed mobility  3. Improve SL balance >5s B demonstrating improved stability for ADLs    Long Term Goals: 6-12 weeks. Patient will:  1. Ascend/descend 4 stairs using reciprocal steps & no rail w/o significant pain, limitation, or deviation in gait mechanics  2. Improve SL balance >/=10s B demonstrating improved stability for ADLs  3. Perceived disability </= 15% as measured on LEFS  4. Be independent with long term HEP    Plan  Therapy options: will be seen for skilled therapy services  Planned modality interventions: cryotherapy and thermotherapy  (hydrocollator packs)  Planned therapy interventions: abdominal trunk stabilization, balance/weight-bearing training, body mechanics training, ADL retraining, flexibility, functional ROM exercises, gait training, home exercise program, manual therapy, neuromuscular re-education, strengthening, stretching and therapeutic activities  Frequency: 2x week  Duration in weeks: 12  Treatment plan discussed with: patient        History # of Personal Factors and/or Comorbidities: MODERATE (1-2)  Examination of Body System(s): # of elements: LOW (1-2)  Clinical Presentation: STABLE   Clinical Decision Making: LOW       Timed:         Manual Therapy:    0     mins  06759;     Therapeutic Exercise:    15     mins  72598;     Neuromuscular Elizabeth:    0    mins  20856;    Therapeutic Activity:     8     mins  71841;     Gait Trainin     mins  64904;     Ultrasound:     0     mins  19175;    Ionto                               0    mins   19390  Self Care                       0     mins   08391  Canalith Repos    0     mins 78689      Un-Timed:  Electrical Stimulation:    0     mins  68198 (MC );  Dry Needling     0     mins self-pay  Traction     0     mins 08644  Low Eval     25     Mins  09587  Mod Eval     0     Mins  04625  High Eval                       0     Mins  88775        Timed Treatment:   23   mins   Total Treatment:     48   mins          PT: Patria Wynn PT     License Number: [id number]  Electronically signed by Patria Wynn PT, 10/04/24, 11:37 AM EDT    Certification Period: 10/4/2024 thru 2025  I certify that the therapy services are furnished while this patient is under my care.  The services outlined above are required by this patient, and will be reviewed every 90 days.         Physician Signature:__________________________________________________    PHYSICIAN: Noah Salcedo MD  NPI: [id number]                                      DATE:      Please sign and return via fax to [email or web address]  . Thank you, [facility] Physical Therapy.

## 2024-10-04 NOTE — PROGRESS NOTES
Physical Therapy Daily Treatment Note      Patient: Barry Cam   : 1950  Referring practitioner: Noah Salcedo MD  Date of Initial Visit: Type: THERAPY  Noted: 2024  Today's Date: 10/4/2024  Patient seen for 35 sessions       Visit Diagnoses:    ICD-10-CM ICD-9-CM   1. Status post reverse total arthroplasty of right shoulder  Z96.611 V43.61   2. Impaired functional mobility and activity tolerance  Z74.09 V49.89       Subjective   No new changes.    Objective   See Exercise, Manual, and Modality Logs for complete treatment.       Assessment/Plan  R shoulder crepitus w/ PROM scaption. Continues to have tightness >90deg flex & abd. Strength improving. Encouraged daily HEP. Do not recommend sleeping on R. Progress per POC.    Timed:         Manual Therapy:    8     mins  94413;     Therapeutic Exercise:    15     mins  74370;     Neuromuscular Elizabeth:    0    mins  19493;    Therapeutic Activity:     15     mins  52072;     Gait Trainin     mins  86794;     Ultrasound:     0     mins  25106;    Ionto                               0    mins   07849  Self Care                       0     mins   56482      Un-Timed:  Electrical Stimulation:    0     mins  40348 ( );  Dry Needling     0     mins self-pay  Traction     0     mins 75716  Canalith Repos    0     mins 85725    Timed Treatment:   38   mins   Total Treatment:     38   mins    Patria Wynn PT  [place] License: [id number]

## 2024-10-04 NOTE — PATIENT INSTRUCTIONS
Access Code: [email or web address]  URL: [email or web address]  Date: 10/04/2024  Prepared by: Patria Wynn    Exercises  - Lateral Step Down  - 1 x daily - 7 x weekly - 1 sets - 10 reps  - Standing Single Leg Stance with Counter Support  - 1 x daily - 7 x weekly - 1 sets - 1 reps - 30s hold  - Standing Tandem Balance with Counter Support  - 1 x daily - 7 x weekly - 1 sets - 1 reps - 30s hold  - Active Straight Leg Raise with Quad Set  - 1 x daily - 7 x weekly - 1 sets - 30 reps  - Clamshell  - 1 x daily - 7 x weekly - 1 sets - 30 reps

## 2024-10-07 ENCOUNTER — TREATMENT (OUTPATIENT)
Dept: PHYSICAL THERAPY | Facility: CLINIC | Age: 74
End: 2024-10-07
Payer: MEDICARE

## 2024-10-07 DIAGNOSIS — Z96.652 HISTORY OF LEFT KNEE REPLACEMENT: ICD-10-CM

## 2024-10-07 DIAGNOSIS — Z74.09 IMPAIRED FUNCTIONAL MOBILITY, BALANCE, GAIT, AND ENDURANCE: ICD-10-CM

## 2024-10-07 DIAGNOSIS — G89.29 CHRONIC PAIN OF LEFT KNEE: Primary | ICD-10-CM

## 2024-10-07 DIAGNOSIS — M25.562 CHRONIC PAIN OF LEFT KNEE: Primary | ICD-10-CM

## 2024-10-07 DIAGNOSIS — Z96.611 STATUS POST REVERSE TOTAL ARTHROPLASTY OF RIGHT SHOULDER: Primary | ICD-10-CM

## 2024-10-07 DIAGNOSIS — Z74.09 IMPAIRED FUNCTIONAL MOBILITY AND ACTIVITY TOLERANCE: ICD-10-CM

## 2024-10-07 PROCEDURE — 97140 MANUAL THERAPY 1/> REGIONS: CPT | Performed by: PHYSICAL THERAPIST

## 2024-10-07 PROCEDURE — 97530 THERAPEUTIC ACTIVITIES: CPT | Performed by: PHYSICAL THERAPIST

## 2024-10-07 PROCEDURE — 97110 THERAPEUTIC EXERCISES: CPT | Performed by: PHYSICAL THERAPIST

## 2024-10-07 NOTE — PROGRESS NOTES
Physical Therapy Daily Treatment Note      Patient: Barry Cam   : 1950  Referring practitioner: Noah Salcedo MD  Date of Initial Visit: Type: THERAPY  Noted: 2024  Today's Date: 10/7/2024  Patient seen for 36 sessions       Visit Diagnoses:    ICD-10-CM ICD-9-CM   1. Status post reverse total arthroplasty of right shoulder  Z96.611 V43.61   2. Impaired functional mobility and activity tolerance  Z74.09 V49.89       Subjective   No new changes.    Objective   See Exercise, Manual, and Modality Logs for complete treatment.       Assessment/Plan  Improved PROM overhead with no clunking. Strength continues to improve. Progress per POC.    Timed:         Manual Therapy:    8     mins  49088;     Therapeutic Exercise:    20     mins  21020;     Neuromuscular Elizabeth:    0    mins  43636;    Therapeutic Activity:     10     mins  92977;     Gait Trainin     mins  79431;     Ultrasound:     0     mins  65491;    Ionto                               0    mins   76919  Self Care                       0     mins   99312      Un-Timed:  Electrical Stimulation:    0     mins  63785 ( );  Dry Needling     0     mins self-pay  Traction     0     mins 41681  Canalith Repos    0     mins 70653    Timed Treatment:   38   mins   Total Treatment:     48   mins    Patria Wynn PT  [place] License: [id number]

## 2024-10-07 NOTE — PROGRESS NOTES
Physical Therapy Daily Treatment Note      Patient: Barry Cam   : 1950  Referring practitioner: Noah Salcedo MD  Date of Initial Visit: Type: THERAPY  Noted: 10/4/2024  Today's Date: 10/7/2024  Patient seen for 2 sessions       Visit Diagnoses:    ICD-10-CM ICD-9-CM   1. Chronic pain of left knee  M25.562 719.46    G89.29 338.29   2. History of left knee replacement  Z96.652 V43.65   3. Impaired functional mobility, balance, gait, and endurance  Z74.09 V49.89       Subjective   Very sore after back to back knee and shoulder PT Friday. Couldn't get out of bed Saturday. Going to give PT a try today.    Objective   See Exercise, Manual, and Modality Logs for complete treatment.     Assessment/Plan  Unable to perform SLR bilaterally due to DOMS. Tolerated new exercises well w/o c/o pain. Updated HEP. Progress per POC.    Timed:         Manual Therapy:    0     mins  72657;     Therapeutic Exercise:    30     mins  25745;     Neuromuscular Elizabeth:    0    mins  65397;    Therapeutic Activity:     8     mins  53094;     Gait Trainin     mins  58639;     Ultrasound:     0     mins  63223;    Ionto                               0    mins   51618  Self Care                       0     mins   70274      Un-Timed:  Electrical Stimulation:    0     mins  51268 ( );  Dry Needling     0     mins self-pay  Traction     0     mins 81124  Canalith Repos    0     mins 85133    Timed Treatment:   38   mins   Total Treatment:     38   mins    Patria Wynn PT  [place] License: [id number]

## 2024-10-14 ENCOUNTER — TREATMENT (OUTPATIENT)
Dept: PHYSICAL THERAPY | Facility: CLINIC | Age: 74
End: 2024-10-14
Payer: MEDICARE

## 2024-10-14 DIAGNOSIS — Z74.09 IMPAIRED FUNCTIONAL MOBILITY, BALANCE, GAIT, AND ENDURANCE: ICD-10-CM

## 2024-10-14 DIAGNOSIS — Z74.09 IMPAIRED FUNCTIONAL MOBILITY AND ACTIVITY TOLERANCE: ICD-10-CM

## 2024-10-14 DIAGNOSIS — Z96.611 STATUS POST REVERSE TOTAL ARTHROPLASTY OF RIGHT SHOULDER: Primary | ICD-10-CM

## 2024-10-14 DIAGNOSIS — G89.29 CHRONIC PAIN OF LEFT KNEE: Primary | ICD-10-CM

## 2024-10-14 DIAGNOSIS — M25.562 CHRONIC PAIN OF LEFT KNEE: Primary | ICD-10-CM

## 2024-10-14 DIAGNOSIS — Z96.652 HISTORY OF LEFT KNEE REPLACEMENT: ICD-10-CM

## 2024-10-14 PROCEDURE — 97530 THERAPEUTIC ACTIVITIES: CPT | Performed by: PHYSICAL THERAPIST

## 2024-10-14 PROCEDURE — 97140 MANUAL THERAPY 1/> REGIONS: CPT | Performed by: PHYSICAL THERAPIST

## 2024-10-14 PROCEDURE — 97110 THERAPEUTIC EXERCISES: CPT | Performed by: PHYSICAL THERAPIST

## 2024-10-14 NOTE — PROGRESS NOTES
Physical Therapy Daily Treatment Note      Patient: Barry Cam   : 1950  Referring practitioner: Noah Salcedo MD  Date of Initial Visit: Type: THERAPY  Noted: 2024  Today's Date: 10/14/2024  Patient seen for 37 sessions       Visit Diagnoses:    ICD-10-CM ICD-9-CM   1. Status post reverse total arthroplasty of right shoulder  Z96.611 V43.61   2. Impaired functional mobility and activity tolerance  Z74.09 V49.89       Subjective   No new changes.    Objective   See Exercise, Manual, and Modality Logs for complete treatment.       Assessment/Plan  Continues to have R UE weakness and compensates with scapular elevation when reaching. Able to increase weight/resistance with a few exercises. Progress per POC.      Timed:         Manual Therapy:    8     mins  64732;     Therapeutic Exercise:    25     mins  81143;     Neuromuscular Elizabeth:    0    mins  83520;    Therapeutic Activity:     15     mins  65267;     Gait Trainin     mins  20289;     Ultrasound:     0     mins  59980;    Ionto                               0    mins   16661  Self Care                       0     mins   50526      Un-Timed:  Electrical Stimulation:    0     mins  50656 ( );  Dry Needling     0     mins self-pay  Traction     0     mins 75280  Canalith Repos    0     mins 24273    Timed Treatment:   48   mins   Total Treatment:     58   mins    ANGELIQUE Valles License: [id number]

## 2024-10-14 NOTE — PROGRESS NOTES
Physical Therapy Daily Treatment Note      Patient: Barry Cam   : 1950  Referring practitioner: Noah Salcedo MD  Date of Initial Visit: Type: THERAPY  Noted: 10/4/2024  Today's Date: 10/14/2024  Patient seen for 3 sessions       Visit Diagnoses:    ICD-10-CM ICD-9-CM   1. Chronic pain of left knee  M25.562 719.46    G89.29 338.29   2. History of left knee replacement  Z96.652 V43.65   3. Impaired functional mobility, balance, gait, and endurance  Z74.09 V49.89       Subjective   Has not been compliant with HEP. L 5th digit tingling is disrupting sleep. Has difficulty breathing when exercising.    Objective   See Exercise, Manual, and Modality Logs for complete treatment.       Assessment/Plan  Pt requires frequent rest breaks/increased time to complete exercises due to fatigue and SOB. No complaints of knee pain w/ exercise. Progress per POC.      Timed:         Manual Therapy:    0     mins  07463;     Therapeutic Exercise:    30     mins  83861;     Neuromuscular Elizabeth:    0    mins  37168;    Therapeutic Activity:     0     mins  85940;     Gait Trainin     mins  55108;     Ultrasound:     0     mins  74730;    Ionto                               0    mins   89255  Self Care                       0     mins   27233      Un-Timed:  Electrical Stimulation:    0     mins  08846 ( );  Dry Needling     0     mins self-pay  Traction     0     mins 36152  Canalith Repos    0     mins 56279    Timed Treatment:   30   mins   Total Treatment:     30   mins    Patria Wynn, PT  [place] License: [id number]

## 2024-10-18 ENCOUNTER — TELEPHONE (OUTPATIENT)
Dept: PHYSICAL THERAPY | Facility: CLINIC | Age: 74
End: 2024-10-18

## 2024-10-29 RX ORDER — CEPHALEXIN 500 MG/1
CAPSULE ORAL
Qty: 4 CAPSULE | Refills: 2 | Status: SHIPPED | OUTPATIENT
Start: 2024-10-29

## 2024-10-31 ENCOUNTER — TREATMENT (OUTPATIENT)
Dept: PHYSICAL THERAPY | Facility: CLINIC | Age: 74
End: 2024-10-31
Payer: MEDICARE

## 2024-10-31 DIAGNOSIS — Z96.611 STATUS POST REVERSE TOTAL ARTHROPLASTY OF RIGHT SHOULDER: Primary | ICD-10-CM

## 2024-10-31 DIAGNOSIS — Z74.09 IMPAIRED FUNCTIONAL MOBILITY AND ACTIVITY TOLERANCE: ICD-10-CM

## 2024-10-31 NOTE — PROGRESS NOTES
Physical Therapy Daily Treatment Note  [facility] [place]   [address], [address]  [place] [address]  P: [phone]  F: [phone]    Patient: Barry Cam   : 1950  Referring practitioner: Noah Salcedo MD  Date of Initial Visit: Type: THERAPY  Noted: 10/4/2024  Today's Date: 10/31/2024  Patient seen for 4 sessions       Visit Diagnoses:    ICD-10-CM ICD-9-CM   1. Status post reverse total arthroplasty of right shoulder  Z96.611 V43.61   2. Impaired functional mobility and activity tolerance  Z74.09 V49.89         Subjective     Barry Cam reports: Says he doesn't have any issues picking items up off the ground. His main goal is to be able to reach overhead and push. Back pain has slowed down his progress.        Objective   See Exercise, Manual, and Modality Logs for complete treatment.       Assessment:  Pt is currently focusing on rehab to his shoulder before addressing other impairments. Strengthening ther exercises and manual PROM performed, with proper form and no compensatory motions. Pt demonstrates poor endurance for standing activities, needing extended seated rest breaks between interventions. Pt will continue to benefit from skilled therapy to return shoulder to PLOF for ease with ADLs.         Plan:  Progress per Plan of Care            Timed:         Manual Therapy:    10     mins  46236;     Therapeutic Exercise:    15     mins  81407;     Neuromuscular Elizabeth:        mins  39062;    Therapeutic Activity:     15     mins  04725;     Gait Training:           mins  88130;     Ultrasound:          mins  74034;    Ionto                                   mins  09114  Self Care                            mins  19492    Un-Timed:  Electrical Stimulation:         mins  26636 ( );  Traction          mins 79223        Timed Treatment:   40   mins   Total Treatment:     50   mins      Prakash Ag, Physical Therapist Assistant  [place] License #:  [id number]

## 2024-11-06 ENCOUNTER — TREATMENT (OUTPATIENT)
Dept: PHYSICAL THERAPY | Facility: CLINIC | Age: 74
End: 2024-11-06
Payer: MEDICARE

## 2024-11-06 DIAGNOSIS — Z96.611 STATUS POST REVERSE TOTAL ARTHROPLASTY OF RIGHT SHOULDER: Primary | ICD-10-CM

## 2024-11-06 DIAGNOSIS — Z74.09 IMPAIRED FUNCTIONAL MOBILITY AND ACTIVITY TOLERANCE: ICD-10-CM

## 2024-11-06 NOTE — PROGRESS NOTES
Physical Therapy Daily Treatment Note  [facility] [place]   [address], [address]  [place] [address]  P: [phone]  F: [phone]    Patient: Barry Cam   : 1950  Referring practitioner: Noah Salcedo MD  Date of Initial Visit: Type: THERAPY  Noted: 2024  Today's Date: 2024  Patient seen for 39 sessions       Visit Diagnoses:    ICD-10-CM ICD-9-CM   1. Status post reverse total arthroplasty of right shoulder  Z96.611 V43.61   2. Impaired functional mobility and activity tolerance  Z74.09 V49.89         Subjective     Barry Cam reports: Having dizzy spells when changing direction of head quickly. Struggling with balance and excessive fatigue during the day.        Objective   See Exercise, Manual, and Modality Logs for complete treatment.       Assessment:  Pt demonstrated excessive fatigue between interventions, requiring extended seated rest breaks. Shoulder IR and ER at cable system modified to be seated, due to pt being unsteady on his feet. Pt benefits from verbal cues throughout exercises to avoid compensatory movements. Pt demonstrates limitations in abduction and external rotation of R shoulder. Pt will continue to benefit from skilled therapy to address strength deficits mentioned prior.         Plan:  Progress per Plan of Care            Timed:         Manual Therapy:         mins  60943;     Therapeutic Exercise:    25     mins  19982;     Neuromuscular Elizabeth:        mins  19808;    Therapeutic Activity:     15     mins  80068;     Gait Training:           mins  01765;     Ultrasound:          mins  46601;    Ionto                                   mins  40145  Self Care                            mins  31597    Un-Timed:  Electrical Stimulation:         mins  02353 ( );  Traction          mins 63787        Timed Treatment:   40   mins   Total Treatment:     50   mins      Prakash Ag, Physical Therapist Assistant  [place] License  #: [id number]

## 2024-11-12 ENCOUNTER — TREATMENT (OUTPATIENT)
Dept: PHYSICAL THERAPY | Facility: CLINIC | Age: 74
End: 2024-11-12
Payer: MEDICARE

## 2024-11-12 DIAGNOSIS — Z74.09 IMPAIRED FUNCTIONAL MOBILITY AND ACTIVITY TOLERANCE: ICD-10-CM

## 2024-11-12 DIAGNOSIS — Z96.611 STATUS POST REVERSE TOTAL ARTHROPLASTY OF RIGHT SHOULDER: Primary | ICD-10-CM

## 2024-11-12 NOTE — PROGRESS NOTES
"  Physical Therapy Re Certification Of Plan of Care  Patient: Barry Cam   : 1950  Diagnosis/ICD-10 Code:  Status post reverse total arthroplasty of right shoulder [Z96.611]  Referring practitioner: Noah Salcedo MD  Date of Initial Visit: Type: THERAPY  Noted: 2024    Type: THERAPY  Noted: 10/4/2024  Today's Date: 2024  Patient seen for 40 sessions         Visit Diagnoses:    ICD-10-CM ICD-9-CM   1. Status post reverse total arthroplasty of right shoulder  Z96.611 V43.61   2. Impaired functional mobility and activity tolerance  Z74.09 V49.89   3. Chronic pain of left knee  M25.562 719.46    G89.29 338.29   4. History of left knee replacement  Z96.652 V43.65   5. Impaired functional mobility, balance, gait, and endurance  Z74.09 V49.89         Barry Cam reports: Per pt, he is \"giving up on the knee\". He states he is too tired to do home exercise. He is unwilling to do shoulder exercises at home. Willing to attend PT for shoulder.    Would like to improve overhead push/pull and ability to use drill.    Went to a wedding mid-October, rode in a golf cart to/from venue, and had increased back pain afterwards. Cancelled several sessions of PT due to back pain and extreme fatigue. PT states sleepiness is uncontrollable.    L 5th digit is driving me crazy. Tingling numbness is constant.    Subjective Questionnaire: QuickDASH: 31.82   Clinical Progress: improved  Home Program Compliance: No  Treatment has included: therapeutic exercise, neuromuscular re-education, manual therapy, therapeutic activity, and cryotherapy      Subjective       Objective   Active Range of Motion      Right Shoulder   Flexion: 118 degrees   Abduction: 105 degrees   External rotation BTH: C7   Internal rotation BTB: sacrum      Passive Range of Motion      Right Shoulder   Flexion: 160 degrees   Abduction: 126 degrees   External rotation 90°: 90 degrees   Internal rotation 90°: 75 degrees      Strength/Myotome " Testing      Right Shoulder      Planes of Motion   Flexion: 4+   Abduction: 4-   External rotation at 0°: 4-   Internal rotation at 0°: 4+      Isolated Muscles   Biceps: 5       Short Term Goals: 6 weeks. Patient will:  1. Be independent with initial HEP MET  2. Be instructed in posture and body mechanics. MET  3. Demonstrate R shoulder PROM WNL to allow for progressing of therapy exercises. NOT MET     Long Term Goals: 6-12 weeks. Pt will:  1. Exhibit (R) shoulder AROM to WFL to allow for reaching overhead and out (ABD) without pain limiting function.  2. Demonstrate improved R UE MMT of >/= 4+/5 to allow for performance of ADL's/household management/recreational activities.  3. Pt able to reach overhead and lift 10# to allow for return to doing home/yard/recreational activities with min to no pain.  4. Report perceived disability </=10% based on QuickDASH  ALL NOT MET       Assessment/Plan  Continues to have significant weakness limiting R shoulder AROM. Unable to reach & lift above shoulder height. Stressed importance of home program to build strength.     Recommendations: Continue with recommendations 3x/week  Timeframe: 6 weeks  Prognosis to achieve goals: fair      Timed:         Manual Therapy:    0     mins  64456;     Therapeutic Exercise:    40     mins  92025;     Neuromuscular Elizabeth:    0    mins  32976;    Therapeutic Activity:     8     mins  33093;     Gait Trainin     mins  83271;     Ultrasound:     0     mins  97390;    Ionto                               0    mins   70290  Self Care                       0     mins   28096    Un-Timed:  Electrical Stimulation:    0     mins  99779 (MC );  Dry Needling     0     mins self-pay  Traction     0     mins 95240  Re-Eval                           0    mins  25529  Canalith Repos    0     mins 46175    Timed Treatment:   48   mins   Total Treatment:     48   mins          PT: Patria Wynn, PT     [place] License:  [id number]    Electronically  signed by Patria Wynn, PT, 11/12/24, 2:11 PM EST    Certification Period: 11/12/2024 thru 2/9/2025  I certify that the therapy services are furnished while this patient is under my care.  The services outlined above are required by this patient, and will be reviewed every 90 days.         Physician Signature:__________________________________________________    PHYSICIAN: Noah Salcedo MD  NPI: [id number]                                      DATE:  :     Please sign and return via fax to [email or web address] . Thank you, [facility] Physical Therapy

## 2024-11-13 ENCOUNTER — TREATMENT (OUTPATIENT)
Dept: PHYSICAL THERAPY | Facility: CLINIC | Age: 74
End: 2024-11-13
Payer: MEDICARE

## 2024-11-13 DIAGNOSIS — Z74.09 IMPAIRED FUNCTIONAL MOBILITY AND ACTIVITY TOLERANCE: ICD-10-CM

## 2024-11-13 DIAGNOSIS — Z96.611 STATUS POST REVERSE TOTAL ARTHROPLASTY OF RIGHT SHOULDER: Primary | ICD-10-CM

## 2024-11-13 NOTE — PROGRESS NOTES
Physical Therapy Daily Treatment Note  [facility] Therapy [place]   [address] [place] [address], [address]  [place] [address]  P: [phone]  F: [phone]    Patient: Barry Cam   : 1950  Referring practitioner: Noah Salcedo MD  Date of Initial Visit: Type: THERAPY  Noted: 2024  Today's Date: 2024  Patient seen for 41 sessions       Visit Diagnoses:    ICD-10-CM ICD-9-CM   1. Status post reverse total arthroplasty of right shoulder  Z96.611 V43.61   2. Impaired functional mobility and activity tolerance  Z74.09 V49.89         Subjective     Barry Cam reports: No changes since last visit. He found all strengthening exercises from last visit very helpful. Eager to regain strength and return to chores around the house.         Objective   See Exercise, Manual, and Modality Logs for complete treatment.       Assessment:  Pt found wall ABC abduction exercise considerably more challenging than flexion. Benefits from tactile cues during cable press and standing row exercises to avoid compensatory movements. Plan to progress strengthening interventions gradually as pt tolerates.         Plan:  Progress per Plan of Care            Timed:         Manual Therapy:         mins  25613;     Therapeutic Exercise:    20     mins  66833;     Neuromuscular Elizabeth:        mins  41437;    Therapeutic Activity:     15     mins  81224;     Gait Training:           mins  98568;     Ultrasound:          mins  76903;    Ionto                                   mins  66956  Self Care                            mins  47864    Un-Timed:  Electrical Stimulation:         mins  82100 (MC );  Traction          mins 41363        Timed Treatment:   35   mins   Total Treatment:     45   mins      Prakash Ag, Physical Therapist Assistant  [place] License #: [id number]

## 2024-11-14 ENCOUNTER — TREATMENT (OUTPATIENT)
Dept: PHYSICAL THERAPY | Facility: CLINIC | Age: 74
End: 2024-11-14
Payer: MEDICARE

## 2024-11-14 ENCOUNTER — PATIENT MESSAGE (OUTPATIENT)
Dept: SLEEP MEDICINE | Facility: HOSPITAL | Age: 74
End: 2024-11-14
Payer: MEDICARE

## 2024-11-14 DIAGNOSIS — Z96.611 STATUS POST REVERSE TOTAL ARTHROPLASTY OF RIGHT SHOULDER: Primary | ICD-10-CM

## 2024-11-14 DIAGNOSIS — G47.33 OBSTRUCTIVE SLEEP APNEA SYNDROME: ICD-10-CM

## 2024-11-14 DIAGNOSIS — Z74.09 IMPAIRED FUNCTIONAL MOBILITY AND ACTIVITY TOLERANCE: ICD-10-CM

## 2024-11-14 DIAGNOSIS — G47.14 HYPERSOMNIA DUE TO MEDICAL CONDITION: Primary | ICD-10-CM

## 2024-11-14 NOTE — PROGRESS NOTES
Physical Therapy Daily Treatment Note  [facility] Therapy [place]   [address], [address]  [place] [address]  P: [phone]  F: [phone]    Patient: Barry Cam   : 1950  Referring practitioner: Noah Salcedo MD  Date of Initial Visit: Type: THERAPY  Noted: 2024  Today's Date: 2024  Patient seen for 42 sessions       Visit Diagnoses:    ICD-10-CM ICD-9-CM   1. Status post reverse total arthroplasty of right shoulder  Z96.611 V43.61   2. Impaired functional mobility and activity tolerance  Z74.09 V49.89         Subjective     Barry Cam reports: No new changes this visit.         Objective   See Exercise, Manual, and Modality Logs for complete treatment.       Assessment:  Pt benefits from cueing with standing 3-way raise to avoid upper trap compensation. Ball roll outs added for lumbar stretch after pt reported LBP with MB up wall activity. Pt will benefit from gradually increased strengthening interventions, avoiding compensatory motions that may injure his low back.         Plan:  Progress per Plan of Care            Timed:         Manual Therapy:         mins  49514;     Therapeutic Exercise:    26     mins  64604;     Neuromuscular Elizabeth:        mins  18207;    Therapeutic Activity:     15     mins  14279;     Gait Training:           mins  92291;     Ultrasound:          mins  62684;    Ionto                                   mins  19963  Self Care                            mins  03912    Un-Timed:  Electrical Stimulation:         mins  62539 ( );  Traction          mins 48515        Timed Treatment:   41   mins   Total Treatment:     51   mins      Prakash gA, Physical Therapist Assistant  [place] License #: [id number]

## 2024-11-19 ENCOUNTER — TREATMENT (OUTPATIENT)
Dept: PHYSICAL THERAPY | Facility: CLINIC | Age: 74
End: 2024-11-19
Payer: MEDICARE

## 2024-11-19 DIAGNOSIS — Z96.611 STATUS POST REVERSE TOTAL ARTHROPLASTY OF RIGHT SHOULDER: Primary | ICD-10-CM

## 2024-11-19 DIAGNOSIS — Z74.09 IMPAIRED FUNCTIONAL MOBILITY AND ACTIVITY TOLERANCE: ICD-10-CM

## 2024-11-19 PROCEDURE — 97112 NEUROMUSCULAR REEDUCATION: CPT | Performed by: PHYSICAL THERAPIST

## 2024-11-19 PROCEDURE — 97110 THERAPEUTIC EXERCISES: CPT | Performed by: PHYSICAL THERAPIST

## 2024-11-19 PROCEDURE — 97530 THERAPEUTIC ACTIVITIES: CPT | Performed by: PHYSICAL THERAPIST

## 2024-11-19 NOTE — PROGRESS NOTES
"Physical Therapy Daily Treatment Note  [facility] Therapy [place]   [address], [address]  [place] [address]  P: [phone]  F: [phone]    Patient: Barry Cam   : 1950  Referring practitioner: Noah Salcedo MD  Date of Initial Visit: Type: THERAPY  Noted: 2024  Today's Date: 2024  Patient seen for 43 sessions       Visit Diagnoses:    ICD-10-CM ICD-9-CM   1. Status post reverse total arthroplasty of right shoulder  Z96.611 V43.61   2. Impaired functional mobility and activity tolerance  Z74.09 V49.89         Subjective     Barry Cam reports: \"Overdid it yesterday with the shoulder. Feeling it today.\"        Objective   See Exercise, Manual, and Modality Logs for complete treatment.       Assessment:  Added cabinet reach activity for functional strengthening intervention. Pt tolerated increase in resistance for scapular strengthening exercises, with minimal subjective pain levels. Pt continues to demonstrate poor endurance for standing activities, needing extended seated rest breaks in between exercises.         Plan:  Progress per Plan of Care            Timed:         Manual Therapy:         mins  65944;     Therapeutic Exercise:    23     mins  99873;     Neuromuscular Elizabeth:    8    mins  12680;    Therapeutic Activity:     15     mins  90720;     Gait Training:           mins  68752;     Ultrasound:          mins  53187;    Ionto                                   mins  90547  Self Care                            mins  57507    Un-Timed:  Electrical Stimulation:         mins  66572 (MC );  Traction          mins 00820        Timed Treatment:   46   mins   Total Treatment:     56   mins      Prakash Ag, Physical Therapist Assistant  [place] License #: [id number]        "

## 2024-11-20 ENCOUNTER — TREATMENT (OUTPATIENT)
Dept: PHYSICAL THERAPY | Facility: CLINIC | Age: 74
End: 2024-11-20
Payer: MEDICARE

## 2024-11-20 DIAGNOSIS — Z74.09 IMPAIRED FUNCTIONAL MOBILITY AND ACTIVITY TOLERANCE: ICD-10-CM

## 2024-11-20 DIAGNOSIS — Z96.611 STATUS POST REVERSE TOTAL ARTHROPLASTY OF RIGHT SHOULDER: Primary | ICD-10-CM

## 2024-11-20 NOTE — PROGRESS NOTES
"Physical Therapy Daily Treatment Note  [facility] Physical Therapy [place]   [address], [address]  [place] [address]  P: [phone]  F: [phone]    Patient: Barry Cam   : 1950  Referring practitioner: Noah Salcedo MD  Date of Initial Visit: Type: THERAPY  Noted: 10/4/2024  Today's Date: 2024  Patient seen for 4 sessions       Visit Diagnoses:    ICD-10-CM ICD-9-CM   1. Status post reverse total arthroplasty of right shoulder  Z96.611 V43.61   2. Impaired functional mobility and activity tolerance  Z74.09 V49.89         Subjective     Barry Cam reports: Back was sore following PT yesterday. \"It is not happy with me whenever I do anything in standing.\"         Objective   See Exercise, Manual, and Modality Logs for complete treatment.       Assessment:  Pt demonstrates weakness in R shoulder external rotators.  New interventions added to address deficits. Sword pulls and seatbelts progressed to standing with band. Pt continues to require extended seated breaks after each standing intervention.         Plan:  Progress per Plan of Care            Timed:         Manual Therapy:         mins  70201;     Therapeutic Exercise:    20     mins  53281;     Neuromuscular Elizabeth:    8    mins  99160;    Therapeutic Activity:     10     mins  39164;     Gait Training:           mins  10374;     Ultrasound:          mins  66310;    Ionto                                   mins  42711  Self Care                            mins  21907    Un-Timed:  Electrical Stimulation:         mins  88809 (MC );  Traction          mins 09325        Timed Treatment:   38   mins   Total Treatment:     48   mins      Prakash Ag, Physical Therapist Assistant  [place] License #: [id number]        "

## 2024-11-21 ENCOUNTER — TREATMENT (OUTPATIENT)
Dept: PHYSICAL THERAPY | Facility: CLINIC | Age: 74
End: 2024-11-21
Payer: MEDICARE

## 2024-11-21 DIAGNOSIS — Z96.611 STATUS POST REVERSE TOTAL ARTHROPLASTY OF RIGHT SHOULDER: Primary | ICD-10-CM

## 2024-11-21 DIAGNOSIS — Z74.09 IMPAIRED FUNCTIONAL MOBILITY AND ACTIVITY TOLERANCE: ICD-10-CM

## 2024-11-21 NOTE — PROGRESS NOTES
Check the following website to choose validated blood pressure cuffs:  https://www.validatebp.org/    How to Check Blood Pressure           Discharge Report    Patient Name: Barry aCm       Patient MRN: [id number]  : 1950  Physician:Noah Salcedo MD  Date: 2017    Encounter Diagnoses   Name Primary?   • Chronic left shoulder pain Yes   • Tear of left rotator cuff, unspecified tear extent        Treatment has included therapeutic exercise, manual therapy, therapeutic activity, ultrasound and cryotherapy    Assessment: Pt performed all strengthening with no increased pain. Reviewed supine to standing transfers from ground, advised pt to roll to right instead of left due to weakness in the left shoulder to stabilize to push up from the ground.  Discussed with patient, long term outcomes, anatomy of why he continues to have the weakness and popping.  Instructed pt to continue HEP, follow up with MD as needed.   Progress towards goals: Partially Met    Discharge Reason: Plateau in patient's progress      Plan of Care: Continue with current home exercise program as instructed  Patient to return to referring/providing physician    Prognosis: fair    Understanding at Discharge: good

## 2024-11-21 NOTE — PROGRESS NOTES
Physical Therapy Daily Treatment Note  [facility] Physical Therapy [place]   [address], [address]  [place] [address]  P: [phone]  F: [phone]    Patient: Barry Cam   : 1950  Referring practitioner: Noah Salcedo MD  Date of Initial Visit: Type: THERAPY  Noted: 2024  Today's Date: 2024  Patient seen for 45 sessions       Visit Diagnoses:    ICD-10-CM ICD-9-CM   1. Status post reverse total arthroplasty of right shoulder  Z96.611 V43.61   2. Impaired functional mobility and activity tolerance  Z74.09 V49.89         Subjective     Barry Cam reports: Has had to use his inhaler many times today. His wife seems to think he may be having a reaction to vaccines he had last week.         Objective   See Exercise, Manual, and Modality Logs for complete treatment.       Assessment:  Multiple scapular strengthening interventions modified to supine or seated position, due to pt being limited by LBP for prolonged standing activities. TRX flexion walk out performed for lower back stretch after standing Wall ABC exercise. Pt demonstrated appropriate muscle fatigue at the end of today's session. Pt is progressing well and will continue to benefit from skilled PT.        Plan:  Progress per Plan of Care            Timed:         Manual Therapy:         mins  68641;     Therapeutic Exercise:    20     mins  64405;     Neuromuscular Elizabeth:        mins  13325;    Therapeutic Activity:     15     mins  23667;     Gait Training:           mins  82496;     Ultrasound:          mins  55706;    Ionto                                   mins  68681  Self Care                            mins  51122    Un-Timed:  Electrical Stimulation:         mins  82690 ( );  Traction          mins 49528        Timed Treatment:   35   mins   Total Treatment:     45   mins      Prakash Ag, Physical Therapist Assistant  [place] License #: [id number]

## 2024-11-26 ENCOUNTER — TREATMENT (OUTPATIENT)
Dept: PHYSICAL THERAPY | Facility: CLINIC | Age: 74
End: 2024-11-26
Payer: MEDICARE

## 2024-11-26 DIAGNOSIS — Z96.611 STATUS POST REVERSE TOTAL ARTHROPLASTY OF RIGHT SHOULDER: Primary | ICD-10-CM

## 2024-11-26 DIAGNOSIS — Z74.09 IMPAIRED FUNCTIONAL MOBILITY AND ACTIVITY TOLERANCE: ICD-10-CM

## 2024-11-26 NOTE — PROGRESS NOTES
Physical Therapy Daily Treatment Note  [facility] Therapy [place]   [address], [address]  [place] [address]  P: [phone]  F: [phone]    Patient: Barry Cam   : 1950  Referring practitioner: Noah Salcedo MD  Date of Initial Visit: Type: THERAPY  Noted: 2024  Today's Date: 2024  Patient seen for 46 sessions       Visit Diagnoses:    ICD-10-CM ICD-9-CM   1. Status post reverse total arthroplasty of right shoulder  Z96.611 V43.61   2. Impaired functional mobility and activity tolerance  Z74.09 V49.89         Subjective     Barry Cam reports: Woke up with stomach pain this morning. Doing better this afternoon.       Objective   See Exercise, Manual, and Modality Logs for complete treatment.       Assessment:  Pt tolerated increase in resistance for seated rows, with no compensatory movements. Wall circles added for increased scapular stability. Pt's form is improving with supine PNF band exercises. Plan to continue current PoC, progressing as pt tolerates.         Plan:  Progress per Plan of Care            Timed:         Manual Therapy:         mins  19534;     Therapeutic Exercise:    25     mins  38076;     Neuromuscular Elizabeth:    8    mins  31662;    Therapeutic Activity:     13    mins  47170;     Gait Training:           mins  40196;     Ultrasound:          mins  28865;    Ionto                                   mins  45446  Self Care                            mins  54869    Un-Timed:  Electrical Stimulation:         mins  32372 ( );  Traction          mins 09035        Timed Treatment:   46   mins   Total Treatment:     56   mins      Prakash Ag, Physical Therapist Assistant  [place] License #: [id number]

## 2024-11-27 ENCOUNTER — TREATMENT (OUTPATIENT)
Dept: PHYSICAL THERAPY | Facility: CLINIC | Age: 74
End: 2024-11-27
Payer: MEDICARE

## 2024-11-27 ENCOUNTER — DOCUMENTATION (OUTPATIENT)
Dept: SLEEP MEDICINE | Facility: HOSPITAL | Age: 74
End: 2024-11-27
Payer: MEDICARE

## 2024-11-27 DIAGNOSIS — G47.33 OBSTRUCTIVE SLEEP APNEA SYNDROME: ICD-10-CM

## 2024-11-27 DIAGNOSIS — Z74.09 IMPAIRED FUNCTIONAL MOBILITY AND ACTIVITY TOLERANCE: ICD-10-CM

## 2024-11-27 DIAGNOSIS — G47.14 HYPERSOMNIA DUE TO MEDICAL CONDITION: ICD-10-CM

## 2024-11-27 DIAGNOSIS — Z96.611 STATUS POST REVERSE TOTAL ARTHROPLASTY OF RIGHT SHOULDER: Primary | ICD-10-CM

## 2024-11-27 RX ORDER — MODAFINIL 200 MG/1
TABLET ORAL
Qty: 60 TABLET | Refills: 3 | Status: SHIPPED | OUTPATIENT
Start: 2024-11-27 | End: 2024-11-27 | Stop reason: SDUPTHER

## 2024-11-27 RX ORDER — MODAFINIL 200 MG/1
TABLET ORAL
Qty: 60 TABLET | Refills: 3 | Status: SHIPPED | OUTPATIENT
Start: 2024-11-27

## 2024-11-27 NOTE — PROGRESS NOTES
Physical Therapy Daily Treatment Note  [facility] Physical Therapy [place]   [address], [address]  [place] [address]  P: [phone]  F: [phone]    Patient: Barry Cam   : 1950  Referring practitioner: Noah Salcedo MD  Date of Initial Visit: Type: THERAPY  Noted: 2024  Today's Date: 2024  Patient seen for 47 sessions       Visit Diagnoses:    ICD-10-CM ICD-9-CM   1. Status post reverse total arthroplasty of right shoulder  Z96.611 V43.61   2. Impaired functional mobility and activity tolerance  Z74.09 V49.89         Subjective     Barry Cam reports: No new complaints this visit. Still experiencing episodes of being SOA. Goes to primary care doc in January to address.         Objective   See Exercise, Manual, and Modality Logs for complete treatment.       Assessment:  Pt continues to tolerate seated and supine interventions well, maintaining proper form. Pt tolerated increased resistance with PNF supine exercises, demonstrating appropriate muscle fatigue for last repetitions. Wall abduction slide weight increased. Pt is making good progress with strength in R rotator cuff and deltoid musculature.       Plan:  Progress per Plan of Care            Timed:         Manual Therapy:         mins  84748;     Therapeutic Exercise:    15     mins  95093;     Neuromuscular Elizabeth:        mins  41777;    Therapeutic Activity:     15     mins  21446;     Gait Training:           mins  75505;     Ultrasound:          mins  76679;    Ionto                                   mins  57290  Self Care                            mins  59691    Un-Timed:  Electrical Stimulation:         mins  04634 ( );  Traction          mins 15559        Timed Treatment:   30   mins   Total Treatment:     40   mins      Prakash Ag, Physical Therapist Assistant  [place] License #: [id number]

## 2024-11-27 NOTE — PROGRESS NOTES
From patient dated 11/14/2024.  Maddy MONTERO. I have sent the following to Dr. Gillespie. RADHA - Maddy Gillespie. I Have found a source for Modafinil locally that is reasonably priced. I would like to go back on it and off the generic Adderall. Can you send a prescription for 60 tabs per month of 200 mg Modafinil to [facility], [address] [place] [address], [phone]. [phone]? Thank You. Barry Cam, [address] [place] [address]. [phone].     The above medication was prescribed.  Patient should follow-up as scheduled.

## 2024-12-02 DIAGNOSIS — E11.65 TYPE 2 DIABETES MELLITUS WITH HYPERGLYCEMIA, WITHOUT LONG-TERM CURRENT USE OF INSULIN: ICD-10-CM

## 2024-12-02 RX ORDER — PIOGLITAZONE 45 MG/1
45 TABLET ORAL DAILY
Qty: 90 TABLET | Refills: 3 | Status: SHIPPED | OUTPATIENT
Start: 2024-12-02

## 2024-12-03 ENCOUNTER — TREATMENT (OUTPATIENT)
Dept: PHYSICAL THERAPY | Facility: CLINIC | Age: 74
End: 2024-12-03
Payer: MEDICARE

## 2024-12-03 DIAGNOSIS — Z96.611 STATUS POST REVERSE TOTAL ARTHROPLASTY OF RIGHT SHOULDER: Primary | ICD-10-CM

## 2024-12-03 DIAGNOSIS — Z74.09 IMPAIRED FUNCTIONAL MOBILITY AND ACTIVITY TOLERANCE: ICD-10-CM

## 2024-12-03 PROCEDURE — 97110 THERAPEUTIC EXERCISES: CPT | Performed by: PHYSICAL THERAPIST

## 2024-12-03 NOTE — PROGRESS NOTES
Physical Therapy Daily Treatment Note      Patient: Barry Cam   : 1950  Referring practitioner: Noah Salcedo MD  Date of Initial Visit: Type: THERAPY  Noted: 2024  Today's Date: 12/3/2024  Patient seen for 48 sessions       Visit Diagnoses:    ICD-10-CM ICD-9-CM   1. Status post reverse total arthroplasty of right shoulder  Z96.611 V43.61   2. Impaired functional mobility and activity tolerance  Z74.09 V49.89       Subjective   My back is weak. My shoulder is weak. Better but not 100% yet.    Objective   See Exercise, Manual, and Modality Logs for complete treatment.       Assessment/Plan  Tolerance to standing exercise limited by lower back pain. Continues to have limited AROM overhead due to UE weakness. Progressed scapular and back strengthening as tolerated. Progress per POC.      Timed:         Manual Therapy:    0     mins  40301;     Therapeutic Exercise:    30     mins  24490;     Neuromuscular Elizabeth:    0    mins  72031;    Therapeutic Activity:     0     mins  98735;   20 mins Untimed  Gait Trainin     mins  86397;     Ultrasound:     0     mins  34973;    Ionto                               0    mins   48072  Self Care                       0     mins   82453      Un-Timed:  Electrical Stimulation:    0     mins  09955 ( );  Dry Needling     0     mins self-pay  Traction     0     mins 55702  Canalith Repos    0     mins 75594    Timed Treatment:   30   mins   Total Treatment:     50   mins    Patria Wynn PT  [place] License: [id number]

## 2024-12-04 ENCOUNTER — TREATMENT (OUTPATIENT)
Dept: PHYSICAL THERAPY | Facility: CLINIC | Age: 74
End: 2024-12-04
Payer: MEDICARE

## 2024-12-04 DIAGNOSIS — Z96.611 STATUS POST REVERSE TOTAL ARTHROPLASTY OF RIGHT SHOULDER: Primary | ICD-10-CM

## 2024-12-04 DIAGNOSIS — Z74.09 IMPAIRED FUNCTIONAL MOBILITY AND ACTIVITY TOLERANCE: ICD-10-CM

## 2024-12-04 NOTE — PROGRESS NOTES
Physical Therapy Daily Treatment Note  [facility] [place]   [address], [address]  [place] [address]  P: [phone]  F: [phone]    Patient: Barry Cam   : 1950  Referring practitioner: Noah Salcedo MD  Date of Initial Visit: Type: THERAPY  Noted: 2024  Today's Date: 2024  Patient seen for 49 sessions       Visit Diagnoses:    ICD-10-CM ICD-9-CM   1. Status post reverse total arthroplasty of right shoulder  Z96.611 V43.61   2. Impaired functional mobility and activity tolerance  Z74.09 V49.89         Subjective     Barry Cam reports: Sore from yesterday's session. No other new changes to report.         Objective   See Exercise, Manual, and Modality Logs for complete treatment.       Assessment:  Pt continues to demonstrate very minimal tolerance to standing exercises, limited by lower back pain. Pt's R shoulder fatigued following standing rows and was unable to complete all exercises today. Plan to start next session with supine interventions initially, progressing to more strenuous standing activities for improved pt outcome.         Plan:  Progress per Plan of Care            Timed:         Manual Therapy:         mins  18317;     Therapeutic Exercise:    20     mins  77273;     Neuromuscular Elizabeth:        mins  57673;    Therapeutic Activity:     10     mins  71734;     Gait Training:           mins  01101;     Ultrasound:          mins  88731;    Ionto                                   mins  97183  Self Care                            mins  75573    Un-Timed:  Electrical Stimulation:         mins  84001 (MC );  Traction          mins 70226        Timed Treatment:   30   mins   Total Treatment:     40   mins      Prakash Ag, Physical Therapist Assistant  [place] License #: [id number]

## 2024-12-05 ENCOUNTER — TREATMENT (OUTPATIENT)
Dept: PHYSICAL THERAPY | Facility: CLINIC | Age: 74
End: 2024-12-05
Payer: MEDICARE

## 2024-12-05 DIAGNOSIS — Z74.09 IMPAIRED FUNCTIONAL MOBILITY AND ACTIVITY TOLERANCE: ICD-10-CM

## 2024-12-05 DIAGNOSIS — Z96.611 STATUS POST REVERSE TOTAL ARTHROPLASTY OF RIGHT SHOULDER: Primary | ICD-10-CM

## 2024-12-05 NOTE — PROGRESS NOTES
Physical Therapy Daily Treatment Note      Patient: Barry Cam   : 1950  Referring practitioner: Noah Salcedo MD  Date of Initial Visit: Type: THERAPY  Noted: 2024  Today's Date: 2024  Patient seen for 50 sessions       Visit Diagnoses:    ICD-10-CM ICD-9-CM   1. Status post reverse total arthroplasty of right shoulder  Z96.611 V43.61   2. Impaired functional mobility and activity tolerance  Z74.09 V49.89       Subjective   Increased pain after wall slides in abduction at PT yesterday. Cannot stand 5 mins due to back pain. Can walk to mailbox w/o Rollator.    Objective   See Exercise, Manual, and Modality Logs for complete treatment.     Assessment/Plan  Continues to have limited PROM flexion and weakness limiting active overhead reach. Exercise tolerance limited by lower back pain. Requires frequent seated rest breaks.   Recommend increasing activity level starting w/ walking 1 lap/day on driveway, increasing as tolerated.   Progress per POC.      Timed:         Manual Therapy:    0     mins  01160;     Therapeutic Exercise:    30     mins  60300;     Neuromuscular Elizabeth:    0    mins  97110;    Therapeutic Activity:     15     mins  99978;     Gait Trainin     mins  62679;     Ultrasound:     0     mins  19997;    Ionto                               0    mins   08370  Self Care                       0     mins   11108      Un-Timed:  Electrical Stimulation:    0     mins  38836 ( );  Dry Needling     0     mins self-pay  Traction     0     mins 97197  Canalith Repos    0     mins 43563    Timed Treatment:   45   mins   Total Treatment:     45   mins    Patria Wynn PT  [place] License: [id number]

## 2024-12-16 DIAGNOSIS — E11.65 TYPE 2 DIABETES MELLITUS WITH HYPERGLYCEMIA, WITHOUT LONG-TERM CURRENT USE OF INSULIN: Primary | ICD-10-CM

## 2024-12-18 ENCOUNTER — TREATMENT (OUTPATIENT)
Dept: PHYSICAL THERAPY | Facility: CLINIC | Age: 74
End: 2024-12-18
Payer: MEDICARE

## 2024-12-18 DIAGNOSIS — Z74.09 IMPAIRED FUNCTIONAL MOBILITY AND ACTIVITY TOLERANCE: ICD-10-CM

## 2024-12-18 DIAGNOSIS — Z96.611 STATUS POST REVERSE TOTAL ARTHROPLASTY OF RIGHT SHOULDER: Primary | ICD-10-CM

## 2024-12-18 NOTE — PROGRESS NOTES
"Physical Therapy Daily Treatment Note  [facility] Physical Therapy [place]   [address], [address]  [place] [address]  P: [phone]  F: [phone]    Patient: Barry Cam   : 1950  Referring practitioner: Noah Salcedo MD  Date of Initial Visit: Type: THERAPY  Noted: 2024  Today's Date: 2024  Patient seen for 51 sessions       Visit Diagnoses:    ICD-10-CM ICD-9-CM   1. Status post reverse total arthroplasty of right shoulder  Z96.611 V43.61   2. Impaired functional mobility and activity tolerance  Z74.09 V49.89         Subjective     Barry Cam reports: Doesn't feel like he's making much progress. Wants to d/c therapy for his shoulder after next visit. Has a hard time reaching bird feeder at his house 7' off the ground to refill it.        Objective   See Exercise, Manual, and Modality Logs for complete treatment.       Assessment:  Pt continues to be limited by LBP and episodes of SOA with standing interventions. Pt benefits from verbal cueing to avoid compensatory trunk rotations with alternating scapular \"Y\" strengthening exercise at wall.         Plan:  Progress per Plan of Care            Timed:         Manual Therapy:         mins  42600;     Therapeutic Exercise:    20     mins  75380;     Neuromuscular Elizabeth:    8    mins  04798;    Therapeutic Activity:     12     mins  52601;     Gait Training:           mins  58332;     Ultrasound:          mins  62221;    Ionto                                   mins  01726  Self Care                            mins  20683    Un-Timed:  Electrical Stimulation:         mins  67985 ( );  Traction          mins 50189        Timed Treatment:   40   mins   Total Treatment:     60   mins      Prakash Ag, Physical Therapist Assistant  [place] License #: [id number]        "

## 2024-12-19 ENCOUNTER — TREATMENT (OUTPATIENT)
Dept: PHYSICAL THERAPY | Facility: CLINIC | Age: 74
End: 2024-12-19
Payer: MEDICARE

## 2024-12-19 DIAGNOSIS — Z74.09 IMPAIRED FUNCTIONAL MOBILITY AND ACTIVITY TOLERANCE: ICD-10-CM

## 2024-12-19 DIAGNOSIS — Z96.611 STATUS POST REVERSE TOTAL ARTHROPLASTY OF RIGHT SHOULDER: Primary | ICD-10-CM

## 2024-12-19 NOTE — PROGRESS NOTES
Physical Therapy Re Certification Of Plan of Care  Patient: Barry Cam   : 1950  Diagnosis/ICD-10 Code:  Status post reverse total arthroplasty of right shoulder [Z96.611]  Referring practitioner: Noah Salcedo MD  Date of Initial Visit: Type: THERAPY  Noted: 2024  Today's Date: 2024  Patient seen for 52 sessions         Visit Diagnoses:    ICD-10-CM ICD-9-CM   1. Status post reverse total arthroplasty of right shoulder  Z96.611 V43.61   2. Impaired functional mobility and activity tolerance  Z74.09 V49.89         Barry Cam reports: My shoulder is doing as well as expected. No complaints w/ ADLs. I have not tried lifting 30+ lbs. I am able to lift bag of bird seed into car and raise arm overhead to retrieve feeder. No pain.    Subjective Questionnaire: QuickDASH: DNT  Clinical Progress: improved  Home Program Compliance: No  Treatment has included: therapeutic exercise, neuromuscular re-education, manual therapy, therapeutic activity, and cryotherapy      Subjective       Objective          Active Range of Motion   Left Shoulder   Flexion: 147 degrees   Abduction: 157 degrees     Right Shoulder   Flexion: 116 degrees   Abduction: 105 degrees   External rotation BTH: C7   Internal rotation BTB: sacrum       Passive Range of Motion      Right Shoulder   Flexion: 155 degrees   Abduction: 120 degrees   External rotation 90°: 90 degrees   Internal rotation 90°: 75 degrees      Strength/Myotome Testing      Right Shoulder      Planes of Motion   Flexion: 4+   Abduction: 4+   External rotation at 0°: 4-   Internal rotation at 0°: 5      Isolated Muscles   Biceps: 5       Short Term Goals: 6 weeks. Patient will:  1. Be independent with initial HEP   2. Be instructed in posture and body mechanics.   3. Demonstrate R shoulder PROM WNL to allow for progressing of therapy exercises.      Long Term Goals: 6-12 weeks. Pt will:  1. Exhibit (R) shoulder AROM to WFL to allow for reaching overhead  and out (ABD) without pain limiting function. NOT MET  2. Demonstrate improved R UE MMT of >/= 4+/5 to allow for performance of ADL's/household management/recreational activities. MET  3. Pt able to reach overhead and lift 10# to allow for return to doing home/yard/recreational activities with min to no pain. MET  4. Report perceived disability </=10% based on QuickDASH DNT      Assessment/Plan  Lacking ~30deg of overhead AROM. Demonstrated ability to lift 5lbs overhead w/ R UE. Issued concise HEP to progress AROM overhead. Pt requesting to DC to HEP.     Recommendations: Discharge  Timeframe:  N/A  Prognosis to achieve goals: fair      Timed:         Manual Therapy:    0     mins  48390;     Therapeutic Exercise:    15     mins  87458;     Neuromuscular Elizabeth:    0    mins  97158;    Therapeutic Activity:     15     mins  65028;     Gait Trainin     mins  63522;     Ultrasound:     0     mins  10072;    Ionto                               0    mins   98906  Self Care                       0     mins   87536    Un-Timed:  Electrical Stimulation:    0     mins  36887 ( );  Dry Needling     0     mins self-pay  Traction     0     mins 19764  Re-Eval                           0    mins  65872  Canalith Repos    0     mins 31400    Timed Treatment:   30   mins   Total Treatment:     30   mins          PT: Patria Wynn PT     [place] License:  [id number]    Electronically signed by Patria Wynn PT, 24, 2:10 PM EST    Certification Period: 2024 thru 3/18/2025  I certify that the therapy services are furnished while this patient is under my care.  The services outlined above are required by this patient, and will be reviewed every 90 days.         Physician Signature:__________________________________________________    PHYSICIAN: Noah Salcedo MD  NPI: [id number]                                      DATE:  :     Please sign and return via fax to [email or web address] . Thank you, [facility] Physical  Therapy

## 2025-01-02 DIAGNOSIS — I10 HYPERTENSION, UNSPECIFIED TYPE: ICD-10-CM

## 2025-01-02 DIAGNOSIS — E11.65 TYPE 2 DIABETES MELLITUS WITH HYPERGLYCEMIA, WITHOUT LONG-TERM CURRENT USE OF INSULIN: ICD-10-CM

## 2025-01-02 DIAGNOSIS — E11.40 DIABETIC NEUROPATHY, PAINFUL: ICD-10-CM

## 2025-01-02 DIAGNOSIS — K21.9 GASTROESOPHAGEAL REFLUX DISEASE, UNSPECIFIED WHETHER ESOPHAGITIS PRESENT: ICD-10-CM

## 2025-01-03 RX ORDER — PIOGLITAZONE 45 MG/1
45 TABLET ORAL DAILY
Qty: 90 TABLET | Refills: 0 | Status: SHIPPED | OUTPATIENT
Start: 2025-01-03

## 2025-01-03 RX ORDER — GABAPENTIN 300 MG/1
600 CAPSULE ORAL 2 TIMES DAILY
Qty: 360 CAPSULE | Refills: 0 | Status: SHIPPED | OUTPATIENT
Start: 2025-01-03

## 2025-01-03 RX ORDER — LOSARTAN POTASSIUM 100 MG/1
100 TABLET ORAL DAILY
Qty: 90 TABLET | Refills: 0 | Status: SHIPPED | OUTPATIENT
Start: 2025-01-03

## 2025-01-07 DIAGNOSIS — I10 HYPERTENSION, UNSPECIFIED TYPE: ICD-10-CM

## 2025-01-07 DIAGNOSIS — Z96.652 TOTAL KNEE REPLACEMENT STATUS, LEFT: ICD-10-CM

## 2025-01-07 DIAGNOSIS — E78.2 MIXED HYPERLIPIDEMIA: Primary | ICD-10-CM

## 2025-01-07 DIAGNOSIS — E11.40 DIABETIC NEUROPATHY, PAINFUL: ICD-10-CM

## 2025-01-07 DIAGNOSIS — E11.65 TYPE 2 DIABETES MELLITUS WITH HYPERGLYCEMIA, WITHOUT LONG-TERM CURRENT USE OF INSULIN: ICD-10-CM

## 2025-01-07 DIAGNOSIS — Z86.711 HISTORY OF PULMONARY EMBOLISM: ICD-10-CM

## 2025-01-07 DIAGNOSIS — K21.9 GASTROESOPHAGEAL REFLUX DISEASE, UNSPECIFIED WHETHER ESOPHAGITIS PRESENT: ICD-10-CM

## 2025-01-07 RX ORDER — PRAVASTATIN SODIUM 40 MG
40 TABLET ORAL DAILY
Qty: 90 TABLET | Refills: 3 | Status: SHIPPED | OUTPATIENT
Start: 2025-01-07

## 2025-01-07 RX ORDER — LOSARTAN POTASSIUM 100 MG/1
100 TABLET ORAL DAILY
Qty: 90 TABLET | Refills: 0 | Status: SHIPPED | OUTPATIENT
Start: 2025-01-07

## 2025-01-07 RX ORDER — HYDROCHLOROTHIAZIDE 12.5 MG/1
12.5 TABLET ORAL DAILY
Qty: 90 TABLET | Refills: 3 | Status: SHIPPED | OUTPATIENT
Start: 2025-01-07

## 2025-01-07 RX ORDER — PIOGLITAZONE 45 MG/1
45 TABLET ORAL DAILY
Qty: 90 TABLET | Refills: 0 | Status: SHIPPED | OUTPATIENT
Start: 2025-01-07

## 2025-01-07 RX ORDER — AMLODIPINE BESYLATE 10 MG/1
10 TABLET ORAL DAILY
Qty: 90 TABLET | Refills: 3 | Status: SHIPPED | OUTPATIENT
Start: 2025-01-07

## 2025-01-07 RX ORDER — GLIMEPIRIDE 4 MG/1
4 TABLET ORAL
Qty: 90 TABLET | Refills: 3 | Status: SHIPPED | OUTPATIENT
Start: 2025-01-07

## 2025-01-07 RX ORDER — FERROUS SULFATE 325(65) MG
325 TABLET ORAL
Qty: 90 TABLET | Refills: 0 | Status: SHIPPED | OUTPATIENT
Start: 2025-01-07

## 2025-01-08 RX ORDER — GABAPENTIN 300 MG/1
600 CAPSULE ORAL 2 TIMES DAILY
Qty: 360 CAPSULE | Refills: 0 | Status: SHIPPED | OUTPATIENT
Start: 2025-01-08

## 2025-01-08 RX ORDER — ALBUTEROL SULFATE 90 UG/1
2 INHALANT RESPIRATORY (INHALATION) EVERY 4 HOURS PRN
Qty: 18 G | Refills: 5 | Status: SHIPPED | OUTPATIENT
Start: 2025-01-08

## 2025-01-08 RX ORDER — LANCETS
EACH MISCELLANEOUS
Qty: 100 EACH | Refills: 3 | Status: SHIPPED | OUTPATIENT
Start: 2025-01-08

## 2025-01-15 DIAGNOSIS — D50.9 IRON DEFICIENCY ANEMIA, UNSPECIFIED IRON DEFICIENCY ANEMIA TYPE: ICD-10-CM

## 2025-01-15 DIAGNOSIS — E11.65 TYPE 2 DIABETES MELLITUS WITH HYPERGLYCEMIA, WITHOUT LONG-TERM CURRENT USE OF INSULIN: Primary | ICD-10-CM

## 2025-01-15 DIAGNOSIS — I10 HYPERTENSION, UNSPECIFIED TYPE: ICD-10-CM

## 2025-01-15 DIAGNOSIS — E78.5 HYPERLIPIDEMIA, UNSPECIFIED HYPERLIPIDEMIA TYPE: ICD-10-CM

## 2025-01-16 LAB
ALBUMIN SERPL-MCNC: 4.4 G/DL (ref 3.5–5.2)
ALBUMIN/GLOB SERPL: 1.7 G/DL
ALP SERPL-CCNC: 74 U/L (ref 39–117)
ALT SERPL-CCNC: 15 U/L (ref 1–41)
APPEARANCE UR: CLEAR
AST SERPL-CCNC: 16 U/L (ref 1–40)
BACTERIA #/AREA URNS HPF: NORMAL /HPF
BASOPHILS # BLD AUTO: 0.05 10*3/MM3 (ref 0–0.2)
BASOPHILS NFR BLD AUTO: 0.6 % (ref 0–1.5)
BILIRUB SERPL-MCNC: 0.3 MG/DL (ref 0–1.2)
BILIRUB UR QL STRIP: NEGATIVE
BUN SERPL-MCNC: 14 MG/DL (ref 8–23)
BUN/CREAT SERPL: 11.7 (ref 7–25)
CALCIUM SERPL-MCNC: 9.5 MG/DL (ref 8.6–10.5)
CASTS URNS MICRO: NORMAL
CHLORIDE SERPL-SCNC: 109 MMOL/L (ref 98–107)
CHOLEST SERPL-MCNC: 211 MG/DL (ref 0–200)
CO2 SERPL-SCNC: 23.1 MMOL/L (ref 22–29)
COLOR UR: YELLOW
CREAT SERPL-MCNC: 1.2 MG/DL (ref 0.76–1.27)
EGFRCR SERPLBLD CKD-EPI 2021: 64.3 ML/MIN/1.73
EOSINOPHIL # BLD AUTO: 0.3 10*3/MM3 (ref 0–0.4)
EOSINOPHIL NFR BLD AUTO: 3.4 % (ref 0.3–6.2)
EPI CELLS #/AREA URNS HPF: NORMAL /HPF
ERYTHROCYTE [DISTWIDTH] IN BLOOD BY AUTOMATED COUNT: 13.1 % (ref 12.3–15.4)
GLOBULIN SER CALC-MCNC: 2.6 GM/DL
GLUCOSE SERPL-MCNC: 129 MG/DL (ref 65–99)
GLUCOSE UR QL STRIP: NEGATIVE
HBA1C MFR BLD: 6.2 % (ref 4.8–5.6)
HCT VFR BLD AUTO: 36.9 % (ref 37.5–51)
HDLC SERPL-MCNC: 47 MG/DL (ref 40–60)
HGB BLD-MCNC: 12.6 G/DL (ref 13–17.7)
HGB UR QL STRIP: NEGATIVE
IMM GRANULOCYTES # BLD AUTO: 0.03 10*3/MM3 (ref 0–0.05)
IMM GRANULOCYTES NFR BLD AUTO: 0.3 % (ref 0–0.5)
IRON SATN MFR SERPL: 21 % (ref 20–50)
IRON SERPL-MCNC: 81 MCG/DL (ref 59–158)
KETONES UR QL STRIP: NEGATIVE
LDLC SERPL CALC-MCNC: 107 MG/DL (ref 0–100)
LDLC/HDLC SERPL: 2.07 {RATIO}
LEUKOCYTE ESTERASE UR QL STRIP: NEGATIVE
LYMPHOCYTES # BLD AUTO: 1.8 10*3/MM3 (ref 0.7–3.1)
LYMPHOCYTES NFR BLD AUTO: 20.7 % (ref 19.6–45.3)
MCH RBC QN AUTO: 32.1 PG (ref 26.6–33)
MCHC RBC AUTO-ENTMCNC: 34.1 G/DL (ref 31.5–35.7)
MCV RBC AUTO: 93.9 FL (ref 79–97)
MONOCYTES # BLD AUTO: 0.55 10*3/MM3 (ref 0.1–0.9)
MONOCYTES NFR BLD AUTO: 6.3 % (ref 5–12)
NEUTROPHILS # BLD AUTO: 5.98 10*3/MM3 (ref 1.7–7)
NEUTROPHILS NFR BLD AUTO: 68.7 % (ref 42.7–76)
NITRITE UR QL STRIP: NEGATIVE
NRBC BLD AUTO-RTO: 0 /100 WBC (ref 0–0.2)
PH UR STRIP: 5.5 [PH] (ref 5–8)
PLATELET # BLD AUTO: 200 10*3/MM3 (ref 140–450)
POTASSIUM SERPL-SCNC: 3.6 MMOL/L (ref 3.5–5.2)
PROT SERPL-MCNC: 7 G/DL (ref 6–8.5)
PROT UR QL STRIP: NEGATIVE
RBC # BLD AUTO: 3.93 10*6/MM3 (ref 4.14–5.8)
RBC #/AREA URNS HPF: NORMAL /HPF
SODIUM SERPL-SCNC: 147 MMOL/L (ref 136–145)
SP GR UR STRIP: 1.02 (ref 1–1.03)
TIBC SERPL-MCNC: 380 MCG/DL
TRIGL SERPL-MCNC: 334 MG/DL (ref 0–150)
UIBC SERPL-MCNC: 299 MCG/DL (ref 112–346)
UROBILINOGEN UR STRIP-MCNC: NORMAL MG/DL
VLDLC SERPL CALC-MCNC: 57 MG/DL (ref 5–40)
WBC # BLD AUTO: 8.71 10*3/MM3 (ref 3.4–10.8)
WBC #/AREA URNS HPF: NORMAL /HPF

## 2025-01-22 ENCOUNTER — OFFICE VISIT (OUTPATIENT)
Dept: FAMILY MEDICINE CLINIC | Facility: CLINIC | Age: 75
End: 2025-01-22
Payer: MEDICARE

## 2025-01-22 VITALS
SYSTOLIC BLOOD PRESSURE: 148 MMHG | HEART RATE: 74 BPM | OXYGEN SATURATION: 95 % | DIASTOLIC BLOOD PRESSURE: 72 MMHG | HEIGHT: 73 IN | RESPIRATION RATE: 20 BRPM | WEIGHT: 315 LBS | TEMPERATURE: 97.6 F | BODY MASS INDEX: 41.75 KG/M2

## 2025-01-22 DIAGNOSIS — Z00.00 MEDICARE ANNUAL WELLNESS VISIT, SUBSEQUENT: Primary | ICD-10-CM

## 2025-01-22 DIAGNOSIS — E78.2 MIXED HYPERLIPIDEMIA: ICD-10-CM

## 2025-01-22 DIAGNOSIS — E11.65 TYPE 2 DIABETES MELLITUS WITH HYPERGLYCEMIA, WITHOUT LONG-TERM CURRENT USE OF INSULIN: ICD-10-CM

## 2025-01-22 DIAGNOSIS — K21.9 GASTROESOPHAGEAL REFLUX DISEASE, UNSPECIFIED WHETHER ESOPHAGITIS PRESENT: ICD-10-CM

## 2025-01-22 DIAGNOSIS — D50.9 IRON DEFICIENCY ANEMIA, UNSPECIFIED IRON DEFICIENCY ANEMIA TYPE: ICD-10-CM

## 2025-01-22 DIAGNOSIS — Z91.81 AT HIGH RISK FOR FALLS: ICD-10-CM

## 2025-01-22 DIAGNOSIS — I10 HYPERTENSION, UNSPECIFIED TYPE: ICD-10-CM

## 2025-01-22 PROCEDURE — 99214 OFFICE O/P EST MOD 30 MIN: CPT | Performed by: INTERNAL MEDICINE

## 2025-01-22 PROCEDURE — 3077F SYST BP >= 140 MM HG: CPT | Performed by: INTERNAL MEDICINE

## 2025-01-22 PROCEDURE — 3044F HG A1C LEVEL LT 7.0%: CPT | Performed by: INTERNAL MEDICINE

## 2025-01-22 PROCEDURE — 1126F AMNT PAIN NOTED NONE PRSNT: CPT | Performed by: INTERNAL MEDICINE

## 2025-01-22 PROCEDURE — G2211 COMPLEX E/M VISIT ADD ON: HCPCS | Performed by: INTERNAL MEDICINE

## 2025-01-22 PROCEDURE — G0439 PPPS, SUBSEQ VISIT: HCPCS | Performed by: INTERNAL MEDICINE

## 2025-01-22 PROCEDURE — 3078F DIAST BP <80 MM HG: CPT | Performed by: INTERNAL MEDICINE

## 2025-01-22 RX ORDER — LANCETS
EACH MISCELLANEOUS
Qty: 100 EACH | Refills: 3 | Status: SHIPPED | OUTPATIENT
Start: 2025-01-22

## 2025-01-22 RX ORDER — ROSUVASTATIN CALCIUM 40 MG/1
40 TABLET, COATED ORAL DAILY
Qty: 90 TABLET | Refills: 3 | Status: SHIPPED | OUTPATIENT
Start: 2025-01-22

## 2025-01-22 NOTE — ASSESSMENT & PLAN NOTE
Hypertension is stable and controlled  Continue current treatment regimen.  Dietary sodium restriction.  Weight loss.  Regular aerobic exercise.  Ambulatory blood pressure monitoring.  Blood pressure will be reassessed in 6 months.

## 2025-01-22 NOTE — PROGRESS NOTES
Subjective   The ABCs of the Annual Wellness Visit  Medicare Wellness Visit      Barry Cam is a 74 y.o. patient who presents for a Medicare Wellness Visit.    The following portions of the patient's history were reviewed and   updated as appropriate: allergies, current medications, past family history, past medical history, past social history, past surgical history, and problem list.    Compared to one year ago, the patient's physical   health is the same.  Compared to one year ago, the patient's mental   health is the same.    Recent Hospitalizations:  He was not admitted to the hospital during the last year.     Current Medical Providers:  Patient Care Team:  Oralia Boggs MD as PCP - General (Internal Medicine)  Reese Velasco MD as Consulting Physician (Pulmonary Disease)    Outpatient Medications Prior to Visit   Medication Sig Dispense Refill    acetaminophen (TYLENOL) 325 MG tablet Take 2 tablets by mouth 2 (Two) Times a Day As Needed for Mild Pain. 60 tablet 0    albuterol sulfate  (90 Base) MCG/ACT inhaler Inhale 2 puffs Every 4 (Four) Hours As Needed for Wheezing or Shortness of Air. for wheezing 18 g 5    amLODIPine (NORVASC) 10 MG tablet Take 1 tablet by mouth Daily. 90 tablet 3    apixaban (Eliquis) 5 MG tablet tablet Take 1 tablet by mouth Every 12 (Twelve) Hours. 180 tablet 1    Ascorbic Acid (VITAMIN C PO) Take  by mouth.      Blood Glucose Monitoring Suppl (Accu-Chek Guide) w/Device kit Use 1 applicator Daily. 1 kit 0    caffeine 200 MG tablet Take 1 tablet by mouth Every 4 (Four) Hours As Needed for Headache. HOLDING FOR DOS      ferrous sulfate 325 (65 FE) MG tablet Take 1 tablet by mouth Daily With Breakfast. 90 tablet 0    gabapentin (NEURONTIN) 300 MG capsule Take 2 capsules by mouth 2 (Two) Times a Day. 360 capsule 0    glimepiride (AMARYL) 4 MG tablet Take 1 tablet by mouth Every Morning Before Breakfast. 90 tablet 3    Glucosamine-Chondroit-Vit C-Mn (GLUCOSAMINE  CHONDR 500 COMPLEX) capsule Take 2 capsules by mouth Daily. HOLDING FOR DOS      hydroCHLOROthiazide 12.5 MG tablet Take 1 tablet by mouth Daily. 90 tablet 3    Lancets Misc. (Accu-Chek Softclix Lancet Dev) kit PT CHECKS BS TID DX E11.9 1 kit 0    loratadine (CLARITIN) 10 MG tablet Take 1 tablet by mouth As Needed.      losartan (COZAAR) 100 MG tablet Take 1 tablet by mouth Daily. 90 tablet 0    Melatonin 10 MG tablet Take  by mouth Daily As Needed.      metFORMIN (GLUCOPHAGE) 1000 MG tablet Take 1 tablet by mouth 2 (Two) Times a Day. 180 tablet 3    modafinil (PROVIGIL) 200 MG tablet Take 1 or 2 tablets daily for excessive daytime sleepiness. 60 tablet 3    MULTIPLE VITAMINS ESSENTIAL PO Take 1 tablet by mouth Daily. HOLDING FOR DOS/CENTRUM SILVER      multivitamin with minerals (PRESERVISION AREDS PO) Take 1 tablet by mouth 2 (Two) Times a Day. HOLDING  FOR DOS      omeprazole (priLOSEC) 20 MG capsule Take 1 capsule by mouth Daily. 90 capsule 0    pioglitazone (ACTOS) 45 MG tablet Take 1 tablet by mouth Daily. 90 tablet 0    pravastatin (PRAVACHOL) 40 MG tablet Take 1 tablet by mouth Daily. 90 tablet 3    SITagliptin (Januvia) 100 MG tablet Take 1 tablet by mouth Daily. 90 tablet 3    Triamcinolone Acetonide (NASACORT) 55 MCG/ACT nasal inhaler Administer 2 sprays into the nostril(s) as directed by provider Daily As Needed.      Accu-Chek Softclix Lancets lancets PT CHECK BS TID DX E11.9 100 each 3    cephalexin (KEFLEX) 500 MG capsule TAKE ALL FOUR CAPSULES BY MOUTH ONE HOUR PRIOR TO DENTAL APPT 4 capsule 0    cephalexin (KEFLEX) 500 MG capsule 4 pills one hour prior to procedure 4 capsule 2    glucose blood (Accu-Chek Guide) test strip Check blood glucose twice a day dx E11.9 100 each 3     No facility-administered medications prior to visit.     No opioid medication identified on active medication list. I have reviewed chart for other potential  high risk medication/s and harmful drug interactions in the  "elderly.      Aspirin is not on active medication list.  Aspirin use is not indicated based on review of current medical condition/s. Risk of harm outweighs potential benefits.  .    Patient Active Problem List   Diagnosis    Seasonal allergic rhinitis    Type 2 diabetes mellitus with hyperglycemia, without long-term current use of insulin    DUGGAN (dyspnea on exertion)    Hypersomnia due to medical condition    Fatigue    Hyperlipidemia    Obstructive sleep apnea syndrome treated with auto CPAP    Gastroesophageal reflux disease    Calculus of kidney    Diabetic neuropathy, painful    Acute deep vein thrombosis (DVT) of distal vein of right lower extremity    History of pulmonary embolism    Chronic deep vein thrombosis (DVT) of distal vein of right lower extremity    Class 3 severe obesity due to excess calories with serious comorbidity and body mass index (BMI) of 40.0 to 44.9 in adult    Thyroid disease    Hypertension    Vertigo    Arthritis of knee    Total knee replacement status, left    Proximal humerus fracture    Fracture of head of right humerus    Surgery, elective    Lumbar radiculopathy    Iron deficiency anemia     Advance Care Planning Advance Directive is on file.  ACP discussion was held with the patient during this visit. Patient has an advance directive in EMR which is still valid.             Objective   Vitals:    01/22/25 0855   BP: 148/72   BP Location: Right arm   Patient Position: Sitting   Cuff Size: Adult   Pulse: 74   Resp: 20   Temp: 97.6 °F (36.4 °C)   TempSrc: Temporal   SpO2: 95%   Weight: (!) 151 kg (332 lb)   Height: 185.4 cm (72.99\")       Estimated body mass index is 43.81 kg/m² as calculated from the following:    Height as of this encounter: 185.4 cm (72.99\").    Weight as of this encounter: 151 kg (332 lb).                Does the patient have evidence of cognitive impairment? No  Lab Results   Component Value Date    CHLPL 211 (H) 01/15/2025    TRIG 334 (H) 01/15/2025    HDL " 47 01/15/2025     (H) 01/15/2025    VLDL 57 (H) 01/15/2025    HGBA1C 6.20 (H) 01/15/2025                                                                                                Health  Risk Assessment    Smoking Status:  Social History     Tobacco Use   Smoking Status Never    Passive exposure: Never   Smokeless Tobacco Never   Tobacco Comments    caffeine use     Alcohol Consumption:  Social History     Substance and Sexual Activity   Alcohol Use Not Currently       Fall Risk Screen  ARMENADI Fall Risk Assessment was completed, and patient is at HIGH risk for falls. Assessment completed on:2025    Depression Screening   Little interest or pleasure in doing things? Not at all   Feeling down, depressed, or hopeless? Not at all   PHQ-2 Total Score 0      Health Habits and Functional and Cognitive Screenin/20/2025     2:59 PM   Functional & Cognitive Status   Do you have difficulty preparing food and eating? No    Do you have difficulty bathing yourself, getting dressed or grooming yourself? No    Do you have difficulty using the toilet? No    Do you have difficulty moving around from place to place? No    Do you have trouble with steps or getting out of a bed or a chair? Yes    Current Diet Other    Dental Exam Up to date    Eye Exam Up to date    Exercise (times per week) Other    Current Exercises Include Gardening;House Cleaning;Other;Yard Work    Do you need help using the phone?  No    Are you deaf or do you have serious difficulty hearing?  No    Do you need help to go to places out of walking distance? Yes    Do you need help shopping? No    Do you need help preparing meals?  No    Do you need help with housework?  No    Do you need help with laundry? No    Do you need help taking your medications? No    Do you need help managing money? No    Do you ever drive or ride in a car without wearing a seat belt? No    Have you felt unusual stress, anger or loneliness in the last month? No     Who do you live with? Spouse    If you need help, do you have trouble finding someone available to you? No    Have you been bothered in the last four weeks by sexual problems? No    Do you have difficulty concentrating, remembering or making decisions? No        Patient-reported           Age-appropriate Screening Schedule:  Refer to the list below for future screening recommendations based on patient's age, sex and/or medical conditions. Orders for these recommended tests are listed in the plan section. The patient has been provided with a written plan.    Health Maintenance List  Health Maintenance   Topic Date Due    DIABETIC FOOT EXAM  07/28/2023    ANNUAL WELLNESS VISIT  05/17/2024    HEMOGLOBIN A1C  07/15/2025    BMI FOLLOWUP  09/04/2025    DIABETIC EYE EXAM  11/25/2025    LIPID PANEL  01/15/2026    TDAP/TD VACCINES (2 - Td or Tdap) 10/01/2027    COLORECTAL CANCER SCREENING  10/10/2027    COVID-19 Vaccine  Completed    INFLUENZA VACCINE  Completed    Pneumococcal Vaccine 65+  Completed    HEPATITIS C SCREENING  Addressed    ZOSTER VACCINE  Addressed    URINE MICROALBUMIN  Discontinued                                                                                                                                                CMS Preventative Services Quick Reference  Risk Factors Identified During Encounter  Fall Risk-High or Moderate: Discussed Fall Prevention in the home, Information on Fall Prevention Shared in After Visit Summary, and Sit to Stand Exercise Information Shared in After Visit Summary  Hearing Problem:  Stable, wears hearing aids    The above risks/problems have been discussed with the patient.  Pertinent information has been shared with the patient in the After Visit Summary.  An After Visit Summary and PPPS were made available to the patient.    Follow Up:   Next Medicare Wellness visit to be scheduled in 1 year.         Additional E&M Note during same encounter follows:  Patient has  "additional, significant, and separately identifiable condition(s)/problem(s) that require work above and beyond the Medicare Wellness Visit     Chief Complaint  Medicare Wellness-subsequent    Subjective   HPI  Mann is also being seen today for HTN, T2DM, HLD, GERD, SHELBY.  He reports to be doing well overall, right shoulder is doing fine since surgery.  Has completed PT sessions.  Reports intermittent low back pain since after completion of PT and occasional SOB and dizziness with aerobic exercises.                Objective   Vital Signs:  /72 (BP Location: Right arm, Patient Position: Sitting, Cuff Size: Adult)   Pulse 74   Temp 97.6 °F (36.4 °C) (Temporal)   Resp 20   Ht 185.4 cm (72.99\")   Wt (!) 151 kg (332 lb)   SpO2 95%   BMI 43.81 kg/m²   Physical Exam  Constitutional:       Appearance: Normal appearance.   HENT:      Head: Normocephalic and atraumatic.   Cardiovascular:      Rate and Rhythm: Normal rate and regular rhythm.      Pulses: Normal pulses.      Heart sounds: Normal heart sounds.   Pulmonary:      Effort: Pulmonary effort is normal. No respiratory distress.      Breath sounds: Normal breath sounds. No wheezing.   Abdominal:      General: Bowel sounds are normal.      Palpations: Abdomen is soft.      Tenderness: There is no abdominal tenderness.   Neurological:      Mental Status: He is alert and oriented to person, place, and time.   Psychiatric:         Mood and Affect: Mood normal.         The following data was reviewed by: Oralia Boggs MD on 01/22/2025:    Common labs          6/20/2024    08:27 6/26/2024    08:55 1/15/2025    08:38   Common Labs   Glucose 149   129    BUN 15   14    Creatinine 1.01   1.20    Sodium 141   147    Potassium 3.7   3.6    Chloride 102   109    Calcium 9.5   9.5    Total Protein 6.7   7.0    Albumin 4.2   4.4    Total Bilirubin 0.5   0.3    Alkaline Phosphatase 78   74    AST (SGOT) 16   16    ALT (SGPT) 10   15    WBC 7.3   8.71    Hemoglobin 11.0   " 12.6    Hematocrit 34.0   36.9    Platelets 216   200    Total Cholesterol 163   211    Triglycerides 243   334    HDL Cholesterol 48   47    LDL Cholesterol  75   107    Hemoglobin A1C 6.7   6.20    PSA  0.685       TSH          6/20/2024    08:27   TSH   TSH 2.470              Assessment and Plan            Medicare annual wellness visit, subsequent  Discussed the importance of maintaining a healthy weight and getting regular exercise.  Educated patient on the benefits of healthy diet.  Advise follow-up annually for wellness exams.       At high risk for falls  At home fall prevention instructions given       Type 2 diabetes mellitus with hyperglycemia, without long-term current use of insulin  Diabetes is improving with treatment.   Continue current treatment regimen.  Recommended an ADA diet.  Regular aerobic exercise.  Up-to-date with annual eye and foot exam  Diabetes will be reassessed in 6 months    Orders:    Accu-Chek Softclix Lancets lancets; PT CHECK BS bid  DX E11.9    glucose blood (Accu-Chek Guide) test strip; Check blood glucose twice a day dx E11.9    Mixed hyperlipidemia   Lipid abnormalities are worsening    Plan:  Discontinue the following medication/s; pravastatin 40 mg.   and Begin taking the following medication/s; Crestor 40 mg daily to achieve target goal of LDL cholesterol <70.    Counseled patient on lifestyle modifications to help control hyperlipidemia.   Advised patient to exercise for 150 minutes weekly. (30 minute brisk walk, 5 days a week for example)  Weight Loss encouraged    Patient Treatment Goals:   LDL goal less than 70    Followup in 6 months.    Orders:    rosuvastatin (Crestor) 40 MG tablet; Take 1 tablet by mouth Daily.    Hypertension, unspecified type  Hypertension is stable and controlled  Continue current treatment regimen.  Dietary sodium restriction.  Weight loss.  Regular aerobic exercise.  Ambulatory blood pressure monitoring.  Blood pressure will be reassessed in 6  months.         Iron deficiency anemia, unspecified iron deficiency anemia type  Iron panel reviewed indicative of improvement in iron level and saturation  CBC reviewed indicating improvement in hemoglobin and MCV  Continue on iron supplement ferrous sulfate daily as prescribed       Gastroesophageal reflux disease, unspecified whether esophagitis present  Clinically stable and controlled on medication  To continue on PPI               Follow Up   No follow-ups on file.  Patient was given instructions and counseling regarding his condition or for health maintenance advice. Please see specific information pulled into the AVS if appropriate.

## 2025-01-22 NOTE — PATIENT INSTRUCTIONS
Fall Prevention in the Home, Adult  Falls can cause injuries and affect people of all ages. There are many simple things that you can do to make your home safe and to help prevent falls.  If you need it, ask for help making these changes.  What actions can I take to prevent falls?  General information  Use good lighting in all rooms. Make sure to:  Replace any light bulbs that burn out.  Turn on lights if it is dark and use night-lights.  Keep items that you use often in easy-to-reach places. Lower the shelves around your home if needed.  Move furniture so that there are clear paths around it.  Do not keep throw rugs or other things on the floor that can make you trip.  If any of your floors are uneven, fix them.  Add color or contrast paint or tape to clearly charity and help you see:  Grab bars or handrails.  First and last steps of staircases.  Where the edge of each step is.  If you use a ladder or stepladder:  Make sure that it is fully opened. Do not climb a closed ladder.  Make sure the sides of the ladder are locked in place.  Have someone hold the ladder while you use it.  Know where your pets are as you move through your home.  What can I do in the bathroom?         Keep the floor dry. Clean up any water that is on the floor right away.  Remove soap buildup in the bathtub or shower. Buildup makes bathtubs and showers slippery.  Use non-skid mats or decals on the floor of the bathtub or shower.  Attach bath mats securely with double-sided, non-slip rug tape.  If you need to sit down while you are in the shower, use a non-slip stool.  Install grab bars by the toilet and in the bathtub and shower. Do not use towel bars as grab bars.  What can I do in the bedroom?  Make sure that you have a light by your bed that is easy to reach.  Do not use any sheets or blankets on your bed that hang to the floor.  Have a firm bench or chair with side arms that you can use for support when you get dressed.  What can I do in  the kitchen?  Clean up any spills right away.  If you need to reach something above you, use a sturdy step stool that has a grab bar.  Keep electrical cables out of the way.  Do not use floor polish or wax that makes floors slippery.  What can I do with my stairs?  Do not leave anything on the stairs.  Make sure that you have a light switch at the top and the bottom of the stairs. Have them installed if you do not have them.  Make sure that there are handrails on both sides of the stairs. Fix handrails that are broken or loose. Make sure that handrails are as long as the staircases.  Install non-slip stair treads on all stairs in your home if they do not have carpet.  Avoid having throw rugs at the top or bottom of stairs, or secure the rugs with carpet tape to prevent them from moving.  Choose a carpet design that does not hide the edge of steps on the stairs. Make sure that carpet is firmly attached to the stairs. Fix any carpet that is loose or worn.  What can I do on the outside of my home?  Use bright outdoor lighting.  Repair the edges of walkways and driveways and fix any cracks. Clear paths of anything that can make you trip, such as tools or rocks.  Add color or contrast paint or tape to clearly charity and help you see high doorway thresholds.  Trim any bushes or trees on the main path into your home.  Check that handrails are securely fastened and in good repair. Both sides of all steps should have handrails.  Install guardrails along the edges of any raised decks or porches.  Have leaves, snow, and ice cleared regularly. Use sand, salt, or ice melt on walkways during winter months if you live where there is ice and snow.  In the garage, clean up any spills right away, including grease or oil spills.  What other actions can I take?  Review your medicines with your health care provider. Some medicines can make you confused or feel dizzy. This can increase your chance of falling.  Wear closed-toe shoes that  fit well and support your feet. Wear shoes that have rubber soles and low heels.  Use a cane, walker, scooter, or crutches that help you move around if needed.  Talk with your provider about other ways that you can decrease your risk of falls. This may include seeing a physical therapist to learn to do exercises to improve movement and strength.  Where to find more information  Centers for Disease Control and Prevention, [place]: cdc.gov  National [place] on Aging: cr.nih.gov  National [place] on Aging: cr.nih.gov  Contact a health care provider if:  You are afraid of falling at home.  You feel weak, drowsy, or dizzy at home.  You fall at home.  Get help right away if you:  Lose consciousness or have trouble moving after a fall.  Have a fall that causes a head injury.  These symptoms may be an emergency. Get help right away. Call 911.  Do not wait to see if the symptoms will go away.  Do not drive yourself to the hospital.  This information is not intended to replace advice given to you by your health care provider. Make sure you discuss any questions you have with your health care provider.  Document Revised: 08/21/2023 Document Reviewed: 08/21/2023  [employer] Patient Education © 2024 [employer]  Sit-to-Stand Exercise    The sit-to-stand exercise (also known as the chair stand or chair rise exercise) strengthens your lower body and helps you maintain or improve your mobility and independence. The end goal is to do the sit-to-stand exercise without using your hands. This will be easier as you become stronger. You should always talk with your health care provider before starting any exercise program, especially if you have had recent surgery.  Do the exercise exactly as told by your health care provider and adjust it as directed. It is normal to feel mild stretching, pulling, tightness, or discomfort as you do this exercise, but you should stop right away if you feel sudden pain or your pain gets worse. Do not  begin doing this exercise until told by your health care provider.  What the sit-to-stand exercise does  The sit-to-stand exercise helps to strengthen the muscles in your thighs and the muscles in the center of your body that give you stability (core muscles). This exercise is especially helpful if:  You have had knee or hip surgery.  You have trouble getting up from a chair, out of a car, or off the toilet due to muscle weakness.  How to do the sit-to-stand exercise  Sit toward the front edge of a sturdy chair without armrests. Your knees should be bent and your feet should be flat on the floor and shoulder-width apart and underneath your hips.  Place your hands lightly on each side of the seat. Keep your back and neck as straight as possible, with your chest slightly forward.  Breathe in slowly. Lean forward and slightly shift your weight to the front of your feet.  Breathe out as you slowly stand up. Try not to support any weight with your hands.  Stand and pause for a full breath in and out.  Breathe in as you sit down slowly. Tighten your core and abdominal muscles to control your lowering as much as possible. You should lower yourself back to the chair slowly, not just drop back into the seat.  Breathe out slowly.  Do this exercise 10-15 times. If needed, do it fewer times until you build up strength.  Rest for 1 minute, then do another set of 10-15 repetitions.  To change the difficulty of the sit-to-stand exercise  If the exercise is too difficult, use a chair with sturdy armrests, and push off the armrests to help you come to the standing position. You can also use the armrests to help slowly lower yourself back to sitting. As this gets easier, try to use your arms less. You can also place a firm cushion or pillow on the chair to make the surface higher.  If this exercise is too easy, do not use your arms to help raise or lower yourself. You can also wear a weighted vest, use hand weights, increase your  repetitions, or try a lower chair.  General tips  You may feel tired when starting an exercise routine. This is normal.  You may have muscle soreness that lasts a few days. This is normal. As you get stronger, you may not feel muscle soreness.  Use smooth, steady movements.  Do not  hold your breath during strength exercises. This can cause unsafe changes in your blood pressure.  Breathe in slowly through your nose, and breathe out slowly through your mouth.  Summary  Strengthening your lower body is an important step to help you move safely and independently.  The sit-to-stand exercise helps strengthen the muscles in your thighs and core.  You should always talk with your health care provider before starting any exercise program, especially if you have had recent surgery.  This information is not intended to replace advice given to you by your health care provider. Make sure you discuss any questions you have with your health care provider.  Document Revised: 04/10/2022 Document Reviewed: 04/10/2022  [employer] Patient Education ©  [employer]    Medicare Wellness  Personal Prevention Plan of Service     Date of Office Visit:    Encounter Provider:  Oralia Boggs MD  Place of Service:  [facility]  Patient Name: Barry Cam  :  1950    As part of the Medicare Wellness portion of your visit today, we are providing you with this personalized preventive plan of services (PPPS). This plan is based upon recommendations of the [place] Preventive Services Task Force (USPSTF) and the Advisory Committee on Immunization Practices (ACIP).    This lists the preventive care services that should be considered, and provides dates of when you are due. Items listed as completed are up-to-date and do not require any further intervention.    Health Maintenance   Topic Date Due   • DIABETIC FOOT EXAM  2023   • ANNUAL WELLNESS VISIT  2024   • HEMOGLOBIN A1C   07/15/2025   • BMI FOLLOWUP  09/04/2025   • DIABETIC EYE EXAM  11/25/2025   • LIPID PANEL  01/15/2026   • TDAP/TD VACCINES (2 - Td or Tdap) 10/01/2027   • COLORECTAL CANCER SCREENING  10/10/2027   • COVID-19 Vaccine  Completed   • INFLUENZA VACCINE  Completed   • Pneumococcal Vaccine 65+  Completed   • HEPATITIS C SCREENING  Addressed   • ZOSTER VACCINE  Addressed   • URINE MICROALBUMIN  Discontinued       No orders of the defined types were placed in this encounter.      No follow-ups on file.

## 2025-01-22 NOTE — ASSESSMENT & PLAN NOTE
Lipid abnormalities are worsening    Plan:  Discontinue the following medication/s; pravastatin 40 mg.   and Begin taking the following medication/s; Crestor 40 mg daily to achieve target goal of LDL cholesterol <70.    Counseled patient on lifestyle modifications to help control hyperlipidemia.   Advised patient to exercise for 150 minutes weekly. (30 minute brisk walk, 5 days a week for example)  Weight Loss encouraged    Patient Treatment Goals:   LDL goal less than 70    Followup in 6 months.    Orders:    rosuvastatin (Crestor) 40 MG tablet; Take 1 tablet by mouth Daily.

## 2025-01-22 NOTE — ASSESSMENT & PLAN NOTE
Iron panel reviewed indicative of improvement in iron level and saturation  CBC reviewed indicating improvement in hemoglobin and MCV  Continue on iron supplement ferrous sulfate daily as prescribed

## 2025-01-22 NOTE — ASSESSMENT & PLAN NOTE
Diabetes is improving with treatment.   Continue current treatment regimen.  Recommended an ADA diet.  Regular aerobic exercise.  Up-to-date with annual eye and foot exam  Diabetes will be reassessed in 6 months    Orders:    Accu-Chek Softclix Lancets lancets; PT CHECK BS bid  DX E11.9    glucose blood (Accu-Chek Guide) test strip; Check blood glucose twice a day dx E11.9

## 2025-01-31 DIAGNOSIS — E11.65 TYPE 2 DIABETES MELLITUS WITH HYPERGLYCEMIA, WITHOUT LONG-TERM CURRENT USE OF INSULIN: Primary | ICD-10-CM

## 2025-01-31 DIAGNOSIS — I10 HYPERTENSION, UNSPECIFIED TYPE: ICD-10-CM

## 2025-01-31 RX ORDER — AMLODIPINE BESYLATE 10 MG/1
10 TABLET ORAL DAILY
Qty: 90 TABLET | Refills: 3 | Status: SHIPPED | OUTPATIENT
Start: 2025-01-31

## 2025-01-31 RX ORDER — GLIMEPIRIDE 4 MG/1
4 TABLET ORAL
Qty: 90 TABLET | Refills: 3 | Status: SHIPPED | OUTPATIENT
Start: 2025-01-31

## 2025-02-04 DIAGNOSIS — I10 HYPERTENSION, UNSPECIFIED TYPE: Primary | ICD-10-CM

## 2025-02-04 DIAGNOSIS — E78.2 MIXED HYPERLIPIDEMIA: ICD-10-CM

## 2025-02-04 RX ORDER — HYDROCHLOROTHIAZIDE 12.5 MG/1
12.5 TABLET ORAL DAILY
Qty: 90 TABLET | Refills: 3 | Status: SHIPPED | OUTPATIENT
Start: 2025-02-04

## 2025-02-04 RX ORDER — ROSUVASTATIN CALCIUM 40 MG/1
40 TABLET, COATED ORAL DAILY
Qty: 90 TABLET | Refills: 3 | Status: SHIPPED | OUTPATIENT
Start: 2025-02-04

## 2025-02-17 ENCOUNTER — DOCUMENTATION (OUTPATIENT)
Dept: FAMILY MEDICINE CLINIC | Facility: CLINIC | Age: 75
End: 2025-02-17
Payer: MEDICARE

## 2025-03-03 ENCOUNTER — OFFICE VISIT (OUTPATIENT)
Dept: ORTHOPEDIC SURGERY | Facility: CLINIC | Age: 75
End: 2025-03-03
Payer: MEDICARE

## 2025-03-03 VITALS — TEMPERATURE: 98.6 F | WEIGHT: 315 LBS | HEIGHT: 73 IN | BODY MASS INDEX: 41.75 KG/M2

## 2025-03-03 DIAGNOSIS — G56.22 CUBITAL TUNNEL SYNDROME, LEFT: ICD-10-CM

## 2025-03-03 DIAGNOSIS — Z96.611 S/P REVERSE TOTAL SHOULDER ARTHROPLASTY, RIGHT: Primary | ICD-10-CM

## 2025-03-03 PROCEDURE — 99214 OFFICE O/P EST MOD 30 MIN: CPT | Performed by: ORTHOPAEDIC SURGERY

## 2025-03-03 PROCEDURE — 73030 X-RAY EXAM OF SHOULDER: CPT | Performed by: ORTHOPAEDIC SURGERY

## 2025-03-03 PROCEDURE — 1160F RVW MEDS BY RX/DR IN RCRD: CPT | Performed by: ORTHOPAEDIC SURGERY

## 2025-03-03 PROCEDURE — 1159F MED LIST DOCD IN RCRD: CPT | Performed by: ORTHOPAEDIC SURGERY

## 2025-03-03 NOTE — PROGRESS NOTES
"Barry Cam : 1950 MRN: [id number] DATE: 3/3/2025    DIAGNOSIS:  Follow up for right shoulder arthroplasty, left hand numbness and tingling    SUBJECTIVE:  Mr. Cam returns today for 9 month follow up of a right shoulder replacement. Patient reports doing well with no unusual complaints with respect to the elbow. Denies any limitations due to the right shoulder.    He continues to have left hand numbness and tingling.  Symptoms are constant.  He reports subjective weakness.  He did get the nerve test.  That study is available for review today.  He tried using the brace but it did not help.    OBJECTIVE:    Temp 98.6 °F (37 °C)   Ht 185.4 cm (73\")   Wt (!) 153 kg (336 lb 9.6 oz)   BMI 44.41 kg/m²   Family History   Problem Relation Age of Onset    Depression Mother     Diabetes Mother     Coronary artery disease Father     Diabetes Father     Stroke Father     Hypertension Father     Heart disease Father     Diabetes Brother     Diabetes Maternal Grandmother     Malig Hyperthermia Neg Hx      Past Medical History:   Diagnosis Date    Allergic 1950    Hay Fever    Allergic rhinitis     Anemia 2023    Anxiety     Arthritis     Serious    Arthritis of back     Asthma 2018    PE Blood Clots    Cataract ??    Removed    CHF (congestive heart failure) 2021    Clotting disorder 2018    Eliquis Related    CTS (carpal tunnel syndrome)     Deep vein thrombosis 2018    CHRONIC    Depression     Diabetes mellitus     Diverticulosis     Mild    Dyspnea on exertion     Fibromyalgia, primary     ?????    Fracture, fibula     Right    Fracture, humerus 2024    Fall    Fracture, tibia and fibula     GERD (gastroesophageal reflux disease)     History of hiatal hernia     History of kidney stones     HL (hearing loss)     Mild    Hx of blood clots     Hyperlipidemia     Hypertension     Hypothyroidism     Irritable bowel syndrome     Mild    Kidney stone     Ongoing    Low back " pain     Never Ending    Low back strain     Narcolepsy     Neuropathy     HANDS AND FEET    Obesity     Can't Win    Peptic ulceration     Pulmonary embolism     Right shoulder pain     Rotator cuff syndrome     Sciatica     Sleep apnea     CPAP USED    Tear of meniscus of knee     Left    Umbilical hernia     Visual impairment     Diabetes Related     Past Surgical History:   Procedure Laterality Date    ANKLE OPEN REDUCTION INTERNAL FIXATION      CYSTOSCOPY W/ LASER LITHOTRIPSY  07/20/2023    EYE SURGERY      Cataract Removal    FRACTURE SURGERY      Right Ankle/Foot/Leg  Right Shoulder    HERNIA REPAIR      HERNIA REPAIR      INGUINAL HERNIA REPAIR      JOINT REPLACEMENT Left 03/02/2023    SHOULDER SURGERY      TONSILLECTOMY      TOTAL KNEE ARTHROPLASTY Left 03/02/2023    Procedure: TOTAL KNEE ARTHROPLASTY;  Surgeon: Noah Salcedo MD;  Location: [facility];  Service: Orthopedics;  Laterality: Left;    TOTAL SHOULDER ARTHROPLASTY W/ DISTAL CLAVICLE EXCISION Right 06/11/2024    Procedure: Right Reverse Total Shoulder Arthroplasty;  Surgeon: Noah Salcedo MD;  Location: [facility];  Service: Orthopedics;  Laterality: Right;    UMBILICAL HERNIA REPAIR  Recent     Social History     Socioeconomic History    Marital status:    Tobacco Use    Smoking status: Never     Passive exposure: Never    Smokeless tobacco: Never    Tobacco comments:     caffeine use   Vaping Use    Vaping status: Never Used   Substance and Sexual Activity    Alcohol use: Not Currently    Drug use: No    Sexual activity: Not Currently     Partners: Female     Birth control/protection: Other     14 point review of systems is reviewed with the patient.  Pertinent positives are listed above.  All others are negative.    Exam:     Right shoulder:  incision is well healed.  No effusion.  FE: 160.  ER 50.  IR to his back pocket . The arm is soft and nontender.  Good strength with elevation and abduction. Intact sensation to light  touch.  Palpable radial pulse    Left arm: Skin is benign.  No atrophy.  He has some tenderness over the cubital tunnel but it is not exquisite.  Negative Tinel's though.  Full elbow motion.  He has diminished sensation in the ulnar nerve distribution of his hand.  Otherwise normal sensation.  He has some weakness with  and weakness to the FDP to the small finger.  Weakness with finger abduction as well.    DIAGNOSTIC STUDIES    Xrays: AP, scapular Y and axillary views of the right shoulder are ordered and reviewed for evaluation of shoulder replacement. The x-rays demonstrate a well positioned, well aligned replacement without complicating factors noted. In comparison with previous films there has been no change.    His recent nerve test is reviewed.  He has findings consistent with peripheral neuropathy.  He also has significant dysfunction of the left ulnar nerve below the elbow.  Interestingly the conduction velocity across the elbow looks okay but he is diminished below the elbow.    ASSESSMENT:  1.  9 month follow up for right shoulder replacement  2.  He does have significant peripheral neuropathy as well.    PLAN: His shoulder seems to be doing fine.  We discussed appropriate activity modifications and restrictions.  Discussed antibiotic prophylaxis recommendations as well.    The left upper extremity nerve dysfunction is a complicated issue.  First of all, he has peripheral neuropathy.  To what extent that is contributing to his symptoms is very difficult to determine at this point.  His nerve test shows that the ulnar nerve is compromised below the elbow but yet the conduction velocity across the elbow looks okay.  It could be that he is getting some compression of the nerve as it dives into the fascia just distal to the elbow.      At this point, he has tried the bracing and he feels like the symptoms are worse.  He does not feel like the conservative treatments are working.  I am willing to offer him  an ulnar nerve decompression with possible transposition.  There are certainly risk associated with the surgery but I think the biggest risk would be that the surgery does not really help him.  If that is the case, and most likely indicates that his symptoms are related to the neuropathy and not any sort of compressive phenomenon.  He needs to understand and accept the possibility that the surgery may not help.  He acknowledged understanding of this concern.    We discussed the surgery and all that entails including all risks, benefits, and alternatives.  The risks discussed included infection, wound healing problems, hematoma, persistent pain/numbness/weakness despite the surgery, iatrogenic damage to the ulnar nerve which could result in worsening of his weakness, numbness and dysfunction.  We also talked about positioning related neuropraxia, RSD, DVT, PE and death as well.  No guarrantees were given regarding the results of the procedure.  He has acknowledged understanding and consents to proceed.      Noah Salcedo MD    03/03/2025

## 2025-05-26 ENCOUNTER — PATIENT MESSAGE (OUTPATIENT)
Dept: FAMILY MEDICINE CLINIC | Facility: CLINIC | Age: 75
End: 2025-05-26
Payer: MEDICARE

## 2025-05-26 ENCOUNTER — PATIENT MESSAGE (OUTPATIENT)
Dept: ORTHOPEDIC SURGERY | Facility: CLINIC | Age: 75
End: 2025-05-26
Payer: MEDICARE

## 2025-05-26 DIAGNOSIS — E11.65 TYPE 2 DIABETES MELLITUS WITH HYPERGLYCEMIA, WITHOUT LONG-TERM CURRENT USE OF INSULIN: ICD-10-CM

## 2025-05-26 DIAGNOSIS — G47.14 HYPERSOMNIA DUE TO MEDICAL CONDITION: Primary | ICD-10-CM

## 2025-05-27 RX ORDER — CEPHALEXIN 500 MG/1
CAPSULE ORAL
Qty: 4 CAPSULE | Refills: 5 | Status: SHIPPED | OUTPATIENT
Start: 2025-05-27

## 2025-05-27 RX ORDER — LANCETS
EACH MISCELLANEOUS
Qty: 100 EACH | Refills: 3 | Status: SHIPPED | OUTPATIENT
Start: 2025-05-27

## 2025-05-27 RX ORDER — CEPHALEXIN 500 MG/1
CAPSULE ORAL
Qty: 4 CAPSULE | Refills: 5 | Status: SHIPPED | OUTPATIENT
Start: 2025-05-27 | End: 2025-05-27

## 2025-05-27 RX ORDER — DEXTROAMPHETAMINE SACCHARATE, AMPHETAMINE ASPARTATE, DEXTROAMPHETAMINE SULFATE AND AMPHETAMINE SULFATE 7.5; 7.5; 7.5; 7.5 MG/1; MG/1; MG/1; MG/1
30 TABLET ORAL 2 TIMES DAILY
Qty: 60 TABLET | Refills: 0 | Status: SHIPPED | OUTPATIENT
Start: 2025-05-27

## 2025-06-12 ENCOUNTER — TELEPHONE (OUTPATIENT)
Dept: FAMILY MEDICINE CLINIC | Facility: CLINIC | Age: 75
End: 2025-06-12

## 2025-06-12 DIAGNOSIS — K21.9 GASTROESOPHAGEAL REFLUX DISEASE, UNSPECIFIED WHETHER ESOPHAGITIS PRESENT: ICD-10-CM

## 2025-06-12 DIAGNOSIS — E78.2 MIXED HYPERLIPIDEMIA: ICD-10-CM

## 2025-06-12 DIAGNOSIS — I10 HYPERTENSION, UNSPECIFIED TYPE: ICD-10-CM

## 2025-06-12 DIAGNOSIS — E11.65 TYPE 2 DIABETES MELLITUS WITH HYPERGLYCEMIA, WITHOUT LONG-TERM CURRENT USE OF INSULIN: ICD-10-CM

## 2025-06-12 RX ORDER — PIOGLITAZONE 45 MG/1
45 TABLET ORAL DAILY
Qty: 90 TABLET | Refills: 1 | Status: SHIPPED | OUTPATIENT
Start: 2025-06-12 | End: 2025-06-13

## 2025-06-12 RX ORDER — LOSARTAN POTASSIUM 100 MG/1
100 TABLET ORAL DAILY
Qty: 90 TABLET | Refills: 1 | Status: SHIPPED | OUTPATIENT
Start: 2025-06-12 | End: 2025-06-13

## 2025-06-12 RX ORDER — OMEPRAZOLE 20 MG/1
20 CAPSULE, DELAYED RELEASE ORAL DAILY
Qty: 90 CAPSULE | Refills: 1 | Status: SHIPPED | OUTPATIENT
Start: 2025-06-12 | End: 2025-06-13

## 2025-06-12 NOTE — TELEPHONE ENCOUNTER
Pharmacy Name:  [facility]    Reference Number (if applicable):     Pharmacy representative name: NASH    Pharmacy representative phone number: [phone]     What medication are you calling in regards to: DIABETIC SUPPLIES     What question does the pharmacy have: PHARMACY CALLING STATING THAT THE PATIENTS INSURANCE IS NEEDING A BACKED DATED CM FORM, IT NEEDS TO BE BACK DATED TO 01/28/2025. PLEASE SIGN AND DATE IT TO 01/28/25     Who is the provider that prescribed the medication:      Additional notes: PLEASE ADVISE

## 2025-06-13 DIAGNOSIS — E78.2 MIXED HYPERLIPIDEMIA: ICD-10-CM

## 2025-06-13 DIAGNOSIS — I10 HYPERTENSION, UNSPECIFIED TYPE: ICD-10-CM

## 2025-06-13 DIAGNOSIS — K21.9 GASTROESOPHAGEAL REFLUX DISEASE, UNSPECIFIED WHETHER ESOPHAGITIS PRESENT: ICD-10-CM

## 2025-06-13 DIAGNOSIS — E11.65 TYPE 2 DIABETES MELLITUS WITH HYPERGLYCEMIA, WITHOUT LONG-TERM CURRENT USE OF INSULIN: ICD-10-CM

## 2025-06-13 RX ORDER — PIOGLITAZONE 45 MG/1
45 TABLET ORAL DAILY
Qty: 90 TABLET | Refills: 3 | Status: SHIPPED | OUTPATIENT
Start: 2025-06-13

## 2025-06-13 RX ORDER — LOSARTAN POTASSIUM 100 MG/1
100 TABLET ORAL DAILY
Qty: 90 TABLET | Refills: 3 | Status: SHIPPED | OUTPATIENT
Start: 2025-06-13

## 2025-06-13 RX ORDER — OMEPRAZOLE 20 MG/1
20 CAPSULE, DELAYED RELEASE ORAL DAILY
Qty: 90 CAPSULE | Refills: 3 | Status: SHIPPED | OUTPATIENT
Start: 2025-06-13

## 2025-06-15 DIAGNOSIS — Z86.711 HISTORY OF PULMONARY EMBOLISM: ICD-10-CM

## 2025-06-17 RX ORDER — APIXABAN 5 MG/1
5 TABLET, FILM COATED ORAL
Qty: 180 TABLET | Refills: 3 | Status: SHIPPED | OUTPATIENT
Start: 2025-06-17

## 2025-06-18 NOTE — TELEPHONE ENCOUNTER
Pharmacy Name:  [facility]      Pharmacy representative name: NASH     Pharmacy representative phone number: [phone]      What medication are you calling in regards to: DIABETIC SUPPLIES      What question does the pharmacy have: PHARMACY CALLING STATING THAT THE PATIENTS INSURANCE IS NEEDING A BACKED DATED CM FORM, IT NEEDS TO BE BACK DATED TO 01/28/2025. PLEASE SIGN AND DATE IT TO 01/28/25      Who is the provider that prescribed the medication:       Additional notes: PLEASE ADVISE

## 2025-06-23 DIAGNOSIS — E11.40 DIABETIC NEUROPATHY, PAINFUL: ICD-10-CM

## 2025-06-24 RX ORDER — GABAPENTIN 300 MG/1
600 CAPSULE ORAL 2 TIMES DAILY
Qty: 360 CAPSULE | Refills: 0 | OUTPATIENT
Start: 2025-06-24

## 2025-06-26 DIAGNOSIS — E11.40 DIABETIC NEUROPATHY, PAINFUL: ICD-10-CM

## 2025-06-27 ENCOUNTER — PATIENT MESSAGE (OUTPATIENT)
Dept: FAMILY MEDICINE CLINIC | Facility: CLINIC | Age: 75
End: 2025-06-27
Payer: MEDICARE

## 2025-06-27 DIAGNOSIS — E11.40 DIABETIC NEUROPATHY, PAINFUL: ICD-10-CM

## 2025-06-27 RX ORDER — GABAPENTIN 300 MG/1
600 CAPSULE ORAL 2 TIMES DAILY
Qty: 360 CAPSULE | Refills: 0 | Status: SHIPPED | OUTPATIENT
Start: 2025-06-27

## 2025-06-27 RX ORDER — GABAPENTIN 300 MG/1
600 CAPSULE ORAL 2 TIMES DAILY
Qty: 360 CAPSULE | Refills: 0 | Status: SHIPPED | OUTPATIENT
Start: 2025-06-27 | End: 2025-06-27 | Stop reason: SDUPTHER

## 2025-06-27 RX ORDER — GABAPENTIN 300 MG/1
600 CAPSULE ORAL 2 TIMES DAILY
Qty: 360 CAPSULE | Refills: 0 | OUTPATIENT
Start: 2025-06-27

## 2025-06-27 NOTE — TELEPHONE ENCOUNTER
Medicated per MAR.  
Rx Refill Note  Requested Prescriptions     Pending Prescriptions Disp Refills    gabapentin (NEURONTIN) 300 MG capsule 360 capsule 0     Sig: Take 2 capsules by mouth 2 (Two) Times a Day.      Last office visit with prescribing clinician: 1/22/2025   Last telemedicine visit with prescribing clinician: Visit date not found   Next office visit with prescribing clinician: 8/6/2025                         Would you like a call back once the refill request has been completed: [] Yes [] No    If the office needs to give you a call back, can they leave a voicemail: [] Yes [] No    Kim Espinoza MA  06/27/25, 12:25 EDT  
Patient states that he was discharged yesterday after having esophogeal surgery, was here for 2 weeks complaining of throat pain, on Protonix at home without pain meds, does not feel good

## 2025-07-16 DIAGNOSIS — I10 HYPERTENSION, UNSPECIFIED TYPE: Primary | ICD-10-CM

## 2025-07-16 DIAGNOSIS — E11.65 TYPE 2 DIABETES MELLITUS WITH HYPERGLYCEMIA, WITHOUT LONG-TERM CURRENT USE OF INSULIN: ICD-10-CM

## 2025-07-16 DIAGNOSIS — Z79.899 HIGH RISK MEDICATION USE: Primary | ICD-10-CM

## 2025-07-16 DIAGNOSIS — G47.14 HYPERSOMNIA DUE TO MEDICAL CONDITION: ICD-10-CM

## 2025-07-16 DIAGNOSIS — E11.40 DIABETIC NEUROPATHY, PAINFUL: ICD-10-CM

## 2025-07-16 DIAGNOSIS — E78.2 MIXED HYPERLIPIDEMIA: ICD-10-CM

## 2025-07-16 DIAGNOSIS — E07.9 THYROID DISEASE: ICD-10-CM

## 2025-07-16 DIAGNOSIS — Z12.5 PROSTATE CANCER SCREENING: ICD-10-CM

## 2025-07-16 RX ORDER — DEXTROAMPHETAMINE SACCHARATE, AMPHETAMINE ASPARTATE, DEXTROAMPHETAMINE SULFATE AND AMPHETAMINE SULFATE 7.5; 7.5; 7.5; 7.5 MG/1; MG/1; MG/1; MG/1
30 TABLET ORAL 2 TIMES DAILY
Qty: 60 TABLET | Refills: 0 | OUTPATIENT
Start: 2025-07-16

## 2025-07-16 RX ORDER — GABAPENTIN 300 MG/1
600 CAPSULE ORAL 2 TIMES DAILY
Qty: 360 CAPSULE | Refills: 0 | Status: SHIPPED | OUTPATIENT
Start: 2025-07-16

## 2025-07-16 NOTE — TELEPHONE ENCOUNTER
Rx Refill Note  Requested Prescriptions     Pending Prescriptions Disp Refills    gabapentin (NEURONTIN) 300 MG capsule 360 capsule 0     Sig: Take 2 capsules by mouth 2 (Two) Times a Day.      Last office visit with prescribing clinician: Visit date not found   Last telemedicine visit with prescribing clinician: Visit date not found   Next office visit with prescribing clinician: Visit date not found                         Would you like a call back once the refill request has been completed: [] Yes [] No    If the office needs to give you a call back, can they leave a voicemail: [] Yes [] No    Kim Espinoza MA  07/16/25, 15:22 EDT

## 2025-07-16 NOTE — TELEPHONE ENCOUNTER
Rx Refill Note  Requested Prescriptions     Pending Prescriptions Disp Refills    amphetamine-dextroamphetamine (Adderall) 30 MG tablet 60 tablet 0     Sig: Take 1 tablet by mouth 2 (Two) Times a Day.      Last office visit with prescribing clinician: 1/22/2025   Last telemedicine visit with prescribing clinician: Visit date not found   Next office visit with prescribing clinician: 8/6/2025                         Would you like a call back once the refill request has been completed: [] Yes [] No    If the office needs to give you a call back, can they leave a voicemail: [] Yes [] No    Kim Espinoza MA  07/16/25, 13:04 EDT

## 2025-08-01 LAB
ALBUMIN SERPL-MCNC: 4.3 G/DL (ref 3.5–5.2)
ALBUMIN/GLOB SERPL: 1.7 G/DL
ALP SERPL-CCNC: 60 U/L (ref 39–117)
ALT SERPL-CCNC: 14 U/L (ref 1–41)
AST SERPL-CCNC: 19 U/L (ref 1–40)
BASOPHILS # BLD AUTO: 0.04 10*3/MM3 (ref 0–0.2)
BASOPHILS NFR BLD AUTO: 0.5 % (ref 0–1.5)
BILIRUB SERPL-MCNC: 0.4 MG/DL (ref 0–1.2)
BUN SERPL-MCNC: 19 MG/DL (ref 8–23)
BUN/CREAT SERPL: 13.5 (ref 7–25)
CALCIUM SERPL-MCNC: 9.6 MG/DL (ref 8.6–10.5)
CHLORIDE SERPL-SCNC: 108 MMOL/L (ref 98–107)
CHOLEST SERPL-MCNC: 114 MG/DL (ref 0–200)
CO2 SERPL-SCNC: 23 MMOL/L (ref 22–29)
CREAT SERPL-MCNC: 1.41 MG/DL (ref 0.76–1.27)
EGFRCR SERPLBLD CKD-EPI 2021: 52.6 ML/MIN/1.73
EOSINOPHIL # BLD AUTO: 0.27 10*3/MM3 (ref 0–0.4)
EOSINOPHIL NFR BLD AUTO: 3.7 % (ref 0.3–6.2)
ERYTHROCYTE [DISTWIDTH] IN BLOOD BY AUTOMATED COUNT: 13.2 % (ref 12.3–15.4)
GLOBULIN SER CALC-MCNC: 2.6 GM/DL
GLUCOSE SERPL-MCNC: 131 MG/DL (ref 65–99)
HBA1C MFR BLD: 6.5 % (ref 4.8–5.6)
HCT VFR BLD AUTO: 36 % (ref 37.5–51)
HDLC SERPL-MCNC: 47 MG/DL (ref 40–60)
HGB BLD-MCNC: 12.2 G/DL (ref 13–17.7)
IMM GRANULOCYTES # BLD AUTO: 0.04 10*3/MM3 (ref 0–0.05)
IMM GRANULOCYTES NFR BLD AUTO: 0.5 % (ref 0–0.5)
LDLC SERPL CALC-MCNC: 37 MG/DL (ref 0–100)
LDLC/HDLC SERPL: 0.63 {RATIO}
LYMPHOCYTES # BLD AUTO: 1.51 10*3/MM3 (ref 0.7–3.1)
LYMPHOCYTES NFR BLD AUTO: 20.5 % (ref 19.6–45.3)
MCH RBC QN AUTO: 32 PG (ref 26.6–33)
MCHC RBC AUTO-ENTMCNC: 33.9 G/DL (ref 31.5–35.7)
MCV RBC AUTO: 94.5 FL (ref 79–97)
MONOCYTES # BLD AUTO: 0.56 10*3/MM3 (ref 0.1–0.9)
MONOCYTES NFR BLD AUTO: 7.6 % (ref 5–12)
NEUTROPHILS # BLD AUTO: 4.93 10*3/MM3 (ref 1.7–7)
NEUTROPHILS NFR BLD AUTO: 67.2 % (ref 42.7–76)
NRBC BLD AUTO-RTO: 0 /100 WBC (ref 0–0.2)
PLATELET # BLD AUTO: 184 10*3/MM3 (ref 140–450)
POTASSIUM SERPL-SCNC: 3.9 MMOL/L (ref 3.5–5.2)
PROT SERPL-MCNC: 6.9 G/DL (ref 6–8.5)
PSA SERPL-MCNC: 0.77 NG/ML (ref 0–4)
RBC # BLD AUTO: 3.81 10*6/MM3 (ref 4.14–5.8)
SODIUM SERPL-SCNC: 144 MMOL/L (ref 136–145)
T4 FREE SERPL-MCNC: 1.05 NG/DL (ref 0.92–1.68)
TRIGL SERPL-MCNC: 188 MG/DL (ref 0–150)
TSH SERPL DL<=0.005 MIU/L-ACNC: 3.7 UIU/ML (ref 0.27–4.2)
VLDLC SERPL CALC-MCNC: 30 MG/DL (ref 5–40)
WBC # BLD AUTO: 7.35 10*3/MM3 (ref 3.4–10.8)

## 2025-08-06 ENCOUNTER — OFFICE VISIT (OUTPATIENT)
Dept: FAMILY MEDICINE CLINIC | Facility: CLINIC | Age: 75
End: 2025-08-06
Payer: MEDICARE

## 2025-08-06 VITALS
WEIGHT: 315 LBS | OXYGEN SATURATION: 97 % | HEIGHT: 73 IN | RESPIRATION RATE: 16 BRPM | DIASTOLIC BLOOD PRESSURE: 62 MMHG | TEMPERATURE: 97 F | SYSTOLIC BLOOD PRESSURE: 144 MMHG | BODY MASS INDEX: 41.75 KG/M2 | HEART RATE: 84 BPM

## 2025-08-06 DIAGNOSIS — E78.2 MIXED HYPERLIPIDEMIA: ICD-10-CM

## 2025-08-06 DIAGNOSIS — I10 PRIMARY HYPERTENSION: ICD-10-CM

## 2025-08-06 DIAGNOSIS — Z12.5 SCREENING PSA (PROSTATE SPECIFIC ANTIGEN): ICD-10-CM

## 2025-08-06 DIAGNOSIS — G47.19 EXCESSIVE DAYTIME SLEEPINESS: ICD-10-CM

## 2025-08-06 DIAGNOSIS — E11.65 TYPE 2 DIABETES MELLITUS WITH HYPERGLYCEMIA, WITHOUT LONG-TERM CURRENT USE OF INSULIN: Primary | ICD-10-CM

## 2025-08-06 DIAGNOSIS — Z79.899 HIGH RISK MEDICATION USE: ICD-10-CM

## 2025-08-06 DIAGNOSIS — E11.40 DIABETIC NEUROPATHY, PAINFUL: ICD-10-CM

## 2025-08-06 RX ORDER — DEXTROAMPHETAMINE SACCHARATE, AMPHETAMINE ASPARTATE, DEXTROAMPHETAMINE SULFATE AND AMPHETAMINE SULFATE 7.5; 7.5; 7.5; 7.5 MG/1; MG/1; MG/1; MG/1
30 TABLET ORAL 2 TIMES DAILY
Qty: 60 TABLET | Refills: 0 | Status: SHIPPED | OUTPATIENT
Start: 2025-08-06

## 2025-08-08 LAB
ALBUMIN/CREAT UR: 14 MG/G CREAT (ref 0–29)
AMPHETAMINES UR QL SCN: POSITIVE NG/ML
BARBITURATES UR QL SCN: NEGATIVE NG/ML
BENZODIAZ UR QL SCN: NEGATIVE NG/ML
BZE UR QL SCN: NEGATIVE NG/ML
CANNABINOIDS UR QL SCN: NEGATIVE NG/ML
CREAT UR-MCNC: 137.1 MG/DL (ref 20–300)
CREAT UR-MCNC: 139.1 MG/DL
LABORATORY COMMENT REPORT: ABNORMAL
METHADONE UR QL SCN: NEGATIVE NG/ML
MICROALBUMIN UR-MCNC: 18.8 UG/ML
OPIATES UR QL SCN: NEGATIVE NG/ML
OXYCODONE+OXYMORPHONE UR QL SCN: NEGATIVE NG/ML
PCP UR QL: NEGATIVE NG/ML
PH UR: 4.9 [PH] (ref 4.5–8.9)
PROPOXYPH UR QL SCN: NEGATIVE NG/ML

## 2025-08-21 ENCOUNTER — OFFICE VISIT (OUTPATIENT)
Dept: SLEEP MEDICINE | Facility: HOSPITAL | Age: 75
End: 2025-08-21
Payer: MEDICARE

## 2025-08-21 VITALS — OXYGEN SATURATION: 94 % | HEART RATE: 92 BPM | BODY MASS INDEX: 41.75 KG/M2 | HEIGHT: 73 IN | WEIGHT: 315 LBS

## 2025-08-21 DIAGNOSIS — G47.14 HYPERSOMNIA DUE TO MEDICAL CONDITION: ICD-10-CM

## 2025-08-21 DIAGNOSIS — G47.33 OBSTRUCTIVE SLEEP APNEA SYNDROME: ICD-10-CM

## 2025-08-21 DIAGNOSIS — G47.36 HYPOXEMIA ASSOCIATED WITH SLEEP: ICD-10-CM

## 2025-08-21 DIAGNOSIS — E66.813 CLASS 3 SEVERE OBESITY DUE TO EXCESS CALORIES WITH SERIOUS COMORBIDITY AND BODY MASS INDEX (BMI) OF 40.0 TO 44.9 IN ADULT: Primary | ICD-10-CM

## 2025-08-21 PROCEDURE — G0463 HOSPITAL OUTPT CLINIC VISIT: HCPCS

## 2025-08-23 PROBLEM — G47.36 HYPOXEMIA ASSOCIATED WITH SLEEP: Status: ACTIVE | Noted: 2025-08-23

## (undated) DEVICE — GLV SURG SENSICARE PI MIC PF SZ6.5 LF STRL

## (undated) DEVICE — SKIN PREP TRAY W/CHG: Brand: MEDLINE INDUSTRIES, INC.

## (undated) DEVICE — DRSNG SURESITE WNDW 4X4.5

## (undated) DEVICE — GLV SURG BIOGEL LTX PF 8 1/2

## (undated) DEVICE — APPL CHLORAPREP HI/LITE 26ML ORNG

## (undated) DEVICE — SOL IRR NACL 0.9PCT 3000ML

## (undated) DEVICE — PK SHLDR OPN 40

## (undated) DEVICE — MAT FLR ABSORBENT LG 4FT 10 2.5FT

## (undated) DEVICE — PAD,NON-ADHERENT,3X8,STERILE,LF,1/PK: Brand: MEDLINE

## (undated) DEVICE — GLV SURG SENSICARE POLYISPRN W/ALOE PF LF 6.5 GRN STRL

## (undated) DEVICE — STERILE PATIENT PROTECTIVE PAD FOR IMP® KNEE POSITIONERS & COHESIVE WRAP (10 / CASE): Brand: DE MAYO KNEE POSITIONER®

## (undated) DEVICE — SUT SILK 2/0 TIES 18IN A185H

## (undated) DEVICE — ZIP 24 SURGICAL SKIN CLOSURE DEVICE, PSA: Brand: ZIP 24 SURGICAL SKIN CLOSURE DEVICE

## (undated) DEVICE — DRAPE,U/ SHT,SPLIT,PLAS,STERIL: Brand: MEDLINE

## (undated) DEVICE — CEMENT MIXING SYSTEM WITH FEMORAL BREAKWAY NOZZLE: Brand: REVOLUTION

## (undated) DEVICE — SUT VIC 2/0 CT2 27IN J269H

## (undated) DEVICE — HANDPIECE SET WITH COAXIAL HIGH FLOW TIP AND SUCTION TUBE: Brand: INTERPULSE

## (undated) DEVICE — PK KN TOTL 40

## (undated) DEVICE — GLV SURG BIOGEL LTX PF 6 1/2

## (undated) DEVICE — BIT DRL SCRW PERIPH 2.7MM

## (undated) DEVICE — GLV SURG SIGNATURE ESSENTIAL PF LTX SZ8.5

## (undated) DEVICE — STRIP,CLOSURE,WOUND,MEDI-STRIP,1/2X4: Brand: MEDLINE

## (undated) DEVICE — GLV SURG BIOGEL LTX PF 7

## (undated) DEVICE — SOL ISO/ALC 70PCT 4OZ

## (undated) DEVICE — IMMOB SHLDR PAD2 UNIV LG

## (undated) DEVICE — DRAPE,REIN 53X77,STERILE: Brand: MEDLINE

## (undated) DEVICE — PREP SOL POVIDONE/IODINE BT 4OZ

## (undated) DEVICE — NEEDLE, QUINCKE, 20GX3.5": Brand: MEDLINE

## (undated) DEVICE — IMPLANTABLE DEVICE
Type: IMPLANTABLE DEVICE | Site: SHOULDER | Status: NON-FUNCTIONAL
Brand: COMPREHENSIVE® REVERSE SHOULDER
Removed: 2024-06-11

## (undated) DEVICE — MARKER,SKIN,WI/RULER AND LABELS: Brand: MEDLINE

## (undated) DEVICE — ANTIBACTERIAL UNDYED BRAIDED (POLYGLACTIN 910), SYNTHETIC ABSORBABLE SUTURE: Brand: COATED VICRYL

## (undated) DEVICE — DISPOSABLE TOURNIQUET CUFF SINGLE BLADDER, SINGLE PORT AND QUICK CONNECT CONNECTOR: Brand: COLOR CUFF

## (undated) DEVICE — UNDERCAST PADDING: Brand: DEROYAL

## (undated) DEVICE — TBG PENCL TELESCP MEGADYNE SMOKE EVAC 10FT

## (undated) DEVICE — TRAP FLD MINIVAC MEGADYNE 100ML

## (undated) DEVICE — GLV SURG SENSICARE PI MIC PF SZ7 LF STRL

## (undated) DEVICE — DUAL CUT SAGITTAL BLADE

## (undated) DEVICE — DECANTER BAG 9": Brand: MEDLINE INDUSTRIES, INC.

## (undated) DEVICE — PATIENT RETURN ELECTRODE, SINGLE-USE, CONTACT QUALITY MONITORING, ADULT, WITH 9FT CORD, FOR PATIENTS WEIGING OVER 33LBS. (15KG): Brand: MEGADYNE

## (undated) DEVICE — DRAPE,SHOULDER,BEACH ULTRAGARD: Brand: MEDLINE

## (undated) DEVICE — BNDG ELAS ELITE V/CLOSE 6IN 5YD LF STRL

## (undated) DEVICE — 3M™ IOBAN™ 2 ANTIMICROBIAL INCISE DRAPE 6640EZ: Brand: IOBAN™ 2